# Patient Record
Sex: MALE | Race: WHITE | NOT HISPANIC OR LATINO | Employment: OTHER | URBAN - METROPOLITAN AREA
[De-identification: names, ages, dates, MRNs, and addresses within clinical notes are randomized per-mention and may not be internally consistent; named-entity substitution may affect disease eponyms.]

---

## 2017-02-24 ENCOUNTER — ALLSCRIPTS OFFICE VISIT (OUTPATIENT)
Dept: OTHER | Facility: OTHER | Age: 77
End: 2017-02-24

## 2017-02-24 DIAGNOSIS — E78.00 PURE HYPERCHOLESTEROLEMIA: ICD-10-CM

## 2017-02-24 DIAGNOSIS — I10 ESSENTIAL (PRIMARY) HYPERTENSION: ICD-10-CM

## 2017-02-24 DIAGNOSIS — E11.51 TYPE 2 DIABETES MELLITUS WITH DIABETIC PERIPHERAL ANGIOPATHY WITHOUT GANGRENE (HCC): ICD-10-CM

## 2017-03-01 ENCOUNTER — APPOINTMENT (OUTPATIENT)
Dept: LAB | Facility: CLINIC | Age: 77
End: 2017-03-01
Payer: MEDICARE

## 2017-03-01 ENCOUNTER — TRANSCRIBE ORDERS (OUTPATIENT)
Dept: LAB | Facility: CLINIC | Age: 77
End: 2017-03-01

## 2017-03-01 DIAGNOSIS — I10 ESSENTIAL (PRIMARY) HYPERTENSION: ICD-10-CM

## 2017-03-01 DIAGNOSIS — E11.51 TYPE 2 DIABETES MELLITUS WITH DIABETIC PERIPHERAL ANGIOPATHY WITHOUT GANGRENE (HCC): ICD-10-CM

## 2017-03-01 DIAGNOSIS — E78.00 PURE HYPERCHOLESTEROLEMIA: ICD-10-CM

## 2017-03-01 LAB
ALBUMIN SERPL BCP-MCNC: 4 G/DL (ref 3.5–5)
ALP SERPL-CCNC: 41 U/L (ref 46–116)
ALT SERPL W P-5'-P-CCNC: 40 U/L (ref 12–78)
ANION GAP SERPL CALCULATED.3IONS-SCNC: 8 MMOL/L (ref 4–13)
AST SERPL W P-5'-P-CCNC: 24 U/L (ref 5–45)
BASOPHILS # BLD AUTO: 0.01 THOUSANDS/ΜL (ref 0–0.1)
BASOPHILS NFR BLD AUTO: 0 % (ref 0–1)
BILIRUB SERPL-MCNC: 1.58 MG/DL (ref 0.2–1)
BUN SERPL-MCNC: 26 MG/DL (ref 5–25)
CALCIUM SERPL-MCNC: 9.3 MG/DL (ref 8.3–10.1)
CHLORIDE SERPL-SCNC: 102 MMOL/L (ref 100–108)
CHOLEST SERPL-MCNC: 144 MG/DL (ref 50–200)
CO2 SERPL-SCNC: 29 MMOL/L (ref 21–32)
CREAT SERPL-MCNC: 1.52 MG/DL (ref 0.6–1.3)
CREAT UR-MCNC: 91.6 MG/DL
EOSINOPHIL # BLD AUTO: 0.4 THOUSAND/ΜL (ref 0–0.61)
EOSINOPHIL NFR BLD AUTO: 7 % (ref 0–6)
ERYTHROCYTE [DISTWIDTH] IN BLOOD BY AUTOMATED COUNT: 13.3 % (ref 11.6–15.1)
EST. AVERAGE GLUCOSE BLD GHB EST-MCNC: 160 MG/DL
GFR SERPL CREATININE-BSD FRML MDRD: 44.6 ML/MIN/1.73SQ M
GLUCOSE SERPL-MCNC: 151 MG/DL (ref 65–140)
HBA1C MFR BLD: 7.2 % (ref 4.2–6.3)
HCT VFR BLD AUTO: 45.9 % (ref 36.5–49.3)
HDLC SERPL-MCNC: 46 MG/DL (ref 40–60)
HGB BLD-MCNC: 16.2 G/DL (ref 12–17)
LDLC SERPL CALC-MCNC: 67 MG/DL (ref 0–100)
LYMPHOCYTES # BLD AUTO: 1.72 THOUSANDS/ΜL (ref 0.6–4.47)
LYMPHOCYTES NFR BLD AUTO: 29 % (ref 14–44)
MCH RBC QN AUTO: 33.5 PG (ref 26.8–34.3)
MCHC RBC AUTO-ENTMCNC: 35.3 G/DL (ref 31.4–37.4)
MCV RBC AUTO: 95 FL (ref 82–98)
MICROALBUMIN UR-MCNC: 111 MG/L (ref 0–20)
MICROALBUMIN/CREAT 24H UR: 121 MG/G CREATININE (ref 0–30)
MONOCYTES # BLD AUTO: 0.58 THOUSAND/ΜL (ref 0.17–1.22)
MONOCYTES NFR BLD AUTO: 10 % (ref 4–12)
NEUTROPHILS # BLD AUTO: 3.27 THOUSANDS/ΜL (ref 1.85–7.62)
NEUTS SEG NFR BLD AUTO: 54 % (ref 43–75)
NRBC BLD AUTO-RTO: 0 /100 WBCS
PLATELET # BLD AUTO: 186 THOUSANDS/UL (ref 149–390)
PMV BLD AUTO: 10.8 FL (ref 8.9–12.7)
POTASSIUM SERPL-SCNC: 3.8 MMOL/L (ref 3.5–5.3)
PROT SERPL-MCNC: 7.2 G/DL (ref 6.4–8.2)
RBC # BLD AUTO: 4.84 MILLION/UL (ref 3.88–5.62)
SODIUM SERPL-SCNC: 139 MMOL/L (ref 136–145)
TRIGL SERPL-MCNC: 153 MG/DL
WBC # BLD AUTO: 5.99 THOUSAND/UL (ref 4.31–10.16)

## 2017-03-01 PROCEDURE — 85025 COMPLETE CBC W/AUTO DIFF WBC: CPT

## 2017-03-01 PROCEDURE — 82043 UR ALBUMIN QUANTITATIVE: CPT

## 2017-03-01 PROCEDURE — 36415 COLL VENOUS BLD VENIPUNCTURE: CPT

## 2017-03-01 PROCEDURE — 82570 ASSAY OF URINE CREATININE: CPT

## 2017-03-01 PROCEDURE — 80061 LIPID PANEL: CPT

## 2017-03-01 PROCEDURE — 83036 HEMOGLOBIN GLYCOSYLATED A1C: CPT

## 2017-03-01 PROCEDURE — 80053 COMPREHEN METABOLIC PANEL: CPT

## 2017-03-02 ENCOUNTER — GENERIC CONVERSION - ENCOUNTER (OUTPATIENT)
Dept: OTHER | Facility: OTHER | Age: 77
End: 2017-03-02

## 2017-03-28 ENCOUNTER — GENERIC CONVERSION - ENCOUNTER (OUTPATIENT)
Dept: OTHER | Facility: OTHER | Age: 77
End: 2017-03-28

## 2017-09-18 ENCOUNTER — ALLSCRIPTS OFFICE VISIT (OUTPATIENT)
Dept: OTHER | Facility: OTHER | Age: 77
End: 2017-09-18

## 2017-09-18 DIAGNOSIS — L81.9 DISORDER OF PIGMENTATION: ICD-10-CM

## 2017-09-18 PROCEDURE — 88305 TISSUE EXAM BY PATHOLOGIST: CPT | Performed by: INTERNAL MEDICINE

## 2017-09-19 ENCOUNTER — LAB REQUISITION (OUTPATIENT)
Dept: LAB | Facility: HOSPITAL | Age: 77
End: 2017-09-19
Payer: MEDICARE

## 2017-09-19 DIAGNOSIS — L81.9 DISORDER OF PIGMENTATION: ICD-10-CM

## 2017-09-27 ENCOUNTER — GENERIC CONVERSION - ENCOUNTER (OUTPATIENT)
Dept: OTHER | Facility: OTHER | Age: 77
End: 2017-09-27

## 2017-11-02 ENCOUNTER — GENERIC CONVERSION - ENCOUNTER (OUTPATIENT)
Dept: OTHER | Facility: OTHER | Age: 77
End: 2017-11-02

## 2018-01-10 NOTE — RESULT NOTES
Verified Results  (1) TISSUE EXAM [id number] 06:01PM Sydnee Lozano     Test Name Result Flag Reference   LAB AP CASE REPORT (Report)     Surgical Pathology Report             Case: [id number]                   Authorizing Provider: Tiffnay Johnson DO     Collected:      09/18/2017           Pathologist:      Elsa Dunaway MD      Received:      09/19/2017 0875        Specimen:  Skin, Other, Left Lateral Arm   LAB AP FINAL DIAGNOSIS (Report)     A  Skin, Left Lateral Arm, shave biopsy:  - Seborrheic keratosis, pigmented, inflamed/lichenoid and hyperkeratotic  Interpretation performed at [place] [address]  Electronically signed by Elsa Dunaway MD on 9/25/2017 at 6:01 PM   LAB AP SURGICAL ADDITIONAL INFORMATION (Report)     All controls performed with the immunohistochemical stains reported above   reacted appropriately  These tests were developed and their performance   characteristics determined by [facility] or   [address] [place] [address]  They may not be cleared or approved by the [place] S  Food and Drug Administration  The FDA has determined that such clearance   or approval is not necessary  These tests are used for clinical purposes  They should not be regarded as investigational or for research  This   laboratory has been approved by [place] [address], designated as a high-complexity   laboratory and is qualified to perform these tests  LAB AP GROSS DESCRIPTION (Report)     A  The specimen is received in formalin, labeled with the patient's name   and hospital number, and is designated skin shave left lateral arm  The   specimen consists of a tan superficial shave of skin measuring 1 x 0 8 x   0 1 cm  The skin surface exhibits a tan keratotic papule measuring 0 9 x   0 8 x 0 1 cm  The margin of resection is inked green  The skin surface is   inked red  The specimen is trisected lengthwise  Entirely submitted  One   cassette      Note: The estimated total formalin fixation time based upon information   provided by the submitting clinician and the standard processing schedule   is less than 72 hours   MAC   LAB AP CLINICAL INFORMATION      1 cm atypical pigmented lesion

## 2018-01-13 VITALS
HEART RATE: 76 BPM | BODY MASS INDEX: 28.56 KG/M2 | SYSTOLIC BLOOD PRESSURE: 134 MMHG | DIASTOLIC BLOOD PRESSURE: 82 MMHG | HEIGHT: 67 IN | RESPIRATION RATE: 16 BRPM | TEMPERATURE: 98.3 F | WEIGHT: 182 LBS

## 2018-01-13 NOTE — MISCELLANEOUS
Message   Date: 29 Apr 2016 10:43 AM EST, Recorded By: Jazmyn Galvez For: 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)   3  Benign essential hypertension (401 1) (I10)   4  BPH with urinary obstruction (600 01,599 69) (N40 1,N13 8)   5  Chronic obstructive pulmonary disease (496) (J44 9)   6  Condition influencing health status (V49 9) (Z78 9)   7  Coronary artery disease (414 00) (I25 10)   8  Depression screen (V79 0) (Z13 89)   9  Diabetes mellitus with peripheral circulatory disorder (250 70,443 81) (E11 51)   10  DMII (diabetes mellitus, type 2) (250 00) (E11 9)   11  Encounter for screening colonoscopy (V76 51) (Z12 11)   12  Eye exam, routine (V72 0) (Z01 00)   13  Fungal dermatosis (111 9) (B36 9)   14  Hydronephrosis (591) (N13 30)   15  Hypercholesterolemia (272 0) (E78 0)   16  Hypertension (401 9) (I10)   17  Need for influenza vaccination (V04 81) (Z23)   18  Need for shingles vaccine (V04 89) (Z23)   19  Need for tetanus booster (V03 7) (Z23)   20  Nocturnal polyuria (788 43) (R35 1)   21  Osteoarthritis (715 90) (M19 90)   22  Osteoporosis (733 00) (M81 0)   23  Peripheral arterial disease (443 9) (I73 9)   24  Prostatitis (601 9) (N41 9)   25  Screening for AAA (abdominal aortic aneurysm) (V81 2) (Z13 6)   26  Screening for genitourinary condition (V81 6) (Z13 89)   27  Screening for glaucoma (V80 1) (Z13 5)   28  Screening for neurological condition (V80 09) (Z13 89)   29  Sleep related leg cramps (327 52) (G47 62)   30  Special screening examination for neoplasm of prostate (V76 44) (Z12 5)   31   Urinary incontinence (788 30) (R32) 32  Urinary tract infection (599 0) (N39 0)    Current Meds   1  Atenolol 50 MG Oral Tablet; TAKE 1 TABLET DAILY; Therapy: 05FUX9442 to (Evaluate:18Jun2016)  Requested for: 61TUD6637; Last   Rx:77Eph8014 Ordered   2  Monie Low Strength 81 MG TBEC; Therapy: (Recorded:14Cpg5646) to Recorded   3  Ciprofloxacin HCl - 500 MG Oral Tablet; Take 1 tablet twice daily; Therapy: 91LTS5653 to (Thony Hem)  Requested for: 36OTS9603; Last   Rx:53Hcq4432 Ordered   4  Cyclobenzaprine HCl - 10 MG Oral Tablet; TAKE ONE OR  TWO TABLETS BY MOUTH    DAILY; Therapy: 93INT6833 to (Raymond Apple)  Requested for: 23Rmm7842; Last   Rx:23Ksb9937 Ordered   5  Finasteride 5 MG Oral Tablet; TAKE 1 TABLET DAILY; Therapy: 55LRG8692 to (Evaluate:37Big0597)  Requested for: 98ZCQ9308; Last   Rx:24Hmf9362 Ordered   6  MetFORMIN HCl - 500 MG Oral Tablet; 1 TAB PO QD; Therapy: 09LLQ7044 to (Evaluate:89Uwj7111)  Requested for: 21Mar2016; Last   Rx:21Mar2016 Ordered   7  OneTouch UltraSoft Lancets Miscellaneous; USE AS DIRECTED; Therapy: 75Mlw2617 to (05 12 73 93 30)  Requested for: 82QUZ9868; Last   Rx:88Rkb1144 Ordered   8  Simvastatin 10 MG Oral Tablet; TAKE 1 TABLET IN THE EVENING; Therapy: 30CQL8602 to (Evaluate:97Dlz1455)  Requested for: 37HXP7015; Last   Rx:84Vgu5240 Ordered   9  Tamsulosin HCl - 0 4 MG Oral Capsule; TAKE 1 CAPSULE Bedtime; Therapy: 52AYN6461 to (Last Rx:35Xfk3691)  Requested for: 49WIT2721 Ordered   10  Terazosin HCl - 2 MG Oral Capsule; TAKE 2 CAPSULES DAILY; Therapy: 56OAV2119 to (Evaluate:70Ale4153)  Requested for: 74ZOT3813; Last    Rx:67Gsb3564 Ordered   11  Zoster (Zostavax); INJECT 0 65  ML Subcutaneous; Therapy: 19Oul6503 to (Evaluate:85Acp3847); Last Rx:59Tkz3180 Ordered    Allergies    1  No Known Drug Allergies    Signatures   Electronically signed by : Sayda Luo DO;  Apr 29 2016  1:00PM EST                       (Co-author)

## 2018-01-15 VITALS
SYSTOLIC BLOOD PRESSURE: 146 MMHG | WEIGHT: 173.13 LBS | OXYGEN SATURATION: 96 % | HEART RATE: 88 BPM | DIASTOLIC BLOOD PRESSURE: 92 MMHG | TEMPERATURE: 98.5 F | HEIGHT: 67 IN | RESPIRATION RATE: 18 BRPM | BODY MASS INDEX: 27.17 KG/M2

## 2018-01-17 NOTE — RESULT NOTES
Verified Results  (1) CALCULI, RENAL [id number] 06:54PM Reza Simpson     Test Name Result Flag Reference   COLOR Brown     SIZE Comment mm     Specimen received as whole stones  STONE WEIGHT 742 7 mg     COMPOSITION Comment     Percentage (Represents the % composition)   CA OXALATE,MONOHYDR  70 %     CALCIUM PHOSPHATE 30 %     NIDUS Comment     No Nidus visualized   COMMENT-STONE2 Note:     Please do not submit specimens on Q-Tips, in tape, on filters, or inliquids such as blood, urine or formalin  This may cause unnecessarybiohazards, erroneous results and/or delay in the processing of thespecimen  PLEASE NOTE Comment     Calculi report with photograph will follow via computer, mail orcourier delivery  COMMENT-STONE3 Comment     Physician questions regarding Calculi Analysis contact [facility] at:[phone]  PHOTO Comment     Photograph will follow under separate cover    Performed at:  [address]  [id number]  : Margarita Navarro MD, Phone:  [phone]

## 2018-01-18 NOTE — RESULT NOTES
Verified Results  (1) CBC/PLT/DIFF 59OJM8951 08:14AM [facility] Order Number: [id number]     Test Name Result Flag Reference   WBC COUNT 5 99 Thousand/uL  4 31-10 16   RBC COUNT 4 84 Million/uL  3 88-5 62   HEMOGLOBIN 16 2 g/dL  12 0-17 0   HEMATOCRIT 45 9 %  36 5-49 3   MCV 95 fL  82-98   MCH 33 5 pg  26 8-34 3   MCHC 35 3 g/dL  31 4-37 4   RDW 13 3 %  11 6-15 1   MPV 10 8 fL  8 9-12 7   PLATELET COUNT 888 Thousands/uL  149-390   nRBC AUTOMATED 0 /100 WBCs     NEUTROPHILS RELATIVE PERCENT 54 %  43-75   LYMPHOCYTES RELATIVE PERCENT 29 %  14-44   MONOCYTES RELATIVE PERCENT 10 %  4-12   EOSINOPHILS RELATIVE PERCENT 7 % H 0-6   BASOPHILS RELATIVE PERCENT 0 %  0-1   NEUTROPHILS ABSOLUTE COUNT 3 27 Thousands/? ??L  1 85-7 62   LYMPHOCYTES ABSOLUTE COUNT 1 72 Thousands/? ??L  0 60-4 47   MONOCYTES ABSOLUTE COUNT 0 58 Thousand/? ??L  0 17-1 22   EOSINOPHILS ABSOLUTE COUNT 0 40 Thousand/? ??L  0 00-0 61   BASOPHILS ABSOLUTE COUNT 0 01 Thousands/? ??L  0 00-0 10   - Patient Instructions: This bloodwork is non-fasting  Please drink two glasses of water morning of bloodwork  - Patient Instructions: This bloodwork is non-fasting  Please drink two glasses of water morning of bloodwork  (1) COMPREHENSIVE METABOLIC PANEL 50IBE6111 70:46GB [facility] Order Number: [id number]     Test Name Result Flag Reference   GLUCOSE,RANDM 151 mg/dL H    If the patient is fasting, the ADA then defines impaired fasting glucose as > 100 mg/dL and diabetes as > or equal to 123 mg/dL     SODIUM 139 mmol/L  136-145   POTASSIUM 3 8 mmol/L  3 5-5 3   CHLORIDE 102 mmol/L  100-108   CARBON DIOXIDE 29 mmol/L  21-32   ANION GAP (CALC) 8 mmol/L  4-13   BLOOD UREA NITROGEN 26 mg/dL H 5-25   CREATININE 1 52 mg/dL H 0 60-1 30   Standardized to IDMS reference method   CALCIUM 9 3 mg/dL  8 3-10 1   BILI, TOTAL 1 58 mg/dL H 0 20-1 00   ALK PHOSPHATAS 41 U/L L    ALT (SGPT) 40 U/L  12-78   AST(SGOT) 24 U/L  5-45 ALBUMIN 4 0 g/dL  3 5-5 0   TOTAL PROTEIN 7 2 g/dL  6 4-8 2   eGFR Non-African American 44 6 ml/min/1 73sq m     - Patient Instructions: This is a fasting blood test  Water, black tea or black coffee only after 9:00pm the night before test Drink 2 glasses of water the morning of test   National Kidney Disease Education Program recommendations are as follows:  GFR calculation is accurate only with a steady state creatinine  Chronic Kidney disease less than 60 ml/min/1 73 sq  meters  Kidney failure less than 15 ml/min/1 73 sq  meters  (1) LIPID PANEL FASTING W DIRECT LDL REFLEX [id number] 08:14AM Bertha London Order Number: [id number]     Test Name Result Flag Reference   CHOLESTEROL 144 mg/dL     LDL CHOLESTEROL CALCULATED 67 mg/dL  0-100   - Patient Instructions: This is a fasting blood test  Water, black tea or black coffee only after 9:00pm the night before test   Drink 2 glasses of water the morning of test     - Patient Instructions: This is a fasting blood test  Water, black tea or black coffee only after 9:00pm the night before test Drink 2 glasses of water the morning of test   Triglyceride:         Normal              <150 mg/dl       Borderline High    150-199 mg/dl       High               200-499 mg/dl       Very High          >499 mg/dl  Cholesterol:         Desirable        <200 mg/dl      Borderline High  200-239 mg/dl      High             >239 mg/dl  HDL Cholesterol:        High    >59 mg/dL      Low     <41 mg/dL  LDL Cholesterol:        Optimal          <100 mg/dl        Near Optimal     100-129 mg/dl        Above Optimal          Borderline High   130-159 mg/dl          High              160-189 mg/dl          Very High        >189 mg/dl  LDL CALCULATED:    This screening LDL is a calculated result  It does not have the accuracy of the Direct Measured LDL in the monitoring of patients with hyperlipidemia and/or statin therapy     Direct Measure LDL (EQA775) must be ordered separately in these patients  TRIGLYCERIDES 153 mg/dL H <=150   Specimen collection should occur prior to N-Acetylcysteine or Metamizole administration due to the potential for falsely depressed results  HDL,DIRECT 46 mg/dL  40-60   Specimen collection should occur prior to Metamizole administration due to the potential for falsely depressed results  (1) HEMOGLOBIN A1C [id number] 08:14AM mYwindow Order Number: [id number]     Test Name Result Flag Reference   HEMOGLOBIN A1C 7 2 % H 4 2-6 3   EST  AVG   GLUCOSE 160 mg/dl       (1) MICROALBUMIN CREATININE RATIO, RANDOM URINE [id number] 08:14AM [facility] Order Number: [id number]     Test Name Result Flag Reference   MICROALBUMIN/ CREAT R 121 mg/g creatinine H 0-30   MICROALBUMIN,URINE 111 0 mg/L H 0 0-20 0   CREATININE URINE 91 6 mg/dL

## 2018-01-18 NOTE — RESULT NOTES
Verified Results  (1) TISSUE EXAM [id number] 12:00AM Mark Miranda     Test Name Result Flag Reference   LAB AP CASE REPORT (Report)     Surgical Pathology Report             Case: [id number]                   Authorizing Provider: Fahad Awan DO     Collected:      11/09/2016           Pathologist:      Kori Cueto MD      Received:      11/10/2016 1538        Specimens:  A) - Skin, Other, Skin shaving nose                                  B) - Skin, Other, Skin shaving right ear   LAB AP FINAL DIAGNOSIS (Report)     A  Skin, nose, shave biopsy:  - Ulcerated basal cell carcinoma, nodular type with metatypical features,   extending    to the peripheral border and base of biopsy  See Note  B  Skin, right ear, shave biopsy:  - Ulcerated basal cell carcinoma, nodular and adenoid types, extending to   the peripheral    border and base of biopsy  Interpretation performed at [place] [address]  Electronically signed by Kori Cueto MD on 11/14/2016 at 4:07 PM   LAB AP NOTE      In specimen A, the differential diagnosis includes a basosquamous   carcinoma  LAB AP SURGICAL ADDITIONAL INFORMATION (Report)     These tests were developed and their performance characteristics   determined by [facility] or [place]  They may not be cleared or approved by the [place]  Food and   Drug Administration  The FDA has determined that such clearance or   approval is not necessary  These tests are used for clinical purposes  They should not be regarded as investigational or for research  This   laboratory has been approved by CLIA 88, designated as a high-complexity   laboratory and is qualified to perform these tests  LAB AP GROSS DESCRIPTION (Report)     A  The specimen is received in formalin, labeled with the patient's name   and hospital number, and is designated skin shave nose   The specimen   consists of a tan superficial shave of skin measuring 0 6 x 0 4 x 0 1 cm  The skin surface exhibits a tan plaque-like lesion measuring 0 5 x 0 3 x   0 1 cm  The margin is inked blue  The skin surface is inked red  The   specimen is bisected lengthwise  Entirely submitted  One cassette  B  The specimen is received in formalin, labeled with the patient's name   and hospital number, and is designated skin shave right ear  The   specimen consists of 2 tan superficial shavings of skin, each measuring   0 6 cm in greatest dimension  The skin surfaces are partially covered by   tan plaque-like lesions measuring 0 6 cm on each shave  The margin of each   shave is inked blue  The skin surface of each shave is inked red  The   wider shave is bisected lengthwise  Entirely submitted  Two cassettes  Bisected shave in cassette 1  Note: The estimated total formalin fixation time based upon information   provided by the submitting clinician and the standard processing schedule   is 51 5 hours   MAC       Plan  Basal cell carcinoma of skin    · *1 - 20 [facility] DERMATOLOGY Physician Referral  Consult  [facility] noted in 2 areas on  biopsy  Status: Hold For - Scheduling  Requested for: 27OOZ2668  Care Summary provided  : Yes

## 2018-03-12 DIAGNOSIS — I10 HYPERTENSION, UNSPECIFIED TYPE: ICD-10-CM

## 2018-03-12 DIAGNOSIS — E78.00 HYPERCHOLESTEREMIA: Primary | ICD-10-CM

## 2018-03-12 RX ORDER — METOPROLOL SUCCINATE 50 MG/1
1 TABLET, EXTENDED RELEASE ORAL DAILY
COMMUNITY
Start: 2017-09-06 | End: 2018-03-12 | Stop reason: SDUPTHER

## 2018-03-13 DIAGNOSIS — I10 ESSENTIAL HYPERTENSION: Primary | ICD-10-CM

## 2018-03-13 DIAGNOSIS — E78.00 HYPERCHOLESTEREMIA: ICD-10-CM

## 2018-03-13 RX ORDER — SIMVASTATIN 10 MG
10 TABLET ORAL
Qty: 30 TABLET | Refills: 5 | Status: SHIPPED | OUTPATIENT
Start: 2018-03-13 | End: 2018-06-11

## 2018-03-13 RX ORDER — METOPROLOL SUCCINATE 50 MG/1
50 TABLET, EXTENDED RELEASE ORAL DAILY
Qty: 30 TABLET | Refills: 5 | Status: SHIPPED | OUTPATIENT
Start: 2018-03-13 | End: 2018-06-11

## 2018-03-13 RX ORDER — METOPROLOL SUCCINATE 50 MG/1
TABLET, EXTENDED RELEASE ORAL
Qty: 30 TABLET | Refills: 5 | Status: SHIPPED | OUTPATIENT
Start: 2018-03-13 | End: 2018-06-11

## 2018-03-13 RX ORDER — SIMVASTATIN 10 MG
TABLET ORAL
Qty: 30 TABLET | Refills: 5 | Status: SHIPPED | OUTPATIENT
Start: 2018-03-13 | End: 2018-10-18 | Stop reason: SDUPTHER

## 2018-04-14 DIAGNOSIS — E11.9 TYPE 2 DIABETES MELLITUS WITHOUT COMPLICATION, WITHOUT LONG-TERM CURRENT USE OF INSULIN (HCC): Primary | ICD-10-CM

## 2018-05-29 DIAGNOSIS — R33.9 URINARY RETENTION: Primary | ICD-10-CM

## 2018-05-29 RX ORDER — FINASTERIDE 5 MG/1
5 TABLET, FILM COATED ORAL DAILY
Qty: 90 TABLET | Refills: 3 | Status: SHIPPED | OUTPATIENT
Start: 2018-05-29 | End: 2019-05-14 | Stop reason: SDUPTHER

## 2018-06-06 ENCOUNTER — OFFICE VISIT (OUTPATIENT)
Dept: INTERNAL MEDICINE CLINIC | Facility: CLINIC | Age: 78
End: 2018-06-06
Payer: MEDICARE

## 2018-06-06 VITALS
BODY MASS INDEX: 26.04 KG/M2 | TEMPERATURE: 97.8 F | OXYGEN SATURATION: 93 % | DIASTOLIC BLOOD PRESSURE: 100 MMHG | SYSTOLIC BLOOD PRESSURE: 182 MMHG | WEIGHT: 171.8 LBS | RESPIRATION RATE: 16 BRPM | HEIGHT: 68 IN | HEART RATE: 70 BPM

## 2018-06-06 DIAGNOSIS — H60.332 ACUTE SWIMMER'S EAR OF LEFT SIDE: ICD-10-CM

## 2018-06-06 DIAGNOSIS — E11.51 DIABETES MELLITUS WITH PERIPHERAL CIRCULATORY DISORDER (HCC): Primary | ICD-10-CM

## 2018-06-06 DIAGNOSIS — I10 ESSENTIAL HYPERTENSION: ICD-10-CM

## 2018-06-06 DIAGNOSIS — H61.23 BILATERAL IMPACTED CERUMEN: ICD-10-CM

## 2018-06-06 PROCEDURE — 69209 REMOVE IMPACTED EAR WAX UNI: CPT | Performed by: INTERNAL MEDICINE

## 2018-06-06 PROCEDURE — 99213 OFFICE O/P EST LOW 20 MIN: CPT | Performed by: INTERNAL MEDICINE

## 2018-06-06 NOTE — PROGRESS NOTES
Assessment/Plan:       Diagnoses and all orders for this visit:    Diabetes mellitus with peripheral circulatory disorder ([facility] [address] )    Bilateral impacted cerumen    Essential hypertension    Other orders  -     Ear cerumen removal        No problem-specific Assessment & Plan notes found for this encounter  We will follow up on his BP at follow up    Subjective:      Patient ID: Santy Valencia is a [de-identified] y o  male  HPI    The following portions of the patient's history were reviewed and updated as appropriate:   He has a past medical history of Arthritis; Basal cell carcinoma of skin (11/15/2016); Chronic obstructive pulmonary disease ([facility] [address] ) (9/5/2012); Constipation; Coronary artery disease (5/22/2012); Depression; Hearing difficulty of both ears; High cholesterol; Hypertension; Osteoporosis (5/22/2012); Pacemaker; Poorly fitting dentures; Shortness of breath; Urinary frequency; and Wears glasses  ,   does not have any pertinent problems on file  ,   has a past surgical history that includes Cardiac pacemaker placement; Cataract extraction w/  intraocular lens implant; Kidney stone surgery; Tonsillectomy; pr transurethral elec-surg prostatectom (N/A, 5/9/2016); and CYSTOSCOPY (N/A, 5/9/2016)  ,  family history is not on file  ,   reports that he quit smoking about 38 years ago  He has quit using smokeless tobacco  He reports that he does not drink alcohol or use drugs  ,  has No Known Allergies     Current Outpatient Prescriptions   Medication Sig Dispense Refill    atenolol (TENORMIN) 50 mg tablet Take 50 mg by mouth daily   calcium carbonate (TUMS) 500 mg chewable tablet Chew 1 tablet as needed for indigestion or heartburn   cyclobenzaprine (FLEXERIL) 10 mg tablet Take 10 mg by mouth daily        finasteride (PROSCAR) 5 mg tablet Take 1 tablet (5 mg total) by mouth daily 90 tablet 3    HYDROcodone-acetaminophen (NORCO) 5-325 mg per tablet Take 1 tablet by mouth every 4 (four) hours as needed for moderate pain for up to 15 doses  Max Daily Amount: 6 tablets 15 tablet 0    Ibuprofen (ADVIL) 200 MG CAPS Take by mouth as needed   magnesium hydroxide (MILK OF MAGNESIA) 400 mg/5 mL oral suspension Take by mouth as needed for constipation   metFORMIN (GLUCOPHAGE) 500 mg tablet TAKE ONE TABLET BY MOUTH ONCE DAILY 30 tablet 5    metoprolol succinate (TOPROL-XL) 50 mg 24 hr tablet Take 1 tablet (50 mg total) by mouth daily 30 tablet 5    metoprolol succinate (TOPROL-XL) 50 mg 24 hr tablet TAKE ONE TABLET BY MOUTH ONCE DAILY 30 tablet 5    Multiple Vitamin (MULTIVITAMIN) capsule Take 1 capsule by mouth daily   simvastatin (ZOCOR) 10 mg tablet Take 1 tablet (10 mg total) by mouth daily at bedtime 30 tablet 5    simvastatin (ZOCOR) 10 mg tablet TAKE ONE TABLET BY MOUTH ONCE DAILY IN THE EVENING 30 tablet 5    tamsulosin (FLOMAX) 0 4 mg Take 0 4 mg by mouth daily with dinner   terazosin (HYTRIN) 2 mg capsule Take 2 mg by mouth daily at bedtime  No current facility-administered medications for this visit  Review of Systems   Constitutional: Negative for chills, fatigue and fever  HENT: Negative for ear pain, sinus pain and sneezing  Respiratory: Negative for chest tightness and shortness of breath  Cardiovascular: Negative for chest pain and palpitations  Gastrointestinal: Negative for abdominal pain, constipation, diarrhea, nausea and vomiting  Musculoskeletal: Negative for back pain and myalgias  Objective:  Vitals:    06/06/18 0922   BP: (!) 182/100   BP Location: Left arm   Patient Position: Sitting   Cuff Size: Large   Pulse: 70   Resp: 16   Temp: 97 8 °F (36 6 °C)   SpO2: 93%   Weight: 77 9 kg (171 lb 12 8 oz)   Height: 5' 8" (1 727 m)     Body mass index is 26 12 kg/m²       Physical Exam    Ear cerumen removal  Date/Time: 6/6/2018 9:47 AM  Performed by: Dre Cantrell by: Edward Balderas     Patient location:  Clinic  Other Assisting Provider: No    Consent: Consent obtained:  Verbal    Consent given by:  Patient    Risks discussed:  TM perforation    Alternatives discussed:  No treatment  Universal protocol:     Patient identity confirmed:  Verbally with patient  Procedure details:     Local anesthetic:  None    Location:  L ear and R ear    Procedure type: irrigation      Approach:  External    Equipment used:  Loop  Post-procedure details:     Complication:  None    Hearing quality:  Normal    Patient tolerance of procedure:   Tolerated with difficulty

## 2018-06-11 ENCOUNTER — OFFICE VISIT (OUTPATIENT)
Dept: INTERNAL MEDICINE CLINIC | Facility: CLINIC | Age: 78
End: 2018-06-11
Payer: MEDICARE

## 2018-06-11 VITALS
RESPIRATION RATE: 18 BRPM | DIASTOLIC BLOOD PRESSURE: 102 MMHG | WEIGHT: 168.4 LBS | HEIGHT: 68 IN | TEMPERATURE: 98.8 F | BODY MASS INDEX: 25.52 KG/M2 | SYSTOLIC BLOOD PRESSURE: 170 MMHG | HEART RATE: 83 BPM | OXYGEN SATURATION: 94 %

## 2018-06-11 DIAGNOSIS — I25.10 CORONARY ARTERY DISEASE INVOLVING NATIVE HEART WITHOUT ANGINA PECTORIS, UNSPECIFIED VESSEL OR LESION TYPE: Primary | ICD-10-CM

## 2018-06-11 DIAGNOSIS — I10 ESSENTIAL HYPERTENSION: ICD-10-CM

## 2018-06-11 PROCEDURE — 99214 OFFICE O/P EST MOD 30 MIN: CPT | Performed by: INTERNAL MEDICINE

## 2018-06-11 RX ORDER — ATENOLOL 50 MG/1
50 TABLET ORAL DAILY
Qty: 30 TABLET | Refills: 5 | Status: SHIPPED | OUTPATIENT
Start: 2018-06-11 | End: 2018-10-08 | Stop reason: SDUPTHER

## 2018-06-11 NOTE — PROGRESS NOTES
Assessment/Plan:       Diagnoses and all orders for this visit:    Coronary artery disease involving native heart without angina pectoris, unspecified vessel or lesion type  -     atenolol (TENORMIN) 50 mg tablet; Take 1 tablet (50 mg total) by mouth daily for 180 days  -     NM myocardial perfusion spect (rx stress and/or rest); Future    Essential hypertension  -     atenolol (TENORMIN) 50 mg tablet; Take 1 tablet (50 mg total) by mouth daily for 180 days  -     NM myocardial perfusion spect (rx stress and/or rest); Future        No problem-specific Assessment & Plan notes found for this encounter  The patient will follow up on with an NST  He has OA of the right knee and can not walk on the treadmill  Subjective:      Patient ID: Mana Izquierdo is a [de-identified] y o  male  The patient had an episode of sub sternal chest pain  He notes that it was pressure like, but was not related to effort  He notes no issues with GERD or changes in the pattern of pain with inspiration  The patient states that he has also had issues with episodic blurred vision  The patient states that he was discharged from the ER with negative cardiac markers and an equivocal EKG  He states that the chest pain had resolved  He notes no return of the pain, but does have a significant history of CAD  He is noted to have issues with HTN today  I am uncertain what he is taking, but he is not on a Beta Blocker per the patient  He notes that he was on Atenolol in the past and would like to restart this  Hypertension   This is a chronic problem  The current episode started more than 1 year ago  The problem has been rapidly worsening since onset  The problem is uncontrolled  Associated symptoms include blurred vision  Pertinent negatives include no anxiety, chest pain, headaches, malaise/fatigue, neck pain, orthopnea, palpitations, peripheral edema, PND, shortness of breath or sweats  There are no associated agents to hypertension   Risk factors for coronary artery disease include male gender and dyslipidemia  Past treatments include angiotensin blockers  The current treatment provides mild improvement  Hypertensive end-organ damage includes CAD/MI  There is no history of angina, kidney disease, CVA, heart failure or PVD  The following portions of the patient's history were reviewed and updated as appropriate:   He has a past medical history of Abnormal weight loss; Achrochordon; Arthritis; Basal cell carcinoma of skin (11/15/2016); Chronic obstructive pulmonary disease ([facility] [address] ) (9/5/2012); Condition influencing health status; Constipation; Coronary artery disease (5/22/2012); Depression; Fungal dermatosis; Hearing difficulty of both ears; High cholesterol; Hydronephrosis; Osteoporosis (5/22/2012); Pacemaker; Poorly fitting dentures; Prostatitis; Shortness of breath; Type 2 diabetes mellitus ([facility] ); Urinary frequency; Urinary incontinence; and Wears glasses  ,   does not have any pertinent problems on file  ,   has a past surgical history that includes Cardiac pacemaker placement; Cataract extraction w/  intraocular lens implant; Kidney stone surgery; Tonsillectomy; pr transurethral elec-surg prostatectom (N/A, 5/9/2016); CYSTOSCOPY (N/A, 5/9/2016); Other surgical history; and Inguinal hernia repair  ,  family history includes Stroke in his father  ,   reports that he quit smoking about 38 years ago  His smoking use included Cigars  He has quit using smokeless tobacco  He reports that he does not drink alcohol or use drugs  ,  has No Known Allergies     Current Outpatient Prescriptions   Medication Sig Dispense Refill    finasteride (PROSCAR) 5 mg tablet Take 1 tablet (5 mg total) by mouth daily 90 tablet 3    metFORMIN (GLUCOPHAGE) 500 mg tablet TAKE ONE TABLET BY MOUTH ONCE DAILY 30 tablet 5    simvastatin (ZOCOR) 10 mg tablet TAKE ONE TABLET BY MOUTH ONCE DAILY IN THE EVENING 30 tablet 5    tamsulosin (FLOMAX) 0 4 mg Take 0 4 mg by mouth daily with dinner   terazosin (HYTRIN) 2 mg capsule 2 mg TAKE 2 TABLETS AT BEDTIME       atenolol (TENORMIN) 50 mg tablet Take 1 tablet (50 mg total) by mouth daily for 180 days 30 tablet 5    calcium carbonate (TUMS) 500 mg chewable tablet Chew 1 tablet as needed for indigestion or heartburn   magnesium hydroxide (MILK OF MAGNESIA) 400 mg/5 mL oral suspension Take by mouth as needed for constipation   Multiple Vitamin (MULTIVITAMIN) capsule Take 1 capsule by mouth daily   neomycin-polymyxin-hydrocortisone (CORTISPORIN) otic solution Administer 4 drops into the left ear every 6 (six) hours for 3 days 10 mL 0     No current facility-administered medications for this visit  Review of Systems   Constitutional: Negative for chills, fatigue, fever and malaise/fatigue  HENT: Negative for ear pain, sinus pain and sore throat  Eyes: Positive for blurred vision  Respiratory: Negative for chest tightness and shortness of breath  Cardiovascular: Negative for chest pain, palpitations, orthopnea and PND  Gastrointestinal: Negative for abdominal pain, constipation, diarrhea and nausea  Genitourinary: Negative  Musculoskeletal: Negative  Negative for neck pain  Skin: Negative  Neurological: Negative  Negative for headaches  Psychiatric/Behavioral: Negative  Objective:  Vitals:    06/11/18 0958   BP: (!) 170/102   BP Location: Left arm   Patient Position: Sitting   Cuff Size: Large   Pulse: 83   Resp: 18   Temp: 98 8 °F (37 1 °C)   SpO2: 94%   Weight: 76 4 kg (168 lb 6 4 oz)   Height: 5' 8" (1 727 m)     Body mass index is 25 61 kg/m²  Physical Exam   Constitutional: He is oriented to person, place, and time  He appears well-developed and well-nourished  HENT:   Head: Normocephalic and atraumatic  Eyes: Conjunctivae are normal  Pupils are equal, round, and reactive to light  Cardiovascular: Normal rate, regular rhythm, normal heart sounds and intact distal pulses  Pulmonary/Chest: Effort normal and breath sounds normal  No respiratory distress  He has no wheezes  He exhibits no tenderness  Abdominal: Soft  Bowel sounds are normal  He exhibits no distension  There is no tenderness  There is no guarding  Musculoskeletal: Normal range of motion  Neurological: He is alert and oriented to person, place, and time  Skin: Skin is warm and dry  Psychiatric: He has a normal mood and affect

## 2018-07-10 ENCOUNTER — HOSPITAL ENCOUNTER (OUTPATIENT)
Dept: NON INVASIVE DIAGNOSTICS | Facility: CLINIC | Age: 78
Discharge: HOME | End: 2018-07-10
Payer: MEDICARE

## 2018-07-10 DIAGNOSIS — I10 ESSENTIAL HYPERTENSION: ICD-10-CM

## 2018-07-10 DIAGNOSIS — I25.10 CORONARY ARTERY DISEASE INVOLVING NATIVE HEART WITHOUT ANGINA PECTORIS, UNSPECIFIED VESSEL OR LESION TYPE: ICD-10-CM

## 2018-07-10 PROCEDURE — 93017 CV STRESS TEST TRACING ONLY: CPT

## 2018-07-10 PROCEDURE — A9502 TC99M TETROFOSMIN: HCPCS

## 2018-07-10 PROCEDURE — 78452 HT MUSCLE IMAGE SPECT MULT: CPT

## 2018-07-18 ENCOUNTER — OFFICE VISIT (OUTPATIENT)
Dept: INTERNAL MEDICINE CLINIC | Facility: CLINIC | Age: 78
End: 2018-07-18
Payer: MEDICARE

## 2018-07-18 VITALS
WEIGHT: 170.2 LBS | HEIGHT: 68 IN | OXYGEN SATURATION: 96 % | RESPIRATION RATE: 16 BRPM | BODY MASS INDEX: 25.79 KG/M2 | DIASTOLIC BLOOD PRESSURE: 84 MMHG | TEMPERATURE: 97.4 F | SYSTOLIC BLOOD PRESSURE: 150 MMHG | HEART RATE: 64 BPM

## 2018-07-18 DIAGNOSIS — N40.1 BPH WITH URINARY OBSTRUCTION: ICD-10-CM

## 2018-07-18 DIAGNOSIS — E11.51 DIABETES MELLITUS WITH PERIPHERAL CIRCULATORY DISORDER (HCC): ICD-10-CM

## 2018-07-18 DIAGNOSIS — I10 ESSENTIAL HYPERTENSION: Primary | ICD-10-CM

## 2018-07-18 DIAGNOSIS — E78.00 HYPERCHOLESTEROLEMIA: ICD-10-CM

## 2018-07-18 DIAGNOSIS — N13.8 BPH WITH URINARY OBSTRUCTION: ICD-10-CM

## 2018-07-18 PROCEDURE — 99214 OFFICE O/P EST MOD 30 MIN: CPT | Performed by: INTERNAL MEDICINE

## 2018-07-18 RX ORDER — LISINOPRIL 10 MG/1
10 TABLET ORAL DAILY
Qty: 30 TABLET | Refills: 1 | Status: SHIPPED | OUTPATIENT
Start: 2018-07-18 | End: 2018-07-26 | Stop reason: SDUPTHER

## 2018-07-18 NOTE — PROGRESS NOTES
Assessment/Plan:       Diagnoses and all orders for this visit:    Essential hypertension  -     lisinopril (ZESTRIL) 10 mg tablet; Take 1 tablet (10 mg total) by mouth daily for 60 days    Diabetes mellitus with peripheral circulatory disorder (HCC)  -     HEMOGLOBIN A1C W/ EAG ESTIMATION; Future  -     Microalbumin / creatinine urine ratio    Hypercholesterolemia  -     Lipid Panel with Direct LDL reflex; Future    BPH with urinary obstruction        No problem-specific Assessment & Plan notes found for this encounter  We reviewed the ER note and will stop the redundant Terazosin  The patient states that there are no other concerns at this time  Subjective:      Patient ID: Kavon Wu is a [de-identified] y o  male  The patient states that he has had issues with feeling off in the AM after taking his medications  The patient was in the ER at 6/13/18  We reviewed the labs and the subsequent NST (all reasonable)  The patient had noted that he felt his defibrillator went off and had reported to the ER  The patient states that they had looked at the defibrillator and it had not gone off  The patient notes no episodes since  Hypertension   This is a chronic problem  The current episode started more than 1 year ago  The problem is unchanged  The problem is resistant  Pertinent negatives include no chest pain, palpitations or shortness of breath  There are no associated agents to hypertension  Risk factors for coronary artery disease include male gender and diabetes mellitus  Past treatments include beta blockers and angiotensin blockers  The current treatment provides mild improvement  There are no compliance problems  Hypertensive end-organ damage includes CAD/MI and heart failure  Diabetes   He presents for his follow-up diabetic visit  He has type 2 diabetes mellitus  His disease course has been stable  Hypoglycemia symptoms include dizziness  There are no diabetic associated symptoms   Pertinent negatives for diabetes include no chest pain and no fatigue  There are no hypoglycemic complications  Symptoms are stable  There are no diabetic complications  Risk factors for coronary artery disease include hypertension, male sex, diabetes mellitus and dyslipidemia  Current diabetic treatment includes oral agent (monotherapy)  He is compliant with treatment all of the time  His weight is stable  There is no change in his home blood glucose trend  An ACE inhibitor/angiotensin II receptor blocker is being taken  The following portions of the patient's history were reviewed and updated as appropriate:   He has a past medical history of Abnormal weight loss; Achrochordon; Arthritis; Basal cell carcinoma of skin (11/15/2016); Chronic obstructive pulmonary disease ([facility] [address] ) (9/5/2012); Condition influencing health status; Constipation; Coronary artery disease (5/22/2012); Depression; Fungal dermatosis; Hearing difficulty of both ears; High cholesterol; Hydronephrosis; Osteoporosis (5/22/2012); Pacemaker; Poorly fitting dentures; Prostatitis; Shortness of breath; Type 2 diabetes mellitus ([facility] ); Urinary frequency; Urinary incontinence; and Wears glasses  ,   does not have any pertinent problems on file  ,   has a past surgical history that includes Cardiac pacemaker placement; Cataract extraction w/  intraocular lens implant; Kidney stone surgery; Tonsillectomy; pr transurethral elec-surg prostatectom (N/A, 5/9/2016); CYSTOSCOPY (N/A, 5/9/2016); Other surgical history; and Inguinal hernia repair  ,  family history includes Stroke in his father  ,   reports that he quit smoking about 38 years ago  His smoking use included Cigars  He has quit using smokeless tobacco  He reports that he does not drink alcohol or use drugs  ,  has No Known Allergies     Current Outpatient Prescriptions   Medication Sig Dispense Refill    atenolol (TENORMIN) 50 mg tablet Take 1 tablet (50 mg total) by mouth daily for 180 days 30 tablet 5    calcium carbonate (TUMS) 500 mg chewable tablet Chew 1 tablet as needed for indigestion or heartburn   finasteride (PROSCAR) 5 mg tablet Take 1 tablet (5 mg total) by mouth daily 90 tablet 3    magnesium hydroxide (MILK OF MAGNESIA) 400 mg/5 mL oral suspension Take by mouth as needed for constipation   metFORMIN (GLUCOPHAGE) 500 mg tablet TAKE ONE TABLET BY MOUTH ONCE DAILY 30 tablet 5    Multiple Vitamin (MULTIVITAMIN) capsule Take 1 capsule by mouth daily   simvastatin (ZOCOR) 10 mg tablet TAKE ONE TABLET BY MOUTH ONCE DAILY IN THE EVENING 30 tablet 5    tamsulosin (FLOMAX) 0 4 mg Take 0 4 mg by mouth daily with dinner   lisinopril (ZESTRIL) 10 mg tablet Take 1 tablet (10 mg total) by mouth daily for 60 days 30 tablet 1    neomycin-polymyxin-hydrocortisone (CORTISPORIN) otic solution Administer 4 drops into the left ear every 6 (six) hours for 3 days 10 mL 0     No current facility-administered medications for this visit  Review of Systems   Constitutional: Negative for chills, fatigue and fever  HENT: Negative for ear pain, sinus pain and sore throat  Respiratory: Negative for apnea, choking and shortness of breath  Cardiovascular: Negative for chest pain and palpitations  Gastrointestinal: Negative for abdominal pain, constipation, diarrhea, nausea and vomiting  Genitourinary: Negative  Musculoskeletal: Negative  Skin: Negative  Neurological: Positive for dizziness  Hematological: Negative  Psychiatric/Behavioral: Negative  Objective:  Vitals:    07/18/18 0811 07/18/18 0854   BP: (!) 176/98 150/84   BP Location: Left arm    Patient Position: Sitting    Cuff Size: Extra-Large    Pulse: 64    Resp: 16    Temp: (!) 97 4 °F (36 3 °C)    SpO2: 96%    Weight: 77 2 kg (170 lb 3 2 oz)    Height: 5' 8" (1 727 m)      Body mass index is 25 88 kg/m²  Physical Exam   Constitutional: He is oriented to person, place, and time   He appears well-developed and well-nourished  HENT:   Head: Normocephalic and atraumatic  Eyes: Conjunctivae and EOM are normal  Pupils are equal, round, and reactive to light  Neck: Normal range of motion  Neck supple  Cardiovascular: Normal rate, regular rhythm, normal heart sounds and intact distal pulses  Pulmonary/Chest: Effort normal and breath sounds normal    Abdominal: Soft  Bowel sounds are normal    Musculoskeletal: Normal range of motion  Neurological: He is alert and oriented to person, place, and time  Skin: Skin is warm and dry

## 2018-07-23 ENCOUNTER — REMOTE DEVICE CLINIC VISIT (OUTPATIENT)
Dept: CARDIOLOGY CLINIC | Facility: CLINIC | Age: 78
End: 2018-07-23
Payer: MEDICARE

## 2018-07-23 DIAGNOSIS — I49.5 SICK SINUS SYNDROME (HCC): Primary | ICD-10-CM

## 2018-07-23 DIAGNOSIS — Z95.0 PRESENCE OF PERMANENT CARDIAC PACEMAKER: ICD-10-CM

## 2018-07-23 PROCEDURE — 93280 PM DEVICE PROGR EVAL DUAL: CPT | Performed by: INTERNAL MEDICINE

## 2018-07-23 PROCEDURE — 93294 REM INTERROG EVL PM/LDLS PM: CPT | Performed by: INTERNAL MEDICINE

## 2018-07-26 DIAGNOSIS — I10 ESSENTIAL HYPERTENSION: ICD-10-CM

## 2018-07-26 DIAGNOSIS — N40.1 BPH WITH URINARY OBSTRUCTION: Primary | ICD-10-CM

## 2018-07-26 DIAGNOSIS — N13.8 BPH WITH URINARY OBSTRUCTION: Primary | ICD-10-CM

## 2018-07-26 RX ORDER — TAMSULOSIN HYDROCHLORIDE 0.4 MG/1
0.4 CAPSULE ORAL
Qty: 30 CAPSULE | Refills: 5 | Status: SHIPPED | OUTPATIENT
Start: 2018-07-26 | End: 2019-02-04 | Stop reason: SDUPTHER

## 2018-07-26 RX ORDER — LISINOPRIL 10 MG/1
10 TABLET ORAL DAILY
Qty: 60 TABLET | Refills: 5 | Status: SHIPPED | OUTPATIENT
Start: 2018-07-26 | End: 2018-09-10 | Stop reason: SDUPTHER

## 2018-07-30 DIAGNOSIS — G47.62 SLEEP RELATED LEG CRAMPS: Primary | ICD-10-CM

## 2018-07-30 RX ORDER — CYCLOBENZAPRINE HCL 10 MG
TABLET ORAL
Qty: 60 TABLET | Refills: 0 | Status: SHIPPED | OUTPATIENT
Start: 2018-07-30 | End: 2018-09-05 | Stop reason: SDUPTHER

## 2018-08-05 PROBLEM — R00.1 BRADYCARDIA: Status: ACTIVE | Noted: 2018-08-05

## 2018-08-05 PROBLEM — F41.1 ANXIETY STATE: Status: ACTIVE | Noted: 2018-08-05

## 2018-08-05 RX ORDER — ALPRAZOLAM 0.25 MG/1
0.25 TABLET ORAL 3 TIMES DAILY
COMMUNITY
End: 2018-11-07

## 2018-08-05 RX ORDER — INDAPAMIDE 2.5 MG/1
2.5 TABLET, FILM COATED ORAL EVERY MORNING
COMMUNITY
End: 2018-11-07

## 2018-08-05 RX ORDER — ASPIRIN 81 MG/1
81 TABLET ORAL DAILY
COMMUNITY
End: 2021-10-02 | Stop reason: HOSPADM

## 2018-08-14 ENCOUNTER — OFFICE VISIT (OUTPATIENT)
Dept: CARDIOLOGY CLINIC | Facility: CLINIC | Age: 78
End: 2018-08-14
Payer: MEDICARE

## 2018-08-14 VITALS
WEIGHT: 171 LBS | HEART RATE: 64 BPM | BODY MASS INDEX: 25.91 KG/M2 | DIASTOLIC BLOOD PRESSURE: 90 MMHG | SYSTOLIC BLOOD PRESSURE: 170 MMHG | HEIGHT: 68 IN

## 2018-08-14 DIAGNOSIS — I10 ESSENTIAL HYPERTENSION: ICD-10-CM

## 2018-08-14 DIAGNOSIS — Z95.0 S/P PLACEMENT OF CARDIAC PACEMAKER: ICD-10-CM

## 2018-08-14 DIAGNOSIS — R00.1 BRADYCARDIA: Primary | ICD-10-CM

## 2018-08-14 PROCEDURE — 99213 OFFICE O/P EST LOW 20 MIN: CPT | Performed by: INTERNAL MEDICINE

## 2018-08-14 NOTE — PROGRESS NOTES
HPI:  22-year-old gentleman with history of dual-chamber pacemaker for symptomatic bradycardia history of hypertension dyslipidemia  He lost his wife several months ago and is having quite a difficult time adjusting to being alone and is rather depressed  He went to the emergency room last month for very vague symptoms and no hospitalization recommended  He had a nuclear stress test on July 10, 2018 showing similar findings to that in 2016 with small zone of inferior apical ischemia  Patient denies any chest pain breathlessness dizziness heart palpitations orthopnea nocturnal dyspnea ankle swelling  Pacemaker followed in device Clinic  Current Outpatient Prescriptions:     ALPRAZolam (XANAX) 0 25 mg tablet, Take 0 25 mg by mouth 3 (three) times a day, Disp: , Rfl:     aspirin (ECOTRIN LOW STRENGTH) 81 mg EC tablet, Take 81 mg by mouth daily, Disp: , Rfl:     atenolol (TENORMIN) 50 mg tablet, Take 1 tablet (50 mg total) by mouth daily for 180 days, Disp: 30 tablet, Rfl: 5    calcium carbonate (TUMS) 500 mg chewable tablet, Chew 1 tablet as needed for indigestion or heartburn , Disp: , Rfl:     cyclobenzaprine (FLEXERIL) 10 mg tablet, TAKE 1-2 TABLETS DAILY AS NEEDED, Disp: 60 tablet, Rfl: 0    finasteride (PROSCAR) 5 mg tablet, Take 1 tablet (5 mg total) by mouth daily, Disp: 90 tablet, Rfl: 3    indapamide (LOZOL) 2 5 mg tablet, Take 2 5 mg by mouth every morning, Disp: , Rfl:     lisinopril (ZESTRIL) 10 mg tablet, Take 1 tablet (10 mg total) by mouth daily for 180 days, Disp: 60 tablet, Rfl: 5    magnesium hydroxide (MILK OF MAGNESIA) 400 mg/5 mL oral suspension, Take by mouth as needed for constipation  , Disp: , Rfl:     metFORMIN (GLUCOPHAGE) 500 mg tablet, TAKE ONE TABLET BY MOUTH ONCE DAILY, Disp: 30 tablet, Rfl: 5    Multiple Vitamin (MULTIVITAMIN) capsule, Take 1 capsule by mouth daily  , Disp: , Rfl:     simvastatin (ZOCOR) 10 mg tablet, TAKE ONE TABLET BY MOUTH ONCE DAILY IN THE EVENING, Disp: 30 tablet, Rfl: 5    tamsulosin (FLOMAX) 0 4 mg, Take 1 capsule (0 4 mg total) by mouth daily with dinner, Disp: 30 capsule, Rfl: 5    neomycin-polymyxin-hydrocortisone (CORTISPORIN) otic solution, Administer 4 drops into the left ear every 6 (six) hours for 3 days, Disp: 10 mL, Rfl: 0      PHYSICAL EXAM:  Blood pressure is 138/84 pulse is regular 70 respiratory rate 18  There is no gross neck vein distension carotid upstroke and volume are normal there is no thyromegaly there are no carotid bruits the lungs are clear without wheezing rales or rhonchi normal breath sounds no chest wall deformities no pleural friction rub cardiac exam reveals a grade 2 ejection murmur at the base of the heart is no diastolic murmur no friction rub pacemaker in the left infraclavicular area the PMI is not displaced there are no heaves lifts or thrills the abdomen is soft nontender no masses or bruits or organomegaly no abdominal guarding bowel sounds normal   Extremities no edema cyanosis or clubbing or calf negative  Homans sign is negative  IMPRESSION AND PLAN:    Essential hypertension controlled    Status post pacemaker functioning normally followed in device Clinic for symptomatic bradycardia    Dyslipidemia on statins    Suspected coronary artery disease without angina and minimally abnormal nuclear stress test   No additional cardiac testing required at this point  Follow-up in 6 months

## 2018-09-05 DIAGNOSIS — G47.62 SLEEP RELATED LEG CRAMPS: ICD-10-CM

## 2018-09-05 RX ORDER — CYCLOBENZAPRINE HCL 10 MG
TABLET ORAL
Qty: 60 TABLET | Refills: 5 | Status: SHIPPED | OUTPATIENT
Start: 2018-09-05 | End: 2019-06-19 | Stop reason: SDUPTHER

## 2018-09-10 DIAGNOSIS — I10 ESSENTIAL HYPERTENSION: ICD-10-CM

## 2018-09-10 RX ORDER — LISINOPRIL 10 MG/1
10 TABLET ORAL DAILY
Qty: 30 TABLET | Refills: 5 | Status: SHIPPED | OUTPATIENT
Start: 2018-09-10 | End: 2018-09-12 | Stop reason: SDUPTHER

## 2018-09-12 DIAGNOSIS — I10 ESSENTIAL HYPERTENSION: ICD-10-CM

## 2018-09-12 RX ORDER — LISINOPRIL 10 MG/1
10 TABLET ORAL DAILY
Qty: 30 TABLET | Refills: 5 | Status: SHIPPED | OUTPATIENT
Start: 2018-09-12 | End: 2018-11-02 | Stop reason: SDUPTHER

## 2018-09-26 ENCOUNTER — CLINICAL SUPPORT (OUTPATIENT)
Dept: INTERNAL MEDICINE CLINIC | Facility: CLINIC | Age: 78
End: 2018-09-26
Payer: MEDICARE

## 2018-09-26 DIAGNOSIS — Z23 NEED FOR VACCINATION: Primary | ICD-10-CM

## 2018-09-26 PROCEDURE — G0008 ADMIN INFLUENZA VIRUS VAC: HCPCS | Performed by: INTERNAL MEDICINE

## 2018-09-26 PROCEDURE — 90662 IIV NO PRSV INCREASED AG IM: CPT | Performed by: INTERNAL MEDICINE

## 2018-10-08 DIAGNOSIS — I25.10 CORONARY ARTERY DISEASE INVOLVING NATIVE HEART WITHOUT ANGINA PECTORIS, UNSPECIFIED VESSEL OR LESION TYPE: ICD-10-CM

## 2018-10-08 DIAGNOSIS — I10 ESSENTIAL HYPERTENSION: ICD-10-CM

## 2018-10-08 RX ORDER — ATENOLOL 50 MG/1
50 TABLET ORAL DAILY
Qty: 30 TABLET | Refills: 5 | Status: SHIPPED | OUTPATIENT
Start: 2018-10-08 | End: 2018-11-26 | Stop reason: SDUPTHER

## 2018-10-10 ENCOUNTER — OFFICE VISIT (OUTPATIENT)
Dept: INTERNAL MEDICINE CLINIC | Facility: CLINIC | Age: 78
End: 2018-10-10
Payer: MEDICARE

## 2018-10-10 VITALS
RESPIRATION RATE: 16 BRPM | WEIGHT: 170.4 LBS | SYSTOLIC BLOOD PRESSURE: 142 MMHG | HEART RATE: 61 BPM | OXYGEN SATURATION: 93 % | DIASTOLIC BLOOD PRESSURE: 92 MMHG | TEMPERATURE: 98.6 F | HEIGHT: 68 IN | BODY MASS INDEX: 25.82 KG/M2

## 2018-10-10 DIAGNOSIS — L98.9 SKIN LESION: Primary | ICD-10-CM

## 2018-10-10 DIAGNOSIS — E11.8 TYPE 2 DIABETES MELLITUS WITH COMPLICATION, WITHOUT LONG-TERM CURRENT USE OF INSULIN (HCC): ICD-10-CM

## 2018-10-10 PROCEDURE — 99213 OFFICE O/P EST LOW 20 MIN: CPT | Performed by: PHYSICIAN ASSISTANT

## 2018-10-10 NOTE — PROGRESS NOTES
Assessment/Plan:    Skin lesion  Unable to assess because pt picked off whatever lesion was there and is now just a scab  I informed pt to schedule recheck in 2 weeks to better assess and he is agreeable  Diagnoses and all orders for this visit:    Skin lesion    Type 2 diabetes mellitus with complication, without long-term current use of insulin ([facility] [address] )    Other orders  -     Cancel: POCT hemoglobin A1c          Subjective:      Patient ID: Brittany Torres is a [de-identified] y o  male  Pt presents for evaluation of a spot on his right temple that he scratched open 2 days ago  He had some burning pain the day he picked at it  He has a 2mm scabbed over spot on his right temple  No signs of infection  No pain at present  Pt also is unsure of his medicines  He brought a bottle of metoprolol in and asked for refill  I informed pt he is on atenolol now and should be taking that in place of the metoprolol  He was told to bring his meds with him to next visit so we can all have correct information regarding what he should be taking  The following portions of the patient's history were reviewed and updated as appropriate:   He  has a past medical history of Abnormal weight loss; Achrochordon; Anxiety state (8/5/2018); Arthritis; Basal cell carcinoma of skin (11/15/2016); Bradycardia, unspecified; Chronic obstructive pulmonary disease ([address] ) (9/5/2012); Condition influencing health status; Constipation; Coronary artery disease (5/22/2012); Depression; Essential (primary) hypertension; Fungal dermatosis; Hearing difficulty of both ears; High cholesterol; History of nuclear stress test (08/04/2016); Hydronephrosis; Mixed hyperlipidemia; Osteoporosis (5/22/2012); Pacemaker; Poorly fitting dentures; Prostatitis; Shortness of breath; Trifascicular block; Type 2 diabetes mellitus ([facility] [address] ); Urinary frequency; Urinary incontinence; and Wears glasses    He   Patient Active Problem List    Diagnosis Date Noted    Skin lesion 10/10/2018    Anxiety state 08/05/2018    Bradycardia 08/05/2018    Basal cell carcinoma of skin 11/15/2016    Abnormal nuclear stress test 08/04/2016    BPH with urinary obstruction 11/18/2015    Sleep related leg cramps 01/31/2013    Chronic obstructive pulmonary disease ([place] [address] ) 09/05/2012    Coronary artery disease 05/22/2012    Diabetes mellitus with peripheral circulatory disorder ([place] [address] ) 05/22/2012    Hypercholesterolemia 05/22/2012    Essential hypertension 05/22/2012    Osteoporosis 05/22/2012    Peripheral arterial disease ([place] [address] ) 05/22/2012     He  has a past surgical history that includes Cardiac pacemaker placement (11/2009); Cataract extraction w/  intraocular lens implant; Kidney stone surgery; Tonsillectomy; pr transurethral elec-surg prostatectom (N/A, 5/9/2016); CYSTOSCOPY (N/A, 5/9/2016); Other surgical history; and Inguinal hernia repair  His family history includes Coronary artery disease in his family; Stroke in his father  He  reports that he quit smoking about 38 years ago  His smoking use included Cigars  He has quit using smokeless tobacco  He reports that he does not drink alcohol or use drugs  Current Outpatient Prescriptions   Medication Sig Dispense Refill    ALPRAZolam (XANAX) 0 25 mg tablet Take 0 25 mg by mouth 3 (three) times a day      aspirin (ECOTRIN LOW STRENGTH) 81 mg EC tablet Take 81 mg by mouth daily      atenolol (TENORMIN) 50 mg tablet Take 1 tablet (50 mg total) by mouth daily for 180 days 30 tablet 5    calcium carbonate (TUMS) 500 mg chewable tablet Chew 1 tablet as needed for indigestion or heartburn        cyclobenzaprine (FLEXERIL) 10 mg tablet TAKE 1-2 TABLETS DAILY AS NEEDED 60 tablet 5    finasteride (PROSCAR) 5 mg tablet Take 1 tablet (5 mg total) by mouth daily 90 tablet 3    indapamide (LOZOL) 2 5 mg tablet Take 2 5 mg by mouth every morning      lisinopril (ZESTRIL) 10 mg tablet Take 1 tablet (10 mg total) by mouth daily 30 tablet 5    magnesium hydroxide (MILK OF MAGNESIA) 400 mg/5 mL oral suspension Take by mouth as needed for constipation   metFORMIN (GLUCOPHAGE) 500 mg tablet TAKE ONE TABLET BY MOUTH ONCE DAILY 30 tablet 5    Multiple Vitamin (MULTIVITAMIN) capsule Take 1 capsule by mouth daily   neomycin-polymyxin-hydrocortisone (CORTISPORIN) otic solution Administer 4 drops into the left ear every 6 (six) hours for 3 days 10 mL 0    simvastatin (ZOCOR) 10 mg tablet TAKE ONE TABLET BY MOUTH ONCE DAILY IN THE EVENING 30 tablet 5    tamsulosin (FLOMAX) 0 4 mg Take 1 capsule (0 4 mg total) by mouth daily with dinner 30 capsule 5     No current facility-administered medications for this visit  Current Outpatient Prescriptions on File Prior to Visit   Medication Sig    ALPRAZolam (XANAX) 0 25 mg tablet Take 0 25 mg by mouth 3 (three) times a day    aspirin (ECOTRIN LOW STRENGTH) 81 mg EC tablet Take 81 mg by mouth daily    atenolol (TENORMIN) 50 mg tablet Take 1 tablet (50 mg total) by mouth daily for 180 days    calcium carbonate (TUMS) 500 mg chewable tablet Chew 1 tablet as needed for indigestion or heartburn   cyclobenzaprine (FLEXERIL) 10 mg tablet TAKE 1-2 TABLETS DAILY AS NEEDED    finasteride (PROSCAR) 5 mg tablet Take 1 tablet (5 mg total) by mouth daily    indapamide (LOZOL) 2 5 mg tablet Take 2 5 mg by mouth every morning    lisinopril (ZESTRIL) 10 mg tablet Take 1 tablet (10 mg total) by mouth daily    magnesium hydroxide (MILK OF MAGNESIA) 400 mg/5 mL oral suspension Take by mouth as needed for constipation   metFORMIN (GLUCOPHAGE) 500 mg tablet TAKE ONE TABLET BY MOUTH ONCE DAILY    Multiple Vitamin (MULTIVITAMIN) capsule Take 1 capsule by mouth daily      neomycin-polymyxin-hydrocortisone (CORTISPORIN) otic solution Administer 4 drops into the left ear every 6 (six) hours for 3 days    simvastatin (ZOCOR) 10 mg tablet TAKE ONE TABLET BY MOUTH ONCE DAILY IN THE EVENING    tamsulosin (FLOMAX) 0 4 mg Take 1 capsule (0 4 mg total) by mouth daily with dinner     No current facility-administered medications on file prior to visit  He has No Known Allergies       Review of Systems   Constitutional: Negative for chills and fever  HENT: Negative for congestion, ear pain, hearing loss, postnasal drip, rhinorrhea, sinus pain, sinus pressure, sore throat and trouble swallowing  Eyes: Negative for pain and visual disturbance  Respiratory: Negative for cough, chest tightness, shortness of breath and wheezing  Cardiovascular: Negative  Negative for chest pain, palpitations and leg swelling  Gastrointestinal: Negative for abdominal pain, blood in stool, constipation, diarrhea, nausea and vomiting  Endocrine: Negative for cold intolerance, heat intolerance, polydipsia, polyphagia and polyuria  Genitourinary: Negative for difficulty urinating, dysuria, flank pain and urgency  Musculoskeletal: Negative for arthralgias, back pain, gait problem and myalgias  Skin: Positive for wound  Negative for rash  Allergic/Immunologic: Negative  Neurological: Negative for dizziness, weakness, light-headedness and headaches  Hematological: Negative  Psychiatric/Behavioral: Negative for behavioral problems, dysphoric mood and sleep disturbance  The patient is not nervous/anxious  Objective:      /92 (BP Location: Left arm, Patient Position: Sitting, Cuff Size: Adult)   Pulse 61   Temp 98 6 °F (37 °C) (Tympanic)   Resp 16   Ht 5' 8" (1 727 m)   Wt 77 3 kg (170 lb 6 4 oz)   SpO2 93%   BMI 25 91 kg/m²          Physical Exam   Constitutional: He is oriented to person, place, and time  He appears well-developed and well-nourished  No distress  HENT:   Head: Normocephalic and atraumatic  Right Ear: External ear normal    Left Ear: External ear normal    Nose: Nose normal    Mouth/Throat: Oropharynx is clear and moist  No oropharyngeal exudate     Eyes: Pupils are equal, round, and reactive to light  Conjunctivae and EOM are normal  Right eye exhibits no discharge  Left eye exhibits no discharge  No scleral icterus  Neck: Normal range of motion  Neck supple  No thyromegaly present  Cardiovascular: Normal rate, regular rhythm and normal heart sounds  Exam reveals no gallop and no friction rub  No murmur heard  Pulmonary/Chest: Effort normal and breath sounds normal  No respiratory distress  He has no wheezes  He has no rales  Abdominal: Soft  Bowel sounds are normal  He exhibits no distension  There is no tenderness  Musculoskeletal: Normal range of motion  He exhibits no edema, tenderness or deformity  Neurological: He is alert and oriented to person, place, and time  No cranial nerve deficit  Skin: Skin is warm and dry  He is not diaphoretic  Psychiatric: He has a normal mood and affect   His behavior is normal  Judgment and thought content normal

## 2018-10-10 NOTE — ASSESSMENT & PLAN NOTE
Unable to assess because pt picked off whatever lesion was there and is now just a scab  I informed pt to schedule recheck in 2 weeks to better assess and he is agreeable

## 2018-10-15 DIAGNOSIS — E11.9 TYPE 2 DIABETES MELLITUS WITHOUT COMPLICATION, WITHOUT LONG-TERM CURRENT USE OF INSULIN (HCC): ICD-10-CM

## 2018-10-18 DIAGNOSIS — E78.00 HYPERCHOLESTEREMIA: ICD-10-CM

## 2018-10-18 RX ORDER — SIMVASTATIN 10 MG
10 TABLET ORAL EVERY EVENING
Qty: 30 TABLET | Refills: 5 | Status: SHIPPED | OUTPATIENT
Start: 2018-10-18 | End: 2019-04-28 | Stop reason: SDUPTHER

## 2018-10-19 ENCOUNTER — OFFICE VISIT (OUTPATIENT)
Dept: INTERNAL MEDICINE CLINIC | Facility: CLINIC | Age: 78
End: 2018-10-19
Payer: MEDICARE

## 2018-10-19 VITALS
HEIGHT: 68 IN | BODY MASS INDEX: 26.07 KG/M2 | DIASTOLIC BLOOD PRESSURE: 84 MMHG | HEART RATE: 68 BPM | TEMPERATURE: 97.6 F | WEIGHT: 172 LBS | RESPIRATION RATE: 16 BRPM | OXYGEN SATURATION: 93 % | SYSTOLIC BLOOD PRESSURE: 142 MMHG

## 2018-10-19 DIAGNOSIS — E11.69 TYPE 2 DIABETES MELLITUS WITH OTHER SPECIFIED COMPLICATION, WITHOUT LONG-TERM CURRENT USE OF INSULIN (HCC): Primary | ICD-10-CM

## 2018-10-19 DIAGNOSIS — I10 ESSENTIAL HYPERTENSION: ICD-10-CM

## 2018-10-19 DIAGNOSIS — E78.00 HYPERCHOLESTEROLEMIA: ICD-10-CM

## 2018-10-19 DIAGNOSIS — E11.51 DIABETES MELLITUS WITH PERIPHERAL CIRCULATORY DISORDER (HCC): ICD-10-CM

## 2018-10-19 LAB — SL AMB POCT HEMOGLOBIN AIC: 6.2

## 2018-10-19 PROCEDURE — 36416 COLLJ CAPILLARY BLOOD SPEC: CPT | Performed by: INTERNAL MEDICINE

## 2018-10-19 PROCEDURE — 99214 OFFICE O/P EST MOD 30 MIN: CPT | Performed by: INTERNAL MEDICINE

## 2018-10-19 PROCEDURE — 83036 HEMOGLOBIN GLYCOSYLATED A1C: CPT | Performed by: INTERNAL MEDICINE

## 2018-10-19 NOTE — PROGRESS NOTES
Assessment/Plan:       Diagnoses and all orders for this visit:    Type 2 diabetes mellitus with other specified complication, without long-term current use of insulin (HCC)  -     POCT hemoglobin A1c    Essential hypertension    Diabetes mellitus with peripheral circulatory disorder (HCC)    Hypercholesterolemia        No problem-specific Assessment & Plan notes found for this encounter  The patient will check his BP at home  We will followup in 4 months and see how things go  Subjective:      Patient ID: Walter Gutierrez is a [de-identified] y o  male  Hypertension   This is a chronic problem  The current episode started more than 1 year ago  The problem has been waxing and waning since onset  The problem is uncontrolled  Pertinent negatives include no chest pain, palpitations or shortness of breath  There are no associated agents to hypertension  Risk factors for coronary artery disease include male gender, obesity and diabetes mellitus  Past treatments include ACE inhibitors and beta blockers  The current treatment provides mild improvement  There are no compliance problems  Diabetes   He presents for his follow-up diabetic visit  He has type 2 diabetes mellitus  No MedicAlert identification noted  His disease course has been stable  There are no hypoglycemic associated symptoms  Associated symptoms include foot paresthesias  Pertinent negatives for diabetes include no chest pain and no fatigue  There are no hypoglycemic complications  Current diabetic treatment includes oral agent (monotherapy)  He is compliant with treatment all of the time  His weight is stable  He participates in exercise daily  There is no change in his home blood glucose trend  An ACE inhibitor/angiotensin II receptor blocker is being taken  The following portions of the patient's history were reviewed and updated as appropriate:   He has a past medical history of Abnormal weight loss; Achrochordon;  Anxiety state (8/5/2018); Arthritis; Basal cell carcinoma of skin (11/15/2016); Bradycardia, unspecified; Chronic obstructive pulmonary disease ([facility] [address] ) (9/5/2012); Condition influencing health status; Constipation; Coronary artery disease (5/22/2012); Depression; Essential (primary) hypertension; Fungal dermatosis; Hearing difficulty of both ears; High cholesterol; History of nuclear stress test (08/04/2016); Hydronephrosis; Mixed hyperlipidemia; Osteoporosis (5/22/2012); Pacemaker; Poorly fitting dentures; Prostatitis; Shortness of breath; Trifascicular block; Urinary frequency; Urinary incontinence; and Wears glasses  ,   does not have any pertinent problems on file  ,   has a past surgical history that includes Cardiac pacemaker placement (11/2009); Cataract extraction w/  intraocular lens implant; Kidney stone surgery; Tonsillectomy; pr transurethral elec-surg prostatectom (N/A, 5/9/2016); CYSTOSCOPY (N/A, 5/9/2016); Other surgical history; and Inguinal hernia repair  ,  family history includes Coronary artery disease in his family; Stroke in his father  ,   reports that he quit smoking about 38 years ago  His smoking use included Cigars  He has quit using smokeless tobacco  He reports that he does not drink alcohol or use drugs  ,  has No Known Allergies     Current Outpatient Prescriptions   Medication Sig Dispense Refill    ALPRAZolam (XANAX) 0 25 mg tablet Take 0 25 mg by mouth 3 (three) times a day      aspirin (ECOTRIN LOW STRENGTH) 81 mg EC tablet Take 81 mg by mouth daily      atenolol (TENORMIN) 50 mg tablet Take 1 tablet (50 mg total) by mouth daily for 180 days 30 tablet 5    calcium carbonate (TUMS) 500 mg chewable tablet Chew 1 tablet as needed for indigestion or heartburn        cyclobenzaprine (FLEXERIL) 10 mg tablet TAKE 1-2 TABLETS DAILY AS NEEDED 60 tablet 5    finasteride (PROSCAR) 5 mg tablet Take 1 tablet (5 mg total) by mouth daily 90 tablet 3    indapamide (LOZOL) 2 5 mg tablet Take 2 5 mg by mouth every morning  lisinopril (ZESTRIL) 10 mg tablet Take 1 tablet (10 mg total) by mouth daily 30 tablet 5    magnesium hydroxide (MILK OF MAGNESIA) 400 mg/5 mL oral suspension Take by mouth as needed for constipation   metFORMIN (GLUCOPHAGE) 500 mg tablet Take 1 tablet (500 mg total) by mouth daily 30 tablet 5    Multiple Vitamin (MULTIVITAMIN) capsule Take 1 capsule by mouth daily   simvastatin (ZOCOR) 10 mg tablet Take 1 tablet (10 mg total) by mouth every evening 30 tablet 5    neomycin-polymyxin-hydrocortisone (CORTISPORIN) otic solution Administer 4 drops into the left ear every 6 (six) hours for 3 days 10 mL 0    tamsulosin (FLOMAX) 0 4 mg Take 1 capsule (0 4 mg total) by mouth daily with dinner 30 capsule 5     No current facility-administered medications for this visit  Review of Systems   Constitutional: Negative for chills, fatigue and fever  HENT: Negative for ear pain, facial swelling, rhinorrhea, sinus pain, sinus pressure and sneezing  Respiratory: Negative for cough, chest tightness, shortness of breath and wheezing  Cardiovascular: Negative for chest pain and palpitations  Gastrointestinal: Negative for abdominal pain, constipation, diarrhea and nausea  Genitourinary: Negative  Musculoskeletal: Negative  Skin: Negative  Neurological: Negative  Psychiatric/Behavioral: Negative  Objective:  Vitals:    10/19/18 0756 10/19/18 0825   BP: 152/88 142/84   BP Location: Left arm    Patient Position: Sitting    Cuff Size: Adult    Pulse: 68    Resp: 16    Temp: 97 6 °F (36 4 °C)    TempSrc: Tympanic    SpO2: 93%    Weight: 78 kg (172 lb)    Height: 5' 8" (1 727 m)      Body mass index is 26 15 kg/m²  Physical Exam   Constitutional: He is oriented to person, place, and time  He appears well-developed and well-nourished  HENT:   Head: Normocephalic and atraumatic  Eyes: Pupils are equal, round, and reactive to light   Conjunctivae and EOM are normal    Neck: Normal range of motion  Neck supple  Cardiovascular: Normal rate, regular rhythm and intact distal pulses  Exam reveals no gallop  Pulses are no weak pulses  No murmur heard  Pulses:       Dorsalis pedis pulses are 2+ on the right side, and 2+ on the left side  Posterior tibial pulses are 2+ on the right side, and 2+ on the left side  Pulmonary/Chest: Breath sounds normal  No respiratory distress  He has no wheezes  He has no rales  Abdominal: Soft  Bowel sounds are normal  He exhibits no distension  There is no tenderness  There is no rebound  Musculoskeletal: Normal range of motion  He exhibits no edema or tenderness  Feet:   Right Foot:   Skin Integrity: Negative for ulcer, skin breakdown, erythema, warmth, callus or dry skin  Left Foot:   Skin Integrity: Negative for ulcer, skin breakdown, erythema, warmth, callus or dry skin  Neurological: He is alert and oriented to person, place, and time  Coordination normal    Skin: Skin is warm and dry  Psychiatric: He has a normal mood and affect  Nursing note and vitals reviewed  Patient's shoes and socks removed  Right Foot/Ankle   Right Foot Inspection  Skin Exam: skin normal and skin intact no dry skin, no warmth, no callus, no erythema, no maceration, no abnormal color, no pre-ulcer, no ulcer and no callus                          Toe Exam: ROM and strength within normal limits  Sensory       Monofilament testing: intact  Vascular  Capillary refills: < 3 seconds  The right DP pulse is 2+  The right PT pulse is 2+  Left Foot/Ankle  Left Foot Inspection  Skin Exam: skin normal and skin intactno dry skin, no warmth, no erythema, no maceration, normal color, no pre-ulcer, no ulcer and no callus                         Toe Exam: ROM and strength within normal limits                   Sensory       Monofilament: intact  Vascular  Capillary refills: < 3 seconds  The left DP pulse is 2+  The left PT pulse is 2+     Assign Risk Category:  No deformity present; No loss of protective sensation;  No weak pulses       Risk: 0

## 2018-11-02 ENCOUNTER — OFFICE VISIT (OUTPATIENT)
Dept: INTERNAL MEDICINE CLINIC | Facility: CLINIC | Age: 78
End: 2018-11-02
Payer: MEDICARE

## 2018-11-02 VITALS
HEART RATE: 64 BPM | OXYGEN SATURATION: 95 % | BODY MASS INDEX: 25.88 KG/M2 | TEMPERATURE: 98.4 F | SYSTOLIC BLOOD PRESSURE: 174 MMHG | HEIGHT: 68 IN | RESPIRATION RATE: 18 BRPM | DIASTOLIC BLOOD PRESSURE: 112 MMHG | WEIGHT: 170.8 LBS

## 2018-11-02 DIAGNOSIS — I10 ESSENTIAL HYPERTENSION: ICD-10-CM

## 2018-11-02 DIAGNOSIS — K59.04 CHRONIC IDIOPATHIC CONSTIPATION: ICD-10-CM

## 2018-11-02 DIAGNOSIS — L81.9 PIGMENTED SKIN LESION OF UNCERTAIN NATURE: ICD-10-CM

## 2018-11-02 DIAGNOSIS — L98.9 SKIN LESION OF FACE: Primary | ICD-10-CM

## 2018-11-02 PROBLEM — K59.00 CONSTIPATION: Status: ACTIVE | Noted: 2018-11-02

## 2018-11-02 PROCEDURE — 99214 OFFICE O/P EST MOD 30 MIN: CPT | Performed by: INTERNAL MEDICINE

## 2018-11-02 PROCEDURE — 88305 TISSUE EXAM BY PATHOLOGIST: CPT | Performed by: PATHOLOGY

## 2018-11-02 PROCEDURE — 67810 INCAL BX EYELID SKN LID MRGN: CPT | Performed by: INTERNAL MEDICINE

## 2018-11-02 RX ORDER — POLYETHYLENE GLYCOL 3350 17 G/17G
17 POWDER, FOR SOLUTION ORAL DAILY PRN
Qty: 30 EACH | Refills: 5 | Status: SHIPPED | OUTPATIENT
Start: 2018-11-02 | End: 2019-07-01 | Stop reason: CLARIF

## 2018-11-02 RX ORDER — LISINOPRIL 20 MG/1
20 TABLET ORAL DAILY
Qty: 30 TABLET | Refills: 5 | Status: SHIPPED | OUTPATIENT
Start: 2018-11-02 | End: 2019-03-18 | Stop reason: SDUPTHER

## 2018-11-02 NOTE — PROGRESS NOTES
Assessment/Plan:       Diagnoses and all orders for this visit:    Skin lesion of face  -     Tissue Exam; Future  -     Tissue Exam    Chronic idiopathic constipation  -     polyethylene glycol (MIRALAX) 17 g packet; Take 17 g by mouth daily as needed (constipation) for up to 180 days    Essential hypertension  -     lisinopril (ZESTRIL) 20 mg tablet; Take 1 tablet (20 mg total) by mouth daily for 180 days        No problem-specific Assessment & Plan notes found for this encounter  We will follow up on the biopsy results and see what we get  The will try increasing the fiber with Metamucil and use the Miralax PRN  We will see how he tolerates the higher dose of lisiniopril  Subjective:      Patient ID: Quang Lainez is a [de-identified] y o  male  The patient is here for biopsy of the right upper lid  He notes an irritated area of skin that has been present for several months  He picks at it and it peels off then returns similar to the way it was before  The patient has issues with chronic constiption  He walks daily and is hydrating  However, he notes decreased fiber  We discussed and he is amendable to trying Metamucil  Hypertension   This is a chronic problem  The current episode started more than 1 year ago  The problem has been gradually worsening since onset  The problem is uncontrolled  Pertinent negatives include no anxiety, blurred vision, chest pain, malaise/fatigue, neck pain, orthopnea, palpitations, peripheral edema, PND, shortness of breath or sweats  There are no associated agents to hypertension  Risk factors for coronary artery disease include male gender  Past treatments include ACE inhibitors and beta blockers  The current treatment provides mild improvement  Compliance problems include diet, medication cost, medication side effects, psychosocial issues and exercise          The following portions of the patient's history were reviewed and updated as appropriate:   He has a past medical history of Abnormal weight loss; Achrochordon; Anxiety state (8/5/2018); Arthritis; Basal cell carcinoma of skin (11/15/2016); Bradycardia, unspecified; Chronic obstructive pulmonary disease ([facility] [address] ) (9/5/2012); Condition influencing health status; Constipation; Coronary artery disease (5/22/2012); Depression; Essential (primary) hypertension; Fungal dermatosis; Hearing difficulty of both ears; High cholesterol; History of nuclear stress test (08/04/2016); Hydronephrosis; Mixed hyperlipidemia; Osteoporosis (5/22/2012); Pacemaker; Poorly fitting dentures; Prostatitis; Shortness of breath; Trifascicular block; Urinary frequency; Urinary incontinence; and Wears glasses  ,   does not have any pertinent problems on file  ,   has a past surgical history that includes Cardiac pacemaker placement (11/2009); Cataract extraction w/  intraocular lens implant; Kidney stone surgery; Tonsillectomy; pr transurethral elec-surg prostatectom (N/A, 5/9/2016); CYSTOSCOPY (N/A, 5/9/2016); Other surgical history; and Inguinal hernia repair  ,  family history includes Coronary artery disease in his family; Stroke in his father  ,   reports that he quit smoking about 38 years ago  His smoking use included Cigars  He has quit using smokeless tobacco  He reports that he does not drink alcohol or use drugs  ,  has No Known Allergies     Current Outpatient Prescriptions   Medication Sig Dispense Refill    ALPRAZolam (XANAX) 0 25 mg tablet Take 0 25 mg by mouth 3 (three) times a day      aspirin (ECOTRIN LOW STRENGTH) 81 mg EC tablet Take 81 mg by mouth daily      atenolol (TENORMIN) 50 mg tablet Take 1 tablet (50 mg total) by mouth daily for 180 days 30 tablet 5    calcium carbonate (TUMS) 500 mg chewable tablet Chew 1 tablet as needed for indigestion or heartburn        cyclobenzaprine (FLEXERIL) 10 mg tablet TAKE 1-2 TABLETS DAILY AS NEEDED 60 tablet 5    finasteride (PROSCAR) 5 mg tablet Take 1 tablet (5 mg total) by mouth daily 90 tablet 3    indapamide (LOZOL) 2 5 mg tablet Take 2 5 mg by mouth every morning      lisinopril (ZESTRIL) 20 mg tablet Take 1 tablet (20 mg total) by mouth daily for 180 days 30 tablet 5    magnesium hydroxide (MILK OF MAGNESIA) 400 mg/5 mL oral suspension Take by mouth as needed for constipation   metFORMIN (GLUCOPHAGE) 500 mg tablet Take 1 tablet (500 mg total) by mouth daily 30 tablet 5    Multiple Vitamin (MULTIVITAMIN) capsule Take 1 capsule by mouth daily   simvastatin (ZOCOR) 10 mg tablet Take 1 tablet (10 mg total) by mouth every evening 30 tablet 5    tamsulosin (FLOMAX) 0 4 mg Take 1 capsule (0 4 mg total) by mouth daily with dinner 30 capsule 5    neomycin-polymyxin-hydrocortisone (CORTISPORIN) otic solution Administer 4 drops into the left ear every 6 (six) hours for 3 days 10 mL 0    polyethylene glycol (MIRALAX) 17 g packet Take 17 g by mouth daily as needed (constipation) for up to 180 days 30 each 5     No current facility-administered medications for this visit  Review of Systems   Constitutional: Negative for chills, fatigue, fever and malaise/fatigue  HENT: Negative for ear pain, postnasal drip, rhinorrhea, sinus pain and sinus pressure  Eyes: Negative for blurred vision  Respiratory: Negative for cough, choking, chest tightness and shortness of breath  Cardiovascular: Negative for chest pain, palpitations, orthopnea, leg swelling and PND  Gastrointestinal: Negative for abdominal pain, constipation, diarrhea, nausea and vomiting  Musculoskeletal: Negative for arthralgias, joint swelling and neck pain  Neurological: Negative  Psychiatric/Behavioral: Negative  Objective:  Vitals:    11/02/18 1258   BP: (!) 174/112   BP Location: Left arm   Patient Position: Sitting   Cuff Size: Large   Pulse: 64   Resp: 18   Temp: 98 4 °F (36 9 °C)   SpO2: 95%   Weight: 77 5 kg (170 lb 12 8 oz)   Height: 5' 8" (1 727 m)     Body mass index is 25 97 kg/m²       Physical Exam Constitutional: He is oriented to person, place, and time  He appears well-developed and well-nourished  HENT:   Head: Normocephalic and atraumatic  Eyes: Pupils are equal, round, and reactive to light  Conjunctivae and EOM are normal    Neck: Normal range of motion  Neck supple  Cardiovascular: Normal rate and regular rhythm  Exam reveals no gallop  No murmur heard  Pulmonary/Chest: Effort normal and breath sounds normal  No respiratory distress  He has no wheezes  Abdominal: Soft  Bowel sounds are normal  He exhibits no distension  Musculoskeletal: Normal range of motion  He exhibits no edema or tenderness  Neurological: He is alert and oriented to person, place, and time  No cranial nerve deficit  Coordination normal    Skin: Skin is warm and dry  Psychiatric: He has a normal mood and affect  Nursing note and vitals reviewed  Biopsy  Date/Time: 11/2/2018 1:59 PM  Performed by: Marcella Bautista  Authorized by: Marcella Bautista     Procedure Details - Lesion Biopsy:      Body area:  [address] location:  R eyelid    Initial size (mm):  0 4    Final defect size (mm):  0 5    Malignancy: malignancy unknown      Destruction method: shave biopsy

## 2018-11-07 ENCOUNTER — DOCUMENTATION (OUTPATIENT)
Dept: INTERNAL MEDICINE CLINIC | Facility: CLINIC | Age: 78
End: 2018-11-07

## 2018-11-07 DIAGNOSIS — C44.310 BCC (BASAL CELL CARCINOMA), FACE: Primary | ICD-10-CM

## 2018-11-19 ENCOUNTER — IN-CLINIC DEVICE VISIT (OUTPATIENT)
Dept: CARDIOLOGY CLINIC | Facility: CLINIC | Age: 78
End: 2018-11-19
Payer: MEDICARE

## 2018-11-19 DIAGNOSIS — Z45.010 ENCOUNTER FOR CHECKING AND TESTING OF CARDIAC PACEMAKER PULSE GENERATOR (BATTERY): ICD-10-CM

## 2018-11-19 DIAGNOSIS — I49.5 SICK SINUS SYNDROME (HCC): Primary | ICD-10-CM

## 2018-11-19 PROCEDURE — 93288 INTERROG EVL PM/LDLS PM IP: CPT | Performed by: INTERNAL MEDICINE

## 2018-11-19 NOTE — PROGRESS NOTES
device check pacer no observations      Current Outpatient Prescriptions:     aspirin (ECOTRIN LOW STRENGTH) 81 mg EC tablet, Take 81 mg by mouth daily, Disp: , Rfl:     atenolol (TENORMIN) 50 mg tablet, Take 1 tablet (50 mg total) by mouth daily for 180 days, Disp: 30 tablet, Rfl: 5    calcium carbonate (TUMS) 500 mg chewable tablet, Chew 1 tablet as needed for indigestion or heartburn , Disp: , Rfl:     cyclobenzaprine (FLEXERIL) 10 mg tablet, TAKE 1-2 TABLETS DAILY AS NEEDED, Disp: 60 tablet, Rfl: 5    finasteride (PROSCAR) 5 mg tablet, Take 1 tablet (5 mg total) by mouth daily, Disp: 90 tablet, Rfl: 3    lisinopril (ZESTRIL) 20 mg tablet, Take 1 tablet (20 mg total) by mouth daily for 180 days, Disp: 30 tablet, Rfl: 5    magnesium hydroxide (MILK OF MAGNESIA) 400 mg/5 mL oral suspension, Take by mouth as needed for constipation  , Disp: , Rfl:     metFORMIN (GLUCOPHAGE) 500 mg tablet, Take 1 tablet (500 mg total) by mouth daily, Disp: 30 tablet, Rfl: 5    Multiple Vitamin (MULTIVITAMIN) capsule, Take 1 capsule by mouth daily  , Disp: , Rfl:     neomycin-polymyxin-hydrocortisone (CORTISPORIN) otic solution, Administer 4 drops into the left ear every 6 (six) hours for 3 days, Disp: 10 mL, Rfl: 0    polyethylene glycol (MIRALAX) 17 g packet, Take 17 g by mouth daily as needed (constipation) for up to 180 days, Disp: 30 each, Rfl: 5    simvastatin (ZOCOR) 10 mg tablet, Take 1 tablet (10 mg total) by mouth every evening, Disp: 30 tablet, Rfl: 5    tamsulosin (FLOMAX) 0 4 mg, Take 1 capsule (0 4 mg total) by mouth daily with dinner, Disp: 30 capsule, Rfl: 5

## 2018-11-26 DIAGNOSIS — I25.10 CORONARY ARTERY DISEASE INVOLVING NATIVE HEART WITHOUT ANGINA PECTORIS, UNSPECIFIED VESSEL OR LESION TYPE: ICD-10-CM

## 2018-11-26 DIAGNOSIS — I10 ESSENTIAL HYPERTENSION: ICD-10-CM

## 2018-11-26 RX ORDER — ATENOLOL 50 MG/1
50 TABLET ORAL DAILY
Qty: 30 TABLET | Refills: 5 | Status: SHIPPED | OUTPATIENT
Start: 2018-11-26 | End: 2019-05-14 | Stop reason: SDUPTHER

## 2018-12-11 DIAGNOSIS — K59.00 CONSTIPATION, UNSPECIFIED CONSTIPATION TYPE: Primary | ICD-10-CM

## 2018-12-11 RX ORDER — POLYETHYLENE GLYCOL 3350 17 G/17G
17 POWDER, FOR SOLUTION ORAL DAILY
Qty: 250 G | Refills: 5 | Status: SHIPPED | OUTPATIENT
Start: 2018-12-11 | End: 2019-10-15

## 2019-02-04 DIAGNOSIS — N40.1 BPH WITH URINARY OBSTRUCTION: ICD-10-CM

## 2019-02-04 DIAGNOSIS — N13.8 BPH WITH URINARY OBSTRUCTION: ICD-10-CM

## 2019-02-04 RX ORDER — TAMSULOSIN HYDROCHLORIDE 0.4 MG/1
0.4 CAPSULE ORAL
Qty: 30 CAPSULE | Refills: 5 | Status: SHIPPED | OUTPATIENT
Start: 2019-02-04 | End: 2019-08-23 | Stop reason: SDUPTHER

## 2019-02-19 ENCOUNTER — REMOTE DEVICE CLINIC VISIT (OUTPATIENT)
Dept: CARDIOLOGY CLINIC | Facility: CLINIC | Age: 79
End: 2019-02-19
Payer: MEDICARE

## 2019-02-19 DIAGNOSIS — Z45.010 ENCOUNTER FOR CHECKING AND TESTING OF CARDIAC PACEMAKER PULSE GENERATOR (BATTERY): ICD-10-CM

## 2019-02-19 DIAGNOSIS — I49.5 SICK SINUS SYNDROME (HCC): Primary | ICD-10-CM

## 2019-02-19 PROCEDURE — 93294 REM INTERROG EVL PM/LDLS PM: CPT | Performed by: INTERNAL MEDICINE

## 2019-02-19 PROCEDURE — 93296 REM INTERROG EVL PM/IDS: CPT | Performed by: INTERNAL MEDICINE

## 2019-02-19 NOTE — PROGRESS NOTES
[facility] remote check pacer no alerts  Normal battery function  Current Outpatient Medications:     aspirin (ECOTRIN LOW STRENGTH) 81 mg EC tablet, Take 81 mg by mouth daily, Disp: , Rfl:     atenolol (TENORMIN) 50 mg tablet, Take 1 tablet (50 mg total) by mouth daily for 180 days, Disp: 30 tablet, Rfl: 5    calcium carbonate (TUMS) 500 mg chewable tablet, Chew 1 tablet as needed for indigestion or heartburn , Disp: , Rfl:     cyclobenzaprine (FLEXERIL) 10 mg tablet, TAKE 1-2 TABLETS DAILY AS NEEDED, Disp: 60 tablet, Rfl: 5    finasteride (PROSCAR) 5 mg tablet, Take 1 tablet (5 mg total) by mouth daily, Disp: 90 tablet, Rfl: 3    lisinopril (ZESTRIL) 20 mg tablet, Take 1 tablet (20 mg total) by mouth daily for 180 days, Disp: 30 tablet, Rfl: 5    magnesium hydroxide (MILK OF MAGNESIA) 400 mg/5 mL oral suspension, Take by mouth as needed for constipation  , Disp: , Rfl:     metFORMIN (GLUCOPHAGE) 500 mg tablet, Take 1 tablet (500 mg total) by mouth daily, Disp: 30 tablet, Rfl: 5    Multiple Vitamin (MULTIVITAMIN) capsule, Take 1 capsule by mouth daily  , Disp: , Rfl:     neomycin-polymyxin-hydrocortisone (CORTISPORIN) otic solution, Administer 4 drops into the left ear every 6 (six) hours for 3 days, Disp: 10 mL, Rfl: 0    polyethylene glycol (GLYCOLAX) powder, Take 17 g by mouth daily, Disp: 250 g, Rfl: 5    polyethylene glycol (MIRALAX) 17 g packet, Take 17 g by mouth daily as needed (constipation) for up to 180 days, Disp: 30 each, Rfl: 5    simvastatin (ZOCOR) 10 mg tablet, Take 1 tablet (10 mg total) by mouth every evening, Disp: 30 tablet, Rfl: 5    tamsulosin (FLOMAX) 0 4 mg, Take 1 capsule (0 4 mg total) by mouth daily with dinner, Disp: 30 capsule, Rfl: 5

## 2019-03-18 ENCOUNTER — OFFICE VISIT (OUTPATIENT)
Dept: INTERNAL MEDICINE CLINIC | Facility: CLINIC | Age: 79
End: 2019-03-18
Payer: MEDICARE

## 2019-03-18 VITALS
HEART RATE: 62 BPM | OXYGEN SATURATION: 96 % | BODY MASS INDEX: 25.85 KG/M2 | WEIGHT: 170.6 LBS | SYSTOLIC BLOOD PRESSURE: 140 MMHG | RESPIRATION RATE: 16 BRPM | DIASTOLIC BLOOD PRESSURE: 88 MMHG | HEIGHT: 68 IN | TEMPERATURE: 98.7 F

## 2019-03-18 DIAGNOSIS — I25.10 CORONARY ARTERY DISEASE INVOLVING NATIVE HEART WITHOUT ANGINA PECTORIS, UNSPECIFIED VESSEL OR LESION TYPE: ICD-10-CM

## 2019-03-18 DIAGNOSIS — I10 ESSENTIAL HYPERTENSION: ICD-10-CM

## 2019-03-18 DIAGNOSIS — E11.51 DIABETES MELLITUS WITH PERIPHERAL CIRCULATORY DISORDER (HCC): Primary | ICD-10-CM

## 2019-03-18 DIAGNOSIS — E78.00 HYPERCHOLESTEROLEMIA: ICD-10-CM

## 2019-03-18 LAB — SL AMB POCT HEMOGLOBIN AIC: 5.9 (ref ?–6.5)

## 2019-03-18 PROCEDURE — 83036 HEMOGLOBIN GLYCOSYLATED A1C: CPT | Performed by: INTERNAL MEDICINE

## 2019-03-18 PROCEDURE — 99214 OFFICE O/P EST MOD 30 MIN: CPT | Performed by: INTERNAL MEDICINE

## 2019-03-18 RX ORDER — LISINOPRIL 30 MG/1
30 TABLET ORAL DAILY
Qty: 30 TABLET | Refills: 5 | Status: SHIPPED | OUTPATIENT
Start: 2019-03-18 | End: 2019-03-27

## 2019-03-18 NOTE — PROGRESS NOTES
Assessment/Plan:       Diagnoses and all orders for this visit:    Diabetes mellitus with peripheral circulatory disorder (HCC)  -     glucose blood (ONE TOUCH ULTRA TEST) test strip; Test sugar once daily  -     POCT hemoglobin A1c    Essential hypertension  -     CBC and differential  -     Comprehensive metabolic panel  -     lisinopril (ZESTRIL) 30 mg tablet; Take 1 tablet (30 mg total) by mouth daily for 180 days    Hypercholesterolemia  -     Lipid Panel with Direct LDL reflex    Coronary artery disease involving native heart without angina pectoris, unspecified vessel or lesion type        No problem-specific Assessment & Plan notes found for this encounter  We will have the patient follow up on the NST ASAP  We will follow up afterward  We will see how he does on the higher dose of Lisinopril  We will stop the Metformin secondary to a very good Hgba1c  Subjective:      Patient ID: Octavia Barfield is a [de-identified] y o  male  The patient was noted to have SOB and states that this is exertional   He had a questionable NST in July of 2018  He was noted to be taking ASA and Simvastatin, but he is hypertensive  His BP did improve with recheck here  Hypertension   This is a chronic problem  The current episode started today  The problem is unchanged  The problem is resistant  Associated symptoms include shortness of breath  Pertinent negatives include no chest pain or palpitations  There are no associated agents to hypertension  Risk factors for coronary artery disease include diabetes mellitus, dyslipidemia and male gender  Past treatments include ACE inhibitors  The current treatment provides significant improvement  There are no compliance problems  There is no history of angina, kidney disease, CAD/MI, CVA, heart failure, left ventricular hypertrophy, PVD or retinopathy  Diabetes   He presents for his follow-up diabetic visit  He has type 2 diabetes mellitus   There are no hypoglycemic associated symptoms  Pertinent negatives for hypoglycemia include no pallor  There are no diabetic associated symptoms  Pertinent negatives for diabetes include no chest pain and no fatigue  There are no hypoglycemic complications  There are no diabetic complications  Pertinent negatives for diabetic complications include no CVA, PVD or retinopathy  Current diabetic treatment includes oral agent (monotherapy)  He is compliant with treatment all of the time  His weight is stable  He has not had a previous visit with a dietitian  He participates in exercise intermittently  An ACE inhibitor/angiotensin II receptor blocker is being taken  The following portions of the patient's history were reviewed and updated as appropriate:   He has a past medical history of Abnormal weight loss, Achrochordon, Anxiety state (8/5/2018), Arthritis, Basal cell carcinoma of skin (11/15/2016), Bradycardia, unspecified, Chronic obstructive pulmonary disease ([facility] [address] ) (9/5/2012), Condition influencing health status, Constipation, Coronary artery disease (5/22/2012), Depression, Essential (primary) hypertension, Fungal dermatosis, Hearing difficulty of both ears, High cholesterol, History of nuclear stress test (08/04/2016), Hydronephrosis, Mixed hyperlipidemia, Osteoporosis (5/22/2012), Pacemaker, Poorly fitting dentures, Prostatitis, Shortness of breath, Trifascicular block, Urinary frequency, Urinary incontinence, and Wears glasses  ,  does not have any pertinent problems on file  ,   has a past surgical history that includes Cardiac pacemaker placement (11/2009); Cataract extraction w/  intraocular lens implant; Kidney stone surgery; Tonsillectomy; pr transurethral elec-surg prostatectom (N/A, 5/9/2016); CYSTOSCOPY (N/A, 5/9/2016); Other surgical history; and Inguinal hernia repair  ,  family history includes Coronary artery disease in his family; Stroke in his father  ,   reports that he quit smoking about 38 years ago   His smoking use included cigars  He has quit using smokeless tobacco  He reports that he does not drink alcohol or use drugs  ,  has No Known Allergies     Current Outpatient Medications   Medication Sig Dispense Refill    aspirin (ECOTRIN LOW STRENGTH) 81 mg EC tablet Take 81 mg by mouth daily      atenolol (TENORMIN) 50 mg tablet Take 1 tablet (50 mg total) by mouth daily for 180 days 30 tablet 5    cyclobenzaprine (FLEXERIL) 10 mg tablet TAKE 1-2 TABLETS DAILY AS NEEDED 60 tablet 5    finasteride (PROSCAR) 5 mg tablet Take 1 tablet (5 mg total) by mouth daily 90 tablet 3    lisinopril (ZESTRIL) 30 mg tablet Take 1 tablet (30 mg total) by mouth daily for 180 days 30 tablet 5    magnesium hydroxide (MILK OF MAGNESIA) 400 mg/5 mL oral suspension Take by mouth as needed for constipation   Multiple Vitamin (MULTIVITAMIN) capsule Take 1 capsule by mouth daily   polyethylene glycol (MIRALAX) 17 g packet Take 17 g by mouth daily as needed (constipation) for up to 180 days 30 each 5    simvastatin (ZOCOR) 10 mg tablet Take 1 tablet (10 mg total) by mouth every evening 30 tablet 5    tamsulosin (FLOMAX) 0 4 mg Take 1 capsule (0 4 mg total) by mouth daily with dinner 30 capsule 5    calcium carbonate (TUMS) 500 mg chewable tablet Chew 1 tablet as needed for indigestion or heartburn   glucose blood (ONE TOUCH ULTRA TEST) test strip Test sugar once daily 50 each 5    neomycin-polymyxin-hydrocortisone (CORTISPORIN) otic solution Administer 4 drops into the left ear every 6 (six) hours for 3 days 10 mL 0    polyethylene glycol (GLYCOLAX) powder Take 17 g by mouth daily 250 g 5     No current facility-administered medications for this visit  Review of Systems   Constitutional: Negative for chills, fatigue and fever  HENT: Negative for postnasal drip, rhinorrhea and sore throat  Respiratory: Positive for chest tightness and shortness of breath  Negative for cough      Cardiovascular: Negative for chest pain, palpitations and leg swelling  Gastrointestinal: Negative for abdominal distention, abdominal pain, constipation, diarrhea, nausea and vomiting  Genitourinary: Negative  Musculoskeletal: Negative for arthralgias and myalgias  Skin: Negative for color change, pallor, rash and wound  Neurological: Negative  Psychiatric/Behavioral: Negative  Objective:  Vitals:    03/18/19 0928 03/18/19 0949   BP: (!) 190/110 140/88   BP Location: Left arm    Patient Position: Sitting    Cuff Size: Large    Pulse: 62    Resp: 16    Temp: 98 7 °F (37 1 °C)    SpO2: 96%    Weight: 77 4 kg (170 lb 9 6 oz)    Height: 5' 8" (1 727 m)      Body mass index is 25 94 kg/m²  Physical Exam   Constitutional: He is oriented to person, place, and time  He appears well-developed and well-nourished  HENT:   Head: Normocephalic and atraumatic  Eyes: Pupils are equal, round, and reactive to light  EOM are normal    Neck: Normal range of motion  Cardiovascular: Normal rate, regular rhythm and normal heart sounds  Exam reveals no friction rub  Pulses are weak pulses  No murmur heard  Pulses:       Dorsalis pedis pulses are 1+ on the right side, and 1+ on the left side  Posterior tibial pulses are 1+ on the right side, and 1+ on the left side  Pulmonary/Chest: Effort normal and breath sounds normal  No stridor  No respiratory distress  He has no wheezes  He has no rales  Abdominal: Soft  Bowel sounds are normal  He exhibits no distension  There is no tenderness  There is no guarding  Musculoskeletal: Normal range of motion  Feet:    Feet:   Right Foot:   Skin Integrity: Negative for ulcer, skin breakdown, erythema, warmth, callus or dry skin  Left Foot:   Skin Integrity: Negative for ulcer, skin breakdown, erythema, warmth, callus or dry skin  Neurological: He is alert and oriented to person, place, and time  Skin: Skin is warm and dry  Nursing note and vitals reviewed      Patient's shoes and socks removed  Right Foot/Ankle   Right Foot Inspection  Skin Exam: skin normal and skin intact no dry skin, no warmth, no callus, no erythema, no maceration, no abnormal color, no pre-ulcer, no ulcer and no callus                          Toe Exam: ROM and strength within normal limits  Sensory       Monofilament testing: diminished  Vascular  Capillary refills: < 3 seconds  The right DP pulse is 1+  The right PT pulse is 1+  Left Foot/Ankle  Left Foot Inspection  Skin Exam: skin normal and skin intactno dry skin, no warmth, no erythema, no maceration, normal color, no pre-ulcer, no ulcer and no callus                         Toe Exam: ROM and strength within normal limits                   Sensory       Monofilament: diminished  Vascular  Capillary refills: < 3 seconds  The left DP pulse is 1+  The left PT pulse is 1+  Assign Risk Category:  No deformity present;  No loss of protective sensation; Weak pulses       Risk: 0

## 2019-03-19 ENCOUNTER — APPOINTMENT (OUTPATIENT)
Dept: LAB | Facility: CLINIC | Age: 79
End: 2019-03-19
Payer: MEDICARE

## 2019-03-19 DIAGNOSIS — E11.51 DIABETES MELLITUS WITH PERIPHERAL CIRCULATORY DISORDER (HCC): ICD-10-CM

## 2019-03-19 DIAGNOSIS — E78.00 HYPERCHOLESTEROLEMIA: ICD-10-CM

## 2019-03-19 LAB
ALBUMIN SERPL BCP-MCNC: 4.1 G/DL (ref 3.5–5)
ALP SERPL-CCNC: 48 U/L (ref 46–116)
ALT SERPL W P-5'-P-CCNC: 24 U/L (ref 12–78)
ANION GAP SERPL CALCULATED.3IONS-SCNC: 4 MMOL/L (ref 4–13)
AST SERPL W P-5'-P-CCNC: 16 U/L (ref 5–45)
BASOPHILS # BLD AUTO: 0.03 THOUSANDS/ΜL (ref 0–0.1)
BASOPHILS NFR BLD AUTO: 1 % (ref 0–1)
BILIRUB SERPL-MCNC: 1.5 MG/DL (ref 0.2–1)
BUN SERPL-MCNC: 25 MG/DL (ref 5–25)
CALCIUM SERPL-MCNC: 9.2 MG/DL (ref 8.3–10.1)
CHLORIDE SERPL-SCNC: 101 MMOL/L (ref 100–108)
CHOLEST SERPL-MCNC: 148 MG/DL (ref 50–200)
CO2 SERPL-SCNC: 29 MMOL/L (ref 21–32)
CREAT SERPL-MCNC: 1.33 MG/DL (ref 0.6–1.3)
CREAT UR-MCNC: 62.9 MG/DL
EOSINOPHIL # BLD AUTO: 0.24 THOUSAND/ΜL (ref 0–0.61)
EOSINOPHIL NFR BLD AUTO: 4 % (ref 0–6)
ERYTHROCYTE [DISTWIDTH] IN BLOOD BY AUTOMATED COUNT: 12.1 % (ref 11.6–15.1)
EST. AVERAGE GLUCOSE BLD GHB EST-MCNC: 126 MG/DL
GFR SERPL CREATININE-BSD FRML MDRD: 50 ML/MIN/1.73SQ M
GLUCOSE P FAST SERPL-MCNC: 131 MG/DL (ref 65–99)
HBA1C MFR BLD: 6 % (ref 4.2–6.3)
HCT VFR BLD AUTO: 46.3 % (ref 36.5–49.3)
HDLC SERPL-MCNC: 46 MG/DL (ref 40–60)
HGB BLD-MCNC: 16 G/DL (ref 12–17)
IMM GRANULOCYTES # BLD AUTO: 0.01 THOUSAND/UL (ref 0–0.2)
IMM GRANULOCYTES NFR BLD AUTO: 0 % (ref 0–2)
LDLC SERPL CALC-MCNC: 76 MG/DL (ref 0–100)
LYMPHOCYTES # BLD AUTO: 1.49 THOUSANDS/ΜL (ref 0.6–4.47)
LYMPHOCYTES NFR BLD AUTO: 28 % (ref 14–44)
MCH RBC QN AUTO: 34 PG (ref 26.8–34.3)
MCHC RBC AUTO-ENTMCNC: 34.6 G/DL (ref 31.4–37.4)
MCV RBC AUTO: 99 FL (ref 82–98)
MICROALBUMIN UR-MCNC: 251 MG/L (ref 0–20)
MICROALBUMIN/CREAT 24H UR: 399 MG/G CREATININE (ref 0–30)
MONOCYTES # BLD AUTO: 0.52 THOUSAND/ΜL (ref 0.17–1.22)
MONOCYTES NFR BLD AUTO: 10 % (ref 4–12)
NEUTROPHILS # BLD AUTO: 3.11 THOUSANDS/ΜL (ref 1.85–7.62)
NEUTS SEG NFR BLD AUTO: 57 % (ref 43–75)
NRBC BLD AUTO-RTO: 0 /100 WBCS
PLATELET # BLD AUTO: 167 THOUSANDS/UL (ref 149–390)
PMV BLD AUTO: 10.2 FL (ref 8.9–12.7)
POTASSIUM SERPL-SCNC: 3.9 MMOL/L (ref 3.5–5.3)
PROT SERPL-MCNC: 6.9 G/DL (ref 6.4–8.2)
RBC # BLD AUTO: 4.7 MILLION/UL (ref 3.88–5.62)
SODIUM SERPL-SCNC: 134 MMOL/L (ref 136–145)
TRIGL SERPL-MCNC: 131 MG/DL
WBC # BLD AUTO: 5.4 THOUSAND/UL (ref 4.31–10.16)

## 2019-03-19 PROCEDURE — 80053 COMPREHEN METABOLIC PANEL: CPT | Performed by: INTERNAL MEDICINE

## 2019-03-19 PROCEDURE — 36415 COLL VENOUS BLD VENIPUNCTURE: CPT | Performed by: INTERNAL MEDICINE

## 2019-03-19 PROCEDURE — 80061 LIPID PANEL: CPT | Performed by: INTERNAL MEDICINE

## 2019-03-19 PROCEDURE — 82570 ASSAY OF URINE CREATININE: CPT | Performed by: INTERNAL MEDICINE

## 2019-03-19 PROCEDURE — 85025 COMPLETE CBC W/AUTO DIFF WBC: CPT | Performed by: INTERNAL MEDICINE

## 2019-03-19 PROCEDURE — 82043 UR ALBUMIN QUANTITATIVE: CPT | Performed by: INTERNAL MEDICINE

## 2019-03-19 PROCEDURE — 83036 HEMOGLOBIN GLYCOSYLATED A1C: CPT

## 2019-03-27 ENCOUNTER — OFFICE VISIT (OUTPATIENT)
Dept: CARDIOLOGY CLINIC | Facility: CLINIC | Age: 79
End: 2019-03-27
Payer: MEDICARE

## 2019-03-27 VITALS
HEART RATE: 60 BPM | BODY MASS INDEX: 26.15 KG/M2 | WEIGHT: 172 LBS | DIASTOLIC BLOOD PRESSURE: 100 MMHG | SYSTOLIC BLOOD PRESSURE: 180 MMHG

## 2019-03-27 DIAGNOSIS — E11.51 DIABETES MELLITUS WITH PERIPHERAL CIRCULATORY DISORDER (HCC): ICD-10-CM

## 2019-03-27 DIAGNOSIS — R00.1 BRADYCARDIA: ICD-10-CM

## 2019-03-27 DIAGNOSIS — I25.10 CORONARY ARTERY DISEASE INVOLVING NATIVE HEART WITHOUT ANGINA PECTORIS, UNSPECIFIED VESSEL OR LESION TYPE: Primary | ICD-10-CM

## 2019-03-27 DIAGNOSIS — I73.9 PERIPHERAL ARTERIAL DISEASE (HCC): ICD-10-CM

## 2019-03-27 DIAGNOSIS — E78.00 HYPERCHOLESTEROLEMIA: ICD-10-CM

## 2019-03-27 DIAGNOSIS — I10 ESSENTIAL HYPERTENSION: ICD-10-CM

## 2019-03-27 PROCEDURE — 99214 OFFICE O/P EST MOD 30 MIN: CPT | Performed by: PHYSICIAN ASSISTANT

## 2019-03-27 RX ORDER — AMLODIPINE BESYLATE 10 MG/1
10 TABLET ORAL DAILY
Qty: 30 TABLET | Refills: 5 | Status: SHIPPED | OUTPATIENT
Start: 2019-03-27 | End: 2019-09-16 | Stop reason: SDUPTHER

## 2019-03-27 RX ORDER — LISINOPRIL 40 MG/1
40 TABLET ORAL DAILY
Qty: 30 TABLET | Refills: 5 | Status: SHIPPED | OUTPATIENT
Start: 2019-03-27 | End: 2019-09-16 | Stop reason: SDUPTHER

## 2019-03-27 NOTE — ASSESSMENT & PLAN NOTE
Uncontrolled   Will increase Lisinopril to 40 mg daily   Check a Chem-7    Add Norvasc 10 mg daily    Return will be in 1 month for reassessment   In the meantime we will check an echo to assess for LVH

## 2019-03-27 NOTE — PATIENT INSTRUCTIONS
Change  Lisinopril to 40 mg daily    New: Norvasc 10 mg Daily     Lab work in 10 days      Echo before coming back   Return in one month

## 2019-03-27 NOTE — PROGRESS NOTES
[facility]   Outpatient Note  Latoya Bates  1940  [id number]  [facility] [address] [address] [phone]    Subjective: Latoya Bates is a [de-identified] y o  male    The patient is seen in the office for a routine visit in the office today regarding a history of CAD, HLD and DMII  He has been having problems with uncontrolled HTN  He has no symptoms of exertional dyspnea or chest pain  He has no palpitiatons or syncope  He has no edema orthopnea or PND          Social History  Social History     Tobacco Use   Smoking Status Former Smoker    Types: Cigars    Last attempt to quit: 1980    Years since quittin 9   Smokeless Tobacco Former User   Tobacco Comment    smokes an occ  cigar; hasn't in last 2 weeks; CIGAR (1 A DAY) AS PER [facility]   ,   Social History     Substance and Sexual Activity   Alcohol Use No    Comment: BEING A SOCIAL DRINKER AS PER [employer]   ,   Social History     Substance and Sexual Activity   Drug Use No     Family History   Problem Relation Age of Onset    Stroke Father         SYNDROME    Coronary artery disease Family         less than 61years of age       Medical and Surgical History  Past Medical History:   Diagnosis Date    Abnormal weight loss     LAST ASSESSED: 10/12/15    Achrochordon     LAST ASSESSED: 12    Anxiety state 2018    Arthritis     Basal cell carcinoma of skin 11/15/2016    Bradycardia, unspecified     dual-chamber pacemaker    Chronic obstructive pulmonary disease ([facility] [address] 2012    Condition influencing health status     LAST ASSESSED: 14    Constipation     Coronary artery disease 2012    Depression     Essential (primary) hypertension     Fungal dermatosis     LAST ASSESSED: 8/31/15    Hearing difficulty of both ears     High cholesterol     History of nuclear stress test 2016    Lexiscan MPI: EF 0 39 (39%), Moderate segmental LV systolic dysfunction  No evidence for prior myocardial infarction  Suggested inferoapical ischemia by perfusion imaging   Hydronephrosis     LAST ASSESSED: 11/18/15    Mixed hyperlipidemia     Osteoporosis 5/22/2012    Pacemaker     Poorly fitting dentures     Prostatitis     LAST ASSESSED: 12/22/14    Shortness of breath     Trifascicular block     Urinary frequency     Urinary incontinence     LAST ASSESSED: 10/12/15    Wears glasses      Past Surgical History:   Procedure Laterality Date    CARDIAC PACEMAKER PLACEMENT  11/2009    Medtronic dual chamber     CATARACT EXTRACTION W/  INTRAOCULAR LENS IMPLANT      CYSTOSCOPY N/A 5/9/2016    Procedure: CYSTOSCOPY, evacuation of bladder calculi;  Surgeon: Deshawn Patrick MD;  Location: [facility];  Service:     INGUINAL HERNIA REPAIR      UNILATERAL    KIDNEY STONE SURGERY      OTHER SURGICAL HISTORY      HERNIA REPAIR AS PER [facility] (ALREADY IN PERTINENT NEGATIVES)    DE TRANSURETHRAL ELEC-SURG PROSTATECTOM N/A 5/9/2016    Procedure: TRANSURETHRAL RESECTION OF PROSTATE (TURP);   Surgeon: Deshawn Patrick MD;  Location: [facility];  Service: Urology    TONSILLECTOMY           Current Outpatient Medications:     aspirin (ECOTRIN LOW STRENGTH) 81 mg EC tablet, Take 81 mg by mouth daily, Disp: , Rfl:     atenolol (TENORMIN) 50 mg tablet, Take 1 tablet (50 mg total) by mouth daily for 180 days, Disp: 30 tablet, Rfl: 5    calcium carbonate (TUMS) 500 mg chewable tablet, Chew 1 tablet as needed for indigestion or heartburn , Disp: , Rfl:     finasteride (PROSCAR) 5 mg tablet, Take 1 tablet (5 mg total) by mouth daily, Disp: 90 tablet, Rfl: 3    glucose blood (ONE TOUCH ULTRA TEST) test strip, Test sugar once daily, Disp: 50 each, Rfl: 5    lisinopril (ZESTRIL) 40 mg tablet, Take 1 tablet (40 mg total) by mouth daily for 30 days, Disp: 30 tablet, Rfl: 5    simvastatin (ZOCOR) 10 mg tablet, Take 1 tablet (10 mg total) by mouth every evening, Disp: 30 tablet, Rfl: 5    tamsulosin (FLOMAX) 0 4 mg, Take 1 capsule (0 4 mg total) by mouth daily with dinner, Disp: 30 capsule, Rfl: 5    amLODIPine (NORVASC) 10 mg tablet, Take 1 tablet (10 mg total) by mouth daily, Disp: 30 tablet, Rfl: 5    cyclobenzaprine (FLEXERIL) 10 mg tablet, TAKE 1-2 TABLETS DAILY AS NEEDED (Patient not taking: Reported on 3/27/2019), Disp: 60 tablet, Rfl: 5    magnesium hydroxide (MILK OF MAGNESIA) 400 mg/5 mL oral suspension, Take by mouth as needed for constipation  , Disp: , Rfl:     Multiple Vitamin (MULTIVITAMIN) capsule, Take 1 capsule by mouth daily  , Disp: , Rfl:     neomycin-polymyxin-hydrocortisone (CORTISPORIN) otic solution, Administer 4 drops into the left ear every 6 (six) hours for 3 days, Disp: 10 mL, Rfl: 0    polyethylene glycol (GLYCOLAX) powder, Take 17 g by mouth daily (Patient not taking: Reported on 3/27/2019), Disp: 250 g, Rfl: 5    polyethylene glycol (MIRALAX) 17 g packet, Take 17 g by mouth daily as needed (constipation) for up to 180 days (Patient not taking: Reported on 3/27/2019), Disp: 30 each, Rfl: 5  No Known Allergies    Review of Systems   Constitution: Negative  HENT: Negative  Eyes: Negative  Cardiovascular: Negative  Negative for chest pain, claudication, cyanosis, dyspnea on exertion, irregular heartbeat, leg swelling, near-syncope, orthopnea, palpitations, paroxysmal nocturnal dyspnea and syncope  Respiratory: Negative  Negative for cough, hemoptysis, shortness of breath, sleep disturbances due to breathing, snoring, sputum production and wheezing  Endocrine: Negative  Hematologic/Lymphatic: Negative  Skin: Negative  Musculoskeletal: Negative  Gastrointestinal: Negative  Genitourinary: Negative  Neurological: Negative  Psychiatric/Behavioral: Negative  Allergic/Immunologic: Negative          Objective:   BP (!) 180/100   Pulse 60   Wt 78 kg (172 lb)   BMI 26 15 kg/m²   Physical Exam   Constitutional: He is oriented to person, place, and time  He appears well-developed and well-nourished  HENT:   Head: Normocephalic and atraumatic  Mouth/Throat: Oropharynx is clear and moist    Eyes: Conjunctivae are normal  No scleral icterus  Neck: Normal range of motion  Neck supple  No JVD present  No thyromegaly present  Cardiovascular: Normal rate, regular rhythm and normal heart sounds  Exam reveals no gallop and no friction rub  No murmur heard  Device in place in the L   Pulmonary/Chest: No respiratory distress  He has no wheezes  He has no rales  Abdominal: Soft  Bowel sounds are normal  He exhibits no distension  There is no tenderness  Aorta not palpable   Musculoskeletal: Normal range of motion  He exhibits no edema, tenderness or deformity  Neurological: He is alert and oriented to person, place, and time  Skin: Skin is warm and dry  Psychiatric: He has a normal mood and affect  His behavior is normal    Nursing note and vitals reviewed        Lab Review:   Lab Results   Component Value Date    CHOL 142 08/24/2015     Lab Results   Component Value Date    HDL 46 03/19/2019     Lab Results   Component Value Date    LDLCALC 76 03/19/2019     Lab Results   Component Value Date    TRIG 131 03/19/2019     Results Reviewed     None        Results Reviewed     None        Results Reviewed     None          Recent Cardiovascular Testing:   Nuclear stress 7-18-19: Small zone of inferior apical ischemia    ECG Review:   None today    Assessment and Plan:     Problem List Items Addressed This Visit        Endocrine    Diabetes mellitus with peripheral circulatory disorder ([facility] [address] )     Managed by PCP              Cardiovascular and Mediastinum    Coronary artery disease - Primary     Stable without CHF manifestation or anginal symptoms           Relevant Medications    amLODIPine (NORVASC) 10 mg tablet    Other Relevant Orders    Echo complete with contrast if indicated Essential hypertension     Uncontrolled   Will increase Lisinopril to 40 mg daily   Check a Chem-7    Add Norvasc 10 mg daily    Return will be in 1 month for reassessment   In the meantime we will check an echo to assess for LVH         Relevant Medications    lisinopril (ZESTRIL) 40 mg tablet    amLODIPine (NORVASC) 10 mg tablet    Other Relevant Orders    Basic metabolic panel    Echo complete with contrast if indicated    Peripheral arterial disease (HCC)     Asymptomatic            Other    Hypercholesterolemia     On statin and tolerating           Bradycardia     Un able to tolerate increase in BB                Additional Plan:   Return in one month for reassessment  Medication adjustment as outlined above  Echo prior to next visit

## 2019-04-25 ENCOUNTER — HOSPITAL ENCOUNTER (OUTPATIENT)
Dept: NON INVASIVE DIAGNOSTICS | Facility: CLINIC | Age: 79
Discharge: HOME | End: 2019-04-25
Payer: MEDICARE

## 2019-04-25 DIAGNOSIS — I25.10 CORONARY ARTERY DISEASE INVOLVING NATIVE HEART WITHOUT ANGINA PECTORIS, UNSPECIFIED VESSEL OR LESION TYPE: ICD-10-CM

## 2019-04-25 DIAGNOSIS — I10 ESSENTIAL HYPERTENSION: ICD-10-CM

## 2019-04-25 PROCEDURE — 93306 TTE W/DOPPLER COMPLETE: CPT

## 2019-04-25 PROCEDURE — 93306 TTE W/DOPPLER COMPLETE: CPT | Performed by: INTERNAL MEDICINE

## 2019-04-28 DIAGNOSIS — E78.00 HYPERCHOLESTEREMIA: ICD-10-CM

## 2019-04-29 RX ORDER — SIMVASTATIN 10 MG
10 TABLET ORAL EVERY EVENING
Qty: 30 TABLET | Refills: 0 | Status: SHIPPED | OUTPATIENT
Start: 2019-04-29 | End: 2019-05-29 | Stop reason: SDUPTHER

## 2019-05-01 ENCOUNTER — OFFICE VISIT (OUTPATIENT)
Dept: INTERNAL MEDICINE CLINIC | Facility: CLINIC | Age: 79
End: 2019-05-01
Payer: MEDICARE

## 2019-05-01 VITALS
BODY MASS INDEX: 26.31 KG/M2 | TEMPERATURE: 98.5 F | WEIGHT: 173.6 LBS | DIASTOLIC BLOOD PRESSURE: 94 MMHG | HEIGHT: 68 IN | SYSTOLIC BLOOD PRESSURE: 156 MMHG | HEART RATE: 68 BPM | RESPIRATION RATE: 16 BRPM | OXYGEN SATURATION: 93 %

## 2019-05-01 DIAGNOSIS — E78.00 HYPERCHOLESTEROLEMIA: ICD-10-CM

## 2019-05-01 DIAGNOSIS — F41.1 GENERALIZED ANXIETY DISORDER: Primary | ICD-10-CM

## 2019-05-01 DIAGNOSIS — E11.51 DIABETES MELLITUS WITH PERIPHERAL CIRCULATORY DISORDER (HCC): ICD-10-CM

## 2019-05-01 DIAGNOSIS — I10 ESSENTIAL HYPERTENSION: ICD-10-CM

## 2019-05-01 PROCEDURE — 99214 OFFICE O/P EST MOD 30 MIN: CPT | Performed by: INTERNAL MEDICINE

## 2019-05-01 RX ORDER — ESCITALOPRAM OXALATE 5 MG/1
5 TABLET ORAL DAILY
Qty: 30 TABLET | Refills: 5 | Status: SHIPPED | OUTPATIENT
Start: 2019-05-01 | End: 2019-10-15

## 2019-05-10 ENCOUNTER — OFFICE VISIT (OUTPATIENT)
Dept: CARDIOLOGY CLINIC | Facility: CLINIC | Age: 79
End: 2019-05-10
Payer: MEDICARE

## 2019-05-10 VITALS
BODY MASS INDEX: 26.83 KG/M2 | DIASTOLIC BLOOD PRESSURE: 72 MMHG | HEIGHT: 68 IN | HEART RATE: 68 BPM | WEIGHT: 177 LBS | SYSTOLIC BLOOD PRESSURE: 130 MMHG

## 2019-05-10 DIAGNOSIS — E78.00 HYPERCHOLESTEROLEMIA: ICD-10-CM

## 2019-05-10 DIAGNOSIS — I10 ESSENTIAL HYPERTENSION: Primary | ICD-10-CM

## 2019-05-10 DIAGNOSIS — R00.1 BRADYCARDIA: ICD-10-CM

## 2019-05-10 DIAGNOSIS — I73.9 PERIPHERAL ARTERIAL DISEASE (HCC): ICD-10-CM

## 2019-05-10 DIAGNOSIS — I25.10 CORONARY ARTERY DISEASE INVOLVING NATIVE HEART WITHOUT ANGINA PECTORIS, UNSPECIFIED VESSEL OR LESION TYPE: ICD-10-CM

## 2019-05-10 PROCEDURE — 99214 OFFICE O/P EST MOD 30 MIN: CPT | Performed by: PHYSICIAN ASSISTANT

## 2019-05-14 DIAGNOSIS — I25.10 CORONARY ARTERY DISEASE INVOLVING NATIVE HEART WITHOUT ANGINA PECTORIS, UNSPECIFIED VESSEL OR LESION TYPE: ICD-10-CM

## 2019-05-14 DIAGNOSIS — R33.9 URINARY RETENTION: ICD-10-CM

## 2019-05-14 DIAGNOSIS — I10 ESSENTIAL HYPERTENSION: ICD-10-CM

## 2019-05-14 RX ORDER — ATENOLOL 50 MG/1
50 TABLET ORAL DAILY
Qty: 90 TABLET | Refills: 1 | Status: SHIPPED | OUTPATIENT
Start: 2019-05-14 | End: 2019-11-04 | Stop reason: SDUPTHER

## 2019-05-14 RX ORDER — FINASTERIDE 5 MG/1
5 TABLET, FILM COATED ORAL DAILY
Qty: 90 TABLET | Refills: 3 | Status: SHIPPED | OUTPATIENT
Start: 2019-05-14 | End: 2020-05-11 | Stop reason: SDUPTHER

## 2019-05-29 DIAGNOSIS — E78.00 HYPERCHOLESTEREMIA: ICD-10-CM

## 2019-05-29 RX ORDER — SIMVASTATIN 10 MG
10 TABLET ORAL EVERY EVENING
Qty: 90 TABLET | Refills: 1 | Status: SHIPPED | OUTPATIENT
Start: 2019-05-29 | End: 2019-12-23 | Stop reason: SDUPTHER

## 2019-06-06 ENCOUNTER — REMOTE DEVICE CLINIC VISIT (OUTPATIENT)
Dept: CARDIOLOGY CLINIC | Facility: CLINIC | Age: 79
End: 2019-06-06
Payer: MEDICARE

## 2019-06-06 DIAGNOSIS — I49.5 SICK SINUS SYNDROME (HCC): Primary | ICD-10-CM

## 2019-06-06 DIAGNOSIS — Z45.010 ENCOUNTER FOR CHECKING AND TESTING OF CARDIAC PACEMAKER PULSE GENERATOR (BATTERY): ICD-10-CM

## 2019-06-06 PROCEDURE — 93296 REM INTERROG EVL PM/IDS: CPT | Performed by: INTERNAL MEDICINE

## 2019-06-06 PROCEDURE — 93294 REM INTERROG EVL PM/LDLS PM: CPT | Performed by: INTERNAL MEDICINE

## 2019-06-19 DIAGNOSIS — G47.62 SLEEP RELATED LEG CRAMPS: ICD-10-CM

## 2019-06-19 RX ORDER — CYCLOBENZAPRINE HCL 10 MG
TABLET ORAL
Qty: 60 TABLET | Refills: 5 | Status: SHIPPED | OUTPATIENT
Start: 2019-06-19 | End: 2020-06-29

## 2019-07-01 ENCOUNTER — OFFICE VISIT (OUTPATIENT)
Dept: CARDIOLOGY CLINIC | Facility: CLINIC | Age: 79
End: 2019-07-01
Payer: MEDICARE

## 2019-07-01 VITALS
WEIGHT: 176 LBS | HEART RATE: 70 BPM | SYSTOLIC BLOOD PRESSURE: 140 MMHG | DIASTOLIC BLOOD PRESSURE: 70 MMHG | BODY MASS INDEX: 26.67 KG/M2 | HEIGHT: 68 IN

## 2019-07-01 DIAGNOSIS — I25.10 CORONARY ARTERY DISEASE INVOLVING NATIVE CORONARY ARTERY OF NATIVE HEART WITHOUT ANGINA PECTORIS: ICD-10-CM

## 2019-07-01 DIAGNOSIS — Z95.0 PACEMAKER: ICD-10-CM

## 2019-07-01 DIAGNOSIS — E78.00 HYPERCHOLESTEROLEMIA: Primary | ICD-10-CM

## 2019-07-01 DIAGNOSIS — R00.1 BRADYCARDIA: ICD-10-CM

## 2019-07-01 PROCEDURE — 99214 OFFICE O/P EST MOD 30 MIN: CPT | Performed by: INTERNAL MEDICINE

## 2019-07-01 NOTE — PROGRESS NOTES
Patient ID: Hetal Babin is a 78 y o  male  Plan:      Hypercholesterolemia  Tolerating statin tx  Coronary artery disease  No current symptoms  Pacemaker  Continue device surveillance  Bradycardia  S/p device  Follow up Plan:  1 year ecg and f/u  HPI: The patient is seen in f/u regarding sick sinus syndrome and Cad  No recent symptoms  No chest pain  No syncope  No results found for this visit on 07/01/19  Most recent or relevant cardiac/vascular testing:    Medtronic DDD: 11/6/2009  Echo 4/25/2019: Normal LV function  Mild hypokinesia of the  apical inferior wall  Myoview: 7/10/2018: Normal LV function  Small zone of inferoapical ischemia  Past Surgical History:   Procedure Laterality Date    CARDIAC PACEMAKER PLACEMENT  11/2009    Medtronic dual chamber     CATARACT EXTRACTION W/  INTRAOCULAR LENS IMPLANT      CYSTOSCOPY N/A 5/9/2016    Procedure: CYSTOSCOPY, evacuation of bladder calculi;  Surgeon: Glen Santana MD;  [facility];  Service:     INGUINAL HERNIA REPAIR      UNILATERAL    KIDNEY STONE SURGERY      OTHER SURGICAL HISTORY      HERNIA REPAIR AS PER [facility] (ALREADY IN PERTINENT NEGATIVES)    HI TRANSURETHRAL ELEC-SURG PROSTATECTOM N/A 5/9/2016    Procedure: TRANSURETHRAL RESECTION OF PROSTATE (TURP);   Surgeon: Glen Santana MD;  [facility];  Service: Urology    TONSILLECTOMY       CMP:   Lab Results   Component Value Date     06/09/2018    K 3 9 03/19/2019    K 3 9 06/09/2018     03/19/2019     06/09/2018    CO2 29 03/19/2019    CO2 28 06/09/2018    BUN 25 03/19/2019    BUN 27 (H) 06/09/2018    CREATININE 1 33 (H) 03/19/2019    CREATININE 1 35 (H) 06/09/2018    GLUCOSE 96 11/18/2015    EGFR 50 03/19/2019       Lipid Profile:   Lab Results   Component Value Date    CHOL 142 08/24/2015    TRIG 131 03/19/2019    TRIG 182 08/24/2015    HDL 46 03/19/2019    HDL 41 08/24/2015         Review of Systems   10 point ROS  was otherwise non pertinent or negative except as per HPI or as below  Gait: Slow  Objective:     /70   Pulse 70   Ht 5' 8" (1 727 m)   Wt 79 8 kg (176 lb)   BMI 26 76 kg/m²     PHYSICAL EXAM:    General:  Normal appearance in no distress  Eyes:  Anicteric  Oral mucosa:  Moist   Neck:  No JVD  Carotid upstrokes are brisk without bruits  No masses  Left subclavian pacer  Chest:  Clear to auscultation and percussion  Cardiac:  Normal PMI  Normal S1 and S2  No murmur gallop or rub  Abdomen:  Soft and nontender  No palpable organomegaly or aortic enlargement  Extremities:  No peripheral edema  Musculoskeletal:  Symmetric  Vascular:  Femoral pulses are brisk without bruits  Popliteal pulses are intact bilaterally  Pedal pulses are intact  Neuro:  Grossly symmetric  Psych:  Alert and oriented x3          Current Outpatient Medications:     amLODIPine (NORVASC) 10 mg tablet, Take 1 tablet (10 mg total) by mouth daily, Disp: 30 tablet, Rfl: 5    aspirin (ECOTRIN LOW STRENGTH) 81 mg EC tablet, Take 81 mg by mouth daily, Disp: , Rfl:     atenolol (TENORMIN) 50 mg tablet, Take 1 tablet (50 mg total) by mouth daily, Disp: 90 tablet, Rfl: 1    calcium carbonate (TUMS) 500 mg chewable tablet, Chew 1 tablet as needed for indigestion or heartburn , Disp: , Rfl:     cyclobenzaprine (FLEXERIL) 10 mg tablet, TAKE 1-2 TABLETS DAILY AS NEEDED, Disp: 60 tablet, Rfl: 5    escitalopram (LEXAPRO) 5 mg tablet, Take 1 tablet (5 mg total) by mouth daily, Disp: 30 tablet, Rfl: 5    finasteride (PROSCAR) 5 mg tablet, Take 1 tablet (5 mg total) by mouth daily, Disp: 90 tablet, Rfl: 3    glucose blood (ONE TOUCH ULTRA TEST) test strip, Test sugar once daily, Disp: 50 each, Rfl: 5    lisinopril (ZESTRIL) 40 mg tablet, Take 1 tablet (40 mg total) by mouth daily for 30 days, Disp: 30 tablet, Rfl: 5    magnesium hydroxide (MILK OF MAGNESIA) 400 mg/5 mL oral suspension, Take by mouth as needed for constipation  , Disp: , Rfl:     Multiple Vitamin (MULTIVITAMIN) capsule, Take 1 capsule by mouth daily  , Disp: , Rfl:     polyethylene glycol (GLYCOLAX) powder, Take 17 g by mouth daily, Disp: 250 g, Rfl: 5    simvastatin (ZOCOR) 10 mg tablet, Take 1 tablet (10 mg total) by mouth every evening, Disp: 90 tablet, Rfl: 1    tamsulosin (FLOMAX) 0 4 mg, Take 1 capsule (0 4 mg total) by mouth daily with dinner, Disp: 30 capsule, Rfl: 5  No Known Allergies  Past Medical History:   Diagnosis Date    Abnormal weight loss     LAST ASSESSED: 10/12/15    Achrochordon     LAST ASSESSED: 12    Anxiety state 2018    Arthritis     Basal cell carcinoma of skin 11/15/2016    Bradycardia, unspecified     dual-chamber pacemaker    Chronic obstructive pulmonary disease ([facility] [address] [facility] 2012    Condition influencing health status     LAST ASSESSED: 14    Constipation     Coronary artery disease 2012    Depression     Essential (primary) hypertension     Fungal dermatosis     LAST ASSESSED: 8/31/15    Hearing difficulty of both ears     High cholesterol     History of nuclear stress test 2016    Lexiscan MPI:  EF 0 39 (39%), Moderate segmental LV systolic dysfunction  No evidence for prior myocardial infarction  Suggested inferoapical ischemia by perfusion imaging      Hydronephrosis     LAST ASSESSED: 11/18/15    Mixed hyperlipidemia     Osteoporosis 2012    Pacemaker     Poorly fitting dentures     Prostatitis     LAST ASSESSED: 14    Shortness of breath     Trifascicular block     Urinary frequency     Urinary incontinence     LAST ASSESSED: 10/12/15    Wears glasses            Social History     Tobacco Use   Smoking Status Former Smoker    Types: Cigars    Last attempt to quit: 1980    Years since quittin 2   Smokeless Tobacco Former User   Tobacco Comment    smokes an occ  cigar; hasn't in last 2 weeks; CIGAR (1 A DAY) AS PER [facility]

## 2019-08-02 ENCOUNTER — OFFICE VISIT (OUTPATIENT)
Dept: URGENT CARE | Facility: CLINIC | Age: 79
End: 2019-08-02
Payer: MEDICARE

## 2019-08-02 ENCOUNTER — APPOINTMENT (OUTPATIENT)
Dept: RADIOLOGY | Facility: CLINIC | Age: 79
End: 2019-08-02
Payer: MEDICARE

## 2019-08-02 VITALS
OXYGEN SATURATION: 95 % | DIASTOLIC BLOOD PRESSURE: 89 MMHG | TEMPERATURE: 98 F | RESPIRATION RATE: 16 BRPM | SYSTOLIC BLOOD PRESSURE: 171 MMHG | HEART RATE: 61 BPM

## 2019-08-02 DIAGNOSIS — S99.922A FOOT INJURY, LEFT, INITIAL ENCOUNTER: ICD-10-CM

## 2019-08-02 DIAGNOSIS — S81.802A OPEN WOUND OF LEFT LOWER LEG WITH COMPLICATION, INITIAL ENCOUNTER: Primary | ICD-10-CM

## 2019-08-02 PROCEDURE — 73630 X-RAY EXAM OF FOOT: CPT

## 2019-08-02 PROCEDURE — G0463 HOSPITAL OUTPT CLINIC VISIT: HCPCS | Performed by: PHYSICIAN ASSISTANT

## 2019-08-02 PROCEDURE — 99203 OFFICE O/P NEW LOW 30 MIN: CPT | Performed by: PHYSICIAN ASSISTANT

## 2019-08-02 RX ORDER — CEPHALEXIN 500 MG/1
500 CAPSULE ORAL 3 TIMES DAILY
Qty: 21 CAPSULE | Refills: 0 | Status: SHIPPED | OUTPATIENT
Start: 2019-08-02 | End: 2019-08-09

## 2019-08-02 NOTE — PROGRESS NOTES
[facility]        NAME: Eleonora Yee is a 78 y o  male  : 1940    MRN: [id number]  DATE: 2019  TIME: 9:45 AM    Assessment and Plan   Open wound of left lower leg with complication, initial encounter [S81 [id number]]  1  Open wound of left lower leg with complication, initial encounter  cephalexin (KEFLEX) 500 mg capsule   2  Foot injury, left, initial encounter  XR foot 3+ vw left         Patient Instructions     Start Keflex as directed  Keep left leg elevated as much as possible to keep swelling down  Follow-up with wound care should the wound not start to improve with the antibiotics  Follow up with PCP in 3-5 days  Proceed to  ER if symptoms worsen  Chief Complaint     Chief Complaint   Patient presents with    Foot Pain     Pt c/o left foot pain and bruising for three days  Pt also has an open sore on his left leg  History of Present Illness       Patient presents within wound of his left lower leg, as well as, swelling and ecchymosis of his left foot  Patient states he has increased pain when his left foot swells  He sustained a lower leg wound 3 days ago when he bumped it against the tractor attached to his lawnmower  He denies any fever chills or paresthesias  Review of Systems   Review of Systems   Constitutional: Negative for chills and fever  Cardiovascular: Negative for chest pain  Gastrointestinal: Negative for nausea and vomiting  Skin: Positive for wound  Neurological: Negative for weakness and numbness  Hematological: Negative for adenopathy           Current Medications       Current Outpatient Medications:     amLODIPine (NORVASC) 10 mg tablet, Take 1 tablet (10 mg total) by mouth daily, Disp: 30 tablet, Rfl: 5    aspirin (ECOTRIN LOW STRENGTH) 81 mg EC tablet, Take 81 mg by mouth daily, Disp: , Rfl:     atenolol (TENORMIN) 50 mg tablet, Take 1 tablet (50 mg total) by mouth daily, Disp: 90 tablet, Rfl: 1    calcium carbonate (TUMS) 500 mg chewable tablet, Chew 1 tablet as needed for indigestion or heartburn , Disp: , Rfl:     cephalexin (KEFLEX) 500 mg capsule, Take 1 capsule (500 mg total) by mouth 3 (three) times a day for 7 days, Disp: 21 capsule, Rfl: 0    cyclobenzaprine (FLEXERIL) 10 mg tablet, TAKE 1-2 TABLETS DAILY AS NEEDED, Disp: 60 tablet, Rfl: 5    escitalopram (LEXAPRO) 5 mg tablet, Take 1 tablet (5 mg total) by mouth daily, Disp: 30 tablet, Rfl: 5    finasteride (PROSCAR) 5 mg tablet, Take 1 tablet (5 mg total) by mouth daily, Disp: 90 tablet, Rfl: 3    glucose blood (ONE TOUCH ULTRA TEST) test strip, Test sugar once daily, Disp: 50 each, Rfl: 5    lisinopril (ZESTRIL) 40 mg tablet, Take 1 tablet (40 mg total) by mouth daily for 30 days, Disp: 30 tablet, Rfl: 5    magnesium hydroxide (MILK OF MAGNESIA) 400 mg/5 mL oral suspension, Take by mouth as needed for constipation  , Disp: , Rfl:     Multiple Vitamin (MULTIVITAMIN) capsule, Take 1 capsule by mouth daily  , Disp: , Rfl:     polyethylene glycol (GLYCOLAX) powder, Take 17 g by mouth daily, Disp: 250 g, Rfl: 5    simvastatin (ZOCOR) 10 mg tablet, Take 1 tablet (10 mg total) by mouth every evening, Disp: 90 tablet, Rfl: 1    tamsulosin (FLOMAX) 0 4 mg, Take 1 capsule (0 4 mg total) by mouth daily with dinner, Disp: 30 capsule, Rfl: 5    Current Allergies     Allergies as of 08/02/2019    (No Known Allergies)            The following portions of the patient's history were reviewed and updated as appropriate: allergies, current medications, past family history, past medical history, past social history, past surgical history and problem list      Past Medical History:   Diagnosis Date    Abnormal weight loss     LAST ASSESSED: 10/12/15    Achrochordon     LAST ASSESSED: 7/30/12    Anxiety state 8/5/2018    Arthritis     Basal cell carcinoma of skin 11/15/2016    Bradycardia, unspecified     dual-chamber pacemaker    Chronic obstructive pulmonary disease ([facility] [address] ) 9/5/2012    Condition influencing health status     LAST ASSESSED: 4/11/14    Constipation     Coronary artery disease 5/22/2012    Depression     Essential (primary) hypertension     Fungal dermatosis     LAST ASSESSED: 8/31/15    Hearing difficulty of both ears     High cholesterol     History of nuclear stress test 08/04/2016    Lexiscan MPI:  EF 0 39 (39%), Moderate segmental LV systolic dysfunction  No evidence for prior myocardial infarction  Suggested inferoapical ischemia by perfusion imaging   Hydronephrosis     LAST ASSESSED: 11/18/15    Mixed hyperlipidemia     Osteoporosis 5/22/2012    Pacemaker     Poorly fitting dentures     Prostatitis     LAST ASSESSED: 12/22/14    Shortness of breath     Trifascicular block     Urinary frequency     Urinary incontinence     LAST ASSESSED: 10/12/15    Wears glasses        Past Surgical History:   Procedure Laterality Date    CARDIAC PACEMAKER PLACEMENT  11/2009    Medtronic dual chamber     CATARACT EXTRACTION W/  INTRAOCULAR LENS IMPLANT      CYSTOSCOPY N/A 5/9/2016    Procedure: CYSTOSCOPY, evacuation of bladder calculi;  Surgeon: Marce Boateng MD;  [facility];  Service:     INGUINAL HERNIA REPAIR      UNILATERAL    KIDNEY STONE SURGERY      OTHER SURGICAL HISTORY      HERNIA REPAIR AS PER [facility] (ALREADY IN PERTINENT NEGATIVES)    [place] TRANSURETHRAL ELEC-SURG PROSTATECTOM N/A 5/9/2016    Procedure: TRANSURETHRAL RESECTION OF PROSTATE (TURP); Surgeon: Marce Boateng MD;  [facility];  Service: Urology    TONSILLECTOMY         Family History   Problem Relation Age of Onset    Stroke Father         SYNDROME    Coronary artery disease Family         less than 61years of age         Medications have been verified  Objective   BP (!) 171/89   Pulse 61   Temp 98 °F (36 7 °C)   Resp 16   SpO2 95%        Physical Exam     Physical Exam   Constitutional: He is oriented to person, place, and time   He appears well-developed and well-nourished  HENT:   Head: Normocephalic and atraumatic  Cardiovascular: Normal rate and regular rhythm  Pulmonary/Chest: Effort normal    Musculoskeletal:   Left foot with mild swelling ecchymosis of the 2nd 3rd and 4th toes with ecchymosis around the heel  He has full range of motion of the toes ankle and foot  No open wounds on the foot  There is a 1/2 cm open wound of the left lower leg with surrounding erythema  No lymphatic streaking  Neurological: He is alert and oriented to person, place, and time  Skin: Skin is warm and dry  Psychiatric: He has a normal mood and affect  His behavior is normal    Nursing note and vitals reviewed  Left foot x-ray viewed by me and independently reviewed by me contemporaneously as no acute bony abnormality  Incidentally noted calcaneal spur, degenerative changes

## 2019-08-02 NOTE — PATIENT INSTRUCTIONS
Acute Wound Care   AMBULATORY CARE:   An acute wound  is an injury that causes a break in the skin  An acute wound can happen suddenly, last a short time, and may heal on its own  Common signs and symptoms of an acute wound:   · A cut, tear, or gash in your skin    · Bleeding, swelling, pain, or trouble moving the affected area    · Dirt or foreign objects inside the wound     · Milky, yellow, green, or brown pus in the wound     · Red, tender, or warm area around the pus    · Fever  Seek care immediately if:   · You have pus or a foul odor coming from the wound  · You have sudden trouble breathing or chest pain  · Blood soaks through your bandage  Contact your healthcare provider if:   · You have muscle, joint, or body aches, sweating, or a fever  · You have more swelling, redness, or bleeding in your wound  · Your skin is itchy, swollen, or you have a rash  · You have questions or concerns about your condition or care  Treatment for an acute wound  may include any of the following:  · Cleansing  is done with soap and water to wash away germs and decrease the risk of infection  Sterile water further cleans the wound  The cleaning is done under high pressure with a catheter tip and large syringe  A solution that kills germs may also be used  · Debridement  is done to clean and remove objects, dirt, or dead tissues from the open wound  Healthcare providers may also drain the wound to clean out pus  · Closure of the wound  is done with stitches, staples, skin adhesive, or other treatments  This may be done if the wound is wide or deep  Stitches may be needed if the wound is in an area that moves a lot, such as the hands, feet, and joints  Stitches may help to keep the wound from getting infected  They may also decrease the amount of scarring you have  Some wounds may heal better without stitches    Wound care:   · If your wound was closed with thin strips of medical tape, keep them clean and dry  The strips of medical tape will fall off on their own  Do not pull them off  · Keep the bandage clean and dry  Do not remove the bandage over your wound unless your healthcare provider says it is okay  · Wash your hands before and after you take care of your wound to prevent infection  · Clean the wound as directed  If you cannot reach the wound, have someone help you  · If you have packing, make sure all the gauze used to pack the wound is taken out and replaced as directed  Keep track of how many gauze dressings are placed inside the wound  Follow up with your healthcare provider as directed:  Write down your questions so you remember to ask them during your visits  © 2016 [employer] is for End User's use only and may not be sold, redistributed or otherwise used for commercial purposes  All illustrations and images included in CareNotes® are the copyrighted property of [employer]  or Ketan Saenz  The above information is an  only  It is not intended as medical advice for individual conditions or treatments  Talk to your doctor, nurse or pharmacist before following any medical regimen to see if it is safe and effective for you

## 2019-08-14 ENCOUNTER — APPOINTMENT (OUTPATIENT)
Dept: WOUND CARE | Facility: CLINIC | Age: 79
End: 2019-08-14
Payer: MEDICARE

## 2019-08-14 PROCEDURE — 97597 DBRDMT OPN WND 1ST 20 CM/<: CPT

## 2019-08-21 ENCOUNTER — APPOINTMENT (OUTPATIENT)
Dept: WOUND CARE | Facility: CLINIC | Age: 79
End: 2019-08-21
Payer: MEDICARE

## 2019-08-21 PROCEDURE — 99212 OFFICE O/P EST SF 10 MIN: CPT

## 2019-08-23 DIAGNOSIS — N40.1 BPH WITH URINARY OBSTRUCTION: ICD-10-CM

## 2019-08-23 DIAGNOSIS — N13.8 BPH WITH URINARY OBSTRUCTION: ICD-10-CM

## 2019-08-23 RX ORDER — TAMSULOSIN HYDROCHLORIDE 0.4 MG/1
0.4 CAPSULE ORAL
Qty: 30 CAPSULE | Refills: 5 | Status: SHIPPED | OUTPATIENT
Start: 2019-08-23 | End: 2020-03-18 | Stop reason: SDUPTHER

## 2019-08-28 ENCOUNTER — APPOINTMENT (OUTPATIENT)
Dept: WOUND CARE | Facility: CLINIC | Age: 79
End: 2019-08-28
Payer: MEDICARE

## 2019-08-28 PROCEDURE — 99212 OFFICE O/P EST SF 10 MIN: CPT

## 2019-09-06 ENCOUNTER — REMOTE DEVICE CLINIC VISIT (OUTPATIENT)
Dept: CARDIOLOGY CLINIC | Facility: CLINIC | Age: 79
End: 2019-09-06
Payer: MEDICARE

## 2019-09-06 DIAGNOSIS — I49.5 SICK SINUS SYNDROME (HCC): Primary | ICD-10-CM

## 2019-09-06 DIAGNOSIS — Z45.010 ENCOUNTER FOR CHECKING AND TESTING OF CARDIAC PACEMAKER PULSE GENERATOR (BATTERY): ICD-10-CM

## 2019-09-06 PROCEDURE — 93294 REM INTERROG EVL PM/LDLS PM: CPT | Performed by: INTERNAL MEDICINE

## 2019-09-06 PROCEDURE — 93296 REM INTERROG EVL PM/IDS: CPT | Performed by: INTERNAL MEDICINE

## 2019-09-06 NOTE — PROGRESS NOTES
[facility] remote check pacer  No events  Normal battery function  Current Outpatient Medications:     amLODIPine (NORVASC) 10 mg tablet, Take 1 tablet (10 mg total) by mouth daily, Disp: 30 tablet, Rfl: 5    aspirin (ECOTRIN LOW STRENGTH) 81 mg EC tablet, Take 81 mg by mouth daily, Disp: , Rfl:     atenolol (TENORMIN) 50 mg tablet, Take 1 tablet (50 mg total) by mouth daily, Disp: 90 tablet, Rfl: 1    calcium carbonate (TUMS) 500 mg chewable tablet, Chew 1 tablet as needed for indigestion or heartburn , Disp: , Rfl:     cyclobenzaprine (FLEXERIL) 10 mg tablet, TAKE 1-2 TABLETS DAILY AS NEEDED, Disp: 60 tablet, Rfl: 5    escitalopram (LEXAPRO) 5 mg tablet, Take 1 tablet (5 mg total) by mouth daily, Disp: 30 tablet, Rfl: 5    finasteride (PROSCAR) 5 mg tablet, Take 1 tablet (5 mg total) by mouth daily, Disp: 90 tablet, Rfl: 3    glucose blood (ONE TOUCH ULTRA TEST) test strip, Test sugar once daily, Disp: 50 each, Rfl: 5    lisinopril (ZESTRIL) 40 mg tablet, Take 1 tablet (40 mg total) by mouth daily for 30 days, Disp: 30 tablet, Rfl: 5    magnesium hydroxide (MILK OF MAGNESIA) 400 mg/5 mL oral suspension, Take by mouth as needed for constipation  , Disp: , Rfl:     Multiple Vitamin (MULTIVITAMIN) capsule, Take 1 capsule by mouth daily  , Disp: , Rfl:     polyethylene glycol (GLYCOLAX) powder, Take 17 g by mouth daily, Disp: 250 g, Rfl: 5    simvastatin (ZOCOR) 10 mg tablet, Take 1 tablet (10 mg total) by mouth every evening, Disp: 90 tablet, Rfl: 1    tamsulosin (FLOMAX) 0 4 mg, Take 1 capsule (0 4 mg total) by mouth daily with dinner, Disp: 30 capsule, Rfl: 5

## 2019-09-16 DIAGNOSIS — I10 ESSENTIAL HYPERTENSION: ICD-10-CM

## 2019-09-16 RX ORDER — LISINOPRIL 40 MG/1
40 TABLET ORAL DAILY
Qty: 30 TABLET | Refills: 5 | Status: SHIPPED | OUTPATIENT
Start: 2019-09-16 | End: 2020-03-16 | Stop reason: SDUPTHER

## 2019-09-16 RX ORDER — AMLODIPINE BESYLATE 10 MG/1
10 TABLET ORAL DAILY
Qty: 30 TABLET | Refills: 5 | Status: SHIPPED | OUTPATIENT
Start: 2019-09-16 | End: 2020-03-16 | Stop reason: SDUPTHER

## 2019-09-26 NOTE — PROGRESS NOTES
Assessment/Plan:    Problem List Items Addressed This Visit        Endocrine    Diabetes mellitus with peripheral circulatory disorder [facility])       Cardiovascular and Mediastinum    Essential hypertension       Other    Hypercholesterolemia      Other Visit Diagnoses     Generalized anxiety disorder    -  Primary    Relevant Medications    escitalopram (LEXAPRO) 5 mg tablet           Diagnoses and all orders for this visit:    Generalized anxiety disorder  -     escitalopram (LEXAPRO) 5 mg tablet; Take 1 tablet (5 mg total) by mouth daily    Essential hypertension    Hypercholesterolemia    Diabetes mellitus with peripheral circulatory disorder (HCC)        No problem-specific Assessment & Plan notes found for this encounter  Subjective:      Patient ID: Kavon Wu is a 78 y o  male  Diabetes   He presents for his follow-up diabetic visit  He has type 2 diabetes mellitus  There are no hypoglycemic associated symptoms  Pertinent negatives for hypoglycemia include no nervousness/anxiousness  There are no diabetic associated symptoms  Pertinent negatives for diabetes include no chest pain and no fatigue  There are no diabetic complications  Risk factors for coronary artery disease include male sex, hypertension, diabetes mellitus and dyslipidemia  Current diabetic treatment includes diet  He is compliant with treatment all of the time  There is no change in his home blood glucose trend  An ACE inhibitor/angiotensin II receptor blocker is being taken  Hypertension   This is a chronic problem  The current episode started more than 1 year ago  The problem is unchanged  The problem is uncontrolled  Pertinent negatives include no chest pain, palpitations or shortness of breath  There are no associated agents to hypertension  Past treatments include beta blockers, calcium channel blockers and ACE inhibitors  The current treatment provides mild improvement  There are no compliance problems          The following portions of the patient's history were reviewed and updated as appropriate:   He has a past medical history of Abnormal weight loss, Achrochordon, Anxiety state (8/5/2018), Arthritis, Basal cell carcinoma of skin (11/15/2016), Bradycardia, unspecified, Chronic obstructive pulmonary disease ([facility] [address] ) (9/5/2012), Condition influencing health status, Constipation, Coronary artery disease (5/22/2012), Depression, Essential (primary) hypertension, Fungal dermatosis, Hearing difficulty of both ears, High cholesterol, History of nuclear stress test (08/04/2016), Hydronephrosis, Mixed hyperlipidemia, Osteoporosis (5/22/2012), Pacemaker, Poorly fitting dentures, Prostatitis, Shortness of breath, Trifascicular block, Urinary frequency, Urinary incontinence, and Wears glasses  ,  does not have any pertinent problems on file  ,   has a past surgical history that includes Cardiac pacemaker placement (11/2009); Cataract extraction w/  intraocular lens implant; Kidney stone surgery; Tonsillectomy; pr transurethral elec-surg prostatectom (N/A, 5/9/2016); CYSTOSCOPY (N/A, 5/9/2016); Other surgical history; and Inguinal hernia repair  ,  family history includes Coronary artery disease in his family; Stroke in his father  ,   reports that he quit smoking about 39 years ago  His smoking use included cigars  He has quit using smokeless tobacco  He reports that he does not drink alcohol or use drugs  ,  has No Known Allergies     Current Outpatient Medications   Medication Sig Dispense Refill    aspirin (ECOTRIN LOW STRENGTH) 81 mg EC tablet Take 81 mg by mouth daily      calcium carbonate (TUMS) 500 mg chewable tablet Chew 1 tablet as needed for indigestion or heartburn   glucose blood (ONE TOUCH ULTRA TEST) test strip Test sugar once daily 50 each 5    magnesium hydroxide (MILK OF MAGNESIA) 400 mg/5 mL oral suspension Take by mouth as needed for constipation        Multiple Vitamin (MULTIVITAMIN) capsule Take 1 capsule by mouth daily       amLODIPine (NORVASC) 10 mg tablet Take 1 tablet (10 mg total) by mouth daily 30 tablet 5    atenolol (TENORMIN) 50 mg tablet Take 1 tablet (50 mg total) by mouth daily 90 tablet 1    cyclobenzaprine (FLEXERIL) 10 mg tablet TAKE 1-2 TABLETS DAILY AS NEEDED 60 tablet 5    escitalopram (LEXAPRO) 5 mg tablet Take 1 tablet (5 mg total) by mouth daily 30 tablet 5    finasteride (PROSCAR) 5 mg tablet Take 1 tablet (5 mg total) by mouth daily 90 tablet 3    lisinopril (ZESTRIL) 40 mg tablet Take 1 tablet (40 mg total) by mouth daily 30 tablet 5    polyethylene glycol (GLYCOLAX) powder Take 17 g by mouth daily 250 g 5    simvastatin (ZOCOR) 10 mg tablet Take 1 tablet (10 mg total) by mouth every evening 90 tablet 1    tamsulosin (FLOMAX) 0 4 mg Take 1 capsule (0 4 mg total) by mouth daily with dinner 30 capsule 5     No current facility-administered medications for this visit  Review of Systems   Constitutional: Negative for chills, fatigue and fever  HENT: Negative  Respiratory: Negative for cough, chest tightness and shortness of breath  Cardiovascular: Negative for chest pain and palpitations  Gastrointestinal: Negative for abdominal pain, constipation, diarrhea, nausea and vomiting  Genitourinary: Negative  Musculoskeletal: Negative for back pain and myalgias  Skin: Negative  Neurological: Negative  Psychiatric/Behavioral: Negative for dysphoric mood  The patient is not nervous/anxious  Objective:  Vitals:    05/01/19 0737 05/01/19 0824   BP: 166/94 156/94   BP Location: Left arm    Patient Position: Sitting    Cuff Size: Large    Pulse: 68    Resp: 16    Temp: 98 5 °F (36 9 °C)    SpO2: 93%    Weight: 78 7 kg (173 lb 9 6 oz)    Height: 5' 8" (1 727 m)      Body mass index is 26 4 kg/m²  Physical Exam   Constitutional: He is oriented to person, place, and time  He appears well-developed and well-nourished  HENT:   Head: Normocephalic and atraumatic  Eyes: Pupils are equal, round, and reactive to light  EOM are normal    Neck: Normal range of motion  Neck supple  Cardiovascular: Normal rate, regular rhythm, normal heart sounds and intact distal pulses  No murmur heard  Pulmonary/Chest: Effort normal and breath sounds normal  No stridor  He has no rales  Abdominal: Soft  Bowel sounds are normal  He exhibits no distension  There is no tenderness  Musculoskeletal: Normal range of motion  He exhibits no edema or deformity  Neurological: He is alert and oriented to person, place, and time  Skin: Skin is warm and dry  Psychiatric: He has a normal mood and affect  Nursing note and vitals reviewed         PHQ-9 Depression Screening    PHQ-9:    Frequency of the following problems over the past two weeks:

## 2019-10-15 ENCOUNTER — OFFICE VISIT (OUTPATIENT)
Dept: INTERNAL MEDICINE CLINIC | Facility: CLINIC | Age: 79
End: 2019-10-15
Payer: MEDICARE

## 2019-10-15 VITALS
RESPIRATION RATE: 18 BRPM | TEMPERATURE: 98.9 F | BODY MASS INDEX: 27.49 KG/M2 | HEIGHT: 68 IN | DIASTOLIC BLOOD PRESSURE: 82 MMHG | HEART RATE: 66 BPM | WEIGHT: 181.4 LBS | OXYGEN SATURATION: 94 % | SYSTOLIC BLOOD PRESSURE: 136 MMHG

## 2019-10-15 DIAGNOSIS — Z01.00 DIABETIC EYE EXAM (HCC): ICD-10-CM

## 2019-10-15 DIAGNOSIS — J43.9 PULMONARY EMPHYSEMA, UNSPECIFIED EMPHYSEMA TYPE (HCC): ICD-10-CM

## 2019-10-15 DIAGNOSIS — Z00.00 MEDICARE ANNUAL WELLNESS VISIT, SUBSEQUENT: Primary | ICD-10-CM

## 2019-10-15 DIAGNOSIS — Z23 NEED FOR INFLUENZA VACCINATION: ICD-10-CM

## 2019-10-15 DIAGNOSIS — E11.51 DIABETES MELLITUS WITH PERIPHERAL CIRCULATORY DISORDER (HCC): ICD-10-CM

## 2019-10-15 DIAGNOSIS — E78.00 HYPERCHOLESTEROLEMIA: ICD-10-CM

## 2019-10-15 DIAGNOSIS — E11.9 DIABETIC EYE EXAM (HCC): ICD-10-CM

## 2019-10-15 LAB — SL AMB POCT HEMOGLOBIN AIC: 6.2 (ref ?–6.5)

## 2019-10-15 PROCEDURE — G0439 PPPS, SUBSEQ VISIT: HCPCS | Performed by: INTERNAL MEDICINE

## 2019-10-15 PROCEDURE — 90662 IIV NO PRSV INCREASED AG IM: CPT | Performed by: INTERNAL MEDICINE

## 2019-10-15 PROCEDURE — 99214 OFFICE O/P EST MOD 30 MIN: CPT | Performed by: INTERNAL MEDICINE

## 2019-10-15 PROCEDURE — 83036 HEMOGLOBIN GLYCOSYLATED A1C: CPT | Performed by: INTERNAL MEDICINE

## 2019-10-15 PROCEDURE — G0008 ADMIN INFLUENZA VIRUS VAC: HCPCS | Performed by: INTERNAL MEDICINE

## 2019-10-15 NOTE — PATIENT INSTRUCTIONS
Medicare Preventive Visit Patient Instructions  Thank you for completing your Welcome to Medicare Visit or Medicare Annual Wellness Visit today  Your next wellness visit will be due in one year (10/15/2020)  The screening/preventive services that you may require over the next 5-10 years are detailed below  Some tests may not apply to you based off risk factors and/or age  Screening tests ordered at today's visit but not completed yet may show as past due  Also, please note that scanned in results may not display below  Preventive Screenings:  Service Recommendations Previous Testing/Comments   Colorectal Cancer Screening  · Colonoscopy    · Fecal Occult Blood Test (FOBT)/Fecal Immunochemical Test (FIT)  · Fecal DNA/Cologuard Test  · Flexible Sigmoidoscopy Age: 54-65 years old   Colonoscopy: every 10 years (May be performed more frequently if at higher risk)  OR  FOBT/FIT: every 1 year  OR  Cologuard: every 3 years  OR  Sigmoidoscopy: every 5 years  Screening may be recommended earlier than age 48 if at higher risk for colorectal cancer  Also, an individualized decision between you and your healthcare provider will decide whether screening between the ages of 74-80 would be appropriate   Colonoscopy: Not on file  FOBT/FIT: Not on file  Cologuard: Not on file  Sigmoidoscopy: Not on file         Prostate Cancer Screening Individualized decision between patient and health care provider in men between ages of 53-78   Medicare will cover every 12 months beginning on the day after your 50th birthday PSA: 1 5 ng/mL     Screening Not Indicated     Hepatitis C Screening Once for adults born between 1945 and 1965  More frequently in patients at high risk for Hepatitis C Hep C Antibody: Not on file       Diabetes Screening 1-2 times per year if you're at risk for diabetes or have pre-diabetes Fasting glucose: 131 mg/dL   A1C: 6 0 %    Screening Not Indicated  History Diabetes   Cholesterol Screening Once every 5 years if you don't have a lipid disorder  May order more often based on risk factors  Lipid panel: 03/19/2019    Screening Not Indicated  History Lipid Disorder      Other Preventive Screenings Covered by Medicare:  1  Abdominal Aortic Aneurysm (AAA) Screening: covered once if your at risk  You're considered to be at risk if you have a family history of AAA or a male between the age of 73-68 who smoking at least 100 cigarettes in your lifetime  2  Lung Cancer Screening: covers low dose CT scan once per year if you meet all of the following conditions: (1) Age 50-69; (2) No signs or symptoms of lung cancer; (3) Current smoker or have quit smoking within the last 15 years; (4) You have a tobacco smoking history of at least 30 pack years (packs per day x number of years you smoked); (5) You get a written order from a healthcare provider  3  Glaucoma Screening: covered annually if you're considered high risk: (1) You have diabetes OR (2) Family history of glaucoma OR (3)  aged 48 and older OR (3)  American aged 72 and older  3  Osteoporosis Screening: covered every 2 years if you meet one of the following conditions: (1) Have a vertebral abnormality; (2) On glucocorticoid therapy for more than 3 months; (3) Have primary hyperparathyroidism; (4) On osteoporosis medications and need to assess response to drug therapy  5  HIV Screening: covered annually if you're between the age of 12-76  Also covered annually if you are younger than 13 and older than 72 with risk factors for HIV infection  For pregnant patients, it is covered up to 3 times per pregnancy      Immunizations:  Immunization Recommendations   Influenza Vaccine Annual influenza vaccination during flu season is recommended for all persons aged >= 6 months who do not have contraindications   Pneumococcal Vaccine (Prevnar and Pneumovax)  * Prevnar = PCV13  * Pneumovax = PPSV23 Adults 25-60 years old: 1-3 doses may be recommended based on certain risk factors  Adults 72 years old: Prevnar (PCV13) vaccine recommended followed by Pneumovax (PPSV23) vaccine  If already received PPSV23 since turning 65, then PCV13 recommended at least one year after PPSV23 dose  Hepatitis B Vaccine 3 dose series if at intermediate or high risk (ex: diabetes, end stage renal disease, liver disease)   Tetanus (Td) Vaccine - COST NOT COVERED BY MEDICARE PART B Following completion of primary series, a booster dose should be given every 10 years to maintain immunity against tetanus  Td may also be given as tetanus wound prophylaxis  Tdap Vaccine - COST NOT COVERED BY MEDICARE PART B Recommended at least once for all adults  For pregnant patients, recommended with each pregnancy  Shingles Vaccine (Shingrix) - COST NOT COVERED BY MEDICARE PART B  2 shot series recommended in those aged 48 and above     Health Maintenance Due:  There are no preventive care reminders to display for this patient  Immunizations Due:      Topic Date Due    HEPATITIS B VACCINES (1 of 3 - Risk 3-dose series) 04/15/1959    DTaP,Tdap,and Td Vaccines (1 - Tdap) 04/15/1959    Pneumococcal Vaccine: 65+ Years (2 of 2 - PCV13) 07/27/2010    INFLUENZA VACCINE  07/01/2019     Advance Directives   What are advance directives? Advance directives are legal documents that state your wishes and plans for medical care  These plans are made ahead of time in case you lose your ability to make decisions for yourself  Advance directives can apply to any medical decision, such as the treatments you want, and if you want to donate organs  What are the types of advance directives? There are many types of advance directives, and each state has rules about how to use them  You may choose a combination of any of the following:  · Living will: This is a written record of the treatment you want  You can also choose which treatments you do not want, which to limit, and which to stop at a certain time   This includes surgery, medicine, IV fluid, and tube feedings  · Durable power of  for healthcare [facility]): This is a written record that states who you want to make healthcare choices for you when you are unable to make them for yourself  This person, called a proxy, is usually a family member or a friend  You may choose more than 1 proxy  · Do not resuscitate (DNR) order:  A DNR order is used in case your heart stops beating or you stop breathing  It is a request not to have certain forms of treatment, such as CPR  A DNR order may be included in other types of advance directives  · Medical directive: This covers the care that you want if you are in a coma, near death, or unable to make decisions for yourself  You can list the treatments you want for each condition  Treatment may include pain medicine, surgery, blood transfusions, dialysis, IV or tube feedings, and a ventilator (breathing machine)  · Values history: This document has questions about your views, beliefs, and how you feel and think about life  This information can help others choose the care that you would choose  Why are advance directives important? An advance directive helps you control your care  Although spoken wishes may be used, it is better to have your wishes written down  Spoken wishes can be misunderstood, or not followed  Treatments may be given even if you do not want them  An advance directive may make it easier for your family to make difficult choices about your care  Fall Prevention    Fall prevention  includes ways to make your home and other areas safer  It also includes ways you can move more carefully to prevent a fall  Health conditions that cause changes in your blood pressure, vision, or muscle strength and coordination may increase your risk for falls  Medicines may also increase your risk for falls if they make you dizzy, weak, or sleepy  Fall prevention tips:   · Stand or sit up slowly  · Use assistive devices as directed  · Wear shoes that fit well and have soles that   · Wear a personal alarm  · Stay active  · Manage your medical conditions  Home Safety Tips:  · Add items to prevent falls in the bathroom  · Keep paths clear  · Install bright lights in your home  · Keep items you use often on shelves within reach  · Paint or place reflective tape on the edges of your stairs  Weight Management   Why it is important to manage your weight:  Being overweight increases your risk of health conditions such as heart disease, high blood pressure, type 2 diabetes, and certain types of cancer  It can also increase your risk for osteoarthritis, sleep apnea, and other respiratory problems  Aim for a slow, steady weight loss  Even a small amount of weight loss can lower your risk of health problems  How to lose weight safely:  A safe and healthy way to lose weight is to eat fewer calories and get regular exercise  You can lose up about 1 pound a week by decreasing the number of calories you eat by 500 calories each day  Healthy meal plan for weight management:  A healthy meal plan includes a variety of foods, contains fewer calories, and helps you stay healthy  A healthy meal plan includes the following:  · Eat whole-grain foods more often  A healthy meal plan should contain fiber  Fiber is the part of grains, fruits, and vegetables that is not broken down by your body  Whole-grain foods are healthy and provide extra fiber in your diet  Some examples of whole-grain foods are whole-wheat breads and pastas, oatmeal, brown rice, and bulgur  · Eat a variety of vegetables every day  Include dark, leafy greens such as spinach, kale, emir greens, and mustard greens  Eat yellow and orange vegetables such as carrots, sweet potatoes, and winter squash  · Eat a variety of fruits every day  Choose fresh or canned fruit (canned in its own juice or light syrup) instead of juice   Fruit juice has very little or no fiber   · Eat low-fat dairy foods  Drink fat-free (skim) milk or 1% milk  Eat fat-free yogurt and low-fat cottage cheese  Try low-fat cheeses such as mozzarella and other reduced-fat cheeses  · Choose meat and other protein foods that are low in fat  Choose beans or other legumes such as split peas or lentils  Choose fish, skinless poultry (chicken or turkey), or lean cuts of red meat (beef or pork)  Before you cook meat or poultry, cut off any visible fat  · Use less fat and oil  Try baking foods instead of frying them  Add less fat, such as margarine, sour cream, regular salad dressing and mayonnaise to foods  Eat fewer high-fat foods  Some examples of high-fat foods include french fries, doughnuts, ice cream, and cakes  · Eat fewer sweets  Limit foods and drinks that are high in sugar  This includes candy, cookies, regular soda, and sweetened drinks  Exercise:  Exercise at least 30 minutes per day on most days of the week  Some examples of exercise include walking, biking, dancing, and swimming  You can also fit in more physical activity by taking the stairs instead of the elevator or parking farther away from stores  Ask your healthcare provider about the best exercise plan for you  © Copyright [employer] 2018 Information is for End User's use only and may not be sold, redistributed or otherwise used for commercial purposes  All illustrations and images included in CareNotes® are the copyrighted property of A D A M , Inc  or Formerly named [facility] Diallo Richards   Weight Management   AMBULATORY CARE:   Why it is important to manage your weight:  Being overweight increases your risk of health conditions such as heart disease, high blood pressure, type 2 diabetes, and certain types of cancer  It can also increase your risk for osteoarthritis, sleep apnea, and other respiratory problems  Aim for a slow, steady weight loss  Even a small amount of weight loss can lower your risk of health problems    How to lose weight safely:  A safe and healthy way to lose weight is to eat fewer calories and get regular exercise  You can lose up about 1 pound a week by decreasing the number of calories you eat by 500 calories each day  You can decrease calories by eating smaller portion sizes or by cutting out high-calorie foods  Read labels to find out how many calories are in the foods you eat  You can also burn calories with exercise such as walking, swimming, or biking  You will be more likely to keep weight off if you make these changes part of your lifestyle  Healthy meal plan for weight management:  A healthy meal plan includes a variety of foods, contains fewer calories, and helps you stay healthy  A healthy meal plan includes the following:  · Eat whole-grain foods more often  A healthy meal plan should contain fiber  Fiber is the part of grains, fruits, and vegetables that is not broken down by your body  Whole-grain foods are healthy and provide extra fiber in your diet  Some examples of whole-grain foods are whole-wheat breads and pastas, oatmeal, brown rice, and bulgur  · Eat a variety of vegetables every day  Include dark, leafy greens such as spinach, kale, emir greens, and mustard greens  Eat yellow and orange vegetables such as carrots, sweet potatoes, and winter squash  · Eat a variety of fruits every day  Choose fresh or canned fruit (canned in its own juice or light syrup) instead of juice  Fruit juice has very little or no fiber  · Eat low-fat dairy foods  Drink fat-free (skim) milk or 1% milk  Eat fat-free yogurt and low-fat cottage cheese  Try low-fat cheeses such as mozzarella and other reduced-fat cheeses  · Choose meat and other protein foods that are low in fat  Choose beans or other legumes such as split peas or lentils  Choose fish, skinless poultry (chicken or turkey), or lean cuts of red meat (beef or pork)  Before you cook meat or poultry, cut off any visible fat  · Use less fat and oil  Try baking foods instead of frying them  Add less fat, such as margarine, sour cream, regular salad dressing and mayonnaise to foods  Eat fewer high-fat foods  Some examples of high-fat foods include french fries, doughnuts, ice cream, and cakes  · Eat fewer sweets  Limit foods and drinks that are high in sugar  This includes candy, cookies, regular soda, and sweetened drinks  Ways to decrease calories:   · Eat smaller portions  ¨ Use a small plate with smaller servings  ¨ Do not eat second helpings  ¨ When you eat at a restaurant, ask for a box and place half of your meal in the box before you eat  ¨ Share an entrée with someone else  · Replace high-calorie snacks with healthy, low-calorie snacks  ¨ Choose fresh fruit, vegetables, fat-free rice cakes, or air-popped popcorn instead of potato chips, nuts, or chocolate  ¨ Choose water or calorie-free drinks instead of soda or sweetened drinks  · Eat regular meals  Skipping meals can lead to overeating later in the day  Eat a healthy snack in place of a meal if you do not have time to eat a regular meal      · Do not shop for groceries when you are hungry  You may be more likely to make unhealthy food choices  Take a grocery list of healthy foods and shop after you have eaten  Exercise:  Exercise at least 30 minutes per day on most days of the week  Some examples of exercise include walking, biking, dancing, and swimming  You can also fit in more physical activity by taking the stairs instead of the elevator or parking farther away from stores  Ask your healthcare provider about the best exercise plan for you  Other things to consider as you try to lose weight:   · Be aware of situations that may give you the urge to overeat, such as eating while watching television  Find ways to avoid these situations  For example, read a book, go for a walk, or do crafts      · Meet with a weight loss support group or friends who are also trying to lose weight  This may help you stay motivated to continue working on your weight loss goals  © 2017 [address] [employer] [address] Information is for End User's use only and may not be sold, redistributed or otherwise used for commercial purposes  All illustrations and images included in CareNotes® are the copyrighted property of A [employer] M , Inc  or Ketan Saenz  The above information is an  only  It is not intended as medical advice for individual conditions or treatments  Talk to your doctor, nurse or pharmacist before following any medical regimen to see if it is safe and effective for you

## 2019-10-15 NOTE — PROGRESS NOTES
Assessment/Plan:    Problem List Items Addressed This Visit        Endocrine    Diabetes mellitus with peripheral circulatory disorder (HCC)    Relevant Orders    POCT hemoglobin A1c (Completed)       Respiratory    Chronic obstructive pulmonary disease ([facility] [address] )       Other    Hypercholesterolemia      Other Visit Diagnoses     [facility] )    -  Primary    Need for influenza vaccination        Relevant Orders    influenza vaccine, [id number], high-dose, PF 0 5 mL (FLUZONE HIGH-DOSE)           Diagnoses and all orders for this visit:    Diabetic eye exam ([facility] [address] )  -     Cancel: Ambulatory referral to Ophthalmology    Diabetes mellitus with peripheral circulatory disorder ([facility] [address] )  -     POCT hemoglobin A1c    Need for influenza vaccination  -     influenza vaccine, [id number], high-dose, PF 0 5 mL (FLUZONE HIGH-DOSE)    Pulmonary emphysema, unspecified emphysema type ([facility] [address] )    Hypercholesterolemia        No problem-specific Assessment & Plan notes found for this encounter  Subjective:      Patient ID: Jaxson Cesar is a 78 y o  male  The patient has no issues  He states that his mood is good  He is interested in stopping the Lexapro  The patient states that he had an accident with his lawn tractor and injured his left leg  He notes pain in the anterior distal leg  He noted immediate pain after the accident  However this has resolved  The patient states that there are no other issues at this time  The patient states that there are no other issues at this time  The patient's last LDL cholesterol is noted to be good  His HgbA1c is noted to be good at 6 2%  The patient BP is mildly elevated        The following portions of the patient's history were reviewed and updated as appropriate:   He has a past medical history of Anxiety state (8/5/2018), Basal cell carcinoma of skin (11/15/2016), Chronic obstructive pulmonary disease ([address] ) (9/5/2012), Constipation, Coronary artery disease (5/22/2012), Hydronephrosis, Osteoporosis (5/22/2012), Pacemaker, and Trifascicular block  ,  does not have any pertinent problems on file  ,   has a past surgical history that includes Cardiac pacemaker placement (11/2009); Cataract extraction w/  intraocular lens implant; Kidney stone surgery; Tonsillectomy; pr transurethral elec-surg prostatectom (N/A, 5/9/2016); CYSTOSCOPY (N/A, 5/9/2016); Other surgical history; and Inguinal hernia repair  ,  family history includes Coronary artery disease in his family; Stroke in his father  ,   reports that he quit smoking about 39 years ago  His smoking use included cigars  He has quit using smokeless tobacco  He reports that he does not drink alcohol or use drugs  ,  has No Known Allergies     Current Outpatient Medications   Medication Sig Dispense Refill    amLODIPine (NORVASC) 10 mg tablet Take 1 tablet (10 mg total) by mouth daily 30 tablet 5    aspirin (ECOTRIN LOW STRENGTH) 81 mg EC tablet Take 81 mg by mouth daily      atenolol (TENORMIN) 50 mg tablet Take 1 tablet (50 mg total) by mouth daily 90 tablet 1    calcium carbonate (TUMS) 500 mg chewable tablet Chew 1 tablet as needed for indigestion or heartburn   cyclobenzaprine (FLEXERIL) 10 mg tablet TAKE 1-2 TABLETS DAILY AS NEEDED 60 tablet 5    finasteride (PROSCAR) 5 mg tablet Take 1 tablet (5 mg total) by mouth daily 90 tablet 3    glucose blood (ONE TOUCH ULTRA TEST) test strip Test sugar once daily 50 each 5    lisinopril (ZESTRIL) 40 mg tablet Take 1 tablet (40 mg total) by mouth daily 30 tablet 5    magnesium hydroxide (MILK OF MAGNESIA) 400 mg/5 mL oral suspension Take by mouth as needed for constipation   Multiple Vitamin (MULTIVITAMIN) capsule Take 1 capsule by mouth daily        simvastatin (ZOCOR) 10 mg tablet Take 1 tablet (10 mg total) by mouth every evening 90 tablet 1    tamsulosin (FLOMAX) 0 4 mg Take 1 capsule (0 4 mg total) by mouth daily with dinner 30 capsule 5     No current facility-administered medications for this visit  Review of Systems   Constitutional: Negative for chills, fatigue and fever  HENT: Negative  Negative for rhinorrhea and sinus pain  Respiratory: Negative for cough, chest tightness and shortness of breath  Cardiovascular: Negative for chest pain and palpitations  Gastrointestinal: Negative for abdominal pain, constipation, diarrhea, nausea and vomiting  Genitourinary: Negative  Musculoskeletal: Negative for back pain and myalgias  Skin: Negative  Neurological: Negative  Psychiatric/Behavioral: Negative for dysphoric mood  The patient is not nervous/anxious  Objective:  Vitals:    10/15/19 0704   BP: 136/82   BP Location: Left arm   Patient Position: Sitting   Cuff Size: Large   Pulse: 66   Resp: 18   Temp: 98 9 °F (37 2 °C)   SpO2: 94%   Weight: 82 3 kg (181 lb 6 4 oz)   Height: 5' 8" (1 727 m)     Body mass index is 27 58 kg/m²  Physical Exam   Constitutional: He is oriented to person, place, and time  He appears well-developed and well-nourished  HENT:   Head: Normocephalic and atraumatic  Eyes: Pupils are equal, round, and reactive to light  EOM are normal    Neck: Normal range of motion  Neck supple  Cardiovascular: Normal rate, regular rhythm, normal heart sounds and intact distal pulses  No murmur heard  Pulmonary/Chest: Effort normal and breath sounds normal  No stridor  He has no rales  Abdominal: Soft  Bowel sounds are normal  He exhibits no distension  There is no tenderness  Musculoskeletal: Normal range of motion  He exhibits no edema or deformity  Neurological: He is alert and oriented to person, place, and time  Skin: Skin is warm and dry  Psychiatric: He has a normal mood and affect  Nursing note and vitals reviewed  Depression Screening Follow-up Plan: Patient's depression screening was positive with a PHQ-2 score of 2  Their PHQ-9 score was    Clinically patient does not have depression  No treatment is required  PHQ-9 Depression Screening    PHQ-9:    Frequency of the following problems over the past two weeks:       Little interest or pleasure in doing things:  1 - several days  Feeling down, depressed, or hopeless:  1 - several days  PHQ-2 Score:  2         BMI Counseling: Body mass index is 27 58 kg/m²  The BMI is above normal  Nutrition recommendations include reducing portion sizes  Pharmacotherapy was ordered for patient to aid in weight loss

## 2019-10-15 NOTE — PROGRESS NOTES
Assessment and Plan:     Problem List Items Addressed This Visit        Endocrine    Diabetes mellitus with peripheral circulatory disorder ([facility] [address] )      Other Visit Diagnoses     Diabetic eye exam ([facility] [address] )    -  Primary           Preventive health issues were discussed with patient, and age appropriate screening tests were ordered as noted in patient's After Visit Summary  Personalized health advice and appropriate referrals for health education or preventive services given if needed, as noted in patient's After Visit Summary       History of Present Illness:     Patient presents for Medicare Annual Wellness visit    Patient Care Team:  Jesika Leong DO as PCP - General (Internal Medicine)  MD Kinga Maya, CRNP Rockie Lombard, MD Vicie Malay, MD Rojean Haven, DO     Problem List:     Patient Active Problem List   Diagnosis    Abnormal nuclear stress test    Basal cell carcinoma of skin    BPH with urinary obstruction    Chronic obstructive pulmonary disease ([facility] [address] )    Coronary artery disease    Diabetes mellitus with peripheral circulatory disorder ([facility] [address] )    Hypercholesterolemia    Essential hypertension    Osteoporosis    Peripheral arterial disease ([facility] [address] )    Sleep related leg cramps    Anxiety state    Bradycardia    Skin lesion    Constipation    Pacemaker      Past Medical and Surgical History:     Past Medical History:   Diagnosis Date    Abnormal weight loss     LAST ASSESSED: 10/12/15    Achrochordon     LAST ASSESSED: 7/30/12    Anxiety state 8/5/2018    Arthritis     Basal cell carcinoma of skin 11/15/2016    Bradycardia, unspecified     dual-chamber pacemaker    Chronic obstructive pulmonary disease ([facility] [address] ) 9/5/2012    Condition influencing health status     LAST ASSESSED: 4/11/14    Constipation     Coronary artery disease 5/22/2012    Depression     Essential (primary) hypertension     Fungal dermatosis     LAST ASSESSED: 8/31/15    Hearing difficulty of both ears     High cholesterol     History of nuclear stress test 08/04/2016    Lexiscan MPI:  EF 0 39 (39%), Moderate segmental LV systolic dysfunction  No evidence for prior myocardial infarction  Suggested inferoapical ischemia by perfusion imaging   Hydronephrosis     LAST ASSESSED: 11/18/15    Mixed hyperlipidemia     Osteoporosis 5/22/2012    Pacemaker     Poorly fitting dentures     Prostatitis     LAST ASSESSED: 12/22/14    Shortness of breath     Trifascicular block     Urinary frequency     Urinary incontinence     LAST ASSESSED: 10/12/15    Wears glasses      Past Surgical History:   Procedure Laterality Date    CARDIAC PACEMAKER PLACEMENT  11/2009    Medtronic dual chamber     CATARACT EXTRACTION W/  INTRAOCULAR LENS IMPLANT      CYSTOSCOPY N/A 5/9/2016    Procedure: CYSTOSCOPY, evacuation of bladder calculi;  Surgeon: Ottoniel Graves MD;  [facility];  Service:     INGUINAL HERNIA REPAIR      UNILATERAL    KIDNEY STONE SURGERY      OTHER SURGICAL HISTORY      HERNIA REPAIR AS PER ALLSCRIPTS (ALREADY IN PERTINENT NEGATIVES)    AL TRANSURETHRAL ELEC-SURG PROSTATECTOM N/A 5/9/2016    Procedure: TRANSURETHRAL RESECTION OF PROSTATE (TURP); Surgeon: Ottoniel Graves MD;  Location: [facility];  Service: Urology    TONSILLECTOMY        Family History:     Family History   Problem Relation Age of Onset    Stroke Father         SYNDROME    Coronary artery disease Family         less than 61years of age      Social History:     Social History     Socioeconomic History    Marital status:       Spouse name: None    Number of children: None    Years of education: None    Highest education level: None   Occupational History    Occupation: RETIRED   Social Needs    Financial resource strain: None    Food insecurity:     Worry: None     Inability: None    Transportation needs:     Medical: None     Non-medical: None   Tobacco Use    Smoking status: Former Smoker Types: Cigars     Last attempt to quit: 1980     Years since quittin 4    Smokeless tobacco: Former User    Tobacco comment: smokes an occ  cigar; hasn't in last 2 weeks; CIGAR (1 A DAY) AS PER ALLSCRIPTS   Substance and Sexual Activity    Alcohol use: No     Comment: BEING A SOCIAL DRINKER AS PER ALLSCRIPTS    Drug use: No    Sexual activity: None   Lifestyle    Physical activity:     Days per week: None     Minutes per session: None    Stress: None   Relationships    Social connections:     Talks on phone: None     Gets together: None     Attends Baptism service: None     Active member of club or organization: None     Attends meetings of clubs or organizations: None     Relationship status: None    Intimate partner violence:     Fear of current or ex partner: None     Emotionally abused: None     Physically abused: None     Forced sexual activity: None   Other Topics Concern    None   Social History Narrative    USES SAFETY EQUIPMENT - [employer]       Medications and Allergies:     Current Outpatient Medications   Medication Sig Dispense Refill    amLODIPine (NORVASC) 10 mg tablet Take 1 tablet (10 mg total) by mouth daily 30 tablet 5    aspirin (ECOTRIN LOW STRENGTH) 81 mg EC tablet Take 81 mg by mouth daily      atenolol (TENORMIN) 50 mg tablet Take 1 tablet (50 mg total) by mouth daily 90 tablet 1    calcium carbonate (TUMS) 500 mg chewable tablet Chew 1 tablet as needed for indigestion or heartburn        cyclobenzaprine (FLEXERIL) 10 mg tablet TAKE 1-2 TABLETS DAILY AS NEEDED 60 tablet 5    escitalopram (LEXAPRO) 5 mg tablet Take 1 tablet (5 mg total) by mouth daily 30 tablet 5    finasteride (PROSCAR) 5 mg tablet Take 1 tablet (5 mg total) by mouth daily 90 tablet 3    glucose blood (ONE TOUCH ULTRA TEST) test strip Test sugar once daily 50 each 5    lisinopril (ZESTRIL) 40 mg tablet Take 1 tablet (40 mg total) by mouth daily 30 tablet 5    magnesium hydroxide (MILK OF MAGNESIA) 400 mg/5 mL oral suspension Take by mouth as needed for constipation   Multiple Vitamin (MULTIVITAMIN) capsule Take 1 capsule by mouth daily   polyethylene glycol (GLYCOLAX) powder Take 17 g by mouth daily 250 g 5    simvastatin (ZOCOR) 10 mg tablet Take 1 tablet (10 mg total) by mouth every evening 90 tablet 1    tamsulosin (FLOMAX) 0 4 mg Take 1 capsule (0 4 mg total) by mouth daily with dinner 30 capsule 5     No current facility-administered medications for this visit  No Known Allergies   Immunizations:     Immunization History   Administered Date(s) Administered    INFLUENZA 12/22/2014, 10/24/2016    Influenza Quadrivalent Preservative Free 3 years and older IM 12/22/2014    Influenza Split High Dose Preservative Free IM 10/24/2016, 09/18/2017    Influenza TIV (IM) 10/05/2011, 01/04/2013, 12/05/2013    Influenza, high dose seasonal 0 5 mL 09/26/2018    Pneumococcal Polysaccharide PPV23 07/27/2009      Health Maintenance: There are no preventive care reminders to display for this patient  Topic Date Due    HEPATITIS B VACCINES (1 of 3 - Risk 3-dose series) 04/15/1959    DTaP,Tdap,and Td Vaccines (1 - Tdap) 04/15/1959    Pneumococcal Vaccine: 65+ Years (2 of 2 - PCV13) 07/27/2010    INFLUENZA VACCINE  07/01/2019      Medicare Health Risk Assessment:     /82 (BP Location: Left arm, Patient Position: Sitting, Cuff Size: Large)   Pulse 66   Temp 98 9 °F (37 2 °C)   Resp 18   Ht 5' 8" (1 727 m)   Wt 82 3 kg (181 lb 6 4 oz)   SpO2 94%   BMI 27 58 kg/m²      Anne Bermudez is here for his Subsequent Wellness visit  Health Risk Assessment:   Patient rates overall health as fair  Patient feels that their physical health rating is same  Eyesight was rated as slightly worse  Hearing was rated as slightly worse  Patient feels that their emotional and mental health rating is same  Pain experienced in the last 7 days has been some  Patient's pain rating has been 2/10  Patient states that he has experienced no weight loss or gain in last 6 months  Depression Screening:   PHQ-2 Score: 2      Fall Risk Screening: In the past year, patient has experienced: history of falling in past year    Number of falls: 1    Home Safety:  Patient does not have trouble with stairs inside or outside of their home  Patient has working smoke alarms and has no working carbon monoxide detector  Home safety hazards include: loose rugs on the floor  Nutrition:   Current diet is Regular  Medications:   Patient is currently taking over-the-counter supplements  OTC medications include: see medication list  Patient is able to manage medications  Activities of Daily Living (ADLs)/Instrumental Activities of Daily Living (IADLs):   Walk and transfer into and out of bed and chair?: Yes  Dress and groom yourself?: Yes    Bathe or shower yourself?: Yes    Feed yourself? Yes  Do your laundry/housekeeping?: Yes  Manage your money, pay your bills and track your expenses?: Yes  Make your own meals?: Yes    Do your own shopping?: Yes    Previous Hospitalizations:   Any hospitalizations or ED visits within the last 12 months?: No      Advance Care Planning:   Living will: Yes    Durable POA for healthcare:  Yes    Advanced directive: Yes    Advanced directive counseling given: No    Five wishes given: No    Patient declined ACP directive: No    End of Life Decisions reviewed with patient: Yes    Provider agrees with end of life decisions: Yes      Cognitive Screening:   Provider or family/friend/caregiver concerned regarding cognition?: No    PREVENTIVE SCREENINGS      Cardiovascular Screening:    General: Screening Not Indicated and History Lipid Disorder      Diabetes Screening:     General: Screening Not Indicated and History Diabetes      Colorectal Cancer Screening:     General: Screening Not Indicated      Prostate Cancer Screening:    General: Screening Not Indicated      Osteoporosis Screening:    General: Screening Not Indicated and History Osteoporosis      Abdominal Aortic Aneurysm (AAA) Screening:    Risk factors include: tobacco use        General: Screening Not Indicated      Lung Cancer Screening:     General: Screening Not Indicated      Hepatitis C Screening:    General: Screening Not Indicated      Gage Pinzon DO

## 2019-10-19 ENCOUNTER — HOSPITAL ENCOUNTER (EMERGENCY)
Facility: HOSPITAL | Age: 79
Discharge: HOME | End: 2019-10-19
Attending: EMERGENCY MEDICINE
Payer: MEDICARE

## 2019-10-19 VITALS
TEMPERATURE: 98.4 F | RESPIRATION RATE: 16 BRPM | WEIGHT: 181 LBS | OXYGEN SATURATION: 95 % | HEIGHT: 67 IN | HEART RATE: 67 BPM | SYSTOLIC BLOOD PRESSURE: 144 MMHG | DIASTOLIC BLOOD PRESSURE: 78 MMHG | BODY MASS INDEX: 28.41 KG/M2

## 2019-10-19 DIAGNOSIS — H11.32 SUBCONJUNCTIVAL HEMORRHAGE OF LEFT EYE: Primary | ICD-10-CM

## 2019-10-19 PROCEDURE — 99282 EMERGENCY DEPT VISIT SF MDM: CPT

## 2019-10-19 NOTE — ED PROVIDER NOTES
History  Chief Complaint   Patient presents with    Eye Redness     left eye is blood shot      Patient is an 78-year-old male with history of coronary disease who takes an aspirin every day and this morning he awakened with redness in the sclera of his eye  Patient no direct trauma  Patient has no problem with his vision  Patient has no other complaints except redness his eye is here for evaluation  Prior to Admission Medications   Prescriptions Last Dose Informant Patient Reported? Taking? Multiple Vitamin (MULTIVITAMIN) capsule  Self Yes No   Sig: Take 1 capsule by mouth daily  amLODIPine (NORVASC) 10 mg tablet   No No   Sig: Take 1 tablet (10 mg total) by mouth daily   aspirin (ECOTRIN LOW STRENGTH) 81 mg EC tablet   Yes Yes   Sig: Take 81 mg by mouth daily   atenolol (TENORMIN) 50 mg tablet   No Yes   Sig: Take 1 tablet (50 mg total) by mouth daily   calcium carbonate (TUMS) 500 mg chewable tablet   Yes No   Sig: Chew 1 tablet as needed for indigestion or heartburn  cyclobenzaprine (FLEXERIL) 10 mg tablet   No No   Sig: TAKE 1-2 TABLETS DAILY AS NEEDED   finasteride (PROSCAR) 5 mg tablet   No Yes   Sig: Take 1 tablet (5 mg total) by mouth daily   glucose blood (ONE TOUCH ULTRA TEST) test strip   No No   Sig: Test sugar once daily   lisinopril (ZESTRIL) 40 mg tablet   No Yes   Sig: Take 1 tablet (40 mg total) by mouth daily   magnesium hydroxide (MILK OF MAGNESIA) 400 mg/5 mL oral suspension   Yes No   Sig: Take by mouth as needed for constipation     simvastatin (ZOCOR) 10 mg tablet   No No   Sig: Take 1 tablet (10 mg total) by mouth every evening   tamsulosin (FLOMAX) 0 4 mg   No No   Sig: Take 1 capsule (0 4 mg total) by mouth daily with dinner      Facility-Administered Medications: None       Past Medical History:   Diagnosis Date    Anxiety state 8/5/2018    Basal cell carcinoma of skin 11/15/2016    Chronic obstructive pulmonary disease ([facility] [address] ) 9/5/2012    Constipation     Coronary artery disease 2012    Hydronephrosis     LAST ASSESSED: 11/18/15    Osteoporosis 2012    Pacemaker     Trifascicular block        Past Surgical History:   Procedure Laterality Date    CARDIAC PACEMAKER PLACEMENT  2009    Medtronic dual chamber     CATARACT EXTRACTION W/  INTRAOCULAR LENS IMPLANT      CYSTOSCOPY N/A 2016    Procedure: CYSTOSCOPY, evacuation of bladder calculi;  Surgeon: Rosa Bloom MD;  [facility];  Service:     INGUINAL HERNIA REPAIR      UNILATERAL    KIDNEY STONE SURGERY      OTHER SURGICAL HISTORY      HERNIA REPAIR AS PER ALLSCRIPTS (ALREADY IN PERTINENT NEGATIVES)    [place] TRANSURETHRAL ELEC-SURG PROSTATECTOM N/A 2016    Procedure: TRANSURETHRAL RESECTION OF PROSTATE (TURP); Surgeon: Rosa Bloom MD;  [facility];  Service: Urology    TONSILLECTOMY         Family History   Problem Relation Age of Onset    Stroke Father         SYNDROME    Coronary artery disease Family         less than 61years of age     I have reviewed and agree with the history as documented  Social History     Tobacco Use    Smoking status: Former Smoker     Types: Cigars     Last attempt to quit: 1980     Years since quittin 5    Smokeless tobacco: Former User    Tobacco comment: smokes an occ  cigar; hasn't in last 2 weeks; CIGAR (1 A DAY) AS PER ALLSCRIPTS   Substance Use Topics    Alcohol use: No     Comment: BEING A SOCIAL DRINKER AS PER ALLSCRIPTS    Drug use: No        Review of Systems   Constitutional: Negative for chills, fatigue, fever and unexpected weight change  HENT: Negative for congestion and nosebleeds  Eyes: Negative for visual disturbance  Respiratory: Negative for chest tightness and shortness of breath  Cardiovascular: Negative for chest pain, palpitations and leg swelling  Gastrointestinal: Negative for abdominal pain, blood in stool, diarrhea, nausea and vomiting     Endocrine: Negative for cold intolerance and heat intolerance  Genitourinary: Negative for difficulty urinating  Musculoskeletal: Negative for arthralgias, back pain, gait problem, joint swelling and myalgias  Skin: Negative for rash  Neurological: Negative for dizziness, speech difficulty, weakness and headaches  Psychiatric/Behavioral: Negative for behavioral problems, confusion, self-injury and suicidal ideas  All other systems reviewed and are negative  Physical Exam  Physical Exam   Constitutional: He is oriented to person, place, and time  He appears well-developed and well-nourished  HENT:   Head: Normocephalic and atraumatic  Nose: Nose normal    Eyes: Pupils are equal, round, and reactive to light  EOM are normal    Patient has subconjunctival hemorrhage in his left eye  Pupils equal round reactive he extra ocular muscles are intact the remainder of his eye exam is normal   Neck: Normal range of motion  Neck supple  Cardiovascular: Normal rate, regular rhythm and normal heart sounds  Exam reveals no gallop and no friction rub  No murmur heard  Pulmonary/Chest: Effort normal and breath sounds normal  No respiratory distress  He has no wheezes  He has no rales  Abdominal: Soft  He exhibits no distension  There is no tenderness  There is no rebound and no guarding  Musculoskeletal: Normal range of motion  He exhibits no edema  Neurological: He is alert and oriented to person, place, and time  Skin: Skin is warm and dry  Psychiatric: He has a normal mood and affect  His behavior is normal  Judgment and thought content normal    Nursing note and vitals reviewed        Vital Signs  ED Triage Vitals [10/19/19 0814]   Temperature Pulse Respirations Blood Pressure SpO2   98 4 °F (36 9 °C) 67 16 144/78 95 %      Temp Source Heart Rate Source Patient Position - Orthostatic VS BP Location FiO2 (%)   Temporal -- -- -- --      Pain Score       No Pain           Vitals:    10/19/19 0814   BP: 144/78   Pulse: 67         Visual Acuity      ED Medications  Medications - No data to display    Diagnostic Studies  Results Reviewed     None                 No orders to display              Procedures  Procedures       ED Course                               MDM    Disposition  Final diagnoses:   None     ED Disposition     None      Follow-up Information    None         Patient's Medications   Discharge Prescriptions    No medications on file     No discharge procedures on file      ED Provider  Electronically Signed by           Juanita Childress MD  10/19/19 2171

## 2019-11-04 DIAGNOSIS — I25.10 CORONARY ARTERY DISEASE INVOLVING NATIVE HEART WITHOUT ANGINA PECTORIS, UNSPECIFIED VESSEL OR LESION TYPE: ICD-10-CM

## 2019-11-04 DIAGNOSIS — I10 ESSENTIAL HYPERTENSION: ICD-10-CM

## 2019-11-04 RX ORDER — ATENOLOL 50 MG/1
50 TABLET ORAL DAILY
Qty: 90 TABLET | Refills: 1 | Status: SHIPPED | OUTPATIENT
Start: 2019-11-04 | End: 2020-04-29

## 2019-12-16 ENCOUNTER — IN-CLINIC DEVICE VISIT (OUTPATIENT)
Dept: CARDIOLOGY CLINIC | Facility: CLINIC | Age: 79
End: 2019-12-16
Payer: MEDICARE

## 2019-12-16 DIAGNOSIS — Z45.010 ENCOUNTER FOR CHECKING AND TESTING OF CARDIAC PACEMAKER PULSE GENERATOR (BATTERY): ICD-10-CM

## 2019-12-16 DIAGNOSIS — I49.5 SICK SINUS SYNDROME (HCC): Primary | ICD-10-CM

## 2019-12-16 PROCEDURE — 93280 PM DEVICE PROGR EVAL DUAL: CPT | Performed by: INTERNAL MEDICINE

## 2019-12-17 NOTE — PROGRESS NOTES
Device check in person pacer  No events  Normal battery function  Current Outpatient Medications:     amLODIPine (NORVASC) 10 mg tablet, Take 1 tablet (10 mg total) by mouth daily, Disp: 30 tablet, Rfl: 5    aspirin (ECOTRIN LOW STRENGTH) 81 mg EC tablet, Take 81 mg by mouth daily, Disp: , Rfl:     atenolol (TENORMIN) 50 mg tablet, Take 1 tablet (50 mg total) by mouth daily, Disp: 90 tablet, Rfl: 1    calcium carbonate (TUMS) 500 mg chewable tablet, Chew 1 tablet as needed for indigestion or heartburn , Disp: , Rfl:     cyclobenzaprine (FLEXERIL) 10 mg tablet, TAKE 1-2 TABLETS DAILY AS NEEDED, Disp: 60 tablet, Rfl: 5    finasteride (PROSCAR) 5 mg tablet, Take 1 tablet (5 mg total) by mouth daily, Disp: 90 tablet, Rfl: 3    glucose blood (ONE TOUCH ULTRA TEST) test strip, Test sugar once daily, Disp: 50 each, Rfl: 5    lisinopril (ZESTRIL) 40 mg tablet, Take 1 tablet (40 mg total) by mouth daily, Disp: 30 tablet, Rfl: 5    magnesium hydroxide (MILK OF MAGNESIA) 400 mg/5 mL oral suspension, Take by mouth as needed for constipation  , Disp: , Rfl:     Multiple Vitamin (MULTIVITAMIN) capsule, Take 1 capsule by mouth daily  , Disp: , Rfl:     simvastatin (ZOCOR) 10 mg tablet, Take 1 tablet (10 mg total) by mouth every evening, Disp: 90 tablet, Rfl: 1    tamsulosin (FLOMAX) 0 4 mg, Take 1 capsule (0 4 mg total) by mouth daily with dinner, Disp: 30 capsule, Rfl: 5

## 2019-12-23 DIAGNOSIS — E78.00 HYPERCHOLESTEREMIA: ICD-10-CM

## 2019-12-23 RX ORDER — SIMVASTATIN 10 MG
10 TABLET ORAL EVERY EVENING
Qty: 90 TABLET | Refills: 1 | Status: SHIPPED | OUTPATIENT
Start: 2019-12-23 | End: 2020-03-19 | Stop reason: SDUPTHER

## 2020-02-14 ENCOUNTER — TELEPHONE (OUTPATIENT)
Dept: ADMINISTRATIVE | Facility: OTHER | Age: 80
End: 2020-02-14

## 2020-02-14 ENCOUNTER — OFFICE VISIT (OUTPATIENT)
Dept: INTERNAL MEDICINE CLINIC | Facility: CLINIC | Age: 80
End: 2020-02-14
Payer: MEDICARE

## 2020-02-14 VITALS
TEMPERATURE: 98.1 F | WEIGHT: 182 LBS | SYSTOLIC BLOOD PRESSURE: 140 MMHG | HEART RATE: 62 BPM | OXYGEN SATURATION: 93 % | DIASTOLIC BLOOD PRESSURE: 76 MMHG | HEIGHT: 67 IN | RESPIRATION RATE: 16 BRPM | BODY MASS INDEX: 28.56 KG/M2

## 2020-02-14 DIAGNOSIS — Z01.00 DIABETIC EYE EXAM (HCC): Primary | ICD-10-CM

## 2020-02-14 DIAGNOSIS — E11.9 DIABETIC EYE EXAM (HCC): Primary | ICD-10-CM

## 2020-02-14 DIAGNOSIS — E78.00 HYPERCHOLESTEROLEMIA: ICD-10-CM

## 2020-02-14 DIAGNOSIS — J43.9 PULMONARY EMPHYSEMA, UNSPECIFIED EMPHYSEMA TYPE (HCC): ICD-10-CM

## 2020-02-14 DIAGNOSIS — I10 ESSENTIAL HYPERTENSION: ICD-10-CM

## 2020-02-14 DIAGNOSIS — E11.51 DIABETES MELLITUS WITH PERIPHERAL CIRCULATORY DISORDER (HCC): ICD-10-CM

## 2020-02-14 LAB — SL AMB POCT HEMOGLOBIN AIC: 6.4 (ref ?–6.5)

## 2020-02-14 PROCEDURE — 3078F DIAST BP <80 MM HG: CPT | Performed by: INTERNAL MEDICINE

## 2020-02-14 PROCEDURE — 3077F SYST BP >= 140 MM HG: CPT | Performed by: INTERNAL MEDICINE

## 2020-02-14 PROCEDURE — 4040F PNEUMOC VAC/ADMIN/RCVD: CPT | Performed by: INTERNAL MEDICINE

## 2020-02-14 PROCEDURE — 1036F TOBACCO NON-USER: CPT | Performed by: INTERNAL MEDICINE

## 2020-02-14 PROCEDURE — 99214 OFFICE O/P EST MOD 30 MIN: CPT | Performed by: INTERNAL MEDICINE

## 2020-02-14 PROCEDURE — 83036 HEMOGLOBIN GLYCOSYLATED A1C: CPT | Performed by: INTERNAL MEDICINE

## 2020-02-14 PROCEDURE — 1160F RVW MEDS BY RX/DR IN RCRD: CPT | Performed by: INTERNAL MEDICINE

## 2020-02-14 PROCEDURE — 3044F HG A1C LEVEL LT 7.0%: CPT | Performed by: INTERNAL MEDICINE

## 2020-02-14 NOTE — LETTER
Diabetic Foot Exam Form    Date Requested: 20  Patient: Aimee Skinner  Patient : [id number]   Referring Provider: Abigail Camarillo DO    Diabetic Foot Exam Performed with shoes and socks removed        Yes         No     Date of Diabetic Foot Exam ______________________________  Risk Score ____________________________________________    Left Foot       Visual Inspection         Monofilament Testing Sensory Exam        Pedal Pulses         Additional Comments         Right Foot      Visual Inspection         Monofilament Testing Sensory Exam       Pedal Pulses         Additional Comments         Comments __________________________________________________________    Practice Providing Exam ______________________________________________    Exam Performed By (print name) _______________________________________      Provider Signature ___________________________________________________      These reports are needed for  compliance    Please fax this completed form and a copy of the Diabetic Foot Exam report to our office located at [place] [address] as soon as possible to [phone] simon Salmon: Phone [phone]    We thank you for your assistance in treating our mutual patient

## 2020-02-14 NOTE — TELEPHONE ENCOUNTER
Upon review of the In Basket request and the patient's chart, initial outreach has been made via fax, please see Contacts section for details  A second outreach attempt will be made within 3 business days      Thank you  Yajaira Agarwal

## 2020-02-14 NOTE — PROGRESS NOTES
Assessment/Plan:    Problem List Items Addressed This Visit        Endocrine    Diabetes mellitus with peripheral circulatory disorder ([facility] )    Relevant Orders    POCT hemoglobin A1c (Completed)       Cardiovascular and Mediastinum    Essential hypertension       Other    Hypercholesterolemia      Other Visit Diagnoses     Diabetic eye exam ([facility] )    -  Primary    Relevant Orders    Ambulatory referral to Ophthalmology           Diagnoses and all orders for this visit:    Diabetic eye exam [facility])  -     Ambulatory referral to Ophthalmology; Future    Diabetes mellitus with peripheral circulatory disorder (HCC)  -     POCT hemoglobin A1c    Essential hypertension    Hypercholesterolemia        No problem-specific Assessment & Plan notes found for this encounter  The patient was seen and examined and noted to have issues with an elevated BP, but requested that we continue to follow this for now  Subjective:      Patient ID: Werner Strong is a 78 y o  male  Diabetes   He presents for his follow-up diabetic visit  He has type 2 diabetes mellitus  His disease course has been stable  Pertinent negatives for hypoglycemia include no confusion, dizziness, headaches, hunger, mood changes, nervousness/anxiousness, pallor, seizures, sleepiness, speech difficulty, sweats or tremors  Pertinent negatives for diabetes include no blurred vision, no chest pain, no fatigue, no foot paresthesias, no foot ulcerations, no polydipsia, no polyphagia, no polyuria, no visual change, no weakness and no weight loss  Symptoms are stable  There are no diabetic complications  Risk factors for coronary artery disease include hypertension, male sex, diabetes mellitus and dyslipidemia  Current diabetic treatment includes diet  He is compliant with treatment all of the time  His weight is stable  He has not had a previous visit with a dietitian  He participates in exercise weekly  He does not see a podiatrist Eye exam is not current  Hypertension   Pertinent negatives include no blurred vision, chest pain, headaches, palpitations, shortness of breath or sweats  The following portions of the patient's history were reviewed and updated as appropriate:   He has a past medical history of Anxiety state (8/5/2018), Basal cell carcinoma of skin (11/15/2016), Chronic obstructive pulmonary disease ([facility] ) (9/5/2012), Constipation, Coronary artery disease (5/22/2012), Hydronephrosis, Osteoporosis (5/22/2012), Pacemaker, and Trifascicular block  ,  does not have any pertinent problems on file  ,   has a past surgical history that includes Cardiac pacemaker placement (11/2009); Cataract extraction w/  intraocular lens implant; Kidney stone surgery; Tonsillectomy; pr transurethral elec-surg prostatectom (N/A, 5/9/2016); CYSTOSCOPY (N/A, 5/9/2016); Other surgical history; and Inguinal hernia repair  ,  family history includes Coronary artery disease in his family; Stroke in his father  ,   reports that he quit smoking about 39 years ago  His smoking use included cigars  He has quit using smokeless tobacco  He reports that he does not drink alcohol or use drugs  ,  has No Known Allergies     Current Outpatient Medications   Medication Sig Dispense Refill    amLODIPine (NORVASC) 10 mg tablet Take 1 tablet (10 mg total) by mouth daily 30 tablet 5    aspirin (ECOTRIN LOW STRENGTH) 81 mg EC tablet Take 81 mg by mouth daily      atenolol (TENORMIN) 50 mg tablet Take 1 tablet (50 mg total) by mouth daily 90 tablet 1    calcium carbonate (TUMS) 500 mg chewable tablet Chew 1 tablet as needed for indigestion or heartburn        cyclobenzaprine (FLEXERIL) 10 mg tablet TAKE 1-2 TABLETS DAILY AS NEEDED 60 tablet 5    finasteride (PROSCAR) 5 mg tablet Take 1 tablet (5 mg total) by mouth daily 90 tablet 3    glucose blood (ONE TOUCH ULTRA TEST) test strip Test sugar once daily 50 each 5    lisinopril (ZESTRIL) 40 mg tablet Take 1 tablet (40 mg total) by mouth daily 30 tablet 5    magnesium hydroxide (MILK OF MAGNESIA) 400 mg/5 mL oral suspension Take by mouth as needed for constipation   Multiple Vitamin (MULTIVITAMIN) capsule Take 1 capsule by mouth daily   simvastatin (ZOCOR) 10 mg tablet Take 1 tablet (10 mg total) by mouth every evening 90 tablet 1    tamsulosin (FLOMAX) 0 4 mg Take 1 capsule (0 4 mg total) by mouth daily with dinner 30 capsule 5     No current facility-administered medications for this visit  Review of Systems   Constitutional: Negative for chills, fatigue, fever and weight loss  HENT: Negative  Eyes: Negative for blurred vision  Respiratory: Negative for cough, chest tightness and shortness of breath  Cardiovascular: Negative for chest pain and palpitations  Gastrointestinal: Negative for abdominal pain, constipation, diarrhea, nausea and vomiting  Endocrine: Negative for polydipsia, polyphagia and polyuria  Genitourinary: Negative  Musculoskeletal: Negative for back pain and myalgias  Skin: Negative  Negative for pallor  Neurological: Negative  Negative for dizziness, tremors, seizures, speech difficulty, weakness and headaches  Psychiatric/Behavioral: Negative for confusion and dysphoric mood  The patient is not nervous/anxious  Objective:  Vitals:    02/14/20 0829   BP: 140/76   BP Location: Left arm   Patient Position: Sitting   Cuff Size: Adult   Pulse: 62   Resp: 16   Temp: 98 1 °F (36 7 °C)   TempSrc: Tympanic   SpO2: 93%   Weight: 82 6 kg (182 lb)   Height: 5' 7" (1 702 m)     Body mass index is 28 51 kg/m²  Physical Exam   Constitutional: He is oriented to person, place, and time  He appears well-developed and well-nourished  HENT:   Head: Normocephalic and atraumatic  Eyes: Pupils are equal, round, and reactive to light  EOM are normal    Neck: Normal range of motion  Neck supple  Cardiovascular: Normal rate, regular rhythm, normal heart sounds and intact distal pulses     No murmur heard  Pulmonary/Chest: Effort normal and breath sounds normal  No stridor  He has no rales  Abdominal: Soft  Bowel sounds are normal  He exhibits no distension  There is no tenderness  Musculoskeletal: Normal range of motion  He exhibits no edema or deformity  Neurological: He is alert and oriented to person, place, and time  Skin: Skin is warm and dry  Psychiatric: He has a normal mood and affect  Nursing note and vitals reviewed         PHQ-9 Depression Screening    PHQ-9:    Frequency of the following problems over the past two weeks:

## 2020-02-14 NOTE — LETTER
Diabetic Eye Exam Form    Date Requested: 20  Patient: Riri Noriega  Patient : 1940   Referring Provider: Campos Nino,     Dilated Retinal Exam, Optomap-Iris Exam, or Fundus Photography Done         Yes ([place] one above)         No     Date of Diabetic Eye Exam ______________________________  Left Eye      Exam did show retinopathy    Exam did not show retinopathy         Mild       Moderate       None       Proliferative       Severe     Right Eye     Exam did show retinopathy    Exam did not show retinopathy         Mild       Moderate       None       Proliferative       Severe     Comments __________________________________________________________    Practice Providing Exam ______________________________________________    Exam Performed By (print name) _______________________________________      Provider Signature ___________________________________________________      These reports are needed for  compliance    Please fax this completed form and a copy of the Diabetic Eye Exam report to our office located at [place] [address] as soon as possible to [phone] simon [place]: Phone [phone]    We thank you for your assistance in treating our mutual patient

## 2020-02-14 NOTE — TELEPHONE ENCOUNTER
----- Message from Tiki Wick sent at 2/14/2020  9:41 AM EST -----  Regarding: diabetic foot exam  Contact: [phone]  Pt states he had a foot exam this week at Dr Milly Lott office in [place]  Can we get the report please?   Thank you

## 2020-02-17 ENCOUNTER — TELEPHONE (OUTPATIENT)
Dept: INTERNAL MEDICINE CLINIC | Facility: CLINIC | Age: 80
End: 2020-02-17

## 2020-02-17 NOTE — TELEPHONE ENCOUNTER
----- Message from Rickey Pat sent at 2/14/2020  2:43 PM EST -----  Regarding: RE: diabetic foot exam  Contact: [phone]                Thank you for your request  Your request has been received and reviewed  This [employer] will be closed and copied into an encounter  A telephone encounter from the [address] [place] [address] for Care Gap Request has been created and can be viewed at any time  To review the status of your request within the patient chart navigate to Chart Review > Encounters tab  Messages that includes multiple requests, will require additional time to complete in entirety  We will continue to add status updates within the created encounter  If any issues/questions arise, a patient call encounter will be routed to you for assistance  If no issues arise, the completed encounter will be routed to you for notification of completion and/or review  Thank you  Rickey Pat     ----- Message -----  From: Govind Stuart  Sent: 2/14/2020   9:41 AM EST  To: Care [facility]  Subject: diabetic foot exam                               Pt states he had a foot exam this week at Dr Sienna Werner office in [facility]  Can we get the report please?   Thank you

## 2020-02-19 NOTE — TELEPHONE ENCOUNTER
As a follow-up, a second attempt has been made for outreach via fax, please see Contacts section for details  A third and final attempt will be made within 3 business days      Thank you  Emily Cullen

## 2020-02-21 NOTE — TELEPHONE ENCOUNTER
Upon review of your request/inquiry, we have found as a result of outreach that patient did not have the requested item(s) completed  Pt only had nail care done 02/12/2020    Any additional questions or concerns should be emailed to the Practice Liaisons via [email or web address] email, please do not reply via In Basket       Thank you  Odalis Ivy

## 2020-02-21 NOTE — TELEPHONE ENCOUNTER
As a final attempt, a third outreach has been made via telephone call  Please see Contacts section for details  This encounter will be closed and completed by end of day  Should we receive the requested information because of previous outreach attempts, the requested patient's chart will be updated appropriately       Thank you  Lindie Claude

## 2020-03-16 DIAGNOSIS — I10 ESSENTIAL HYPERTENSION: ICD-10-CM

## 2020-03-16 RX ORDER — LISINOPRIL 40 MG/1
40 TABLET ORAL DAILY
Qty: 30 TABLET | Refills: 5 | Status: SHIPPED | OUTPATIENT
Start: 2020-03-16 | End: 2020-09-08 | Stop reason: SDUPTHER

## 2020-03-16 RX ORDER — AMLODIPINE BESYLATE 10 MG/1
10 TABLET ORAL DAILY
Qty: 30 TABLET | Refills: 5 | Status: SHIPPED | OUTPATIENT
Start: 2020-03-16 | End: 2020-09-08 | Stop reason: SDUPTHER

## 2020-03-17 ENCOUNTER — REMOTE DEVICE CLINIC VISIT (OUTPATIENT)
Dept: CARDIOLOGY CLINIC | Facility: CLINIC | Age: 80
End: 2020-03-17
Payer: MEDICARE

## 2020-03-17 DIAGNOSIS — I49.5 SICK SINUS SYNDROME (HCC): Primary | ICD-10-CM

## 2020-03-17 DIAGNOSIS — Z45.010 ENCOUNTER FOR CHECKING AND TESTING OF CARDIAC PACEMAKER PULSE GENERATOR (BATTERY): ICD-10-CM

## 2020-03-17 PROCEDURE — 93294 REM INTERROG EVL PM/LDLS PM: CPT | Performed by: INTERNAL MEDICINE

## 2020-03-17 PROCEDURE — 93296 REM INTERROG EVL PM/IDS: CPT | Performed by: INTERNAL MEDICINE

## 2020-03-18 DIAGNOSIS — N13.8 BPH WITH URINARY OBSTRUCTION: ICD-10-CM

## 2020-03-18 DIAGNOSIS — N40.1 BPH WITH URINARY OBSTRUCTION: ICD-10-CM

## 2020-03-18 RX ORDER — TAMSULOSIN HYDROCHLORIDE 0.4 MG/1
0.4 CAPSULE ORAL
Qty: 30 CAPSULE | Refills: 5 | Status: SHIPPED | OUTPATIENT
Start: 2020-03-18 | End: 2020-09-30 | Stop reason: SDUPTHER

## 2020-03-19 DIAGNOSIS — E78.00 HYPERCHOLESTEREMIA: ICD-10-CM

## 2020-03-19 RX ORDER — SIMVASTATIN 10 MG
10 TABLET ORAL EVERY EVENING
Qty: 90 TABLET | Refills: 1 | Status: SHIPPED | OUTPATIENT
Start: 2020-03-19 | End: 2020-06-30 | Stop reason: SDUPTHER

## 2020-03-26 NOTE — PROGRESS NOTES
[facility] remote check pacer  No events  Normal battery function  Current Outpatient Medications:     amLODIPine (NORVASC) 10 mg tablet, Take 1 tablet (10 mg total) by mouth daily, Disp: 30 tablet, Rfl: 5    aspirin (ECOTRIN LOW STRENGTH) 81 mg EC tablet, Take 81 mg by mouth daily, Disp: , Rfl:     atenolol (TENORMIN) 50 mg tablet, Take 1 tablet (50 mg total) by mouth daily, Disp: 90 tablet, Rfl: 1    calcium carbonate (TUMS) 500 mg chewable tablet, Chew 1 tablet as needed for indigestion or heartburn , Disp: , Rfl:     cyclobenzaprine (FLEXERIL) 10 mg tablet, TAKE 1-2 TABLETS DAILY AS NEEDED, Disp: 60 tablet, Rfl: 5    finasteride (PROSCAR) 5 mg tablet, Take 1 tablet (5 mg total) by mouth daily, Disp: 90 tablet, Rfl: 3    glucose blood (ONE TOUCH ULTRA TEST) test strip, Test sugar once daily, Disp: 50 each, Rfl: 5    lisinopril (ZESTRIL) 40 mg tablet, Take 1 tablet (40 mg total) by mouth daily, Disp: 30 tablet, Rfl: 5    magnesium hydroxide (MILK OF MAGNESIA) 400 mg/5 mL oral suspension, Take by mouth as needed for constipation  , Disp: , Rfl:     Multiple Vitamin (MULTIVITAMIN) capsule, Take 1 capsule by mouth daily  , Disp: , Rfl:     simvastatin (ZOCOR) 10 mg tablet, Take 1 tablet (10 mg total) by mouth every evening, Disp: 90 tablet, Rfl: 1    tamsulosin (FLOMAX) 0 4 mg, Take 1 capsule (0 4 mg total) by mouth daily with dinner, Disp: 30 capsule, Rfl: 5

## 2020-04-15 ENCOUNTER — OFFICE VISIT (OUTPATIENT)
Dept: INTERNAL MEDICINE CLINIC | Facility: CLINIC | Age: 80
End: 2020-04-15
Payer: MEDICARE

## 2020-04-15 VITALS
OXYGEN SATURATION: 98 % | BODY MASS INDEX: 28.51 KG/M2 | HEIGHT: 67 IN | TEMPERATURE: 97.3 F | SYSTOLIC BLOOD PRESSURE: 138 MMHG | HEART RATE: 76 BPM | DIASTOLIC BLOOD PRESSURE: 84 MMHG

## 2020-04-15 DIAGNOSIS — E78.00 HYPERCHOLESTEROLEMIA: ICD-10-CM

## 2020-04-15 DIAGNOSIS — H10.33 ACUTE BACTERIAL CONJUNCTIVITIS OF BOTH EYES: Primary | ICD-10-CM

## 2020-04-15 DIAGNOSIS — I10 ESSENTIAL HYPERTENSION: ICD-10-CM

## 2020-04-15 DIAGNOSIS — E11.51 DIABETES MELLITUS WITH PERIPHERAL CIRCULATORY DISORDER (HCC): ICD-10-CM

## 2020-04-15 PROCEDURE — 99214 OFFICE O/P EST MOD 30 MIN: CPT | Performed by: INTERNAL MEDICINE

## 2020-04-15 PROCEDURE — 3044F HG A1C LEVEL LT 7.0%: CPT | Performed by: INTERNAL MEDICINE

## 2020-04-15 PROCEDURE — 4040F PNEUMOC VAC/ADMIN/RCVD: CPT | Performed by: INTERNAL MEDICINE

## 2020-04-15 PROCEDURE — 3079F DIAST BP 80-89 MM HG: CPT | Performed by: INTERNAL MEDICINE

## 2020-04-15 PROCEDURE — 3075F SYST BP GE 130 - 139MM HG: CPT | Performed by: INTERNAL MEDICINE

## 2020-04-15 PROCEDURE — 1160F RVW MEDS BY RX/DR IN RCRD: CPT | Performed by: INTERNAL MEDICINE

## 2020-04-15 PROCEDURE — 1036F TOBACCO NON-USER: CPT | Performed by: INTERNAL MEDICINE

## 2020-04-15 RX ORDER — SULFACETAMIDE/PREDNISOLONE 10 %-0.2 %
1 SUSPENSION, DROPS(FINAL DOSAGE FORM)(ML) OPHTHALMIC (EYE)
Qty: 10 ML | Refills: 0 | Status: SHIPPED | OUTPATIENT
Start: 2020-04-15 | End: 2020-04-17

## 2020-04-17 DIAGNOSIS — H10.33 ACUTE BACTERIAL CONJUNCTIVITIS OF BOTH EYES: Primary | ICD-10-CM

## 2020-04-17 RX ORDER — TOBRAMYCIN AND DEXAMETHASONE 3; 1 MG/ML; MG/ML
1 SUSPENSION/ DROPS OPHTHALMIC
Qty: 5 ML | Refills: 0 | Status: SHIPPED | OUTPATIENT
Start: 2020-04-17 | End: 2020-06-23

## 2020-04-29 DIAGNOSIS — I25.10 CORONARY ARTERY DISEASE INVOLVING NATIVE HEART WITHOUT ANGINA PECTORIS, UNSPECIFIED VESSEL OR LESION TYPE: ICD-10-CM

## 2020-04-29 DIAGNOSIS — I10 ESSENTIAL HYPERTENSION: ICD-10-CM

## 2020-04-29 RX ORDER — ATENOLOL 50 MG/1
TABLET ORAL
Qty: 90 TABLET | Refills: 0 | Status: SHIPPED | OUTPATIENT
Start: 2020-04-29 | End: 2020-06-30

## 2020-05-11 DIAGNOSIS — R33.9 URINARY RETENTION: ICD-10-CM

## 2020-05-11 RX ORDER — FINASTERIDE 5 MG/1
5 TABLET, FILM COATED ORAL DAILY
Qty: 90 TABLET | Refills: 1 | Status: SHIPPED | OUTPATIENT
Start: 2020-05-11 | End: 2020-11-05 | Stop reason: SDUPTHER

## 2020-05-28 ENCOUNTER — HOSPITAL ENCOUNTER (OUTPATIENT)
Facility: HOSPITAL | Age: 80
Setting detail: OBSERVATION
Discharge: HOME | End: 2020-05-29
Attending: EMERGENCY MEDICINE | Admitting: SURGERY
Payer: MEDICARE

## 2020-05-28 DIAGNOSIS — J43.9 PULMONARY EMPHYSEMA, UNSPECIFIED EMPHYSEMA TYPE (HCC): ICD-10-CM

## 2020-05-28 DIAGNOSIS — I25.10 CORONARY ARTERY DISEASE INVOLVING NATIVE CORONARY ARTERY OF NATIVE HEART WITHOUT ANGINA PECTORIS: ICD-10-CM

## 2020-05-28 DIAGNOSIS — S51.811A FOREARM LACERATION, RIGHT, INITIAL ENCOUNTER: Primary | ICD-10-CM

## 2020-05-28 DIAGNOSIS — E11.51 DIABETES MELLITUS WITH PERIPHERAL CIRCULATORY DISORDER (HCC): ICD-10-CM

## 2020-05-28 LAB
ANION GAP SERPL CALCULATED.3IONS-SCNC: 11 MMOL/L (ref 4–13)
BASOPHILS # BLD AUTO: 0 THOUSANDS/ΜL (ref 0–0.1)
BASOPHILS NFR BLD AUTO: 1 % (ref 0–2)
BUN SERPL-MCNC: 32 MG/DL (ref 7–25)
CALCIUM SERPL-MCNC: 9.7 MG/DL (ref 8.6–10.5)
CHLORIDE SERPL-SCNC: 103 MMOL/L (ref 98–107)
CO2 SERPL-SCNC: 24 MMOL/L (ref 21–31)
CREAT SERPL-MCNC: 1.92 MG/DL (ref 0.7–1.3)
EOSINOPHIL # BLD AUTO: 0.2 THOUSAND/ΜL (ref 0–0.61)
EOSINOPHIL NFR BLD AUTO: 3 % (ref 0–5)
ERYTHROCYTE [DISTWIDTH] IN BLOOD BY AUTOMATED COUNT: 13.3 % (ref 11.5–14.5)
GFR SERPL CREATININE-BSD FRML MDRD: 32 ML/MIN/1.73SQ M
GLUCOSE SERPL-MCNC: 111 MG/DL (ref 65–99)
GLUCOSE SERPL-MCNC: 154 MG/DL (ref 65–140)
GLUCOSE SERPL-MCNC: 192 MG/DL (ref 65–140)
HCT VFR BLD AUTO: 45.7 % (ref 42–47)
HGB BLD-MCNC: 15.7 G/DL (ref 14–18)
LYMPHOCYTES # BLD AUTO: 1.8 THOUSANDS/ΜL (ref 0.6–4.47)
LYMPHOCYTES NFR BLD AUTO: 28 % (ref 21–51)
MCH RBC QN AUTO: 34.1 PG (ref 26–34)
MCHC RBC AUTO-ENTMCNC: 34.4 G/DL (ref 31–37)
MCV RBC AUTO: 99 FL (ref 81–99)
MONOCYTES # BLD AUTO: 0.6 THOUSAND/ΜL (ref 0.17–1.22)
MONOCYTES NFR BLD AUTO: 9 % (ref 2–12)
NEUTROPHILS # BLD AUTO: 3.9 THOUSANDS/ΜL (ref 1.4–6.5)
NEUTS SEG NFR BLD AUTO: 59 % (ref 42–75)
PLATELET # BLD AUTO: 217 THOUSANDS/UL (ref 149–390)
PMV BLD AUTO: 8.2 FL (ref 8.6–11.7)
POTASSIUM SERPL-SCNC: 4.1 MMOL/L (ref 3.5–5.5)
RBC # BLD AUTO: 4.6 MILLION/UL (ref 4.3–5.9)
SODIUM SERPL-SCNC: 138 MMOL/L (ref 134–143)
WBC # BLD AUTO: 6.6 THOUSAND/UL (ref 4.8–10.8)

## 2020-05-28 PROCEDURE — 96374 THER/PROPH/DIAG INJ IV PUSH: CPT

## 2020-05-28 PROCEDURE — NC001 PR NO CHARGE: Performed by: SURGERY

## 2020-05-28 PROCEDURE — 80048 BASIC METABOLIC PNL TOTAL CA: CPT | Performed by: EMERGENCY MEDICINE

## 2020-05-28 PROCEDURE — 99202 OFFICE O/P NEW SF 15 MIN: CPT | Performed by: SURGERY

## 2020-05-28 PROCEDURE — 99284 EMERGENCY DEPT VISIT MOD MDM: CPT | Performed by: EMERGENCY MEDICINE

## 2020-05-28 PROCEDURE — 94760 N-INVAS EAR/PLS OXIMETRY 1: CPT

## 2020-05-28 PROCEDURE — 82948 REAGENT STRIP/BLOOD GLUCOSE: CPT

## 2020-05-28 PROCEDURE — 36415 COLL VENOUS BLD VENIPUNCTURE: CPT | Performed by: EMERGENCY MEDICINE

## 2020-05-28 PROCEDURE — 99284 EMERGENCY DEPT VISIT MOD MDM: CPT

## 2020-05-28 PROCEDURE — 85025 COMPLETE CBC W/AUTO DIFF WBC: CPT | Performed by: EMERGENCY MEDICINE

## 2020-05-28 PROCEDURE — 12007 RPR S/N/AX/GEN/TRNK >30.0 CM: CPT | Performed by: SURGERY

## 2020-05-28 RX ORDER — ASPIRIN 81 MG/1
81 TABLET ORAL DAILY
Status: DISCONTINUED | OUTPATIENT
Start: 2020-05-29 | End: 2020-05-29 | Stop reason: HOSPADM

## 2020-05-28 RX ORDER — PRAVASTATIN SODIUM 20 MG
20 TABLET ORAL
Status: DISCONTINUED | OUTPATIENT
Start: 2020-05-28 | End: 2020-05-29 | Stop reason: HOSPADM

## 2020-05-28 RX ORDER — AMLODIPINE BESYLATE 5 MG/1
10 TABLET ORAL DAILY
Status: DISCONTINUED | OUTPATIENT
Start: 2020-05-29 | End: 2020-05-29 | Stop reason: HOSPADM

## 2020-05-28 RX ORDER — ONDANSETRON 2 MG/ML
4 INJECTION INTRAMUSCULAR; INTRAVENOUS EVERY 6 HOURS PRN
Status: DISCONTINUED | OUTPATIENT
Start: 2020-05-28 | End: 2020-05-29 | Stop reason: HOSPADM

## 2020-05-28 RX ORDER — LISINOPRIL 20 MG/1
40 TABLET ORAL DAILY
Status: DISCONTINUED | OUTPATIENT
Start: 2020-05-29 | End: 2020-05-29 | Stop reason: HOSPADM

## 2020-05-28 RX ORDER — LIDOCAINE HYDROCHLORIDE AND EPINEPHRINE 10; 10 MG/ML; UG/ML
40 INJECTION, SOLUTION INFILTRATION; PERINEURAL ONCE
Status: DISCONTINUED | OUTPATIENT
Start: 2020-05-28 | End: 2020-05-29 | Stop reason: HOSPADM

## 2020-05-28 RX ORDER — SODIUM CHLORIDE, SODIUM LACTATE, POTASSIUM CHLORIDE, CALCIUM CHLORIDE 600; 310; 30; 20 MG/100ML; MG/100ML; MG/100ML; MG/100ML
125 INJECTION, SOLUTION INTRAVENOUS CONTINUOUS
Status: DISCONTINUED | OUTPATIENT
Start: 2020-05-28 | End: 2020-05-29 | Stop reason: HOSPADM

## 2020-05-28 RX ORDER — TAMSULOSIN HYDROCHLORIDE 0.4 MG/1
0.4 CAPSULE ORAL
Status: DISCONTINUED | OUTPATIENT
Start: 2020-05-28 | End: 2020-05-29 | Stop reason: HOSPADM

## 2020-05-28 RX ORDER — HEPARIN SODIUM 5000 [USP'U]/ML
5000 INJECTION, SOLUTION INTRAVENOUS; SUBCUTANEOUS EVERY 12 HOURS SCHEDULED
Status: DISCONTINUED | OUTPATIENT
Start: 2020-05-28 | End: 2020-05-29 | Stop reason: HOSPADM

## 2020-05-28 RX ORDER — ATENOLOL 25 MG/1
50 TABLET ORAL DAILY
Status: DISCONTINUED | OUTPATIENT
Start: 2020-05-29 | End: 2020-05-29 | Stop reason: HOSPADM

## 2020-05-28 RX ORDER — CEFAZOLIN SODIUM 2 G/50ML
2000 SOLUTION INTRAVENOUS ONCE
Status: COMPLETED | OUTPATIENT
Start: 2020-05-28 | End: 2020-05-28

## 2020-05-28 RX ORDER — OXYCODONE HYDROCHLORIDE AND ACETAMINOPHEN 5; 325 MG/1; MG/1
2 TABLET ORAL EVERY 4 HOURS PRN
Status: DISCONTINUED | OUTPATIENT
Start: 2020-05-28 | End: 2020-05-29 | Stop reason: HOSPADM

## 2020-05-28 RX ORDER — FINASTERIDE 5 MG/1
5 TABLET, FILM COATED ORAL DAILY
Status: DISCONTINUED | OUTPATIENT
Start: 2020-05-29 | End: 2020-05-29 | Stop reason: HOSPADM

## 2020-05-28 RX ORDER — CEFAZOLIN SODIUM 1 G/50ML
1000 SOLUTION INTRAVENOUS EVERY 12 HOURS
Status: DISCONTINUED | OUTPATIENT
Start: 2020-05-29 | End: 2020-05-29 | Stop reason: HOSPADM

## 2020-05-28 RX ADMIN — HEPARIN SODIUM 5000 UNITS: 5000 INJECTION INTRAVENOUS; SUBCUTANEOUS at 21:54

## 2020-05-28 RX ADMIN — CEFAZOLIN SODIUM 2000 MG: 2 SOLUTION INTRAVENOUS at 14:37

## 2020-05-28 RX ADMIN — TETANUS TOXOID, REDUCED DIPHTHERIA TOXOID AND ACELLULAR PERTUSSIS VACCINE, ADSORBED 0.5 ML: 5; 2.5; 8; 8; 2.5 SUSPENSION INTRAMUSCULAR at 14:35

## 2020-05-29 VITALS
HEART RATE: 65 BPM | TEMPERATURE: 98 F | RESPIRATION RATE: 18 BRPM | OXYGEN SATURATION: 93 % | SYSTOLIC BLOOD PRESSURE: 165 MMHG | DIASTOLIC BLOOD PRESSURE: 73 MMHG

## 2020-05-29 PROBLEM — E78.2 MIXED HYPERLIPIDEMIA: Status: ACTIVE | Noted: 2020-05-29

## 2020-05-29 LAB
ANION GAP SERPL CALCULATED.3IONS-SCNC: 10 MMOL/L (ref 4–13)
BASOPHILS # BLD AUTO: 0 THOUSANDS/ΜL (ref 0–0.1)
BASOPHILS NFR BLD AUTO: 0 % (ref 0–2)
BUN SERPL-MCNC: 29 MG/DL (ref 7–25)
CALCIUM SERPL-MCNC: 9.3 MG/DL (ref 8.6–10.5)
CHLORIDE SERPL-SCNC: 103 MMOL/L (ref 98–107)
CO2 SERPL-SCNC: 25 MMOL/L (ref 21–31)
CREAT SERPL-MCNC: 1.51 MG/DL (ref 0.7–1.3)
EOSINOPHIL # BLD AUTO: 0.1 THOUSAND/ΜL (ref 0–0.61)
EOSINOPHIL NFR BLD AUTO: 1 % (ref 0–5)
ERYTHROCYTE [DISTWIDTH] IN BLOOD BY AUTOMATED COUNT: 12.8 % (ref 11.5–14.5)
GFR SERPL CREATININE-BSD FRML MDRD: 42 ML/MIN/1.73SQ M
GLUCOSE SERPL-MCNC: 103 MG/DL (ref 65–99)
GLUCOSE SERPL-MCNC: 109 MG/DL (ref 65–140)
HCT VFR BLD AUTO: 43.3 % (ref 42–47)
HGB BLD-MCNC: 15.2 G/DL (ref 14–18)
LYMPHOCYTES # BLD AUTO: 1.2 THOUSANDS/ΜL (ref 0.6–4.47)
LYMPHOCYTES NFR BLD AUTO: 14 % (ref 21–51)
MAGNESIUM SERPL-MCNC: 2.2 MG/DL (ref 1.9–2.7)
MCH RBC QN AUTO: 34.6 PG (ref 26–34)
MCHC RBC AUTO-ENTMCNC: 35.1 G/DL (ref 31–37)
MCV RBC AUTO: 99 FL (ref 81–99)
MONOCYTES # BLD AUTO: 0.9 THOUSAND/ΜL (ref 0.17–1.22)
MONOCYTES NFR BLD AUTO: 11 % (ref 2–12)
NEUTROPHILS # BLD AUTO: 6.5 THOUSANDS/ΜL (ref 1.4–6.5)
NEUTS SEG NFR BLD AUTO: 75 % (ref 42–75)
PHOSPHATE SERPL-MCNC: 3.4 MG/DL (ref 3–5.5)
PLATELET # BLD AUTO: 168 THOUSANDS/UL (ref 149–390)
PMV BLD AUTO: 8.1 FL (ref 8.6–11.7)
POTASSIUM SERPL-SCNC: 3.6 MMOL/L (ref 3.5–5.5)
RBC # BLD AUTO: 4.39 MILLION/UL (ref 4.3–5.9)
SODIUM SERPL-SCNC: 138 MMOL/L (ref 134–143)
WBC # BLD AUTO: 8.7 THOUSAND/UL (ref 4.8–10.8)

## 2020-05-29 PROCEDURE — 84100 ASSAY OF PHOSPHORUS: CPT | Performed by: SURGERY

## 2020-05-29 PROCEDURE — 85025 COMPLETE CBC W/AUTO DIFF WBC: CPT | Performed by: SURGERY

## 2020-05-29 PROCEDURE — 82948 REAGENT STRIP/BLOOD GLUCOSE: CPT

## 2020-05-29 PROCEDURE — 83735 ASSAY OF MAGNESIUM: CPT | Performed by: SURGERY

## 2020-05-29 PROCEDURE — 99217 PR OBSERVATION CARE DISCHARGE MANAGEMENT: CPT | Performed by: SURGERY

## 2020-05-29 PROCEDURE — 99203 OFFICE O/P NEW LOW 30 MIN: CPT | Performed by: NURSE PRACTITIONER

## 2020-05-29 PROCEDURE — 80048 BASIC METABOLIC PNL TOTAL CA: CPT | Performed by: SURGERY

## 2020-05-29 RX ADMIN — HEPARIN SODIUM 5000 UNITS: 5000 INJECTION INTRAVENOUS; SUBCUTANEOUS at 09:01

## 2020-05-29 RX ADMIN — ATENOLOL 50 MG: 25 TABLET ORAL at 09:01

## 2020-05-29 RX ADMIN — CEFAZOLIN SODIUM 1000 MG: 1 SOLUTION INTRAVENOUS at 03:24

## 2020-05-29 RX ADMIN — ASPIRIN 81 MG: 81 TABLET, COATED ORAL at 09:00

## 2020-05-29 RX ADMIN — LISINOPRIL 40 MG: 20 TABLET ORAL at 09:00

## 2020-05-29 RX ADMIN — FINASTERIDE 5 MG: 5 TABLET, FILM COATED ORAL at 09:01

## 2020-05-29 RX ADMIN — SODIUM CHLORIDE, SODIUM LACTATE, POTASSIUM CHLORIDE, AND CALCIUM CHLORIDE 125 ML/HR: .6; .31; .03; .02 INJECTION, SOLUTION INTRAVENOUS at 07:54

## 2020-06-02 ENCOUNTER — TRANSITIONAL CARE MANAGEMENT (OUTPATIENT)
Dept: INTERNAL MEDICINE CLINIC | Facility: CLINIC | Age: 80
End: 2020-06-02

## 2020-06-05 ENCOUNTER — OFFICE VISIT (OUTPATIENT)
Dept: SURGERY | Facility: CLINIC | Age: 80
End: 2020-06-05

## 2020-06-05 VITALS — TEMPERATURE: 98.5 F

## 2020-06-05 DIAGNOSIS — S51.811A FOREARM LACERATION, RIGHT, INITIAL ENCOUNTER: Primary | ICD-10-CM

## 2020-06-05 PROCEDURE — 1160F RVW MEDS BY RX/DR IN RCRD: CPT | Performed by: PHYSICIAN ASSISTANT

## 2020-06-05 PROCEDURE — 1036F TOBACCO NON-USER: CPT | Performed by: PHYSICIAN ASSISTANT

## 2020-06-05 PROCEDURE — 3079F DIAST BP 80-89 MM HG: CPT | Performed by: PHYSICIAN ASSISTANT

## 2020-06-05 PROCEDURE — 99024 POSTOP FOLLOW-UP VISIT: CPT | Performed by: PHYSICIAN ASSISTANT

## 2020-06-05 PROCEDURE — 3074F SYST BP LT 130 MM HG: CPT | Performed by: PHYSICIAN ASSISTANT

## 2020-06-05 PROCEDURE — 3044F HG A1C LEVEL LT 7.0%: CPT | Performed by: PHYSICIAN ASSISTANT

## 2020-06-05 PROCEDURE — 4040F PNEUMOC VAC/ADMIN/RCVD: CPT | Performed by: PHYSICIAN ASSISTANT

## 2020-06-05 RX ORDER — SULFACETAMIDE SODIUM AND PREDNISOLONE ACETATE 100; 2 MG/ML; MG/ML
SUSPENSION/ DROPS OPHTHALMIC
COMMUNITY
Start: 2020-04-29 | End: 2020-08-26 | Stop reason: SDUPTHER

## 2020-06-05 NOTE — ASSESSMENT & PLAN NOTE
Patient doing well 1 week after large right forearm laceration with primary closure by Dr Leoncio Rose  On exam the incision is intact and well approximated  There is minimal scabbing and no obvious skin or flap necrosis  There is some edema at the proximal aspect with erythema likely from suture irritation  Not grossly infected  I cleansed the wound, applied Neosporin to the skin edges and suture tracts, and dressed it with adaptic gauze, plain gauze, ABD, and ACE wrap to reduce swelling  Instructed to maintain wrap over the weekend and follow-up on 06/08 for repeat examination  Will discuss with Dr Zak Torres regarding the timing of suture removal  Questions answered patient agreeable to plan

## 2020-06-05 NOTE — PROGRESS NOTES
Post-Op Note - General Surgery   Page Russell 78 y o  male MRN: [id number]  Encounter: [id number]    Assessment/Plan    Forearm laceration, right, initial encounter  Patient doing well 1 week after large right forearm laceration with primary closure by Dr Jeni Ingram  On exam the incision is intact and well approximated  There is minimal scabbing and no obvious skin or flap necrosis  There is some edema at the proximal aspect with erythema likely from suture irritation  Not grossly infected  I cleansed the wound, applied Neosporin to the skin edges and suture tracts, and dressed it with adaptic gauze, plain gauze, ABD, and ACE wrap to reduce swelling  Instructed to maintain wrap over the weekend and follow-up on 06/08 for repeat examination  Will discuss with Dr Jasen Russell regarding the timing of suture removal  Questions answered patient agreeable to plan  Diagnoses and all orders for this visit:    Forearm laceration, right, initial encounter    Other orders  -     BLEPHAMIDE 10-0 2 % ophthalmic suspension; INSTILL 1 DROP INTO EACH EYE EVERY 3 HOURS WHILE AWAKE FOR 3 DAYS        Subjective      Chief Complaint   Patient presents with    Post-op     Laceration of rt lower arm 5/28/2020     Patient is here today suture removal,po laceration of right lower arm 05-  Patient dressing was removed and the right lower arm wound is red with swelling that is warm to the touch below his  elbow  Denies fever, chills or discharge at the wound site  [address]    [facility] 29-year-old male here for follow-up 1 week after laceration and laceration repair in the ER by Dr Jeni Ingram  Reports redness and swelling distal to his elbow  Remains tender to touch  No fevers or chills, no purulent discharge from the wound  Has been cleaning the wound regularly  Review of Systems   Constitutional: Negative for chills and fever  Respiratory: Negative for cough and shortness of breath      Cardiovascular: Negative for chest pain and palpitations  Gastrointestinal: Negative for abdominal pain, nausea and vomiting  Skin: Positive for wound  All other systems reviewed and are negative  The following portions of the patient's history were reviewed and updated as appropriate: allergies, current medications, past family history, past medical history, past social history, past surgical history and problem list     Objective      Temperature 98 5 °F (36 9 °C), temperature source Tympanic  Physical Exam   Constitutional: He is oriented to person, place, and time  He appears well-developed and well-nourished  No distress  HENT:   Head: Normocephalic and atraumatic  Eyes: Pupils are equal, round, and reactive to light  Conjunctivae and EOM are normal    Neck: Normal range of motion  Pulmonary/Chest: No respiratory distress  Musculoskeletal: Normal range of motion  Neurological: He is alert and oriented to person, place, and time  Skin: Skin is warm and dry  Capillary refill takes less than 2 seconds  He is not diaphoretic  Psychiatric: He has a normal mood and affect  His behavior is normal    Nursing note and vitals reviewed        Signature:  Anne Shah PA-C  Date: 8/1/2020 Time: 2:40 PM

## 2020-06-08 ENCOUNTER — OFFICE VISIT (OUTPATIENT)
Dept: SURGERY | Facility: CLINIC | Age: 80
End: 2020-06-08
Payer: MEDICARE

## 2020-06-08 VITALS — TEMPERATURE: 98.2 F

## 2020-06-08 DIAGNOSIS — S51.811A FOREARM LACERATION, RIGHT, INITIAL ENCOUNTER: Primary | ICD-10-CM

## 2020-06-08 PROCEDURE — 3044F HG A1C LEVEL LT 7.0%: CPT | Performed by: PHYSICIAN ASSISTANT

## 2020-06-08 PROCEDURE — 3077F SYST BP >= 140 MM HG: CPT | Performed by: PHYSICIAN ASSISTANT

## 2020-06-08 PROCEDURE — 3078F DIAST BP <80 MM HG: CPT | Performed by: PHYSICIAN ASSISTANT

## 2020-06-08 PROCEDURE — 1160F RVW MEDS BY RX/DR IN RCRD: CPT | Performed by: PHYSICIAN ASSISTANT

## 2020-06-08 PROCEDURE — 4040F PNEUMOC VAC/ADMIN/RCVD: CPT | Performed by: PHYSICIAN ASSISTANT

## 2020-06-08 PROCEDURE — 99211 OFF/OP EST MAY X REQ PHY/QHP: CPT | Performed by: PHYSICIAN ASSISTANT

## 2020-06-12 ENCOUNTER — OFFICE VISIT (OUTPATIENT)
Dept: SURGERY | Facility: CLINIC | Age: 80
End: 2020-06-12

## 2020-06-12 VITALS — TEMPERATURE: 98 F

## 2020-06-12 DIAGNOSIS — S51.811A FOREARM LACERATION, RIGHT, INITIAL ENCOUNTER: Primary | ICD-10-CM

## 2020-06-12 PROCEDURE — 4040F PNEUMOC VAC/ADMIN/RCVD: CPT | Performed by: PHYSICIAN ASSISTANT

## 2020-06-12 PROCEDURE — 3077F SYST BP >= 140 MM HG: CPT | Performed by: PHYSICIAN ASSISTANT

## 2020-06-12 PROCEDURE — 99024 POSTOP FOLLOW-UP VISIT: CPT | Performed by: PHYSICIAN ASSISTANT

## 2020-06-12 PROCEDURE — 3078F DIAST BP <80 MM HG: CPT | Performed by: PHYSICIAN ASSISTANT

## 2020-06-12 PROCEDURE — 1160F RVW MEDS BY RX/DR IN RCRD: CPT | Performed by: PHYSICIAN ASSISTANT

## 2020-06-17 ENCOUNTER — REMOTE DEVICE CLINIC VISIT (OUTPATIENT)
Dept: CARDIOLOGY CLINIC | Facility: CLINIC | Age: 80
End: 2020-06-17
Payer: MEDICARE

## 2020-06-17 DIAGNOSIS — Z45.010 ENCOUNTER FOR CHECKING AND TESTING OF CARDIAC PACEMAKER PULSE GENERATOR (BATTERY): ICD-10-CM

## 2020-06-17 DIAGNOSIS — I49.5 SICK SINUS SYNDROME (HCC): Primary | ICD-10-CM

## 2020-06-17 PROCEDURE — 93296 REM INTERROG EVL PM/IDS: CPT | Performed by: INTERNAL MEDICINE

## 2020-06-17 PROCEDURE — 93294 REM INTERROG EVL PM/LDLS PM: CPT | Performed by: INTERNAL MEDICINE

## 2020-06-18 ENCOUNTER — OFFICE VISIT (OUTPATIENT)
Dept: SURGERY | Facility: CLINIC | Age: 80
End: 2020-06-18
Payer: MEDICARE

## 2020-06-18 DIAGNOSIS — S51.811A FOREARM LACERATION, RIGHT, INITIAL ENCOUNTER: Primary | ICD-10-CM

## 2020-06-18 PROCEDURE — 99211 OFF/OP EST MAY X REQ PHY/QHP: CPT | Performed by: PHYSICIAN ASSISTANT

## 2020-06-18 PROCEDURE — 3077F SYST BP >= 140 MM HG: CPT | Performed by: PHYSICIAN ASSISTANT

## 2020-06-18 PROCEDURE — 3078F DIAST BP <80 MM HG: CPT | Performed by: PHYSICIAN ASSISTANT

## 2020-06-18 PROCEDURE — 1160F RVW MEDS BY RX/DR IN RCRD: CPT | Performed by: PHYSICIAN ASSISTANT

## 2020-06-18 PROCEDURE — 4040F PNEUMOC VAC/ADMIN/RCVD: CPT | Performed by: PHYSICIAN ASSISTANT

## 2020-06-18 PROCEDURE — 3044F HG A1C LEVEL LT 7.0%: CPT | Performed by: PHYSICIAN ASSISTANT

## 2020-06-23 ENCOUNTER — OFFICE VISIT (OUTPATIENT)
Dept: INTERNAL MEDICINE CLINIC | Facility: CLINIC | Age: 80
End: 2020-06-23
Payer: MEDICARE

## 2020-06-23 VITALS
HEIGHT: 68 IN | RESPIRATION RATE: 16 BRPM | DIASTOLIC BLOOD PRESSURE: 64 MMHG | SYSTOLIC BLOOD PRESSURE: 112 MMHG | TEMPERATURE: 99 F | WEIGHT: 165.6 LBS | BODY MASS INDEX: 25.1 KG/M2 | OXYGEN SATURATION: 92 % | HEART RATE: 72 BPM

## 2020-06-23 DIAGNOSIS — R63.4 WEIGHT LOSS: ICD-10-CM

## 2020-06-23 DIAGNOSIS — E11.59 TYPE 2 DIABETES MELLITUS WITH OTHER CIRCULATORY COMPLICATION, WITHOUT LONG-TERM CURRENT USE OF INSULIN (HCC): Primary | ICD-10-CM

## 2020-06-23 DIAGNOSIS — I10 ESSENTIAL HYPERTENSION: ICD-10-CM

## 2020-06-23 DIAGNOSIS — E78.00 HYPERCHOLESTEROLEMIA: ICD-10-CM

## 2020-06-23 PROCEDURE — 3044F HG A1C LEVEL LT 7.0%: CPT | Performed by: INTERNAL MEDICINE

## 2020-06-23 PROCEDURE — 1160F RVW MEDS BY RX/DR IN RCRD: CPT | Performed by: INTERNAL MEDICINE

## 2020-06-23 PROCEDURE — 3074F SYST BP LT 130 MM HG: CPT | Performed by: INTERNAL MEDICINE

## 2020-06-23 PROCEDURE — 3008F BODY MASS INDEX DOCD: CPT | Performed by: INTERNAL MEDICINE

## 2020-06-23 PROCEDURE — 3078F DIAST BP <80 MM HG: CPT | Performed by: INTERNAL MEDICINE

## 2020-06-23 PROCEDURE — 99214 OFFICE O/P EST MOD 30 MIN: CPT | Performed by: INTERNAL MEDICINE

## 2020-06-23 PROCEDURE — 4040F PNEUMOC VAC/ADMIN/RCVD: CPT | Performed by: INTERNAL MEDICINE

## 2020-06-23 PROCEDURE — 1036F TOBACCO NON-USER: CPT | Performed by: INTERNAL MEDICINE

## 2020-06-27 DIAGNOSIS — G47.62 SLEEP RELATED LEG CRAMPS: ICD-10-CM

## 2020-06-29 RX ORDER — CYCLOBENZAPRINE HCL 10 MG
TABLET ORAL
Qty: 60 TABLET | Refills: 5 | Status: SHIPPED | OUTPATIENT
Start: 2020-06-29 | End: 2020-06-30

## 2020-06-30 DIAGNOSIS — I25.10 CORONARY ARTERY DISEASE INVOLVING NATIVE HEART WITHOUT ANGINA PECTORIS, UNSPECIFIED VESSEL OR LESION TYPE: ICD-10-CM

## 2020-06-30 DIAGNOSIS — I10 ESSENTIAL HYPERTENSION: ICD-10-CM

## 2020-06-30 DIAGNOSIS — G47.62 SLEEP RELATED LEG CRAMPS: ICD-10-CM

## 2020-06-30 DIAGNOSIS — E78.00 HYPERCHOLESTEREMIA: ICD-10-CM

## 2020-06-30 RX ORDER — SIMVASTATIN 10 MG
10 TABLET ORAL EVERY EVENING
Qty: 90 TABLET | Refills: 1 | Status: SHIPPED | OUTPATIENT
Start: 2020-06-30 | End: 2021-01-05 | Stop reason: SDUPTHER

## 2020-06-30 RX ORDER — CYCLOBENZAPRINE HCL 10 MG
TABLET ORAL
Qty: 60 TABLET | Refills: 0 | Status: SHIPPED | OUTPATIENT
Start: 2020-06-30 | End: 2020-08-26 | Stop reason: SDUPTHER

## 2020-06-30 RX ORDER — ATENOLOL 50 MG/1
TABLET ORAL
Qty: 90 TABLET | Refills: 0 | Status: SHIPPED | OUTPATIENT
Start: 2020-06-30 | End: 2020-11-05 | Stop reason: SDUPTHER

## 2020-07-08 ENCOUNTER — OFFICE VISIT (OUTPATIENT)
Dept: CARDIOLOGY CLINIC | Facility: CLINIC | Age: 80
End: 2020-07-08
Payer: MEDICARE

## 2020-07-08 VITALS
SYSTOLIC BLOOD PRESSURE: 124 MMHG | HEIGHT: 68 IN | WEIGHT: 174 LBS | BODY MASS INDEX: 26.37 KG/M2 | DIASTOLIC BLOOD PRESSURE: 80 MMHG | HEART RATE: 67 BPM

## 2020-07-08 DIAGNOSIS — I25.10 CORONARY ARTERY DISEASE INVOLVING NATIVE CORONARY ARTERY OF NATIVE HEART WITHOUT ANGINA PECTORIS: Primary | ICD-10-CM

## 2020-07-08 PROCEDURE — 3079F DIAST BP 80-89 MM HG: CPT | Performed by: PHYSICIAN ASSISTANT

## 2020-07-08 PROCEDURE — 3044F HG A1C LEVEL LT 7.0%: CPT | Performed by: PHYSICIAN ASSISTANT

## 2020-07-08 PROCEDURE — 4040F PNEUMOC VAC/ADMIN/RCVD: CPT | Performed by: PHYSICIAN ASSISTANT

## 2020-07-08 PROCEDURE — 93000 ELECTROCARDIOGRAM COMPLETE: CPT | Performed by: PHYSICIAN ASSISTANT

## 2020-07-08 PROCEDURE — 1160F RVW MEDS BY RX/DR IN RCRD: CPT | Performed by: PHYSICIAN ASSISTANT

## 2020-07-08 PROCEDURE — 3074F SYST BP LT 130 MM HG: CPT | Performed by: PHYSICIAN ASSISTANT

## 2020-07-08 PROCEDURE — 3008F BODY MASS INDEX DOCD: CPT | Performed by: PHYSICIAN ASSISTANT

## 2020-07-08 PROCEDURE — 1036F TOBACCO NON-USER: CPT | Performed by: PHYSICIAN ASSISTANT

## 2020-07-08 PROCEDURE — 99214 OFFICE O/P EST MOD 30 MIN: CPT | Performed by: PHYSICIAN ASSISTANT

## 2020-07-08 NOTE — PROGRESS NOTES
[facility]   Outpatient Note  Trinidad Almanzar  1940  [id number]  [facility], [facility] CARDIOLOGY [facility] [place] [facility] [id number] [phone]    Assessment and Plan:   Coronary artery disease involving native coronary artery of native heart without angina pectoris  Stable without CHF manifestation or anginal symptoms      Hypercholesterolemia  On statin and tolerating      Essential hypertension  Controlled on current medical regimen    Pacemaker  Paced rhythm on EKG  Interrogation normal          Additional Plan:   No Medication changes made or testing ordered today  Return visit will be in one year or earlier if there are problems  The patient is encouraged to call in the meantime if there are questions or concerns  Subjective: Trinidad Almanzar is a 78 y o  male    The patient is seen in the office for a follow up visit regarding a history of CAD, HLD, SSS with PPM and DMII  From a cardiac perspective, he is doing well without complaints of chest pain or exertional dyspnea  He has no palpitations syncope or near syncope, and denies edema orthopnea or PND  He does not complain of TIA or CVA symptoms  Coronary Artery Disease   Pertinent negatives include no chest pain, leg swelling, palpitations or shortness of breath  Hypertension   Pertinent negatives include no chest pain, orthopnea, palpitations, PND or shortness of breath  Diabetes   Pertinent negatives for diabetes include no chest pain         Social History  Social History     Tobacco Use   Smoking Status Former Smoker    Types: Cigars    Last attempt to quit: 1980    Years since quittin 2   Smokeless Tobacco Former User   Tobacco Comment    smokes an occ  cigar; hasn't in last 2 weeks; CIGAR (1 A DAY) AS PER [facility]   ,   Social History     Substance and Sexual Activity   Alcohol Use Never    Frequency: Never    Comment: BEING A SOCIAL DRINKER AS PER [employer]   ,   Social History     Substance and Sexual Activity   Drug Use No     Family History   Problem Relation Age of Onset    Stroke Father         SYNDROME    Coronary artery disease Family         less than 61years of age       Medical and Surgical History  Past Medical History:   Diagnosis Date    Anxiety state 8/5/2018    Basal cell carcinoma of skin 11/15/2016    Chronic obstructive pulmonary disease ([address] ) 9/5/2012    Constipation     Coronary artery disease 5/22/2012    Forearm laceration, right, initial encounter 5/28/2020    Hydronephrosis     LAST ASSESSED: 11/18/15    Osteoporosis 5/22/2012    Pacemaker     Trifascicular block      Past Surgical History:   Procedure Laterality Date    CARDIAC PACEMAKER PLACEMENT  11/2009    Medtronic dual chamber     CATARACT EXTRACTION W/  INTRAOCULAR LENS IMPLANT      CYSTOSCOPY N/A 5/9/2016    Procedure: CYSTOSCOPY, evacuation of bladder calculi;  Surgeon: Sania Dorantes MD;  Location: [facility];  Service:     INGUINAL HERNIA REPAIR      UNILATERAL    KIDNEY STONE SURGERY      OTHER SURGICAL HISTORY      HERNIA REPAIR AS PER [facility] (ALREADY IN PERTINENT NEGATIVES)    CO TRANSURETHRAL ELEC-SURG PROSTATECTOM N/A 5/9/2016    Procedure: TRANSURETHRAL RESECTION OF PROSTATE (TURP);   Surgeon: Sania Dorantes MD;  Location: [facility];  Service: Urology    TONSILLECTOMY           Current Outpatient Medications:     amLODIPine (NORVASC) 10 mg tablet, Take 1 tablet (10 mg total) by mouth daily, Disp: 30 tablet, Rfl: 5    aspirin (ECOTRIN LOW STRENGTH) 81 mg EC tablet, Take 81 mg by mouth daily, Disp: , Rfl:     atenolol (TENORMIN) 50 mg tablet, Take 1 tablet by mouth once daily, Disp: 90 tablet, Rfl: 0    BLEPHAMIDE 10-0 2 % ophthalmic suspension, INSTILL 1 DROP INTO EACH EYE EVERY 3 HOURS WHILE AWAKE FOR 3 DAYS, Disp: , Rfl:     calcium carbonate (TUMS) 500 mg chewable tablet, Chew 1 tablet as needed for indigestion or heartburn , Disp: , Rfl:     cyclobenzaprine (FLEXERIL) 10 mg tablet, TAKE 1 TO 2 TABLETS BY MOUTH DAILY AS NEEDED, Disp: 60 tablet, Rfl: 0    finasteride (PROSCAR) 5 mg tablet, Take 1 tablet (5 mg total) by mouth daily, Disp: 90 tablet, Rfl: 1    glucose blood (ONE TOUCH ULTRA TEST) test strip, Test sugar once daily, Disp: 50 each, Rfl: 5    lisinopril (ZESTRIL) 40 mg tablet, Take 1 tablet (40 mg total) by mouth daily, Disp: 30 tablet, Rfl: 5    magnesium hydroxide (MILK OF MAGNESIA) 400 mg/5 mL oral suspension, Take by mouth as needed for constipation  , Disp: , Rfl:     Multiple Vitamin (MULTIVITAMIN) capsule, Take 1 capsule by mouth daily  , Disp: , Rfl:     simvastatin (ZOCOR) 10 mg tablet, Take 1 tablet (10 mg total) by mouth every evening, Disp: 90 tablet, Rfl: 1    tamsulosin (FLOMAX) 0 4 mg, Take 1 capsule (0 4 mg total) by mouth daily with dinner, Disp: 30 capsule, Rfl: 5  No Known Allergies    Review of Systems   Constitution: Negative  HENT: Negative  Eyes: Negative  Cardiovascular: Negative for chest pain, claudication, cyanosis, dyspnea on exertion, irregular heartbeat, leg swelling, near-syncope, orthopnea, palpitations, paroxysmal nocturnal dyspnea and syncope  Respiratory: Negative  Negative for cough, hemoptysis, shortness of breath, sleep disturbances due to breathing, snoring, sputum production and wheezing  Endocrine: Negative  Hematologic/Lymphatic: Negative  Skin: Negative  Musculoskeletal: Negative  Gastrointestinal: Negative  Genitourinary: Negative  Neurological: Negative  Psychiatric/Behavioral: Negative  Allergic/Immunologic: Negative  Objective:   /80   Pulse 67   Ht 5' 7 5" (1 715 m)   Wt 78 9 kg (174 lb)   BMI 26 85 kg/m²   Physical Exam   Constitutional: He is oriented to person, place, and time  He appears well-developed and well-nourished  HENT:   Head: Normocephalic and atraumatic     Mouth/Throat: Oropharynx is clear and moist    Eyes: Conjunctivae are normal  No scleral icterus  Neck: Normal range of motion  Neck supple  No JVD present  No thyromegaly present  Cardiovascular: Normal rate and regular rhythm  Exam reveals no gallop and no friction rub  Murmur heard  Device in place in the L   Pulmonary/Chest: No respiratory distress  He has no wheezes  He has no rales  Abdominal: Soft  Bowel sounds are normal  He exhibits no distension  There is no tenderness  Aorta not palpable   Musculoskeletal: Normal range of motion  He exhibits no edema, tenderness or deformity  Neurological: He is alert and oriented to person, place, and time  Skin: Skin is warm and dry  Psychiatric: He has a normal mood and affect  His behavior is normal    Nursing note and vitals reviewed  Lab Review:   Lab Results   Component Value Date    CHOL 142 08/24/2015     Lab Results   Component Value Date    HDL 46 03/19/2019     Lab Results   Component Value Date    LDLCALC 76 03/19/2019     Lab Results   Component Value Date    TRIG 131 03/19/2019     Results Reviewed     None        Results Reviewed     None        Results Reviewed     None          Recent Cardiovascular Testing:   Nuclear stress 7-18-19: Small zone of inferior apical ischemia    Echo 3-66-43 Systolic function was at the lower limits of normal  Ejection fraction was estimated to be 50 %  There was mild hypokinesis of the distal inferoseptum and apical inferior wall   Mild TR    ECG Review:   A-sensed V-paced

## 2020-08-22 ENCOUNTER — HOSPITAL ENCOUNTER (EMERGENCY)
Facility: HOSPITAL | Age: 80
Discharge: HOME | End: 2020-08-22
Attending: EMERGENCY MEDICINE | Admitting: EMERGENCY MEDICINE
Payer: MEDICARE

## 2020-08-22 VITALS
WEIGHT: 174 LBS | TEMPERATURE: 97.5 F | DIASTOLIC BLOOD PRESSURE: 86 MMHG | RESPIRATION RATE: 18 BRPM | SYSTOLIC BLOOD PRESSURE: 162 MMHG | HEART RATE: 75 BPM | OXYGEN SATURATION: 100 % | BODY MASS INDEX: 26.85 KG/M2

## 2020-08-22 DIAGNOSIS — S81.812A NONINFECTED SKIN TEAR OF LEFT LOWER EXTREMITY, INITIAL ENCOUNTER: Primary | ICD-10-CM

## 2020-08-22 DIAGNOSIS — S00.411A EAR CANAL ABRASION, RIGHT, INITIAL ENCOUNTER: ICD-10-CM

## 2020-08-22 PROCEDURE — 99283 EMERGENCY DEPT VISIT LOW MDM: CPT

## 2020-08-22 PROCEDURE — 99282 EMERGENCY DEPT VISIT SF MDM: CPT | Performed by: EMERGENCY MEDICINE

## 2020-08-22 RX ORDER — TRANEXAMIC ACID 100 MG/ML
500 INJECTION, SOLUTION INTRAVENOUS ONCE
Status: COMPLETED | OUTPATIENT
Start: 2020-08-22 | End: 2020-08-22

## 2020-08-22 RX ADMIN — TRANEXAMIC ACID 500 MG: 100 INJECTION, SOLUTION INTRAVENOUS at 07:24

## 2020-08-22 NOTE — ED PROVIDER NOTES
History  No chief complaint on file  Patient is an 78-year-old male past medical history of hypertension, CAD, diabetes, presenting with complaint of intermittent right-sided ear bleeding over the last 2 weeks  Patient states he tried cleaning it with hydrogen peroxide and Q-tips with no relief  Has a history of unknown type of skin cancer on the year many years ago  Patient also complaining of left-sided shin bleeding after he sustained a skin tear after mechanical fall from standing  There was no LOC or head injury  He denies nausea, vomiting, headache, visual changes  He is on baby aspirin but no other anticoagulation  He denies lightheadedness, history of bleeding disorder, additional injury  Last tetanus in May  Injury   Location:  Left ankle  Quality:  Bleeding  Severity:  Mild  Onset quality:  Sudden  Duration:  3 days  Timing:  Intermittent  Progression:  Waxing and waning  Chronicity:  New  Context:  Skin tear  Relieved by:  None  Worsened by:  None  Ineffective treatments:  Bandage  Associated symptoms: no abdominal pain, no chest pain, no congestion, no cough, no diarrhea, no fever, no nausea, no rash, no rhinorrhea, no sore throat, no vomiting and no wheezing        Prior to Admission Medications   Prescriptions Last Dose Informant Patient Reported? Taking? BLEPHAMIDE 10-0 2 % ophthalmic suspension  Self Yes No   Sig: INSTILL 1 DROP INTO EACH EYE EVERY 3 HOURS WHILE AWAKE FOR 3 DAYS   Multiple Vitamin (MULTIVITAMIN) capsule  Self Yes No   Sig: Take 1 capsule by mouth daily  amLODIPine (NORVASC) 10 mg tablet  Self No No   Sig: Take 1 tablet (10 mg total) by mouth daily   aspirin (ECOTRIN LOW STRENGTH) 81 mg EC tablet  Self Yes No   Sig: Take 81 mg by mouth daily   atenolol (TENORMIN) 50 mg tablet   No No   Sig: Take 1 tablet by mouth once daily   calcium carbonate (TUMS) 500 mg chewable tablet  Self Yes No   Sig: Chew 1 tablet as needed for indigestion or heartburn     cyclobenzaprine (FLEXERIL) 10 mg tablet   No No   Sig: TAKE 1 TO 2 TABLETS BY MOUTH DAILY AS NEEDED   finasteride (PROSCAR) 5 mg tablet  Self No No   Sig: Take 1 tablet (5 mg total) by mouth daily   glucose blood (ONE TOUCH ULTRA TEST) test strip  Self No No   Sig: Test sugar once daily   lisinopril (ZESTRIL) 40 mg tablet  Self No No   Sig: Take 1 tablet (40 mg total) by mouth daily   magnesium hydroxide (MILK OF MAGNESIA) 400 mg/5 mL oral suspension  Self Yes No   Sig: Take by mouth as needed for constipation  simvastatin (ZOCOR) 10 mg tablet   No No   Sig: Take 1 tablet (10 mg total) by mouth every evening   tamsulosin (FLOMAX) 0 4 mg  Self No No   Sig: Take 1 capsule (0 4 mg total) by mouth daily with dinner      Facility-Administered Medications: None       Past Medical History:   Diagnosis Date    Anxiety state 8/5/2018    Basal cell carcinoma of skin 11/15/2016    Chronic obstructive pulmonary disease ([address] ) 9/5/2012    Constipation     Coronary artery disease 5/22/2012    Forearm laceration, right, initial encounter 5/28/2020    Hydronephrosis     LAST ASSESSED: 11/18/15    Osteoporosis 5/22/2012    Pacemaker     Trifascicular block        Past Surgical History:   Procedure Laterality Date    CARDIAC PACEMAKER PLACEMENT  11/2009    Medtronic dual chamber     CATARACT EXTRACTION W/  INTRAOCULAR LENS IMPLANT      CYSTOSCOPY N/A 5/9/2016    Procedure: CYSTOSCOPY, evacuation of bladder calculi;  Surgeon: Wilda Farias MD;  Location: [facility];  Service:     INGUINAL HERNIA REPAIR      UNILATERAL    KIDNEY STONE SURGERY      OTHER SURGICAL HISTORY      HERNIA REPAIR AS PER [facility] (ALREADY IN PERTINENT NEGATIVES)    UT TRANSURETHRAL ELEC-SURG PROSTATECTOM N/A 5/9/2016    Procedure: TRANSURETHRAL RESECTION OF PROSTATE (TURP);   Surgeon: Wilda Farias MD;  Location: [facility];  Service: Urology    TONSILLECTOMY         Family History   Problem Relation Age of Onset    Stroke Father         SYNDROME  Coronary artery disease Family         less than 61years of age     I have reviewed and agree with the history as documented  E-Cigarette/Vaping    E-Cigarette Use Never User      E-Cigarette/Vaping Substances    Nicotine No     THC No     CBD No     Flavoring No     Other No     Unknown No      Social History     Tobacco Use    Smoking status: Former Smoker     Types: Cigars     Last attempt to quit: 1980     Years since quittin 3    Smokeless tobacco: Former User    Tobacco comment: smokes an occ  cigar; hasn't in last 2 weeks; CIGAR (1 A DAY) AS PER [facility]   Substance Use Topics    Alcohol use: Never     Frequency: Never     Comment: BEING A SOCIAL DRINKER AS PER [facility]    Drug use: No       Review of Systems   Constitutional: Negative for chills and fever  HENT: Negative for congestion, nosebleeds, rhinorrhea and sore throat  Eyes: Negative for pain and visual disturbance  Respiratory: Negative for cough and wheezing  Cardiovascular: Negative for chest pain and leg swelling  Gastrointestinal: Negative for abdominal distention, abdominal pain, diarrhea, nausea and vomiting  Genitourinary: Negative for dysuria and frequency  Musculoskeletal: Negative for back pain and joint swelling  Skin: Negative for rash and wound  Neurological: Negative for weakness and numbness  Psychiatric/Behavioral: Negative for decreased concentration and suicidal ideas  Physical Exam  Physical Exam  Vitals signs and nursing note reviewed  Constitutional:       Appearance: He is well-developed  HENT:      Head: Normocephalic and atraumatic  Right Ear: Tympanic membrane normal       Left Ear: Tympanic membrane normal       Ears:      Comments: Abrasion on anterior surface of external canal  Eyes:      Conjunctiva/sclera: Conjunctivae normal       Pupils: Pupils are equal, round, and reactive to light     Neck:      Musculoskeletal: Normal range of motion and neck supple  Trachea: No tracheal deviation  Cardiovascular:      Rate and Rhythm: Normal rate and regular rhythm  Heart sounds: Normal heart sounds  No murmur  Pulmonary:      Effort: Pulmonary effort is normal  No respiratory distress  Breath sounds: Normal breath sounds  No wheezing or rales  Abdominal:      General: Bowel sounds are normal  There is no distension  Palpations: Abdomen is soft  Tenderness: There is no abdominal tenderness  Musculoskeletal:         General: No deformity  Skin:     General: Skin is warm and dry  Capillary Refill: Capillary refill takes less than 2 seconds  Comments: 1cm skin tear left shin, no surrounding erythema   Neurological:      Mental Status: He is alert and oriented to person, place, and time  Sensory: No sensory deficit  Psychiatric:         Judgment: Judgment normal          Vital Signs  ED Triage Vitals   Temp Pulse Resp BP SpO2   -- -- -- -- --      Temp src Heart Rate Source Patient Position - Orthostatic VS BP Location FiO2 (%)   -- -- -- -- --      Pain Score       --           There were no vitals filed for this visit  Visual Acuity      ED Medications  Medications - No data to display    Diagnostic Studies  Results Reviewed     None                 No orders to display              Procedures  Procedures         ED Course                                             MDM  Number of Diagnoses or Management Options  Ear canal abrasion, right, initial encounter: new and requires workup  Noninfected skin tear of left lower extremity, initial encounter: new and requires workup  Diagnosis management comments: Patient is an 78-year-old male presenting with right-sided ear bleeding which appears be an abrasion  He has used Q-tips which may be the cause  Also has cancer and is here  Location is in the ear with typical for packing, and I would prefer not to use chemical cautery  Will trial topical TXA   Patient informed he needs dermatology follow up as he has a history of cancer  Wound left lle skin tear, oozing blood, will bandage appropriately  No evidence of infection  Will follow up with PCP in 2-3 days for wound check       Amount and/or Complexity of Data Reviewed  Review and summarize past medical records: yes  Independent visualization of images, tracings, or specimens: yes    Risk of Complications, Morbidity, and/or Mortality  Presenting problems: low  Diagnostic procedures: minimal  Management options: minimal          Disposition  Final diagnoses:   Noninfected skin tear of left lower extremity, initial encounter   Ear canal abrasion, right, initial encounter     Time reflects when diagnosis was documented in both MDM as applicable and the Disposition within this note     Time User Action Codes Description Comment    8/22/2020  7:22 AM Reyna Colorado [T70 [id number]] Noninfected skin tear of left lower extremity, initial encounter     8/22/2020  7:23 AM Reyna Colorado [S00 [id number]] Ear canal abrasion, right, initial encounter       ED Disposition     ED Disposition Condition Date/Time Comment    Discharge Stable Sat Aug 22, 2020  7:32 AM Best Newell discharge to home/self care  Follow-up Information     Follow up With Specialties Details Why Contact Cami Perez DO Internal Medicine In 2 days For wound re-check [address] [facility]  [phone]      [facility] Derm[facility] Dermatology Schedule an appointment as soon as possible for a visit   [place] [address]  [phone]            Patient's Medications   Discharge Prescriptions    No medications on file     No discharge procedures on file      PDMP Review       Value Time User    PDMP Reviewed  Yes 5/29/2020  8:47 AM Yoselin Andino MD          ED Provider  Electronically Signed by           Nilsa Cerrato DO  08/23/20 5415

## 2020-08-26 ENCOUNTER — OFFICE VISIT (OUTPATIENT)
Dept: INTERNAL MEDICINE CLINIC | Facility: CLINIC | Age: 80
End: 2020-08-26
Payer: MEDICARE

## 2020-08-26 VITALS
RESPIRATION RATE: 18 BRPM | OXYGEN SATURATION: 91 % | TEMPERATURE: 98.9 F | BODY MASS INDEX: 26.07 KG/M2 | HEIGHT: 68 IN | HEART RATE: 73 BPM | WEIGHT: 172 LBS

## 2020-08-26 DIAGNOSIS — H10.31 ACUTE BACTERIAL CONJUNCTIVITIS OF RIGHT EYE: ICD-10-CM

## 2020-08-26 DIAGNOSIS — D49.2 ATYPICAL SQUAMOPROLIFERATIVE SKIN LESION: Primary | ICD-10-CM

## 2020-08-26 DIAGNOSIS — G47.62 SLEEP RELATED LEG CRAMPS: ICD-10-CM

## 2020-08-26 PROCEDURE — 4040F PNEUMOC VAC/ADMIN/RCVD: CPT | Performed by: INTERNAL MEDICINE

## 2020-08-26 PROCEDURE — 1036F TOBACCO NON-USER: CPT | Performed by: INTERNAL MEDICINE

## 2020-08-26 PROCEDURE — 11311 SHAVE SKIN LESION 0.6-1.0 CM: CPT | Performed by: INTERNAL MEDICINE

## 2020-08-26 PROCEDURE — 3008F BODY MASS INDEX DOCD: CPT | Performed by: INTERNAL MEDICINE

## 2020-08-26 PROCEDURE — 3044F HG A1C LEVEL LT 7.0%: CPT | Performed by: INTERNAL MEDICINE

## 2020-08-26 PROCEDURE — 3077F SYST BP >= 140 MM HG: CPT | Performed by: INTERNAL MEDICINE

## 2020-08-26 PROCEDURE — 3079F DIAST BP 80-89 MM HG: CPT | Performed by: INTERNAL MEDICINE

## 2020-08-26 PROCEDURE — 99213 OFFICE O/P EST LOW 20 MIN: CPT | Performed by: INTERNAL MEDICINE

## 2020-08-26 PROCEDURE — 1160F RVW MEDS BY RX/DR IN RCRD: CPT | Performed by: INTERNAL MEDICINE

## 2020-08-26 RX ORDER — CYCLOBENZAPRINE HCL 10 MG
10 TABLET ORAL 2 TIMES DAILY
Qty: 60 TABLET | Refills: 5 | Status: SHIPPED | OUTPATIENT
Start: 2020-08-26 | End: 2021-03-10 | Stop reason: SDUPTHER

## 2020-08-26 RX ORDER — SULFACETAMIDE SODIUM AND PREDNISOLONE ACETATE 100; 2 MG/ML; MG/ML
2 SUSPENSION/ DROPS OPHTHALMIC
Qty: 10 ML | Refills: 5 | Status: SHIPPED | OUTPATIENT
Start: 2020-08-26

## 2020-08-26 NOTE — PROGRESS NOTES
Assessment/Plan:    Problem List Items Addressed This Visit        Other    Sleep related leg cramps    Relevant Medications    cyclobenzaprine (FLEXERIL) 10 mg tablet    Blephamide 10-0 2 % ophthalmic suspension      Other Visit Diagnoses     Acute bacterial conjunctivitis of right eye    -  Primary    Relevant Medications    Blephamide 10-0 2 % ophthalmic suspension           Diagnoses and all orders for this visit:    Acute bacterial conjunctivitis of right eye  -     Blephamide 10-0 2 % ophthalmic suspension; Administer 2 drops to both eyes daily at bedtime    Sleep related leg cramps  -     cyclobenzaprine (FLEXERIL) 10 mg tablet; Take 1 tablet (10 mg total) by mouth 2 (two) times a day  -     Blephamide 10-0 2 % ophthalmic suspension; Administer 2 drops to both eyes daily at bedtime        No problem-specific Assessment & Plan notes found for this encounter  Subjective: Bleeding from ear     Patient ID: Octavia Barfield is a 78 y o  male  The patient has an excoriated area of his right ear  This is raised and bleeding  He noes that it has been present for several weeks      The following portions of the patient's history were reviewed and updated as appropriate:   He has a past medical history of Anxiety state (8/5/2018), Basal cell carcinoma of skin (11/15/2016), Chronic obstructive pulmonary disease ([facility] [place] [address] ) (9/5/2012), Constipation, Coronary artery disease (5/22/2012), Forearm laceration, right, initial encounter (5/28/2020), Hydronephrosis, Osteoporosis (5/22/2012), Pacemaker, and Trifascicular block  ,  does not have any pertinent problems on file  ,   has a past surgical history that includes Cardiac pacemaker placement (11/2009); Cataract extraction w/  intraocular lens implant; Kidney stone surgery; Tonsillectomy; pr transurethral elec-surg prostatectom (N/A, 5/9/2016); CYSTOSCOPY (N/A, 5/9/2016); Other surgical history; and Inguinal hernia repair  ,  family history includes Coronary artery disease in his family; Stroke in his father  ,   reports that he quit smoking about 40 years ago  His smoking use included cigars  He has quit using smokeless tobacco  He reports that he does not drink alcohol or use drugs  ,  has No Known Allergies     Current Outpatient Medications   Medication Sig Dispense Refill    amLODIPine (NORVASC) 10 mg tablet Take 1 tablet (10 mg total) by mouth daily 30 tablet 5    aspirin (ECOTRIN LOW STRENGTH) 81 mg EC tablet Take 81 mg by mouth daily      atenolol (TENORMIN) 50 mg tablet Take 1 tablet by mouth once daily 90 tablet 0    Blephamide 10-0 2 % ophthalmic suspension Administer 2 drops to both eyes daily at bedtime 10 mL 5    calcium carbonate (TUMS) 500 mg chewable tablet Chew 1 tablet as needed for indigestion or heartburn   cyclobenzaprine (FLEXERIL) 10 mg tablet Take 1 tablet (10 mg total) by mouth 2 (two) times a day 60 tablet 5    finasteride (PROSCAR) 5 mg tablet Take 1 tablet (5 mg total) by mouth daily 90 tablet 1    glucose blood (ONE TOUCH ULTRA TEST) test strip Test sugar once daily 50 each 5    lisinopril (ZESTRIL) 40 mg tablet Take 1 tablet (40 mg total) by mouth daily 30 tablet 5    magnesium hydroxide (MILK OF MAGNESIA) 400 mg/5 mL oral suspension Take by mouth as needed for constipation   Multiple Vitamin (MULTIVITAMIN) capsule Take 1 capsule by mouth daily   simvastatin (ZOCOR) 10 mg tablet Take 1 tablet (10 mg total) by mouth every evening 90 tablet 1    tamsulosin (FLOMAX) 0 4 mg Take 1 capsule (0 4 mg total) by mouth daily with dinner 30 capsule 5     No current facility-administered medications for this visit  Review of Systems   Skin: Positive for wound  Objective:  Vitals:    08/26/20 1540   Pulse: 73   Resp: 18   Temp: 98 9 °F (37 2 °C)   TempSrc: Temporal   SpO2: 91%   Weight: 78 kg (172 lb)   Height: 5' 7 5" (1 715 m)     Body mass index is 26 54 kg/m²  Physical Exam  Skin:     Findings: Lesion present  Shave lesion    Date/Time: 8/26/2020 4:27 PM  Performed by: Khanh Shaikh DO  Authorized by: Khanh Shaikh DO     Number of Lesions: 1  Lesion 1:     Body area: head/neck    Head/neck location: R ear    Initial size (mm): 10    Final defect size (mm): 10    Malignancy: malignancy unknown      Destruction method: shave removal        PHQ-9 Depression Screening    PHQ-9:    Frequency of the following problems over the past two weeks:

## 2020-08-27 PROCEDURE — 88305 TISSUE EXAM BY PATHOLOGIST: CPT | Performed by: PATHOLOGY

## 2020-09-08 DIAGNOSIS — I10 ESSENTIAL HYPERTENSION: ICD-10-CM

## 2020-09-08 RX ORDER — AMLODIPINE BESYLATE 10 MG/1
10 TABLET ORAL DAILY
Qty: 30 TABLET | Refills: 5 | Status: SHIPPED | OUTPATIENT
Start: 2020-09-08 | End: 2021-03-01 | Stop reason: SDUPTHER

## 2020-09-08 RX ORDER — LISINOPRIL 40 MG/1
40 TABLET ORAL DAILY
Qty: 30 TABLET | Refills: 5 | Status: SHIPPED | OUTPATIENT
Start: 2020-09-08 | End: 2021-03-01 | Stop reason: SDUPTHER

## 2020-09-08 NOTE — TELEPHONE ENCOUNTER
Pt needs lisinopril (ZESTRIL) 40 mg tablet and   amLODIPine (NORVASC) 10 mg tablet refilled, send to [facility] in [place]

## 2020-09-17 ENCOUNTER — REMOTE DEVICE CLINIC VISIT (OUTPATIENT)
Dept: CARDIOLOGY CLINIC | Facility: CLINIC | Age: 80
End: 2020-09-17
Payer: MEDICARE

## 2020-09-17 DIAGNOSIS — I49.5 SICK SINUS SYNDROME (HCC): Primary | ICD-10-CM

## 2020-09-17 DIAGNOSIS — Z45.010 ENCOUNTER FOR CHECKING AND TESTING OF CARDIAC PACEMAKER PULSE GENERATOR (BATTERY): ICD-10-CM

## 2020-09-17 PROCEDURE — 93296 REM INTERROG EVL PM/IDS: CPT | Performed by: INTERNAL MEDICINE

## 2020-09-17 PROCEDURE — 93294 REM INTERROG EVL PM/LDLS PM: CPT | Performed by: INTERNAL MEDICINE

## 2020-09-23 NOTE — PROGRESS NOTES
[facility] remote check pacer  No events  Battery less than a year  Will check more closely  Current Outpatient Medications:     amLODIPine (NORVASC) 10 mg tablet, Take 1 tablet (10 mg total) by mouth daily, Disp: 30 tablet, Rfl: 5    aspirin (ECOTRIN LOW STRENGTH) 81 mg EC tablet, Take 81 mg by mouth daily, Disp: , Rfl:     atenolol (TENORMIN) 50 mg tablet, Take 1 tablet by mouth once daily, Disp: 90 tablet, Rfl: 0    Blephamide 10-0 2 % ophthalmic suspension, Administer 2 drops to both eyes daily at bedtime, Disp: 10 mL, Rfl: 5    calcium carbonate (TUMS) 500 mg chewable tablet, Chew 1 tablet as needed for indigestion or heartburn , Disp: , Rfl:     cyclobenzaprine (FLEXERIL) 10 mg tablet, Take 1 tablet (10 mg total) by mouth 2 (two) times a day, Disp: 60 tablet, Rfl: 5    finasteride (PROSCAR) 5 mg tablet, Take 1 tablet (5 mg total) by mouth daily, Disp: 90 tablet, Rfl: 1    glucose blood (ONE TOUCH ULTRA TEST) test strip, Test sugar once daily, Disp: 50 each, Rfl: 5    lisinopril (ZESTRIL) 40 mg tablet, Take 1 tablet (40 mg total) by mouth daily, Disp: 30 tablet, Rfl: 5    magnesium hydroxide (MILK OF MAGNESIA) 400 mg/5 mL oral suspension, Take by mouth as needed for constipation  , Disp: , Rfl:     Multiple Vitamin (MULTIVITAMIN) capsule, Take 1 capsule by mouth daily  , Disp: , Rfl:     simvastatin (ZOCOR) 10 mg tablet, Take 1 tablet (10 mg total) by mouth every evening, Disp: 90 tablet, Rfl: 1    tamsulosin (FLOMAX) 0 4 mg, Take 1 capsule (0 4 mg total) by mouth daily with dinner, Disp: 30 capsule, Rfl: 5

## 2020-09-30 ENCOUNTER — TELEPHONE (OUTPATIENT)
Dept: INTERNAL MEDICINE CLINIC | Facility: CLINIC | Age: 80
End: 2020-09-30

## 2020-09-30 DIAGNOSIS — N13.8 BPH WITH URINARY OBSTRUCTION: ICD-10-CM

## 2020-09-30 DIAGNOSIS — N40.1 BPH WITH URINARY OBSTRUCTION: ICD-10-CM

## 2020-09-30 RX ORDER — TAMSULOSIN HYDROCHLORIDE 0.4 MG/1
0.4 CAPSULE ORAL
Qty: 30 CAPSULE | Refills: 5 | Status: SHIPPED | OUTPATIENT
Start: 2020-09-30 | End: 2021-04-02

## 2020-10-30 ENCOUNTER — IMMUNIZATIONS (OUTPATIENT)
Dept: INTERNAL MEDICINE CLINIC | Facility: CLINIC | Age: 80
End: 2020-10-30
Payer: MEDICARE

## 2020-10-30 DIAGNOSIS — Z23 ENCOUNTER FOR IMMUNIZATION: ICD-10-CM

## 2020-10-30 PROCEDURE — 90662 IIV NO PRSV INCREASED AG IM: CPT

## 2020-10-30 PROCEDURE — G0008 ADMIN INFLUENZA VIRUS VAC: HCPCS

## 2020-11-05 ENCOUNTER — TELEPHONE (OUTPATIENT)
Dept: INTERNAL MEDICINE CLINIC | Facility: CLINIC | Age: 80
End: 2020-11-05

## 2020-11-05 DIAGNOSIS — I10 ESSENTIAL HYPERTENSION: ICD-10-CM

## 2020-11-05 DIAGNOSIS — R33.9 URINARY RETENTION: ICD-10-CM

## 2020-11-05 DIAGNOSIS — I25.10 CORONARY ARTERY DISEASE INVOLVING NATIVE HEART WITHOUT ANGINA PECTORIS, UNSPECIFIED VESSEL OR LESION TYPE: ICD-10-CM

## 2020-11-05 RX ORDER — ATENOLOL 50 MG/1
50 TABLET ORAL DAILY
Qty: 90 TABLET | Refills: 1 | Status: SHIPPED | OUTPATIENT
Start: 2020-11-05 | End: 2021-05-04 | Stop reason: SDUPTHER

## 2020-11-05 RX ORDER — FINASTERIDE 5 MG/1
5 TABLET, FILM COATED ORAL DAILY
Qty: 90 TABLET | Refills: 1 | Status: SHIPPED | OUTPATIENT
Start: 2020-11-05 | End: 2021-05-17 | Stop reason: SDUPTHER

## 2020-12-14 ENCOUNTER — APPOINTMENT (OUTPATIENT)
Dept: LAB | Facility: CLINIC | Age: 80
End: 2020-12-14
Payer: MEDICARE

## 2020-12-14 ENCOUNTER — OFFICE VISIT (OUTPATIENT)
Dept: INTERNAL MEDICINE CLINIC | Facility: CLINIC | Age: 80
End: 2020-12-14
Payer: MEDICARE

## 2020-12-14 VITALS
BODY MASS INDEX: 26.43 KG/M2 | HEIGHT: 68 IN | RESPIRATION RATE: 14 BRPM | SYSTOLIC BLOOD PRESSURE: 148 MMHG | TEMPERATURE: 98.6 F | HEART RATE: 64 BPM | OXYGEN SATURATION: 98 % | DIASTOLIC BLOOD PRESSURE: 82 MMHG | WEIGHT: 174.4 LBS

## 2020-12-14 DIAGNOSIS — E78.00 HYPERCHOLESTEROLEMIA: ICD-10-CM

## 2020-12-14 DIAGNOSIS — E11.59 TYPE 2 DIABETES MELLITUS WITH OTHER CIRCULATORY COMPLICATION, WITHOUT LONG-TERM CURRENT USE OF INSULIN (HCC): Primary | ICD-10-CM

## 2020-12-14 DIAGNOSIS — I10 ESSENTIAL HYPERTENSION: ICD-10-CM

## 2020-12-14 DIAGNOSIS — I73.9 PERIPHERAL ARTERIAL DISEASE (HCC): ICD-10-CM

## 2020-12-14 DIAGNOSIS — N18.4 CKD (CHRONIC KIDNEY DISEASE), STAGE IV (HCC): ICD-10-CM

## 2020-12-14 LAB
ANION GAP SERPL CALCULATED.3IONS-SCNC: 5 MMOL/L (ref 4–13)
BUN SERPL-MCNC: 28 MG/DL (ref 5–25)
CALCIUM SERPL-MCNC: 9.9 MG/DL (ref 8.3–10.1)
CHLORIDE SERPL-SCNC: 105 MMOL/L (ref 100–108)
CO2 SERPL-SCNC: 30 MMOL/L (ref 21–32)
CREAT SERPL-MCNC: 1.47 MG/DL (ref 0.6–1.3)
GFR SERPL CREATININE-BSD FRML MDRD: 44 ML/MIN/1.73SQ M
GLUCOSE SERPL-MCNC: 89 MG/DL (ref 65–140)
POTASSIUM SERPL-SCNC: 4.1 MMOL/L (ref 3.5–5.3)
SL AMB POCT HEMOGLOBIN AIC: 6.4 (ref ?–6.5)
SODIUM SERPL-SCNC: 140 MMOL/L (ref 136–145)

## 2020-12-14 PROCEDURE — 36415 COLL VENOUS BLD VENIPUNCTURE: CPT

## 2020-12-14 PROCEDURE — 83036 HEMOGLOBIN GLYCOSYLATED A1C: CPT | Performed by: INTERNAL MEDICINE

## 2020-12-14 PROCEDURE — 99214 OFFICE O/P EST MOD 30 MIN: CPT | Performed by: INTERNAL MEDICINE

## 2020-12-14 PROCEDURE — 80048 BASIC METABOLIC PNL TOTAL CA: CPT

## 2020-12-14 PROCEDURE — 1124F ACP DISCUSS-NO DSCNMKR DOCD: CPT | Performed by: INTERNAL MEDICINE

## 2020-12-14 RX ORDER — BIMATOPROST 0.01 %
DROPS OPHTHALMIC (EYE)
COMMUNITY
Start: 2020-12-02 | End: 2021-09-09 | Stop reason: ALTCHOICE

## 2020-12-14 RX ORDER — BRIMONIDINE TARTRATE/TIMOLOL 0.2%-0.5%
DROPS OPHTHALMIC (EYE)
COMMUNITY
Start: 2020-10-30

## 2020-12-17 ENCOUNTER — REMOTE DEVICE CLINIC VISIT (OUTPATIENT)
Dept: CARDIOLOGY CLINIC | Facility: CLINIC | Age: 80
End: 2020-12-17
Payer: MEDICARE

## 2020-12-17 DIAGNOSIS — I49.5 SICK SINUS SYNDROME (HCC): Primary | ICD-10-CM

## 2020-12-17 DIAGNOSIS — Z45.010 ENCOUNTER FOR CHECKING AND TESTING OF CARDIAC PACEMAKER PULSE GENERATOR (BATTERY): ICD-10-CM

## 2020-12-17 PROCEDURE — 93294 REM INTERROG EVL PM/LDLS PM: CPT | Performed by: INTERNAL MEDICINE

## 2020-12-17 PROCEDURE — 93296 REM INTERROG EVL PM/IDS: CPT | Performed by: INTERNAL MEDICINE

## 2021-01-05 DIAGNOSIS — E78.00 HYPERCHOLESTEREMIA: ICD-10-CM

## 2021-01-05 RX ORDER — SIMVASTATIN 10 MG
10 TABLET ORAL EVERY EVENING
Qty: 90 TABLET | Refills: 1 | Status: SHIPPED | OUTPATIENT
Start: 2021-01-05 | End: 2021-07-19

## 2021-01-14 NOTE — PROGRESS NOTES
[facility] remote check pacer  No events  Normal battery function  Current Outpatient Medications:     amLODIPine (NORVASC) 10 mg tablet, Take 1 tablet (10 mg total) by mouth daily, Disp: 30 tablet, Rfl: 5    aspirin (ECOTRIN LOW STRENGTH) 81 mg EC tablet, Take 81 mg by mouth daily, Disp: , Rfl:     atenolol (TENORMIN) 50 mg tablet, Take 1 tablet (50 mg total) by mouth daily, Disp: 90 tablet, Rfl: 1    Blephamide 10-0 2 % ophthalmic suspension, Administer 2 drops to both eyes daily at bedtime, Disp: 10 mL, Rfl: 5    calcium carbonate (TUMS) 500 mg chewable tablet, Chew 1 tablet as needed for indigestion or heartburn , Disp: , Rfl:     Combigan 0 2-0 5 %, INSTILL 1 DROP INTO RIGHT EYE EVERY 12 HOURS, Disp: , Rfl:     cyclobenzaprine (FLEXERIL) 10 mg tablet, Take 1 tablet (10 mg total) by mouth 2 (two) times a day, Disp: 60 tablet, Rfl: 5    finasteride (PROSCAR) 5 mg tablet, Take 1 tablet (5 mg total) by mouth daily, Disp: 90 tablet, Rfl: 1    glucose blood (ONE TOUCH ULTRA TEST) test strip, Test sugar once daily, Disp: 50 each, Rfl: 5    lisinopril (ZESTRIL) 40 mg tablet, Take 1 tablet (40 mg total) by mouth daily, Disp: 30 tablet, Rfl: 5    Lumigan 0 01 % ophthalmic drops, INSTILL 1 DROP AT BEDTIME INTO RIGHT EYE, Disp: , Rfl:     magnesium hydroxide (MILK OF MAGNESIA) 400 mg/5 mL oral suspension, Take by mouth as needed for constipation  , Disp: , Rfl:     Multiple Vitamin (MULTIVITAMIN) capsule, Take 1 capsule by mouth daily  , Disp: , Rfl:     simvastatin (ZOCOR) 10 mg tablet, Take 1 tablet (10 mg total) by mouth every evening, Disp: 90 tablet, Rfl: 1    tamsulosin (FLOMAX) 0 4 mg, Take 1 capsule (0 4 mg total) by mouth daily with dinner, Disp: 30 capsule, Rfl: 5

## 2021-02-27 DIAGNOSIS — I10 ESSENTIAL HYPERTENSION: ICD-10-CM

## 2021-03-01 DIAGNOSIS — I10 ESSENTIAL HYPERTENSION: ICD-10-CM

## 2021-03-01 RX ORDER — AMLODIPINE BESYLATE 10 MG/1
TABLET ORAL
Qty: 30 TABLET | Refills: 0 | Status: SHIPPED | OUTPATIENT
Start: 2021-03-01 | End: 2021-09-28 | Stop reason: SDUPTHER

## 2021-03-01 RX ORDER — LISINOPRIL 40 MG/1
TABLET ORAL
Qty: 30 TABLET | Refills: 0 | Status: SHIPPED | OUTPATIENT
Start: 2021-03-01 | End: 2021-05-05

## 2021-03-01 RX ORDER — LISINOPRIL 40 MG/1
40 TABLET ORAL DAILY
Qty: 30 TABLET | Refills: 5 | Status: SHIPPED | OUTPATIENT
Start: 2021-03-01 | End: 2021-03-31

## 2021-03-01 RX ORDER — AMLODIPINE BESYLATE 10 MG/1
10 TABLET ORAL DAILY
Qty: 30 TABLET | Refills: 5 | Status: SHIPPED | OUTPATIENT
Start: 2021-03-01 | End: 2021-03-31

## 2021-03-01 NOTE — DISCHARGE INSTRUCTIONS
You were seen for bleeding from your right ear  I put medication on it to help stop it bleeding  We also bandaged your left leg  Follow up with your primary physician in 2 days for a wound check  Also follow up with your dermatologist regarding your ear bleeding for re-evaluation and rule out cancer reoccurence  Return if you develop signs of infection such as fever, chills, pain, pus, or any other concerns 
no

## 2021-03-10 DIAGNOSIS — G47.62 SLEEP RELATED LEG CRAMPS: ICD-10-CM

## 2021-03-10 RX ORDER — CYCLOBENZAPRINE HCL 10 MG
10 TABLET ORAL 2 TIMES DAILY
Qty: 60 TABLET | Refills: 5 | Status: SHIPPED | OUTPATIENT
Start: 2021-03-10 | End: 2021-07-08 | Stop reason: SDUPTHER

## 2021-03-17 ENCOUNTER — HOSPITAL ENCOUNTER (INPATIENT)
Facility: HOSPITAL | Age: 81
LOS: 2 days | Discharge: HOME HEALTH | DRG: 682 | End: 2021-03-19
Attending: EMERGENCY MEDICINE | Admitting: FAMILY MEDICINE
Payer: COMMERCIAL

## 2021-03-17 ENCOUNTER — APPOINTMENT (EMERGENCY)
Dept: CT IMAGING | Facility: HOSPITAL | Age: 81
DRG: 682 | End: 2021-03-17
Payer: COMMERCIAL

## 2021-03-17 ENCOUNTER — TELEPHONE (OUTPATIENT)
Dept: MRI IMAGING | Facility: HOSPITAL | Age: 81
End: 2021-03-17

## 2021-03-17 ENCOUNTER — APPOINTMENT (EMERGENCY)
Dept: RADIOLOGY | Facility: HOSPITAL | Age: 81
DRG: 682 | End: 2021-03-17
Payer: COMMERCIAL

## 2021-03-17 DIAGNOSIS — R42 DIZZINESS: ICD-10-CM

## 2021-03-17 DIAGNOSIS — Z95.0 PACEMAKER: ICD-10-CM

## 2021-03-17 DIAGNOSIS — N17.9 AKI (ACUTE KIDNEY INJURY) (HCC): ICD-10-CM

## 2021-03-17 DIAGNOSIS — G45.9 TIA (TRANSIENT ISCHEMIC ATTACK): Primary | ICD-10-CM

## 2021-03-17 DIAGNOSIS — R77.8 ELEVATED TROPONIN: ICD-10-CM

## 2021-03-17 DIAGNOSIS — N39.0 UTI (URINARY TRACT INFECTION): ICD-10-CM

## 2021-03-17 PROBLEM — R79.89 TROPONIN LEVEL ELEVATED: Status: ACTIVE | Noted: 2021-03-17

## 2021-03-17 LAB
ALBUMIN SERPL BCP-MCNC: 3.6 G/DL (ref 3.5–5.7)
ALP SERPL-CCNC: 52 U/L (ref 55–165)
ALT SERPL W P-5'-P-CCNC: 22 U/L (ref 7–52)
ANION GAP SERPL CALCULATED.3IONS-SCNC: 10 MMOL/L (ref 4–13)
APTT PPP: 34 SECONDS (ref 23–37)
AST SERPL W P-5'-P-CCNC: 19 U/L (ref 13–39)
BACTERIA UR QL AUTO: ABNORMAL /HPF
BASOPHILS # BLD AUTO: 0.1 THOUSANDS/ΜL (ref 0–0.1)
BASOPHILS NFR BLD AUTO: 1 % (ref 0–2)
BILIRUB SERPL-MCNC: 1.1 MG/DL (ref 0.2–1)
BILIRUB UR QL STRIP: NEGATIVE
BNP SERPL-MCNC: 548 PG/ML (ref 1–100)
BUN SERPL-MCNC: 49 MG/DL (ref 7–25)
CALCIUM SERPL-MCNC: 9.1 MG/DL (ref 8.6–10.5)
CHLORIDE SERPL-SCNC: 99 MMOL/L (ref 98–107)
CLARITY UR: ABNORMAL
CO2 SERPL-SCNC: 28 MMOL/L (ref 21–31)
COLOR UR: YELLOW
CREAT SERPL-MCNC: 2.01 MG/DL (ref 0.7–1.3)
EOSINOPHIL # BLD AUTO: 0 THOUSAND/ΜL (ref 0–0.61)
EOSINOPHIL NFR BLD AUTO: 0 % (ref 0–5)
ERYTHROCYTE [DISTWIDTH] IN BLOOD BY AUTOMATED COUNT: 13.2 % (ref 11.5–14.5)
FLUAV RNA RESP QL NAA+PROBE: NEGATIVE
FLUBV RNA RESP QL NAA+PROBE: NEGATIVE
GFR SERPL CREATININE-BSD FRML MDRD: 30 ML/MIN/1.73SQ M
GLUCOSE SERPL-MCNC: 158 MG/DL (ref 65–99)
GLUCOSE UR STRIP-MCNC: NEGATIVE MG/DL
HCT VFR BLD AUTO: 40.3 % (ref 42–47)
HGB BLD-MCNC: 13.5 G/DL (ref 14–18)
HGB UR QL STRIP.AUTO: ABNORMAL
INR PPP: 1.05 (ref 0.84–1.19)
KETONES UR STRIP-MCNC: NEGATIVE MG/DL
LACTATE SERPL-SCNC: 1.3 MMOL/L (ref 0.5–2)
LEUKOCYTE ESTERASE UR QL STRIP: ABNORMAL
LYMPHOCYTES # BLD AUTO: 0.9 THOUSANDS/ΜL (ref 0.6–4.47)
LYMPHOCYTES NFR BLD AUTO: 10 % (ref 21–51)
MAGNESIUM SERPL-MCNC: 2.4 MG/DL (ref 1.9–2.7)
MCH RBC QN AUTO: 33.3 PG (ref 26–34)
MCHC RBC AUTO-ENTMCNC: 33.5 G/DL (ref 31–37)
MCV RBC AUTO: 99 FL (ref 81–99)
MONOCYTES # BLD AUTO: 1.1 THOUSAND/ΜL (ref 0.17–1.22)
MONOCYTES NFR BLD AUTO: 12 % (ref 2–12)
NEUTROPHILS # BLD AUTO: 6.9 THOUSANDS/ΜL (ref 1.4–6.5)
NEUTS SEG NFR BLD AUTO: 77 % (ref 42–75)
NITRITE UR QL STRIP: NEGATIVE
NON-SQ EPI CELLS URNS QL MICRO: ABNORMAL /HPF
PH UR STRIP.AUTO: 5.5 [PH]
PLATELET # BLD AUTO: 132 THOUSANDS/UL (ref 149–390)
PLATELET # BLD AUTO: 133 THOUSANDS/UL (ref 149–390)
PMV BLD AUTO: 8.3 FL (ref 8.6–11.7)
POTASSIUM SERPL-SCNC: 3.8 MMOL/L (ref 3.5–5.5)
PROCALCITONIN SERPL-MCNC: 12.24 NG/ML
PROT SERPL-MCNC: 6.4 G/DL (ref 6.4–8.9)
PROT UR STRIP-MCNC: ABNORMAL MG/DL
PROTHROMBIN TIME: 13.6 SECONDS (ref 11.6–14.5)
RBC # BLD AUTO: 4.05 MILLION/UL (ref 4.3–5.9)
RBC #/AREA URNS AUTO: ABNORMAL /HPF
RSV RNA RESP QL NAA+PROBE: NEGATIVE
SARS-COV-2 RNA RESP QL NAA+PROBE: NEGATIVE
SODIUM SERPL-SCNC: 137 MMOL/L (ref 134–143)
SP GR UR STRIP.AUTO: 1.01 (ref 1–1.03)
TROPONIN I SERPL-MCNC: 0.04 NG/ML
TROPONIN I SERPL-MCNC: 0.04 NG/ML
TROPONIN I SERPL-MCNC: 0.05 NG/ML
TSH SERPL DL<=0.05 MIU/L-ACNC: 1.86 UIU/ML (ref 0.45–5.33)
UROBILINOGEN UR QL STRIP.AUTO: 0.2 E.U./DL
WBC # BLD AUTO: 9 THOUSAND/UL (ref 4.8–10.8)
WBC #/AREA URNS AUTO: ABNORMAL /HPF
WBC CASTS URNS QL MICRO: ABNORMAL /LPF

## 2021-03-17 PROCEDURE — 87040 BLOOD CULTURE FOR BACTERIA: CPT | Performed by: PHYSICIAN ASSISTANT

## 2021-03-17 PROCEDURE — 85025 COMPLETE CBC W/AUTO DIFF WBC: CPT | Performed by: PHYSICIAN ASSISTANT

## 2021-03-17 PROCEDURE — 36415 COLL VENOUS BLD VENIPUNCTURE: CPT | Performed by: PHYSICIAN ASSISTANT

## 2021-03-17 PROCEDURE — 81001 URINALYSIS AUTO W/SCOPE: CPT | Performed by: PHYSICIAN ASSISTANT

## 2021-03-17 PROCEDURE — 71045 X-RAY EXAM CHEST 1 VIEW: CPT

## 2021-03-17 PROCEDURE — 83735 ASSAY OF MAGNESIUM: CPT | Performed by: PHYSICIAN ASSISTANT

## 2021-03-17 PROCEDURE — 93005 ELECTROCARDIOGRAM TRACING: CPT

## 2021-03-17 PROCEDURE — 83605 ASSAY OF LACTIC ACID: CPT | Performed by: PHYSICIAN ASSISTANT

## 2021-03-17 PROCEDURE — 83880 ASSAY OF NATRIURETIC PEPTIDE: CPT | Performed by: PHYSICIAN ASSISTANT

## 2021-03-17 PROCEDURE — 99285 EMERGENCY DEPT VISIT HI MDM: CPT | Performed by: PHYSICIAN ASSISTANT

## 2021-03-17 PROCEDURE — 81003 URINALYSIS AUTO W/O SCOPE: CPT | Performed by: PHYSICIAN ASSISTANT

## 2021-03-17 PROCEDURE — 84484 ASSAY OF TROPONIN QUANT: CPT | Performed by: FAMILY MEDICINE

## 2021-03-17 PROCEDURE — 99223 1ST HOSP IP/OBS HIGH 75: CPT | Performed by: FAMILY MEDICINE

## 2021-03-17 PROCEDURE — 0241U HB NFCT DS VIR RESP RNA 4 TRGT: CPT | Performed by: PHYSICIAN ASSISTANT

## 2021-03-17 PROCEDURE — 87086 URINE CULTURE/COLONY COUNT: CPT | Performed by: PHYSICIAN ASSISTANT

## 2021-03-17 PROCEDURE — G1004 CDSM NDSC: HCPCS

## 2021-03-17 PROCEDURE — G0508 CRIT CARE TELEHEA CONSULT 60: HCPCS | Performed by: PSYCHIATRY & NEUROLOGY

## 2021-03-17 PROCEDURE — 85730 THROMBOPLASTIN TIME PARTIAL: CPT | Performed by: PHYSICIAN ASSISTANT

## 2021-03-17 PROCEDURE — 84145 PROCALCITONIN (PCT): CPT | Performed by: PHYSICIAN ASSISTANT

## 2021-03-17 PROCEDURE — 70450 CT HEAD/BRAIN W/O DYE: CPT

## 2021-03-17 PROCEDURE — 87186 SC STD MICRODIL/AGAR DIL: CPT | Performed by: PHYSICIAN ASSISTANT

## 2021-03-17 PROCEDURE — 84443 ASSAY THYROID STIM HORMONE: CPT | Performed by: PSYCHIATRY & NEUROLOGY

## 2021-03-17 PROCEDURE — 84484 ASSAY OF TROPONIN QUANT: CPT | Performed by: PHYSICIAN ASSISTANT

## 2021-03-17 PROCEDURE — 85049 AUTOMATED PLATELET COUNT: CPT | Performed by: FAMILY MEDICINE

## 2021-03-17 PROCEDURE — 87077 CULTURE AEROBIC IDENTIFY: CPT | Performed by: PHYSICIAN ASSISTANT

## 2021-03-17 PROCEDURE — 99285 EMERGENCY DEPT VISIT HI MDM: CPT

## 2021-03-17 PROCEDURE — 80053 COMPREHEN METABOLIC PANEL: CPT | Performed by: PHYSICIAN ASSISTANT

## 2021-03-17 PROCEDURE — 85610 PROTHROMBIN TIME: CPT | Performed by: PHYSICIAN ASSISTANT

## 2021-03-17 RX ORDER — SODIUM CHLORIDE 9 MG/ML
125 INJECTION, SOLUTION INTRAVENOUS CONTINUOUS
Status: DISCONTINUED | OUTPATIENT
Start: 2021-03-17 | End: 2021-03-19 | Stop reason: HOSPADM

## 2021-03-17 RX ORDER — ASPIRIN 81 MG/1
81 TABLET ORAL DAILY
Status: DISCONTINUED | OUTPATIENT
Start: 2021-03-18 | End: 2021-03-19 | Stop reason: HOSPADM

## 2021-03-17 RX ORDER — ATENOLOL 25 MG/1
50 TABLET ORAL DAILY
Status: DISCONTINUED | OUTPATIENT
Start: 2021-03-18 | End: 2021-03-19 | Stop reason: HOSPADM

## 2021-03-17 RX ORDER — CYCLOBENZAPRINE HCL 10 MG
10 TABLET ORAL 2 TIMES DAILY
Status: DISCONTINUED | OUTPATIENT
Start: 2021-03-17 | End: 2021-03-19 | Stop reason: HOSPADM

## 2021-03-17 RX ORDER — TAMSULOSIN HYDROCHLORIDE 0.4 MG/1
0.4 CAPSULE ORAL
Status: DISCONTINUED | OUTPATIENT
Start: 2021-03-17 | End: 2021-03-19 | Stop reason: HOSPADM

## 2021-03-17 RX ORDER — PRAVASTATIN SODIUM 20 MG
20 TABLET ORAL
Status: DISCONTINUED | OUTPATIENT
Start: 2021-03-17 | End: 2021-03-19 | Stop reason: HOSPADM

## 2021-03-17 RX ORDER — FINASTERIDE 5 MG/1
5 TABLET, FILM COATED ORAL DAILY
Status: DISCONTINUED | OUTPATIENT
Start: 2021-03-18 | End: 2021-03-19 | Stop reason: HOSPADM

## 2021-03-17 RX ORDER — AMLODIPINE BESYLATE 10 MG/1
10 TABLET ORAL DAILY
Status: DISCONTINUED | OUTPATIENT
Start: 2021-03-18 | End: 2021-03-19 | Stop reason: HOSPADM

## 2021-03-17 RX ORDER — HEPARIN SODIUM 5000 [USP'U]/ML
5000 INJECTION, SOLUTION INTRAVENOUS; SUBCUTANEOUS EVERY 8 HOURS SCHEDULED
Status: DISCONTINUED | OUTPATIENT
Start: 2021-03-17 | End: 2021-03-19 | Stop reason: HOSPADM

## 2021-03-17 RX ORDER — NEOMYCIN SULFATE, POLYMYXIN B SULFATE AND DEXAMETHASONE 3.5; 10000; 1 MG/ML; [USP'U]/ML; MG/ML
1 SUSPENSION/ DROPS OPHTHALMIC
Status: DISCONTINUED | OUTPATIENT
Start: 2021-03-17 | End: 2021-03-19 | Stop reason: HOSPADM

## 2021-03-17 RX ADMIN — NEOMYCIN SULFATE, POLYMYXIN B SULFATE AND DEXAMETHASONE 1 DROP: 3.5; 10000; 1 SUSPENSION OPHTHALMIC at 21:20

## 2021-03-17 RX ADMIN — HEPARIN SODIUM 5000 UNITS: 5000 INJECTION INTRAVENOUS; SUBCUTANEOUS at 21:17

## 2021-03-17 RX ADMIN — SODIUM CHLORIDE 125 ML/HR: 0.9 INJECTION, SOLUTION INTRAVENOUS at 19:56

## 2021-03-17 RX ADMIN — PRAVASTATIN SODIUM 20 MG: 20 TABLET ORAL at 20:00

## 2021-03-17 RX ADMIN — TAMSULOSIN HYDROCHLORIDE 0.4 MG: 0.4 CAPSULE ORAL at 20:00

## 2021-03-17 RX ADMIN — CYCLOBENZAPRINE HYDROCHLORIDE 10 MG: 10 TABLET, FILM COATED ORAL at 21:17

## 2021-03-17 RX ADMIN — BETAXOLOL HYDROCHLORIDE 1 DROP: 2.8 SUSPENSION/ DROPS OPHTHALMIC at 21:20

## 2021-03-17 NOTE — ASSESSMENT & PLAN NOTE
BRENDA on CKD stage 3   Creatinine 2 01 on admission  Baseline creatinine around 1 5  Likely prerenal  Will start on IV fluids  Repeat BMP a m    If no improvement will consult Nephrology

## 2021-03-17 NOTE — H&P
[address] [facility],[address] 1940, 78 y o  male MRN: [id number]  Unit/Bed#: ED 10 Encounter: [id number]  Primary Care Provider: Thomas Pham DO   Date and time admitted to hospital: 3/17/2021 10:23 AM    Dizziness  Assessment & Plan  Patient describes multiple episodes of dizziness since Sunday  They usually happen when he gets out of bed and last for about 5 minutes  Also he had witnessed slurred speech on Sunday  Will place on telemetry  Trend troponin  Orthostatic BP  CT head negative  ED provider discussed with neurology  Order MRI head and neck without contrast, MRI brain without contrast    Troponin level elevated  Assessment & Plan  Troponin 0 05  No chest pain  EKG nonischemic  Will trend troponin    BRENDA (acute kidney injury) ([facility] [address] )  Assessment & Plan  BRENDA on CKD stage 3   Creatinine 2 01 on admission  Baseline creatinine around 1 5  Likely prerenal  Will start on IV fluids  Repeat BMP a m  If no improvement will consult Nephrology    Essential hypertension  Assessment & Plan  Well controlled  Continue amlodipine and atenolol  Lisinopril held secondary to BRENDA  Continue to monitor    BPH (benign prostatic hyperplasia)  Assessment & Plan  Continue tamsulosin    VTE Prophylaxis: Heparin  / sequential compression device   Code Status:  DNR DNI  POLST: POLST form is not discussed and not completed at this time  Discussion with family:  With patient and patient's son at bedside    Anticipated Length of Stay:  Patient will be admitted on an Inpatient basis with an anticipated length of stay of  more more than 2 midnights  Justification for Hospital Stay:  Telemetry monitoring, serial labs, cardiology can    Total Time for Visit, including Counseling / Coordination of Care: 45 minutes  Greater than 50% of this total time spent on direct patient counseling and coordination of care      Chief Complaint:   Dizziness    History of Present Illness:    Shaan Vasquez is a 78 y o  male with past medical history of BPH, hypertension, CKD stage 3 who presents with dizziness and slurred speech  Patient had 3 day history of intermittent dizziness does lasting about 5 minutes and is mostly associated with getting out of bed  Patient also had witnessed slurred speech by his friend on Sunday  He denies any chest pain, shortness of breath, abdominal pain, nausea, vomiting  Patient 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 in place  Denies any fevers, chills  Review of Systems:    Review of Systems   Constitutional: Negative for chills and fever  HENT: Negative for hearing loss and sore throat  Respiratory: Negative for cough and shortness of breath  Cardiovascular: Negative for chest pain and palpitations  Gastrointestinal: Negative for abdominal pain and nausea  Neurological: Positive for dizziness and light-headedness  Negative for seizures, facial asymmetry, weakness and numbness  Hematological: Negative          Past Medical and Surgical History:     Past Medical History:   Diagnosis Date    Anxiety state 8/5/2018    Basal cell carcinoma of skin 11/15/2016    Chronic obstructive pulmonary disease ([facility] [address] ) 9/5/2012    Constipation     Coronary artery disease 5/22/2012    Forearm laceration, right, initial encounter 5/28/2020    Hydronephrosis     LAST ASSESSED: 11/18/15    Osteoporosis 5/22/2012    Pacemaker     Trifascicular block        Past Surgical History:   Procedure Laterality Date    CARDIAC PACEMAKER PLACEMENT  11/2009    Medtronic dual chamber     CATARACT EXTRACTION W/  INTRAOCULAR LENS IMPLANT      CYSTOSCOPY N/A 5/9/2016    Procedure: CYSTOSCOPY, evacuation of bladder calculi;  Surgeon: Sayda Pearl MD;  Location: [facility];  Service:     INGUINAL HERNIA REPAIR      UNILATERAL    KIDNEY STONE SURGERY      OTHER SURGICAL HISTORY      HERNIA REPAIR AS PER [employer] (ALREADY IN PERTINENT NEGATIVES)    KS TRANSURETHRAL ELEC-SURG PROSTATECTOM N/A 5/9/2016    Procedure: TRANSURETHRAL RESECTION OF PROSTATE (TURP); Surgeon: Malu High MD;  Location: [facility];  Service: Urology    TONSILLECTOMY         Meds/Allergies:    Prior to Admission medications    Medication Sig Start Date End Date Taking? Authorizing Provider   amLODIPine (NORVASC) 10 mg tablet Take 1 tablet by mouth once daily 3/1/21   Hollis Bone, DO   amLODIPine (NORVASC) 10 mg tablet Take 1 tablet (10 mg total) by mouth daily 3/1/21   Hollis Bone, DO   aspirin (ECOTRIN LOW STRENGTH) 81 mg EC tablet Take 81 mg by mouth daily    Historical Provider, MD   atenolol (TENORMIN) 50 mg tablet Take 1 tablet (50 mg total) by mouth daily 11/5/20   Hollis Bone, DO   Blephamide 10-0 2 % ophthalmic suspension Administer 2 drops to both eyes daily at bedtime 8/26/20   Hollis Bone, DO   calcium carbonate (TUMS) 500 mg chewable tablet Chew 1 tablet as needed for indigestion or heartburn  Historical Provider, MD   Combigan 0 2-0 5 % INSTILL 1 DROP INTO RIGHT EYE EVERY 12 HOURS 10/30/20   Historical Provider, MD   cyclobenzaprine (FLEXERIL) 10 mg tablet Take 1 tablet (10 mg total) by mouth 2 (two) times a day 3/10/21   Hollis Bone, DO   finasteride (PROSCAR) 5 mg tablet Take 1 tablet (5 mg total) by mouth daily 11/5/20   Hollis Bone, DO   glucose blood (ONE TOUCH ULTRA TEST) test strip Test sugar once daily 3/18/19   Hollis Bone, DO   lisinopril (ZESTRIL) 40 mg tablet Take 1 tablet by mouth once daily 3/1/21   Hollis Bone, DO   lisinopril (ZESTRIL) 40 mg tablet Take 1 tablet (40 mg total) by mouth daily 3/1/21 11/17/21  Hollis Bone, DO   Lumigan 0 01 % ophthalmic drops INSTILL 1 DROP AT BEDTIME INTO RIGHT EYE 12/2/20   Historical Provider, MD   magnesium hydroxide (MILK OF MAGNESIA) 400 mg/5 mL oral suspension Take by mouth as needed for constipation  Historical Provider, MD   Multiple Vitamin (MULTIVITAMIN) capsule Take 1 capsule by mouth daily      Historical Provider, MD   simvastatin (ZOCOR) 10 mg tablet Take 1 tablet (10 mg total) by mouth every evening 21   Joey Bermudez DO   tamsulosin [employer]) 0 4 mg Take 1 capsule (0 4 mg total) by mouth daily with dinner 20   Joey Bermudez DO     I have reviewed home medications with patient personally  Allergies: No Known Allergies    Social History:     Marital Status:    Occupation:   Patient Pre-hospital Living Situation:  Living by himself  Patient Pre-hospital Level of Mobility:  None  Patient Pre-hospital Diet Restrictions:  None  Substance Use History:   Social History     Substance and Sexual Activity   Alcohol Use Never    Frequency: Never    Comment: BEING A SOCIAL DRINKER AS PER [employer]     Social History     Tobacco Use   Smoking Status Former Smoker    Types: Cigars    Quit date: 1980    Years since quittin 9   Smokeless Tobacco Former User   Tobacco Comment    smokes an occ  cigar; hasn't in last 2 weeks; CIGAR (1 A DAY) AS PER [employer]     Social History     Substance and Sexual Activity   Drug Use No       Family History:    non-contributory    Physical Exam:     Vitals:   Blood Pressure: 124/66 (21 1430)  Pulse: 60 (21 1033)  Temperature: 98 7 °F (37 1 °C) (21 1033)  Temp Source: Temporal (21 1033)  Respirations: 16 (21 1200)  Height: 5' 7" (170 2 cm) (21 1033)  Weight - Scale: 78 9 kg (174 lb) (21 1033)  SpO2: 98 % (21 1033)    Physical Exam  Vitals signs and nursing note reviewed  Constitutional:       General: He is not in acute distress  Appearance: Normal appearance  HENT:      Head: Normocephalic  Nose: Nose normal       Mouth/Throat:      Mouth: Mucous membranes are moist    Eyes:      Conjunctiva/sclera: Conjunctivae normal    Cardiovascular:      Rate and Rhythm: Normal rate  Heart sounds: Normal heart sounds  No murmur  Pulmonary:      Effort: Pulmonary effort is normal  No respiratory distress  Breath sounds: Normal breath sounds  No wheezing  Abdominal:      General: There is no distension  Tenderness: There is no abdominal tenderness  There is no guarding  Musculoskeletal:         General: No swelling  Right lower leg: No edema  Skin:     General: Skin is warm  Neurological:      General: No focal deficit present  Mental Status: He is alert and oriented to person, place, and time  Cranial Nerves: No cranial nerve deficit  Sensory: No sensory deficit  Motor: No weakness  Coordination: Coordination normal       Gait: Gait abnormal       Comments: Patient unsteady on his feet   Psychiatric:         Mood and Affect: Mood normal              Additional Data:     Lab Results: I have personally reviewed pertinent reports  Results from last 7 days   Lab Units 03/17/21  1110   WBC Thousand/uL 9 00   HEMOGLOBIN g/dL 13 5*   HEMATOCRIT % 40 3*   PLATELETS Thousands/uL 132*   NEUTROS PCT % 77*   LYMPHS PCT % 10*   MONOS PCT % 12   EOS PCT % 0     Results from last 7 days   Lab Units 03/17/21  1110   SODIUM mmol/L 137   POTASSIUM mmol/L 3 8   CHLORIDE mmol/L 99   CO2 mmol/L 28   BUN mg/dL 49*   CREATININE mg/dL 2 01*   ANION GAP mmol/L 10   CALCIUM mg/dL 9 1   ALBUMIN g/dL 3 6   TOTAL BILIRUBIN mg/dL 1 10*   ALK PHOS U/L 52*   ALT U/L 22   AST U/L 19   GLUCOSE RANDOM mg/dL 158*     Results from last 7 days   Lab Units 03/17/21  1110   INR  1 05             Results from last 7 days   Lab Units 03/17/21  1110   LACTIC ACID mmol/L 1 3   PROCALCITONIN ng/ml 12 24*       Imaging: I have personally reviewed pertinent reports  CT head without contrast   Final Result by Davey Santiago MD (03/17 1319)      No acute intracranial abnormality  Workstation performed: [id number]         XR chest portable   Final Result by Raulito Miguel MD (03/17 1322)   No acute cardiopulmonary disease        Stable exam            Workstation performed: [id number] MRI inpatient order    (Results Pending)       EKG, Pathology, and Other Studies Reviewed on Admission:       Allscripts / Epic Records Reviewed: Yes     ** Please Note: This note has been constructed using a voice recognition system   **

## 2021-03-17 NOTE — ED PROVIDER NOTES
History  Chief Complaint   Patient presents with    Dizziness    Memory Loss       78-year-old male history of CAD with pacemaker, basal cell carcinoma, HTN, BPH s/p TURP presents accompanied by a friend complaining of generalized weakness  Patient reports that over the past 3-4 days he has felt a lightheaded sensation when rising from a seated position  He denies any syncopal episodes and reports that the dizziness subsides after a few minutes  He denies any recent falls or any headache, nausea or vomiting associated with the symptoms  His friend provides some of the history stating that he has not been acting himself and that his speech seems "slightly off" and that he is not remembering things as well as he normally does  Patient reports some chills over the past few nights but denies any fevers, cough, sore throat, rashes, lymphadenopathy  Does report that his family doctor told him he had 20 lb of weight loss that was unintentional however he states he is not eating as much recently  Prior to Admission Medications   Prescriptions Last Dose Informant Patient Reported? Taking? Blephamide 10-0 2 % ophthalmic suspension   No No   Sig: Administer 2 drops to both eyes daily at bedtime   Combigan 0 2-0 5 %   Yes No   Sig: INSTILL 1 DROP INTO RIGHT EYE EVERY 12 HOURS   Lumigan 0 01 % ophthalmic drops   Yes No   Sig: INSTILL 1 DROP AT BEDTIME INTO RIGHT EYE   Multiple Vitamin (MULTIVITAMIN) capsule  Self Yes No   Sig: Take 1 capsule by mouth daily     amLODIPine (NORVASC) 10 mg tablet   No No   Sig: Take 1 tablet by mouth once daily   amLODIPine (NORVASC) 10 mg tablet   No No   Sig: Take 1 tablet (10 mg total) by mouth daily   aspirin (ECOTRIN LOW STRENGTH) 81 mg EC tablet  Self Yes No   Sig: Take 81 mg by mouth daily   atenolol (TENORMIN) 50 mg tablet   No No   Sig: Take 1 tablet (50 mg total) by mouth daily   calcium carbonate (TUMS) 500 mg chewable tablet  Self Yes No   Sig: Chew 1 tablet as needed for indigestion or heartburn  cyclobenzaprine (FLEXERIL) 10 mg tablet   No No   Sig: Take 1 tablet (10 mg total) by mouth 2 (two) times a day   finasteride (PROSCAR) 5 mg tablet   No No   Sig: Take 1 tablet (5 mg total) by mouth daily   glucose blood (ONE TOUCH ULTRA TEST) test strip  Self No No   Sig: Test sugar once daily   lisinopril (ZESTRIL) 40 mg tablet   No No   Sig: Take 1 tablet by mouth once daily   lisinopril (ZESTRIL) 40 mg tablet   No No   Sig: Take 1 tablet (40 mg total) by mouth daily   magnesium hydroxide (MILK OF MAGNESIA) 400 mg/5 mL oral suspension  Self Yes No   Sig: Take by mouth as needed for constipation  simvastatin (ZOCOR) 10 mg tablet   No No   Sig: Take 1 tablet (10 mg total) by mouth every evening   tamsulosin (FLOMAX) 0 4 mg   No No   Sig: Take 1 capsule (0 4 mg total) by mouth daily with dinner      Facility-Administered Medications: None       Past Medical History:   Diagnosis Date    Anxiety state 8/5/2018    Basal cell carcinoma of skin 11/15/2016    Chronic obstructive pulmonary disease ([facility] [address] ) 9/5/2012    Constipation     Coronary artery disease 5/22/2012    Forearm laceration, right, initial encounter 5/28/2020    Hydronephrosis     LAST ASSESSED: 11/18/15    Osteoporosis 5/22/2012    Pacemaker     Trifascicular block        Past Surgical History:   Procedure Laterality Date    CARDIAC PACEMAKER PLACEMENT  11/2009    [employer]onic dual chamber     CATARACT EXTRACTION W/  INTRAOCULAR LENS IMPLANT      CYSTOSCOPY N/A 5/9/2016    Procedure: CYSTOSCOPY, evacuation of bladder calculi;  Surgeon: Da Argueta MD;  Location: [facility];  Service:     INGUINAL HERNIA REPAIR      UNILATERAL    KIDNEY STONE SURGERY      OTHER SURGICAL HISTORY      HERNIA REPAIR AS PER [facility] (ALREADY IN PERTINENT NEGATIVES)    LA TRANSURETHRAL ELEC-SURG PROSTATECTOM N/A 5/9/2016    Procedure: TRANSURETHRAL RESECTION OF PROSTATE (TURP);   Surgeon: Da Argueta MD;  Location: [facility];  Service: Urology    TONSILLECTOMY         Family History   Problem Relation Age of Onset    Stroke Father         SYNDROME    Coronary artery disease Family         less than 61years of age     I have reviewed and agree with the history as documented  E-Cigarette/Vaping    E-Cigarette Use Never User      E-Cigarette/Vaping Substances    Nicotine No     THC No     CBD No     Flavoring No     Other No     Unknown No      Social History     Tobacco Use    Smoking status: Former Smoker     Types: Cigars     Quit date: 1980     Years since quittin 9    Smokeless tobacco: Former User    Tobacco comment: smokes an occ  cigar; hasn't in last 2 weeks; CIGAR (1 A DAY) AS PER [facility]   Substance Use Topics    Alcohol use: Never     Frequency: Never     Comment: BEING A SOCIAL DRINKER AS PER [facility]    Drug use: No       Review of Systems   Constitutional: Positive for chills and fatigue  Negative for fever  HENT: Negative for congestion and sore throat  Eyes: Negative for pain  Respiratory: Negative for cough, chest tightness, shortness of breath and wheezing  Cardiovascular: Negative for chest pain, palpitations and leg swelling  Gastrointestinal: Negative for abdominal pain, constipation, diarrhea, nausea and vomiting  Endocrine: Negative for polyuria  Genitourinary: Negative for dysuria  Musculoskeletal: Negative for arthralgias, back pain, myalgias and neck pain  Skin: Negative for rash  Neurological: Positive for dizziness and light-headedness  Negative for syncope and headaches  All other systems reviewed and are negative  Physical Exam  Physical Exam  Vitals signs reviewed  Constitutional:       Appearance: He is well-developed  HENT:      Head: Normocephalic and atraumatic  Mouth/Throat:      Mouth: Mucous membranes are moist    Eyes:      Conjunctiva/sclera: Conjunctivae normal    Neck:      Musculoskeletal: Normal range of motion  Cardiovascular:      Rate and Rhythm: Normal rate and regular rhythm  Heart sounds: Normal heart sounds  Pulmonary:      Effort: Pulmonary effort is normal       Breath sounds: Normal breath sounds  Abdominal:      General: Bowel sounds are normal       Palpations: Abdomen is soft  Tenderness: There is no abdominal tenderness  Musculoskeletal: Normal range of motion  Skin:     General: Skin is warm and dry  Capillary Refill: Capillary refill takes less than 2 seconds  Neurological:      General: No focal deficit present  Mental Status: He is alert and oriented to person, place, and time  Comments: No dysarthria, nystagmus, dysphagia, diplopia, aphasia, vertigo, ataxia, visions loss, dysmetria  Normal finger to nose, gait, rapid alternating movement, strength and sensation in bilat upper and lower extremities  Normal upper and lower reflexes  No pronator drift  Normal heel to shin  EOMI, no visual field defects  CN 2-12 intact  GCS 15  AOx3  Normal babinski              Vital Signs  ED Triage Vitals [03/17/21 1033]   Temperature Pulse Respirations Blood Pressure SpO2   98 7 °F (37 1 °C) 60 16 134/66 98 %      Temp Source Heart Rate Source Patient Position - Orthostatic VS BP Location FiO2 (%)   Temporal Monitor Sitting Left arm --      Pain Score       --           Vitals:    03/17/21 1245 03/17/21 1300 03/17/21 1400 03/17/21 1430   BP: 148/69 144/92 126/75 124/66   Pulse:       Patient Position - Orthostatic VS: Sitting Standing Lying Lying         Visual Acuity      ED Medications  Medications - No data to display    Diagnostic Studies  Results Reviewed     Procedure Component Value Units Date/Time    Procalcitonin with AM Reflex [[id number]]  (Abnormal) Collected: 03/17/21 1110    Lab Status: Final result Specimen: Blood from Arm, Left Updated: 03/17/21 1542     Procalcitonin 12 24 ng/ml     Procalcitonin Reflex [[id number]]     Lab Status: No result Specimen: Blood Troponin I [[id number]]     Lab Status: No result Specimen: Blood     COVID19, Influenza A/B, RSV PCR, [facility] [[id number]]  (Normal) Collected: 03/17/21 1110    Lab Status: Final result Specimen: Nasopharyngeal Swab Updated: 03/17/21 1157     SARS-CoV-2 Negative     INFLUENZA A PCR Negative     INFLUENZA B PCR Negative     RSV PCR Negative    Narrative: This test has been authorized by FDA under an EUA (Emergency Use Assay) for use by authorized laboratories  Clinical caution and judgement should be used with the interpretation of these results with consideration of the clinical impression and other laboratory testing  Testing reported as "Positive" or "Negative" has been proven to be accurate according to standard laboratory validation requirements  All testing is performed with control materials showing appropriate reactivity at standard intervals  B-Type Natriuretic Peptide ([address]) [[id number]]  (Abnormal) Collected: 03/17/21 1110    Lab Status: Final result Specimen: Blood from Arm, Left Updated: 03/17/21 1142      pg/mL     Troponin I [[id number]]  (Abnormal) Collected: 03/17/21 1110    Lab Status: Final result Specimen: Blood from Arm, Left Updated: 03/17/21 1140     Troponin I 0 05 ng/mL     Lactic acid [[id number]]  (Normal) Collected: 03/17/21 1110    Lab Status: Final result Specimen: Blood from Arm, Left Updated: 03/17/21 1137     LACTIC ACID 1 3 mmol/L     Narrative:      Result may be elevated if tourniquet was used during collection      Comprehensive metabolic panel [[id number]]  (Abnormal) Collected: 03/17/21 1110    Lab Status: Final result Specimen: Blood from Arm, Left Updated: 03/17/21 1137     Sodium 137 mmol/L      Potassium 3 8 mmol/L      Chloride 99 mmol/L      CO2 28 mmol/L      ANION GAP 10 mmol/L      BUN 49 mg/dL      Creatinine 2 01 mg/dL      Glucose 158 mg/dL      Calcium 9 1 mg/dL      AST 19 U/L      ALT 22 U/L      Alkaline Phosphatase 52 U/L      Total Protein 6 4 g/dL      Albumin 3 6 g/dL      Total Bilirubin 1 10 mg/dL      eGFR 30 ml/min/1 73sq m     Narrative:      National Kidney Disease Foundation guidelines for Chronic Kidney Disease (CKD):     Stage 1 with normal or high GFR (GFR > 90 mL/min/1 73 square meters)    Stage 2 Mild CKD (GFR = 60-89 mL/min/1 73 square meters)    Stage 3A Moderate CKD (GFR = 45-59 mL/min/1 73 square meters)    Stage 3B Moderate CKD (GFR = 30-44 mL/min/1 73 square meters)    Stage 4 Severe CKD (GFR = 15-29 mL/min/1 73 square meters)    Stage 5 End Stage CKD (GFR <15 mL/min/1 73 square meters)  Note: GFR calculation is accurate only with a steady state creatinine    Magnesium [[id number]]  (Normal) Collected: 03/17/21 1110    Lab Status: Final result Specimen: Blood from Arm, Left Updated: 03/17/21 1135     Magnesium 2 4 mg/dL     Protime-INR [[id number]]  (Normal) Collected: 03/17/21 1110    Lab Status: Final result Specimen: Blood from Arm, Left Updated: 03/17/21 1133     Protime 13 6 seconds      INR 1 05    APTT [[id number]]  (Normal) Collected: 03/17/21 1110    Lab Status: Final result Specimen: Blood from Arm, Left Updated: 03/17/21 1133     PTT 34 seconds     CBC and differential [[id number]]  (Abnormal) Collected: 03/17/21 1110    Lab Status: Final result Specimen: Blood from Arm, Left Updated: 03/17/21 1124     WBC 9 00 Thousand/uL      RBC 4 05 Million/uL      Hemoglobin 13 5 g/dL      Hematocrit 40 3 %      MCV 99 fL      MCH 33 3 pg      MCHC 33 5 g/dL      RDW 13 2 %      MPV 8 3 fL      Platelets 507 Thousands/uL      Neutrophils Relative 77 %      Lymphocytes Relative 10 %      Monocytes Relative 12 %      Eosinophils Relative 0 %      Basophils Relative 1 %      Neutrophils Absolute 6 90 Thousands/µL      Lymphocytes Absolute 0 90 Thousands/µL      Monocytes Absolute 1 10 Thousand/µL      Eosinophils Absolute 0 00 Thousand/µL      Basophils Absolute 0 10 Thousands/µL     Blood culture #1 [[id number]] Collected: 03/17/21 1110    Lab Status: In process Specimen: Blood from Arm, Left Updated: 03/17/21 1115    Blood culture #2 [[id number]] Collected: 03/17/21 1110    Lab Status: In process Specimen: Blood from Arm, Right Updated: 03/17/21 1115    UA w Reflex to Microscopic w Reflex to Culture [[id number]]     Lab Status: No result Specimen: Urine                  CT head without contrast   Final Result by Hetal Villela MD (03/17 1319)      No acute intracranial abnormality  Workstation performed: [id number]         XR chest portable   Final Result by Lucia Dowell MD (03/17 1322)   No acute cardiopulmonary disease  Stable exam            Workstation performed: [id number]                    Procedures  ECG 12 Lead Documentation Only    Date/Time: 3/17/2021 3:56 PM  Performed by: Mary Arechiga PA-C  Authorized by: Mary Arechiga PA-C     ECG reviewed by me, the ED Provider: yes    Patient location:  ED  Previous ECG:     Previous ECG:  Compared to current    Similarity:  No change    Comparison to cardiac monitor: Yes    Interpretation:     Interpretation: normal    Rate:     ECG rate:  60    ECG rate assessment: normal    Rhythm:     Rhythm: paced    Ectopy:     Ectopy: none    QRS:     QRS axis:  Normal  Conduction:     Conduction: normal    ST segments:     ST segments:  Normal  T waves:     T waves: normal    Comments:      No evidence of acute cardiac ischemia  atrio-ventricular sequential paced rhythm per Dr Donato Clarity             ED Course  ED Course as of Mar 17 1559   Wed Mar 17, 2021   1036 No concordant ST-segment elevation = 1 mm in any lead  No concordant ST-segment depression = 1 mm in lead V1 - V3  No discordant ST/S Ratio = -0 25        1229 Daughter in-law Gunnar Mendez would like a call back [phone] Discussed with Dr Paxton Chavarria of neuro, recommends interogating the pacemaker to assess for a-fib   MRI brain without contrast  MRA head and neck without contrast  Stroke wouldve likely showed up by now on CT so unlikely source of symptoms, especially given metabolic abnormalities                                              MDM  Number of Diagnoses or Management Options  BRENDA (acute kidney injury) [facility]):   Elevated troponin:   TIA (transient ischemic attack):   Diagnosis management comments:  Patient presented with dizziness that appears to be orthostatic in nature as it is a lightheaded sensation when rising from a sitting position that resolves shortly after  Patient describes no dizziness at rest   He is not ataxic on exam and has a steady gait  Family and friends describe the patient's speech as being off with slight slur however no dysarthria was appreciable on exam today and they state that it has resolved  Considered TIA however after discussion with Neurology unlikely given metabolic abnormalities that are more likely responsible for the patient's symptoms  Elevated troponin noted  No evidence of acute cardiac ischemia on the EKG  Patient had no chest pain  Patient will be admitted and is agreeable to plan        Disposition  Final diagnoses:   [facility]   Elevated troponin   TIA (transient ischemic attack)     Time reflects when diagnosis was documented in both MDM as applicable and the Disposition within this note     Time User Action Codes Description Comment    3/17/2021  2:35 PM [place] Brightly Add [N17 9] BRENDA (acute kidney injury) ([facility] [address] )     3/17/2021  2:35 PM [place] Brightly Add [R77 8] Elevated troponin     3/17/2021  2:35 PM [place] Brightly Add [G45 9] TIA (transient ischemic attack)     3/17/2021  2:35 PM [place] Brightly Modify [N17 9] BRENDA (acute kidney [facility]) [facility]     3/17/2021  2:35 PM [place] Brightly Modify [G45 9] TIA (transient ischemic attack)       ED Disposition     ED Disposition Condition Date/Time Comment    Admit Stable Wed Mar 17, 2021  2:35 PM Case was discussed with Dr Sudheer Rosenthal and the patient's admission status was agreed to be Admission Status: inpatient status to the service of Dr Elena Timmons         Follow-up Information    None         Patient's Medications   Discharge Prescriptions    No medications on file     No discharge procedures on file      PDMP Review       Value Time User    PDMP Reviewed  Yes 5/29/2020  8:47 AM Mary Ann Fisher MD          ED Provider  Electronically Signed by           Katelyn Farmer PA-C  03/17/21 7941

## 2021-03-17 NOTE — PLAN OF CARE
Problem: Potential for Falls  Goal: Patient will remain free of falls  Description: INTERVENTIONS:  - Assess patient frequently for physical needs  -  Identify cognitive and physical deficits and behaviors that affect risk of falls  -  [place] fall precautions as indicated by assessment   - Educate patient/family on patient safety including physical limitations  - Instruct patient to call for assistance with activity based on assessment  - Modify environment to reduce risk of injury  - Consider OT/PT consult to assist with strengthening/mobility  Outcome: Progressing     Problem: Nutrition/Hydration-ADULT  Goal: Nutrient/Hydration intake appropriate for improving, restoring or maintaining nutritional needs  Description: Monitor and assess patient's nutrition/hydration status for malnutrition  Collaborate with interdisciplinary team and initiate plan and interventions as ordered  Monitor patient's weight and dietary intake as ordered or per policy  Utilize nutrition screening tool and intervene as necessary  Determine patient's food preferences and provide high-protein, high-caloric foods as appropriate       INTERVENTIONS:  - Monitor oral intake, urinary output, labs, and treatment plans  - Assess nutrition and hydration status and recommend course of action  - Evaluate amount of meals eaten  - Assist patient with eating if necessary   - Allow adequate time for meals  - Recommend/ encourage appropriate diets, oral nutritional supplements, and vitamin/mineral supplements  - Order, calculate, and assess calorie counts as needed  - Recommend, monitor, and adjust tube feedings and TPN/PPN based on assessed needs  - Assess need for intravenous fluids  - Provide specific nutrition/hydration education as appropriate  - Include patient/family/caregiver in decisions related to nutrition  Outcome: Progressing     Problem: NEUROSENSORY - ADULT  Goal: Achieves stable or improved neurological status  Description: INTERVENTIONS  - Monitor and report changes in neurological status  - Monitor vital signs such as temperature, blood pressure, glucose, and any other labs ordered   - Initiate measures to prevent increased intracranial pressure  - Monitor for seizure activity and implement precautions if appropriate      Outcome: Progressing     Problem: CARDIOVASCULAR - ADULT  Goal: Maintains optimal cardiac output and hemodynamic stability  Description: INTERVENTIONS:  - Monitor I/O, vital signs and rhythm  - Monitor for S/S and trends of decreased cardiac output  - Administer and titrate ordered vasoactive medications to optimize hemodynamic stability  - Assess quality of pulses, skin color and temperature  - Assess for signs of decreased coronary artery perfusion  - Instruct patient to report change in severity of symptoms  Outcome: Progressing  Goal: Absence of cardiac dysrhythmias or at baseline rhythm  Description: INTERVENTIONS:  - Continuous cardiac monitoring, vital signs, obtain 12 lead EKG if ordered  - Administer antiarrhythmic and heart rate control medications as ordered  - Monitor electrolytes and administer replacement therapy as ordered  Outcome: Progressing     Problem: RESPIRATORY - ADULT  Goal: Achieves optimal ventilation and oxygenation  Description: INTERVENTIONS:  - Assess for changes in respiratory status  - Assess for changes in mentation and behavior  - Position to facilitate oxygenation and minimize respiratory effort  - Oxygen administered by appropriate delivery if ordered  - Initiate smoking cessation education as indicated  - Encourage broncho-pulmonary hygiene including cough, deep breathe, Incentive Spirometry  - Assess the need for suctioning and aspirate as needed  - Assess and instruct to report SOB or any respiratory difficulty  - Respiratory Therapy support as indicated  Outcome: Progressing     Problem: GASTROINTESTINAL - ADULT  Goal: Minimal or absence of nausea and/or vomiting  Description: INTERVENTIONS:  - Administer IV fluids if ordered to ensure adequate hydration  - Maintain NPO status until nausea and vomiting are resolved  - Nasogastric tube if ordered  - Administer ordered antiemetic medications as needed  - Provide nonpharmacologic comfort measures as appropriate  - Advance diet as tolerated, if ordered  - Consider nutrition services referral to assist patient with adequate nutrition and appropriate food choices  Outcome: Progressing  Goal: Maintains or returns to baseline bowel function  Description: INTERVENTIONS:  - Assess bowel function  - Encourage oral fluids to ensure adequate hydration  - Administer IV fluids if ordered to ensure adequate hydration  - Administer ordered medications as needed  - Encourage mobilization and activity  - Consider nutritional services referral to assist patient with adequate nutrition and appropriate food choices  Outcome: Progressing  Goal: Maintains adequate nutritional intake  Description: INTERVENTIONS:  - Monitor percentage of each meal consumed  - Identify factors contributing to decreased intake, treat as appropriate  - Assist with meals as needed  - Monitor I&O, weight, and lab values if indicated  - Obtain nutrition services referral as needed  Outcome: Progressing     Problem: GENITOURINARY - ADULT  Goal: Maintains or returns to baseline urinary function  Description: INTERVENTIONS:  - Assess urinary function  - Encourage oral fluids to ensure adequate hydration if ordered  - Administer IV fluids as ordered to ensure adequate hydration  - Administer ordered medications as needed  - Offer frequent toileting  - Follow urinary retention protocol if ordered  Outcome: Progressing  Goal: Absence of urinary retention  Description: INTERVENTIONS:  - Assess patients ability to void and empty bladder  - Monitor I/O  - Bladder scan as needed  - Discuss with physician/AP medications to alleviate retention as needed  - Discuss catheterization for long term situations as appropriate  Outcome: Progressing     Problem: METABOLIC, FLUID AND ELECTROLYTES - ADULT  Goal: Electrolytes maintained within normal limits  Description: INTERVENTIONS:  - Monitor labs and assess patient for signs and symptoms of electrolyte imbalances  - Administer electrolyte replacement as ordered  - Monitor response to electrolyte replacements, including repeat lab results as appropriate  - Instruct patient on fluid and nutrition as appropriate  Outcome: Progressing  Goal: Fluid balance maintained  Description: INTERVENTIONS:  - Monitor labs   - Monitor I/O and WT  - Instruct patient on fluid and nutrition as appropriate  - Assess for signs & symptoms of volume excess or deficit  Outcome: Progressing  Goal: Glucose maintained within target range  Description: INTERVENTIONS:  - Monitor Blood Glucose as ordered  - Assess for signs and symptoms of hyperglycemia and hypoglycemia  - Administer ordered medications to maintain glucose within target range  - Assess nutritional intake and initiate nutrition service referral as needed  Outcome: Progressing     Problem: SKIN/TISSUE INTEGRITY - ADULT  Goal: Skin integrity remains intact  Description: INTERVENTIONS  - Identify patients at risk for skin breakdown  - Assess and monitor skin integrity  - Assess and monitor nutrition and hydration status  - Monitor labs (i e  albumin)  - Assess for incontinence   - Turn and reposition patient  - Assist with mobility/ambulation  - Relieve pressure over bony prominences  - Avoid friction and shearing  - Provide appropriate hygiene as needed including keeping skin clean and dry  - Evaluate need for skin moisturizer/barrier cream  - Collaborate with interdisciplinary team (i e  Nutrition, Rehabilitation, etc )   - Patient/family teaching  Outcome: Progressing  Goal: Oral mucous membranes remain intact  Description: INTERVENTIONS  - Assess oral mucosa and hygiene practices  - Implement preventative oral hygiene regimen  - Implement oral medicated treatments as ordered  - Initiate Nutrition services referral as needed  Outcome: Progressing     Problem: HEMATOLOGIC - ADULT  Goal: Maintains hematologic stability  Description: INTERVENTIONS  - Assess for signs and symptoms of bleeding or hemorrhage  - Monitor labs  - Administer supportive blood products/factors as ordered and appropriate  Outcome: Progressing     Problem: MUSCULOSKELETAL - ADULT  Goal: Maintain or return mobility to safest level of function  Description: INTERVENTIONS:  - Assess patient's ability to carry out ADLs; assess patient's baseline for ADL function and identify physical deficits which impact ability to perform ADLs (bathing, care of mouth/teeth, toileting, grooming, dressing, etc )  - Assess/evaluate cause of self-care deficits   - Assess range of motion  - Assess patient's mobility  - Assess patient's need for assistive devices and provide as appropriate  - Encourage maximum independence but intervene and supervise when necessary  - Involve family in performance of ADLs  - Assess for home care needs following discharge   - Consider OT consult to assist with ADL evaluation and planning for discharge  - Provide patient education as appropriate  Outcome: Progressing  Goal: Maintain proper alignment of affected body part  Description: INTERVENTIONS:  - Support, maintain and protect limb and body alignment  - Provide patient/ family with appropriate education  Outcome: Progressing     Problem: PAIN - ADULT  Goal: Verbalizes/displays adequate comfort level or baseline comfort level  Description: Interventions:  - Encourage patient to monitor pain and request assistance  - Assess pain using appropriate pain scale  - Administer analgesics based on type and severity of pain and evaluate response  - Implement non-pharmacological measures as appropriate and evaluate response  - Consider cultural and social influences on pain and pain management  - Notify physician/advanced practitioner if interventions unsuccessful or patient reports new pain  Outcome: Progressing     Problem: INFECTION - ADULT  Goal: Absence or prevention of progression during hospitalization  Description: INTERVENTIONS:  - Assess and monitor for signs and symptoms of infection  - Monitor lab/diagnostic results  - Monitor all insertion sites, i e  indwelling lines, tubes, and drains  - Monitor endotracheal if appropriate and nasal secretions for changes in amount and color  - [place] appropriate cooling/warming therapies per order  - Administer medications as ordered  - Instruct and encourage patient and family to use good hand hygiene technique  - Identify and instruct in appropriate isolation precautions for identified infection/condition  Outcome: Progressing  Goal: Absence of fever/infection during neutropenic period  Description: INTERVENTIONS:  - Monitor WBC    Outcome: Progressing     Problem: SAFETY ADULT  Goal: Patient will remain free of falls  Description: INTERVENTIONS:  - Assess patient frequently for physical needs  -  Identify cognitive and physical deficits and behaviors that affect risk of falls    -  [place] fall precautions as indicated by assessment   - Educate patient/family on patient safety including physical limitations  - Instruct patient to call for assistance with activity based on assessment  - Modify environment to reduce risk of injury  - Consider OT/PT consult to assist with strengthening/mobility  Outcome: Progressing  Goal: Maintain or return to baseline ADL function  Description: INTERVENTIONS:  -  Assess patient's ability to carry out ADLs; assess patient's baseline for ADL function and identify physical deficits which impact ability to perform ADLs (bathing, care of mouth/teeth, toileting, grooming, dressing, etc )  - Assess/evaluate cause of self-care deficits   - Assess range of motion  - Assess patient's mobility; develop plan if impaired  - Assess patient's need for assistive devices and provide as appropriate  - Encourage maximum independence but intervene and supervise when necessary  - Involve family in performance of ADLs  - Assess for home care needs following discharge   - Consider OT consult to assist with ADL evaluation and planning for discharge  - Provide patient education as appropriate  Outcome: Progressing  Goal: Maintain or return mobility status to optimal level  Description: INTERVENTIONS:  - Assess patient's baseline mobility status (ambulation, transfers, stairs, etc )    - Identify cognitive and physical deficits and behaviors that affect mobility  - Identify mobility aids required to assist with transfers and/or ambulation (gait belt, sit-to-stand, lift, walker, cane, etc )  - [place] fall precautions as indicated by assessment  - Record patient progress and toleration of activity level on Mobility SBAR; progress patient to next Phase/Stage  - Instruct patient to call for assistance with activity based on assessment  - Consider rehabilitation consult to assist with strengthening/weightbearing, etc   Outcome: Progressing     Problem: DISCHARGE PLANNING  Goal: Discharge to home or other facility with appropriate resources  Description: INTERVENTIONS:  - Identify barriers to discharge w/patient and caregiver  - Arrange for needed discharge resources and transportation as appropriate  - Identify discharge learning needs (meds, wound care, etc )  - Arrange for interpretive services to assist at discharge as needed  - Refer to Case Management Department for coordinating discharge planning if the patient needs post-hospital services based on physician/advanced practitioner order or complex needs related to functional status, cognitive ability, or social support system  Outcome: Progressing     Problem: Knowledge Deficit  Goal: Patient/family/caregiver demonstrates understanding of disease process, treatment plan, medications, and discharge instructions  Description: Complete learning assessment and assess knowledge base    Interventions:  - Provide teaching at level of understanding  - Provide teaching via preferred learning methods  Outcome: Progressing

## 2021-03-17 NOTE — ASSESSMENT & PLAN NOTE
Patient describes multiple episodes of dizziness since Sunday  They usually happen when he gets out of bed and last for about 5 minutes  Also he had witnessed slurred speech on Sunday  Will place on telemetry  Trend troponin  Orthostatic BP  Patient has a pacemaker  Will consult Cardiology for possible pacemaker interrogation  CT head negative  ED provider discussed with neurology    Order MRI head and neck without contrast, MRI brain without contrast  PT/OT evaluation

## 2021-03-17 NOTE — PLAN OF CARE
Problem: Potential for Falls  Goal: Patient will remain free of falls  Description: INTERVENTIONS:  - Assess patient frequently for physical needs  -  Identify cognitive and physical deficits and behaviors that affect risk of falls  -  [place] fall precautions as indicated by assessment   - Educate patient/family on patient safety including physical limitations  - Instruct patient to call for assistance with activity based on assessment  - Modify environment to reduce risk of injury  - Consider OT/PT consult to assist with strengthening/mobility  Outcome: Progressing     Problem: Nutrition/Hydration-ADULT  Goal: Nutrient/Hydration intake appropriate for improving, restoring or maintaining nutritional needs  Description: Monitor and assess patient's nutrition/hydration status for malnutrition  Collaborate with interdisciplinary team and initiate plan and interventions as ordered  Monitor patient's weight and dietary intake as ordered or per policy  Utilize nutrition screening tool and intervene as necessary  Determine patient's food preferences and provide high-protein, high-caloric foods as appropriate       INTERVENTIONS:  - Monitor oral intake, urinary output, labs, and treatment plans  - Assess nutrition and hydration status and recommend course of action  - Evaluate amount of meals eaten  - Assist patient with eating if necessary   - Allow adequate time for meals  - Recommend/ encourage appropriate diets, oral nutritional supplements, and vitamin/mineral supplements  - Order, calculate, and assess calorie counts as needed  - Recommend, monitor, and adjust tube feedings and TPN/PPN based on assessed needs  - Assess need for intravenous fluids  - Provide specific nutrition/hydration education as appropriate  - Include patient/family/caregiver in decisions related to nutrition  Outcome: Progressing     Problem: NEUROSENSORY - ADULT  Goal: Achieves stable or improved neurological status  Description: INTERVENTIONS  - Monitor and report changes in neurological status  - Monitor vital signs such as temperature, blood pressure, glucose, and any other labs ordered   - Initiate measures to prevent increased intracranial pressure  - Monitor for seizure activity and implement precautions if appropriate      Outcome: Progressing     Problem: CARDIOVASCULAR - ADULT  Goal: Maintains optimal cardiac output and hemodynamic stability  Description: INTERVENTIONS:  - Monitor I/O, vital signs and rhythm  - Monitor for S/S and trends of decreased cardiac output  - Administer and titrate ordered vasoactive medications to optimize hemodynamic stability  - Assess quality of pulses, skin color and temperature  - Assess for signs of decreased coronary artery perfusion  - Instruct patient to report change in severity of symptoms  Outcome: Progressing  Goal: Absence of cardiac dysrhythmias or at baseline rhythm  Description: INTERVENTIONS:  - Continuous cardiac monitoring, vital signs, obtain 12 lead EKG if ordered  - Administer antiarrhythmic and heart rate control medications as ordered  - Monitor electrolytes and administer replacement therapy as ordered  Outcome: Progressing     Problem: RESPIRATORY - ADULT  Goal: Achieves optimal ventilation and oxygenation  Description: INTERVENTIONS:  - Assess for changes in respiratory status  - Assess for changes in mentation and behavior  - Position to facilitate oxygenation and minimize respiratory effort  - Oxygen administered by appropriate delivery if ordered  - Initiate smoking cessation education as indicated  - Encourage broncho-pulmonary hygiene including cough, deep breathe, Incentive Spirometry  - Assess the need for suctioning and aspirate as needed  - Assess and instruct to report SOB or any respiratory difficulty  - Respiratory Therapy support as indicated  Outcome: Progressing     Problem: GASTROINTESTINAL - ADULT  Goal: Minimal or absence of nausea and/or vomiting  Description: INTERVENTIONS:  - Administer IV fluids if ordered to ensure adequate hydration  - Maintain NPO status until nausea and vomiting are resolved  - Nasogastric tube if ordered  - Administer ordered antiemetic medications as needed  - Provide nonpharmacologic comfort measures as appropriate  - Advance diet as tolerated, if ordered  - Consider nutrition services referral to assist patient with adequate nutrition and appropriate food choices  Outcome: Progressing  Goal: Maintains or returns to baseline bowel function  Description: INTERVENTIONS:  - Assess bowel function  - Encourage oral fluids to ensure adequate hydration  - Administer IV fluids if ordered to ensure adequate hydration  - Administer ordered medications as needed  - Encourage mobilization and activity  - Consider nutritional services referral to assist patient with adequate nutrition and appropriate food choices  Outcome: Progressing  Goal: Maintains adequate nutritional intake  Description: INTERVENTIONS:  - Monitor percentage of each meal consumed  - Identify factors contributing to decreased intake, treat as appropriate  - Assist with meals as needed  - Monitor I&O, weight, and lab values if indicated  - Obtain nutrition services referral as needed  Outcome: Progressing     Problem: GENITOURINARY - ADULT  Goal: Maintains or returns to baseline urinary function  Description: INTERVENTIONS:  - Assess urinary function  - Encourage oral fluids to ensure adequate hydration if ordered  - Administer IV fluids as ordered to ensure adequate hydration  - Administer ordered medications as needed  - Offer frequent toileting  - Follow urinary retention protocol if ordered  Outcome: Progressing  Goal: Absence of urinary retention  Description: INTERVENTIONS:  - Assess patients ability to void and empty bladder  - Monitor I/O  - Bladder scan as needed  - Discuss with physician/AP medications to alleviate retention as needed  - Discuss catheterization for long term situations as appropriate  Outcome: Progressing     Problem: METABOLIC, FLUID AND ELECTROLYTES - ADULT  Goal: Electrolytes maintained within normal limits  Description: INTERVENTIONS:  - Monitor labs and assess patient for signs and symptoms of electrolyte imbalances  - Administer electrolyte replacement as ordered  - Monitor response to electrolyte replacements, including repeat lab results as appropriate  - Instruct patient on fluid and nutrition as appropriate  Outcome: Progressing  Goal: Fluid balance maintained  Description: INTERVENTIONS:  - Monitor labs   - Monitor I/O and WT  - Instruct patient on fluid and nutrition as appropriate  - Assess for signs & symptoms of volume excess or deficit  Outcome: Progressing  Goal: Glucose maintained within target range  Description: INTERVENTIONS:  - Monitor Blood Glucose as ordered  - Assess for signs and symptoms of hyperglycemia and hypoglycemia  - Administer ordered medications to maintain glucose within target range  - Assess nutritional intake and initiate nutrition service referral as needed  Outcome: Progressing     Problem: SKIN/TISSUE INTEGRITY - ADULT  Goal: Skin integrity remains intact  Description: INTERVENTIONS  - Identify patients at risk for skin breakdown  - Assess and monitor skin integrity  - Assess and monitor nutrition and hydration status  - Monitor labs (i e  albumin)  - Assess for incontinence   - Turn and reposition patient  - Assist with mobility/ambulation  - Relieve pressure over bony prominences  - Avoid friction and shearing  - Provide appropriate hygiene as needed including keeping skin clean and dry  - Evaluate need for skin moisturizer/barrier cream  - Collaborate with interdisciplinary team (i e  Nutrition, Rehabilitation, etc )   - Patient/family teaching  Outcome: Progressing  Goal: Oral mucous membranes remain intact  Description: INTERVENTIONS  - Assess oral mucosa and hygiene practices  - Implement preventative oral hygiene regimen  - Implement oral medicated treatments as ordered  - Initiate Nutrition services referral as needed  Outcome: Progressing     Problem: HEMATOLOGIC - ADULT  Goal: Maintains hematologic stability  Description: INTERVENTIONS  - Assess for signs and symptoms of bleeding or hemorrhage  - Monitor labs  - Administer supportive blood products/factors as ordered and appropriate  Outcome: Progressing     Problem: MUSCULOSKELETAL - ADULT  Goal: Maintain or return mobility to safest level of function  Description: INTERVENTIONS:  - Assess patient's ability to carry out ADLs; assess patient's baseline for ADL function and identify physical deficits which impact ability to perform ADLs (bathing, care of mouth/teeth, toileting, grooming, dressing, etc )  - Assess/evaluate cause of self-care deficits   - Assess range of motion  - Assess patient's mobility  - Assess patient's need for assistive devices and provide as appropriate  - Encourage maximum independence but intervene and supervise when necessary  - Involve family in performance of ADLs  - Assess for home care needs following discharge   - Consider OT consult to assist with ADL evaluation and planning for discharge  - Provide patient education as appropriate  Outcome: Progressing  Goal: Maintain proper alignment of affected body part  Description: INTERVENTIONS:  - Support, maintain and protect limb and body alignment  - Provide patient/ family with appropriate education  Outcome: Progressing     Problem: PAIN - ADULT  Goal: Verbalizes/displays adequate comfort level or baseline comfort level  Description: Interventions:  - Encourage patient to monitor pain and request assistance  - Assess pain using appropriate pain scale  - Administer analgesics based on type and severity of pain and evaluate response  - Implement non-pharmacological measures as appropriate and evaluate response  - Consider cultural and social influences on pain and pain management  - Notify physician/advanced practitioner if interventions unsuccessful or patient reports new pain  Outcome: Progressing     Problem: INFECTION - ADULT  Goal: Absence or prevention of progression during hospitalization  Description: INTERVENTIONS:  - Assess and monitor for signs and symptoms of infection  - Monitor lab/diagnostic results  - Monitor all insertion sites, i e  indwelling lines, tubes, and drains  - Monitor endotracheal if appropriate and nasal secretions for changes in amount and color  - [place] appropriate cooling/warming therapies per order  - Administer medications as ordered  - Instruct and encourage patient and family to use good hand hygiene technique  - Identify and instruct in appropriate isolation precautions for identified infection/condition  Outcome: Progressing  Goal: Absence of fever/infection during neutropenic period  Description: INTERVENTIONS:  - Monitor WBC    Outcome: Progressing     Problem: SAFETY ADULT  Goal: Patient will remain free of falls  Description: INTERVENTIONS:  - Assess patient frequently for physical needs  -  Identify cognitive and physical deficits and behaviors that affect risk of falls    -  [place] fall precautions as indicated by assessment   - Educate patient/family on patient safety including physical limitations  - Instruct patient to call for assistance with activity based on assessment  - Modify environment to reduce risk of injury  - Consider OT/PT consult to assist with strengthening/mobility  Outcome: Progressing  Goal: Maintain or return to baseline ADL function  Description: INTERVENTIONS:  -  Assess patient's ability to carry out ADLs; assess patient's baseline for ADL function and identify physical deficits which impact ability to perform ADLs (bathing, care of mouth/teeth, toileting, grooming, dressing, etc )  - Assess/evaluate cause of self-care deficits   - Assess range of motion  - Assess patient's mobility; develop plan if impaired  - Assess patient's need for assistive devices and provide as appropriate  - Encourage maximum independence but intervene and supervise when necessary  - Involve family in performance of ADLs  - Assess for home care needs following discharge   - Consider OT consult to assist with ADL evaluation and planning for discharge  - Provide patient education as appropriate  Outcome: Progressing  Goal: Maintain or return mobility status to optimal level  Description: INTERVENTIONS:  - Assess patient's baseline mobility status (ambulation, transfers, stairs, etc )    - Identify cognitive and physical deficits and behaviors that affect mobility  - Identify mobility aids required to assist with transfers and/or ambulation (gait belt, sit-to-stand, lift, walker, cane, etc )  - [place] fall precautions as indicated by assessment  - Record patient progress and toleration of activity level on Mobility SBAR; progress patient to next Phase/Stage  - Instruct patient to call for assistance with activity based on assessment  - Consider rehabilitation consult to assist with strengthening/weightbearing, etc   Outcome: Progressing     Problem: DISCHARGE PLANNING  Goal: Discharge to home or other facility with appropriate resources  Description: INTERVENTIONS:  - Identify barriers to discharge w/patient and caregiver  - Arrange for needed discharge resources and transportation as appropriate  - Identify discharge learning needs (meds, wound care, etc )  - Arrange for interpretive services to assist at discharge as needed  - Refer to Case Management Department for coordinating discharge planning if the patient needs post-hospital services based on physician/advanced practitioner order or complex needs related to functional status, cognitive ability, or social support system  Outcome: Progressing     Problem: Knowledge Deficit  Goal: Patient/family/caregiver demonstrates understanding of disease process, treatment plan, medications, and discharge instructions  Description: Complete learning assessment and assess knowledge base    Interventions:  - Provide teaching at level of understanding  - Provide teaching via preferred learning methods  Outcome: Progressing

## 2021-03-17 NOTE — TELEMEDICINE
TeleConsultation - Neurology   Alex Johnson 78 y o  male MRN: [id number]  Unit/Bed#: ED 10 Encounter: [id number]      REQUIRED DOCUMENTATION:     1  This service was provided via Telemedicine  2  Provider located at [facility]  3  TeleMed provider:  Dr Charmaine Crowley  4  Identify all parties in room with patient during tele consult: The patient  5  After connecting through TRELYSideo, patient was identified by name and date of birth and assistant checked wristband  Patient was then informed that this was a Telemedicine visit and that the exam was being conducted confidentially over secure lines  My office door was closed  No one else was in the room  Patient acknowledged consent and understanding of privacy and security of the Telemedicine visit, and gave us permission to have the assistant stay in the room in order to assist with the history and to conduct the exam   I informed the patient that I have reviewed their record in Epic and presented the opportunity for them to ask any questions regarding the visit today  The patient agreed to participate  Assessment/Plan   78year old male with past medical hx as listed below, the patient has a hx of CAD and pacemaker, who presented to [facility] ED with dizziness, when stands up, speech disturbance, confusion and memory loss, this was reported to be occurring for 4 days  NIH is reported to be zero, neurological examination as below, CT of head was completed which showed no acute intracranial pathology  He was note to have ROBIN, his procalcitonin was elevated, and had a slight troponin elevation  Currently he does not appear encephalopathic  Unlikely an underlying dementia process  At this point appears to have been toxic/metabolic encephalopathy from acute robin  He states he was getting more dizzy this past week than his usual baseline dizziness when standing   sbp in 130s-140s here in standing/laying positions however orthostats not checked at one time and he's been receiving fluids  He says he feels a lot better  1  I am not seeing any clinical indication for mri brain imaging or of cerebrovasculature  2  Recommend testing b12, tsh added to current labs    3  He can follow with outpt neurology team in the outpt setting if family remains concerned  History of Present Illness     Reason for Consult / Principal Problem:  Confusion    HPI: Boby Rangel is a 78 y o   male with past medical hx as listed below, the patient has a hx of CAD and pacemaker, who presents with dizziness when he stands up, speech is off, confusion and memory loss  It is reported that these symptoms have been present for 4 days  CT of the head was completed, which showed no acute intracranial abnormality  Chest x ray showed no acute cardiopulmonary disease  The patient was noted to have an elevated BUN and CR, he is being treated for BRENDA  The patient's procalcitonin was elevated, he had troponin of 0 5H  The patient's BNP was 548H  UA is pending  Neurology was asked to see secondary to above correlation of symptoms  Vital signs was 60, respiration 16, pulse 60, blood pressure 134/66 mmhg  Consult to neurology  Consult performed by: Gianfranco Israel MD  Consult ordered by:  Kya Palomares PA-C           Review of Systems   Per 12 point review, nocturia, dizziness when standing up, dry mouth, rt sided hearing loss, near blindness in the right eye, rest negative    Historical Information   Past Medical History:   Diagnosis Date    Anxiety state 8/5/2018    Basal cell carcinoma of skin 11/15/2016    Chronic obstructive pulmonary disease ([facility] [address] ) 9/5/2012    Constipation     Coronary artery disease 5/22/2012    Forearm laceration, right, initial encounter 5/28/2020    Hydronephrosis     LAST ASSESSED: 11/18/15    Osteoporosis 5/22/2012    Pacemaker     Trifascicular block      Past Surgical History:   Procedure Laterality Date    CARDIAC PACEMAKER PLACEMENT  11/2009    Medtronic dual chamber     CATARACT EXTRACTION W/  INTRAOCULAR LENS IMPLANT      CYSTOSCOPY N/A 2016    Procedure: CYSTOSCOPY, evacuation of bladder calculi;  Surgeon: Liz Panchal MD;  [facility];  Service:     INGUINAL HERNIA REPAIR      UNILATERAL    KIDNEY STONE SURGERY      OTHER SURGICAL HISTORY      HERNIA REPAIR AS PER ALLSCRIPTS (ALREADY IN PERTINENT NEGATIVES)    [place] TRANSURETHRAL ELEC-SURG PROSTATECTOM N/A 2016    Procedure: TRANSURETHRAL RESECTION OF PROSTATE (TURP); Surgeon: Liz Panchal MD;  [facility];  Service: Urology    TONSILLECTOMY       Social History   Social History     Substance and Sexual Activity   Alcohol Use Never    Frequency: Never    Comment: BEING A SOCIAL DRINKER AS PER ALLSCRIPTS     Social History     Substance and Sexual Activity   Drug Use No     E-Cigarette/Vaping    E-Cigarette Use Never User      E-Cigarette/Vaping Substances    Nicotine No     THC No     CBD No     Flavoring No     Other No     Unknown No      Social History     Tobacco Use   Smoking Status Former Smoker    Types: Cigars    Quit date: 1980    Years since quittin 9   Smokeless Tobacco Former User   Tobacco Comment    smokes an occ  cigar; hasn't in last 2 weeks; CIGAR (1 A DAY) AS PER ALLSCRIPTS     Family History:   Family History   Problem Relation Age of Onset    Stroke Father         SYNDROME    Coronary artery disease Family         less than 61years of age     Review of previous medical records was completed, no prior neurological records with record review  Meds/Allergies   all current active meds have been reviewed, current meds:   No current facility-administered medications for this encounter  and PTA meds:   Prior to Admission Medications   Prescriptions Last Dose Informant Patient Reported? Taking?    Blephamide 10-0 2 % ophthalmic suspension   No No   Sig: Administer 2 drops to both eyes daily at bedtime   Combigan 0 2-0 5 %   Yes No Sig: INSTILL 1 DROP INTO RIGHT EYE EVERY 12 HOURS   Lumigan 0 01 % ophthalmic drops   Yes No   Sig: INSTILL 1 DROP AT BEDTIME INTO RIGHT EYE   Multiple Vitamin (MULTIVITAMIN) capsule  Self Yes No   Sig: Take 1 capsule by mouth daily  amLODIPine (NORVASC) 10 mg tablet   No No   Sig: Take 1 tablet by mouth once daily   amLODIPine (NORVASC) 10 mg tablet   No No   Sig: Take 1 tablet (10 mg total) by mouth daily   aspirin (ECOTRIN LOW STRENGTH) 81 mg EC tablet  Self Yes No   Sig: Take 81 mg by mouth daily   atenolol (TENORMIN) 50 mg tablet   No No   Sig: Take 1 tablet (50 mg total) by mouth daily   calcium carbonate (TUMS) 500 mg chewable tablet  Self Yes No   Sig: Chew 1 tablet as needed for indigestion or heartburn  cyclobenzaprine (FLEXERIL) 10 mg tablet   No No   Sig: Take 1 tablet (10 mg total) by mouth 2 (two) times a day   finasteride (PROSCAR) 5 mg tablet   No No   Sig: Take 1 tablet (5 mg total) by mouth daily   glucose blood (ONE TOUCH ULTRA TEST) test strip  Self No No   Sig: Test sugar once daily   lisinopril (ZESTRIL) 40 mg tablet   No No   Sig: Take 1 tablet by mouth once daily   lisinopril (ZESTRIL) 40 mg tablet   No No   Sig: Take 1 tablet (40 mg total) by mouth daily   magnesium hydroxide (MILK OF MAGNESIA) 400 mg/5 mL oral suspension  Self Yes No   Sig: Take by mouth as needed for constipation  simvastatin (ZOCOR) 10 mg tablet   No No   Sig: Take 1 tablet (10 mg total) by mouth every evening   tamsulosin (FLOMAX) 0 4 mg   No No   Sig: Take 1 capsule (0 4 mg total) by mouth daily with dinner      Facility-Administered Medications: None     No Known Allergies    Objective   Vitals:Blood pressure 124/66, pulse 60, temperature 98 7 °F (37 1 °C), temperature source Temporal, resp  rate 16, height 5' 7" (1 702 m), weight 78 9 kg (174 lb), SpO2 98 %  ,Body mass index is 27 25 kg/m²    No intake or output data in the 24 hours ending 03/17/21 1547     Physical Exam   General: no acute distress  Extremities: no visible deformities    Neurologic Exam  MS: Alert and orientedX3  Able to spell the word "women" forwards/backwards  He choose this word for testing  3/3 immediate and delayed recall  Able to take multistep command taking rt thumb to left ear to nose  +  Currently   7 quarters in $1 75  Gave his address  States he's been living by himself for the last 5 years  4 sons live in the area and daughter lives [facility]  He was attentive  States he drinks 5-6 bottles of water per day when prompted  He states he drives locally and that his friend has been concerned about him for the past week and urged him to come  CN 2-12 intact except impaired visual fields rt eye  Decreased hearing rt ear to finger rub  Motor: 5 power ue/le bilat with assistance from nurse performed under my visual supervision      Sensory: light touch intact throughout ue/le bilat    Coordination: finger to nose, heel to shin no dysmetria or ataxia      Lab Results:     Recent Results (from the past 24 hour(s))   CBC and differential    Collection Time: 21 11:10 AM   Result Value Ref Range    WBC 9 00 4 80 - 10 80 Thousand/uL    RBC 4 05 (L) 4 30 - 5 90 Million/uL    Hemoglobin 13 5 (L) 14 0 - 18 0 g/dL    Hematocrit 40 3 (L) 42 0 - 47 0 %    MCV 99 81 - 99 fL    MCH 33 3 26 0 - 34 0 pg    MCHC 33 5 31 0 - 37 0 g/dL    RDW 13 2 11 5 - 14 5 %    MPV 8 3 (L) 8 6 - 11 7 fL    Platelets 570 (L) 050 - 390 Thousands/uL    Neutrophils Relative 77 (H) 42 - 75 %    Lymphocytes Relative 10 (L) 21 - 51 %    Monocytes Relative 12 2 - 12 %    Eosinophils Relative 0 0 - 5 %    Basophils Relative 1 0 - 2 %    Neutrophils Absolute 6 90 (H) 1 40 - 6 50 Thousands/µL    Lymphocytes Absolute 0 90 0 60 - 4 47 Thousands/µL    Monocytes Absolute 1 10 0 17 - 1 22 Thousand/µL    Eosinophils Absolute 0 00 0 00 - 0 61 Thousand/µL    Basophils Absolute 0 10 0 00 - 0 10 Thousands/µL   Comprehensive metabolic panel Collection Time: 03/17/21 11:10 AM   Result Value Ref Range    Sodium 137 134 - 143 mmol/L    Potassium 3 8 3 5 - 5 5 mmol/L    Chloride 99 98 - 107 mmol/L    CO2 28 21 - 31 mmol/L    ANION GAP 10 4 - 13 mmol/L    BUN 49 (H) 7 - 25 mg/dL    Creatinine 2 01 (H) 0 70 - 1 30 mg/dL    Glucose 158 (H) 65 - 99 mg/dL    Calcium 9 1 8 6 - 10 5 mg/dL    AST 19 13 - 39 U/L    ALT 22 7 - 52 U/L    Alkaline Phosphatase 52 (L) 55 - 165 U/L    Total Protein 6 4 6 4 - 8 9 g/dL    Albumin 3 6 3 5 - 5 7 g/dL    Total Bilirubin 1 10 (H) 0 20 - 1 00 mg/dL    eGFR 30 ml/min/1 73sq m   Lactic acid    Collection Time: 03/17/21 11:10 AM   Result Value Ref Range    LACTIC ACID 1 3 0 5 - 2 0 mmol/L   Procalcitonin with AM Reflex    Collection Time: 03/17/21 11:10 AM   Result Value Ref Range    Procalcitonin 12 24 (H) <=0 25 ng/ml   Protime-INR    Collection Time: 03/17/21 11:10 AM   Result Value Ref Range    Protime 13 6 11 6 - 14 5 seconds    INR 1 05 0 84 - 1 19   APTT    Collection Time: 03/17/21 11:10 AM   Result Value Ref Range    PTT 34 23 - 37 seconds   COVID19, Influenza A/B, RSV PCR, [facility]    Collection Time: 03/17/21 11:10 AM    Specimen: Nasopharyngeal Swab   Result Value Ref Range    SARS-CoV-2 Negative Negative    INFLUENZA A PCR Negative Negative    INFLUENZA B PCR Negative Negative    RSV PCR Negative Negative   Troponin I    Collection Time: 03/17/21 11:10 AM   Result Value Ref Range    Troponin I 0 05 (H) <=0 03 ng/mL   Magnesium    Collection Time: 03/17/21 11:10 AM   Result Value Ref Range    Magnesium 2 4 1 9 - 2 7 mg/dL   B-Type Natriuretic Peptide ([facility] ONLY)    Collection Time: 03/17/21 11:10 AM   Result Value Ref Range     (H) 1 - 100 pg/mL     Per radiology interpretation -    "  Procedure: Xr Chest Portable    Result Date: 3/17/2021  Narrative: CHEST INDICATION:   dizzy   COMPARISON:  4/27/2016 EXAM PERFORMED/VIEWS:  XR CHEST PORTABLE Single view FINDINGS: Dual-lead left chest wall cardiac pacer Cardiomediastinal silhouette appears unremarkable  The lungs are clear  No pneumothorax or pleural effusion  Osseous structures appear within normal limits for patient age  Eventration right hemidiaphragm     Impression: No acute cardiopulmonary disease  Stable exam Workstation performed: [id number]     Procedure: Ct Head Without Contrast    Result Date: 3/17/2021  Narrative: CT BRAIN - WITHOUT CONTRAST INDICATION:   Dizziness, non-specific dizziness  COMPARISON:  CT brain 6/9/2018 TECHNIQUE:  CT examination of the brain was performed  In addition to axial images, sagittal and coronal 2D reformatted images were created and submitted for interpretation  Radiation dose length product (DLP) for this visit:  872 8 mGy-cm   This examination, like all CT scans performed in the [facility], was performed utilizing techniques to minimize radiation dose exposure, including the use of iterative reconstruction and automated exposure control  IMAGE QUALITY:  Diagnostic  FINDINGS: PARENCHYMA: Decreased attenuation is noted in periventricular and subcortical white matter demonstrating an appearance that is statistically most likely to represent moderate microangiopathic change; this appearance is similar when compared to most recent prior examination  No CT signs of acute infarction  No intracranial mass, mass effect or midline shift  No acute parenchymal hemorrhage  VENTRICLES AND EXTRA-AXIAL SPACES:  Ventricles and extra-axial CSF spaces are prominent commensurate with the degree of volume loss  No hydrocephalus  No acute extra-axial hemorrhage  VISUALIZED ORBITS AND PARANASAL SINUSES:  Unremarkable  CALVARIUM AND EXTRACRANIAL SOFT TISSUES:  Normal      Impression: No acute intracranial abnormality  Workstation performed: [id number]   "  Imaging Studies: I have personally reviewed pertinent reports  EKG, Pathology, and Other Studies: I have personally reviewed pertinent reports        Code Status: No Order    Counseling / Coordination of [facility] JIGNA acted as a scribe for attending during this tele medicine consultation

## 2021-03-17 NOTE — ASSESSMENT & PLAN NOTE
Well controlled  Continue amlodipine and atenolol    Lisinopril held secondary to BRENDA  Continue to monitor

## 2021-03-18 PROBLEM — N39.0 UTI (URINARY TRACT INFECTION): Status: ACTIVE | Noted: 2021-03-18

## 2021-03-18 LAB
ALBUMIN SERPL BCP-MCNC: 4 G/DL (ref 3.5–5.7)
ALP SERPL-CCNC: 69 U/L (ref 55–165)
ALT SERPL W P-5'-P-CCNC: 31 U/L (ref 7–52)
ANION GAP SERPL CALCULATED.3IONS-SCNC: 13 MMOL/L (ref 4–13)
AST SERPL W P-5'-P-CCNC: 28 U/L (ref 13–39)
ATRIAL RATE: 60 BPM
ATRIAL RATE: 60 BPM
ATRIAL RATE: 64 BPM
BILIRUB SERPL-MCNC: 1.1 MG/DL (ref 0.2–1)
BUN SERPL-MCNC: 46 MG/DL (ref 7–25)
CALCIUM SERPL-MCNC: 9.6 MG/DL (ref 8.6–10.5)
CHLORIDE SERPL-SCNC: 99 MMOL/L (ref 98–107)
CO2 SERPL-SCNC: 27 MMOL/L (ref 21–31)
CREAT SERPL-MCNC: 1.81 MG/DL (ref 0.7–1.3)
GFR SERPL CREATININE-BSD FRML MDRD: 34 ML/MIN/1.73SQ M
GLUCOSE SERPL-MCNC: 154 MG/DL (ref 65–99)
P AXIS: 13 DEGREES
P AXIS: 31 DEGREES
POTASSIUM SERPL-SCNC: 3.4 MMOL/L (ref 3.5–5.5)
PR INTERVAL: 184 MS
PROCALCITONIN SERPL-MCNC: 10.65 NG/ML
PROT SERPL-MCNC: 7.5 G/DL (ref 6.4–8.9)
QRS AXIS: -31 DEGREES
QRS AXIS: -38 DEGREES
QRS AXIS: -46 DEGREES
QRSD INTERVAL: 186 MS
QRSD INTERVAL: 204 MS
QRSD INTERVAL: 208 MS
QT INTERVAL: 490 MS
QT INTERVAL: 490 MS
QT INTERVAL: 494 MS
QTC INTERVAL: 490 MS
QTC INTERVAL: 494 MS
QTC INTERVAL: 505 MS
SODIUM SERPL-SCNC: 139 MMOL/L (ref 134–143)
T WAVE AXIS: 104 DEGREES
T WAVE AXIS: 112 DEGREES
T WAVE AXIS: 122 DEGREES
TROPONIN I SERPL-MCNC: 0.07 NG/ML
VENTRICULAR RATE: 60 BPM
VENTRICULAR RATE: 60 BPM
VENTRICULAR RATE: 64 BPM
VIT B12 SERPL-MCNC: 451 PG/ML (ref 100–900)

## 2021-03-18 PROCEDURE — 97166 OT EVAL MOD COMPLEX 45 MIN: CPT

## 2021-03-18 PROCEDURE — 84484 ASSAY OF TROPONIN QUANT: CPT | Performed by: FAMILY MEDICINE

## 2021-03-18 PROCEDURE — 93010 ELECTROCARDIOGRAM REPORT: CPT | Performed by: INTERNAL MEDICINE

## 2021-03-18 PROCEDURE — 82607 VITAMIN B-12: CPT | Performed by: PSYCHIATRY & NEUROLOGY

## 2021-03-18 PROCEDURE — 99222 1ST HOSP IP/OBS MODERATE 55: CPT | Performed by: INTERNAL MEDICINE

## 2021-03-18 PROCEDURE — 97163 PT EVAL HIGH COMPLEX 45 MIN: CPT

## 2021-03-18 PROCEDURE — 99233 SBSQ HOSP IP/OBS HIGH 50: CPT | Performed by: FAMILY MEDICINE

## 2021-03-18 PROCEDURE — 80053 COMPREHEN METABOLIC PANEL: CPT | Performed by: FAMILY MEDICINE

## 2021-03-18 RX ORDER — POTASSIUM CHLORIDE 20 MEQ/1
40 TABLET, EXTENDED RELEASE ORAL ONCE
Status: COMPLETED | OUTPATIENT
Start: 2021-03-18 | End: 2021-03-18

## 2021-03-18 RX ORDER — CEFTRIAXONE 1 G/50ML
1000 INJECTION, SOLUTION INTRAVENOUS EVERY 24 HOURS
Status: DISCONTINUED | OUTPATIENT
Start: 2021-03-18 | End: 2021-03-19 | Stop reason: HOSPADM

## 2021-03-18 RX ADMIN — BETAXOLOL HYDROCHLORIDE 1 DROP: 2.8 SUSPENSION/ DROPS OPHTHALMIC at 22:31

## 2021-03-18 RX ADMIN — CEFTRIAXONE 1000 MG: 1 INJECTION, SOLUTION INTRAVENOUS at 09:17

## 2021-03-18 RX ADMIN — CYCLOBENZAPRINE HYDROCHLORIDE 10 MG: 10 TABLET, FILM COATED ORAL at 21:30

## 2021-03-18 RX ADMIN — NEOMYCIN SULFATE, POLYMYXIN B SULFATE AND DEXAMETHASONE 1 DROP: 3.5; 10000; 1 SUSPENSION OPHTHALMIC at 22:31

## 2021-03-18 RX ADMIN — FINASTERIDE 5 MG: 5 TABLET, FILM COATED ORAL at 09:09

## 2021-03-18 RX ADMIN — SODIUM CHLORIDE 125 ML/HR: 0.9 INJECTION, SOLUTION INTRAVENOUS at 22:46

## 2021-03-18 RX ADMIN — SODIUM CHLORIDE 125 ML/HR: 0.9 INJECTION, SOLUTION INTRAVENOUS at 12:12

## 2021-03-18 RX ADMIN — ATENOLOL 50 MG: 25 TABLET ORAL at 09:10

## 2021-03-18 RX ADMIN — HEPARIN SODIUM 5000 UNITS: 5000 INJECTION INTRAVENOUS; SUBCUTANEOUS at 05:06

## 2021-03-18 RX ADMIN — ASPIRIN 81 MG: 81 TABLET, COATED ORAL at 09:09

## 2021-03-18 RX ADMIN — AMLODIPINE BESYLATE 10 MG: 10 TABLET ORAL at 09:09

## 2021-03-18 RX ADMIN — POTASSIUM CHLORIDE 40 MEQ: 1500 TABLET, EXTENDED RELEASE ORAL at 09:17

## 2021-03-18 RX ADMIN — PRAVASTATIN SODIUM 20 MG: 20 TABLET ORAL at 15:55

## 2021-03-18 RX ADMIN — HEPARIN SODIUM 5000 UNITS: 5000 INJECTION INTRAVENOUS; SUBCUTANEOUS at 22:26

## 2021-03-18 RX ADMIN — HEPARIN SODIUM 5000 UNITS: 5000 INJECTION INTRAVENOUS; SUBCUTANEOUS at 15:05

## 2021-03-18 RX ADMIN — SODIUM CHLORIDE 125 ML/HR: 0.9 INJECTION, SOLUTION INTRAVENOUS at 03:26

## 2021-03-18 RX ADMIN — CYCLOBENZAPRINE HYDROCHLORIDE 10 MG: 10 TABLET, FILM COATED ORAL at 09:10

## 2021-03-18 RX ADMIN — TAMSULOSIN HYDROCHLORIDE 0.4 MG: 0.4 CAPSULE ORAL at 15:55

## 2021-03-18 NOTE — PHYSICAL THERAPY NOTE
Physical Therapy Evaluation     Patient's Name: Marian Davis    Admitting Diagnosis  TIA (transient ischemic attack) [G45 9]  Dizziness [R42]  Vertigo [R42]  Pacemaker [Z95 0]  Elevated troponin [R77 8]  BRENDA (acute kidney injury) ([facility] [address] ) [N17 9]    Problem List  Patient Active Problem List   Diagnosis    Abnormal nuclear stress test    Basal cell carcinoma of skin    BPH (benign prostatic hyperplasia)    Coronary artery disease involving native coronary artery of native heart without angina pectoris    Type 2 diabetes mellitus with circulatory disorder, without long-term current use of insulin ([facility] [address] )    Hypercholesterolemia    Essential hypertension    Osteoporosis    Peripheral arterial disease ([facility] [address] )    Sleep related leg cramps    Anxiety state    Bradycardia    Skin lesion    Constipation    Pacemaker    Mixed hyperlipidemia    BRENDA (acute kidney injury) ([facility] [address] )    Troponin level elevated    Dizziness       Past Medical History  Past Medical History:   Diagnosis Date    Anxiety state 8/5/2018    Basal cell carcinoma of skin 11/15/2016    Chronic obstructive pulmonary disease ([facility] [address] ) 9/5/2012    Constipation     Coronary artery disease 5/22/2012    Forearm laceration, right, initial encounter 5/28/2020    Hydronephrosis     LAST ASSESSED: 11/18/15    Osteoporosis 5/22/2012    Pacemaker     Trifascicular block        Past Surgical History  Past Surgical History:   Procedure Laterality Date    CARDIAC PACEMAKER PLACEMENT  11/2009    Medtronic dual chamber     CATARACT EXTRACTION W/  INTRAOCULAR LENS IMPLANT      CYSTOSCOPY N/A 5/9/2016    Procedure: CYSTOSCOPY, evacuation of bladder calculi;  Surgeon: Rockie Lombard, MD;  [facility];  Service:     INGUINAL HERNIA REPAIR      UNILATERAL    KIDNEY STONE SURGERY      OTHER SURGICAL HISTORY      HERNIA REPAIR AS PER ALLSCRIPTS (ALREADY IN PERTINENT NEGATIVES)    [place] TRANSURETHRAL ELEC-SURG PROSTATECTOM N/A 5/9/2016    Procedure: TRANSURETHRAL RESECTION OF PROSTATE (TURP); Surgeon: Malu High MD;  [facility];  Service: Urology    TONSILLECTOMY          03/18/21 0819   PT Last Visit   PT Visit Date 03/18/21   Note Type   Note type Evaluation   Pain Assessment   Pain Assessment Tool Pain Assessment not indicated - pt denies pain   Pain Score No Pain   Home Living   Type of [address] One level;Performs ADLs on one level; Able to live on main level with bedroom/bathroom;Stairs to enter without rails  (1 ALIDA)   Bathroom Shower/Tub Tub/shower unit   [employer] Raised   Bathroom Equipment Grab bars in [place] [address]   (denies prior AD usage)   Prior Function   Level of [place] Independent with ADLs and functional mobility   Lives With [address] [facility] in the last 6 months 0   Vocational Retired   Restrictions/Precautions   [facility] Bearing Precautions Per Order No   Other Precautions Fall Risk;Telemetry; Chair Alarm; Bed Alarm   General   Family/Caregiver Present No   Cognition   Overall Cognitive Status WFL   Arousal/Participation Alert   Orientation Level Oriented X4   Memory Decreased recall of precautions   Following Commands Follows one step commands with increased time or repetition   Comments Pt  agreeable to PT assessment  RUE Assessment   RUE Assessment WFL   LUE Assessment   LUE Assessment WFL   RLE Assessment   RLE Assessment X  (3+/5 gross musculature)   LLE Assessment   LLE Assessment X  (3+/5 gross musculature)   Coordination   Movements are Fluid and Coordinated 0   Bed Mobility   Additional Comments DNT as pt  was sitting on bedside upon arrival/ OOB on chair upon conclusion  Transfers   Sit to Stand   (CGA)   Additional items Assist x 1;Bedrails; Increased time required;Verbal cues   Stand to Sit   (CGA)   Additional items Assist x 1; Armrests; Impulsive;Verbal cues   Stand pivot   (CGA)   Additional items Assist x 1; Impulsive;Verbal cues   Ambulation/Elevation   Gait pattern Improper Weight shift;Narrow JANA; Forward Flexion; Inconsistent lupe   Gait Assistance   (CGA)   Additional items Assist x 1;Verbal cues; Tactile cues   Assistive Device Rolling walker   Distance 30 feet   Stair Management Assistance Not tested   Balance   Static Sitting Good   Dynamic Sitting Fair +   Static Standing Fair   Dynamic Standing Fair -   Ambulatory Fair -   Endurance Deficit   Endurance Deficit Yes   Activity Tolerance   Activity Tolerance Patient limited by fatigue   Medical Staff Made Aware yes, Malia EDGAR & Juancho Prince OT   Nurse Made Aware Yes,nursing staff was informed of assessment outcome  Assessment   Prognosis Good   Problem List Decreased strength;Decreased endurance; Impaired balance;Decreased mobility; Decreased coordination;Decreased safety awareness; Impaired judgement   Assessment Pt is 78 y o  male seen for PT evaluation s/p admit to [address] [place] [address] on 3/17/2021 w/ Dizziness  PT consulted to assess pt's functional mobility and d/c needs  Order placed for PT eval and tx, w/ up and OOB as tolerated order  Comorbidities affecting pt's physical performance at time of assessment include: weakness,dizziness, BRENDA, HTN, elevated troponins  PTA, pt was independent w/ all functional mobility w/ o AD usage  Personal factors affecting pt at time of IE include: stairs to enter home, inability to navigate community distances, inability to navigate level surfaces w/o external assistance, unable to perform dynamic tasks in community, impulsivity, limited insight into impairments, inability to perform IADLs and inability to perform ADLs   Please find objective findings from PT assessment regarding body systems outlined above with impairments and limitations including weakness, impaired balance, decreased endurance, impaired coordination, gait deviations, decreased activity tolerance, decreased functional mobility tolerance, decreased safety awareness, impaired judgement and fall risk  The following objective measures performed on IE also reveal limitations: AM-PAC 6-Clicks: 78/59  Pt's clinical presentation is currently unstable/unpredictable seen in pt's presentation of abnormal lab values, impulsivity, sudden symptomatic onset PTA, ongoing telemetry monitoring  Pt to benefit from continued PT tx to address deficits as defined above and maximize level of functional independent mobility and consistency  From PT/mobility standpoint, recommendation at time of d/c would be Home PT pending progress in order to facilitate return to PLOF  Goals   Patient Goals to go home today   LTG Expiration Date 03/28/21   Long Term Goal #1 Patient will complete transfers modified I  to decrease risk of falls, facilitate upright standing posture  BLE strength to greater than/equal to 4/5 gross musculature to increase ability to safely transfer, control descent to chair  Patient will exhibit increase dynamic standing balance to Good > 3 minutes without LOB distant supervision to improve endurance  Patient will exhibit increase dynamic ambulatory balance to Good >100 feet w/ least restrictive AD supervision of 1 to improve ability to mobilize to toilet, chair and decrease risk for additional medical complications  Patient will exhibit good self monitoring and ability to follow 2 step commands to increase complexity of tasks and resume ADL's without LOB  PT Treatment Day 0   Plan   Treatment/Interventions Functional transfer training;LE strengthening/ROM; Therapeutic exercise; Endurance training;Patient/family training;Equipment eval/education; Bed mobility;Gait training;Spoke to nursing;OT   PT Frequency Other (Comment)  (3-5x/wk)   Recommendation   PT Discharge Recommendation Home with skilled therapy   Equipment Recommended [address] [facility] Recommended Wheeled walker   Change/add to [address]?  No   PT - OK to Discharge No   Additional Comments Upon conclusion, pt  was resting OOB on chair w/chair alarm engaged and all needs within reach  Additional Comments [facility] raw score of 19, standardized score of 42 48  As per the data, this score is correlated most closely with a HHPT recommendation  However,the therapist's discharge disposition recommendation may vary as numerous social factors and objective findings contribute to the suggested destination     AM-PAC Basic Mobility Inpatient   Turning in Bed Without Bedrails 4   Lying on Back to Sitting on Edge of Flat Bed 3   Moving Bed to Chair 3   Standing Up From Chair 3   Walk in Room 3   Climb 3-5 Stairs 3   Basic Mobility Inpatient Raw Score 19   Basic Mobility Standardized Score 42 48       Beryl Canela, PT

## 2021-03-18 NOTE — ASSESSMENT & PLAN NOTE
Blood pressure with some readings higher than desired range  Will monitor for now on current regimen   Norvasc, atenolol    Home lisinopril on hold due to BRENDA/CKD

## 2021-03-18 NOTE — ASSESSMENT & PLAN NOTE
No significant rise;   Continue to trend and monitor symptoms  May be related to BRENDA/CKD, possible infection (source unknown at this time) procal is elevated  Will arrange OP stress imaging

## 2021-03-18 NOTE — ASSESSMENT & PLAN NOTE
Troponin 0 05> 0 04> 0 07  No chest pain  EKG nonischemic  Cardiology consult appreciated    Patient will need outpatient stress test

## 2021-03-18 NOTE — NURSING NOTE
RESTING IN BED RESP EASY  NO C/O DIZZINESS  TELE MAINTAINED PACED RYTHYMN   CALL PALA[place] IS NEAR AND 3 RAILS UP

## 2021-03-18 NOTE — PLAN OF CARE
Problem: OCCUPATIONAL THERAPY ADULT  Goal: Performs self-care activities at highest level of function for planned discharge setting  See evaluation for individualized goals  Description: Treatment Interventions: ADL retraining, Functional transfer training, UE strengthening/ROM, Endurance training, Compensatory technique education, Patient/family training(safety awareness )          See flowsheet documentation for full assessment, interventions and recommendations  Note: Limitation: Decreased ADL status, Decreased UE ROM, Decreased UE strength, Decreased cognition, Decreased Safe judgement during ADL, Decreased endurance, Decreased self-care trans, Decreased high-level ADLs  Prognosis: Good  Assessment: Patient is a 78 y o  male seen for OT evaluation at [address] [place] [address] on 3/17/2021  s/p Dizziness  Comorbidities impacting functional performance include: BPH, HTN, BRENDA, and elevated troponin levels  Patient presents with active orders for OT eval and treat and up and OOB as tolerated   Performed at least 2 patient identifiers during session including name and wristband  Patient reports prior level of function as independent  with ADL/IADLs, ambulatory without AD , and lives alone in a 2 story house  with 1 ALIDA  Patient is retired  and does  drive  Upon initial evaluation, patient requires supervision for UB/LB ADLs, and CGA for transfers and functional mobility with RW  Based on functional eval, patient presents A&Ox4 with intact  attention, but impaired safety awareness and memory  Occupational performance is affected by the following deficits: endurance , muscular strength , balance , memory  and awareness   Patient would benefit from OT services to maximize independence in self-care and transfers  Personal factors impacting performance include: decreased caregiver status , ALIDA home  and FOS to second floor   Occupational areas to address include:bathing/showering, toileting, dressing , bed mobility , functional mobility, meal preparation, medication management , safety awareness  and home management   Recommending return to previous environment with skilled therapy upon D/C        OT Discharge Recommendation: Home with skilled therapy  OT - OK to Discharge: Yes(Once medically clear)     GIA Wilson

## 2021-03-18 NOTE — ASSESSMENT & PLAN NOTE
Patient describes multiple episodes of dizziness since Sunday  They usually happen when he gets out of bed and last for about 5 minutes  Also he had witnessed slurred speech on Sunday  Telemetry unremarkable  Pending orthostatic BP  CT head negative  Neurology consult appreciated:  No indication of MRI, pending TSH and vitamin B12, can follow-up with neurology family still concerned  Cardiology consult appreciated; no arrhythmia on telemetry, not cardiac cause of dizziness  PT/OT:  Home with home care    His dizziness significantly improved with IV fluids and antibiotics    Likely to dehydration and UTI

## 2021-03-18 NOTE — OCCUPATIONAL THERAPY NOTE
Occupational Therapy Evaluation      Je Brown    3/18/2021    Patient Active Problem List   Diagnosis    Abnormal nuclear stress test    Basal cell carcinoma of skin    BPH (benign prostatic hyperplasia)    Coronary artery disease involving native coronary artery of native heart without angina pectoris    Type 2 diabetes mellitus with circulatory disorder, without long-term current use of insulin (HCC)    Hypercholesterolemia    Essential hypertension    Osteoporosis    Peripheral arterial disease ([facility] [facility]    Sleep related leg cramps    Anxiety state    Bradycardia    Skin lesion    Constipation    Pacemaker    Mixed hyperlipidemia    BRENDA (acute kidney injury) ([facility] [facility] )    Troponin level elevated    Dizziness       Past Medical History:   Diagnosis Date    Anxiety state 8/5/2018    Basal cell carcinoma of skin 11/15/2016    Chronic obstructive pulmonary disease ([facility] [facility] ) 9/5/2012    Constipation     Coronary artery disease 5/22/2012    Forearm laceration, right, initial encounter 5/28/2020    Hydronephrosis     LAST ASSESSED: 11/18/15    Osteoporosis 5/22/2012    Pacemaker     Trifascicular block        Past Surgical History:   Procedure Laterality Date    CARDIAC PACEMAKER PLACEMENT  11/2009    [employer]tronic dual chamber     CATARACT EXTRACTION W/  INTRAOCULAR LENS IMPLANT      CYSTOSCOPY N/A 5/9/2016    Procedure: CYSTOSCOPY, evacuation of bladder calculi;  Surgeon: Delta Oden MD;  [facility]: [facility];  Service:     INGUINAL HERNIA REPAIR      UNILATERAL    KIDNEY STONE SURGERY      OTHER SURGICAL HISTORY      HERNIA REPAIR AS PER ALLSCRIPTS (ALREADY IN PERTINENT NEGATIVES)    AR TRANSURETHRAL ELEC-SURG PROSTATECTOM N/A 5/9/2016    Procedure: TRANSURETHRAL RESECTION OF PROSTATE (TURP);   Surgeon: Delta Oden MD;  [facility];  Service: Urology    TONSILLECTOMY          03/18/21 7898   OT Last Visit   OT Visit Date 03/18/21   Note Type   Note type Evaluation Restrictions/Precautions   Weight Bearing Precautions Per Order No   Other Precautions Telemetry; Bed Alarm; Fall Risk; Chair Alarm; Cognitive  (Pacemaker )   Pain Assessment   Pain Assessment Tool Pain Assessment not indicated - pt denies pain   Pain Score No Pain   Home Living   Type of Home House   Home Layout Performs ADLs on one level; Able to live on main level with bedroom/bathroom;Stairs to enter without rails; Two level  (1 ALIDA without HR, FFSU)   Bathroom Shower/Tub Tub/Shower   Bathroom Toilet Raised   Bathroom Equipment Grab bars around toilet   [address]   (no use of AD at baseline )   Prior Function   Level of Bartholomew Independent with ADLs and functional mobility   Lives With Alone   ADL Assistance Independent   IADLs Independent   Falls in the last 6 months 0   Vocational Retired   Comments (+) driving    Lifestyle   Autonomy Pt lives alone in 2 story house with 1 ALIDA and first floor set up  Pt reports PLOF as (I) c ADL/IADLs, retired, and does drive; no use of AD at baseline  Reciprocal Relationships 5 adult children    Service to Others retired, used to work in a [address]  Modified independent   [address] [place] [address]   [address] [address]  [address] [place] [address]  5  [address]  [address]  [place]  [address]  5  [phone]  [phone]  Supervision/Setup   Additional Comments Pt limited by decreased strength, balance, and safety awareness     Bed Mobility   Supine to Sit   (DNT: pt seated at EOB upon arrival )   Sit to Supine   (DNT: pt seated OOB in chair at end of session )   Transfers   Sit to Stand   (CGA )   Additional items Assist x 1;Bedrails; Increased time required;Verbal cues; Impulsive   Stand to Sit   (CGA )   Additional items Assist x 1; Armrests; Verbal cues; Impulsive   Additional Comments Pt denied lightheaded/dizziness c positional changes    Functional Mobility   Functional Mobility   (CGA )   Additional Comments Pt ambulated bed>door>chair with no overt LOB, but general instability noted  No s/s of exertion, but decreased safety awareness noted   Additional items Rolling walker   Balance   Static Sitting Good   Dynamic Sitting Fair +   Static Standing Fair   Dynamic Standing Fair -   Activity Tolerance   Activity Tolerance Patient limited by fatigue   Medical Staff Made Aware PT Meg Teran present during session    Nurse Made Aware RN made aware of outcomes    RUE Assessment   RUE Assessment WFL  (Full AROM, MMT 4/5 )   LUE Assessment   LUE Assessment WFL  (Full AROM, MMT 4/5 )   Hand Function   Gross Motor Coordination Functional   Fine Motor Coordination Functional   Sensation   Light Touch No apparent deficits  (pt demonstrates equal B sensation upon evaluation  )   Vision-Basic Assessment   Current Vision Wears glasses only for reading   Vision - Complex Assessment   Acuity Able to read employee name badge without difficulty  (Pt unable to read clock but reports consistent c baseline)   Cognition   Overall Cognitive Status [facility]   Arousal/Participation Alert; Responsive; Cooperative   Attention Attends with cues to redirect   Orientation Level Oriented X4   Memory Decreased recall of precautions;Decreased short term memory  (Pt able to recall 0/3 words per Mini Cog )   Following Commands Follows one step commands with increased time or repetition   Comments Mini-Cog assessment performed today  Version 1 was given  Patient able to recall 0/3 words  Patient able to draw an abnormal clock with the correct time  Total score of test was 1/5 indicating  further screening of cognition is recommended  (Pt agreeable to OT session )   Cognition Assessment Tools Other (Comment)  (Mini Cog )   Score 1   Assessment   Limitation Decreased ADL status; Decreased UE ROM; Decreased UE strength;Decreased cognition;Decreased Safe judgement during ADL;Decreased endurance;Decreased self-care trans;Decreased high-level ADLs   Prognosis Good   Assessment Patient is a 78 y o  male seen for OT evaluation at [address] [place] [address] on 3/17/2021  s/p Dizziness  Comorbidities impacting functional performance include: BPH, HTN, BRENDA, and elevated troponin levels  Patient presents with active orders for OT eval and treat and up and OOB as tolerated   Performed at least 2 patient identifiers during session including name and wristband  Patient reports prior level of function as independent  with ADL/IADLs, ambulatory without AD , and lives alone in a 2 story house  with 1 ALIDA  Patient is retired  and does  drive  Upon initial evaluation, patient requires supervision for UB/LB ADLs, and CGA for transfers and functional mobility with RW  Based on functional eval, patient presents A&Ox4 with intact  attention, but impaired safety awareness and memory  Occupational performance is affected by the following deficits: endurance , muscular strength , balance , memory  and awareness   Patient would benefit from OT services to maximize independence in self-care and transfers  Personal factors impacting performance include: decreased caregiver status , ALIDA home  and FOS to second floor  Occupational areas to address include:bathing/showering, toileting, dressing , bed mobility , functional mobility, meal preparation, medication management , safety awareness  and home management   Recommending return to previous environment with skilled therapy upon D/C  Goals   Patient Goals to go home today    Plan   Treatment Interventions ADL retraining;Functional transfer training;UE strengthening/ROM; Endurance training; Compensatory technique education;Patient/family training  (safety awareness )   Goal Expiration Date 03/28/21   OT Treatment Day 0   OT Frequency 3-5x/wk   Recommendation   OT Discharge Recommendation Home with skilled therapy   OT - OK to Discharge Yes  (Once medically clear)   Additional Comments  Pt seated OOB in chair at end of session; call button in reach and chair alarm activated    Additional Comments 2 The patient's raw score on the AM-PAC Daily Activity inpatient short form is 20, standardized score is 42 03, greater than 39 4  Patients at this level are likely to benefit from discharge to home  Please refer to the recommendation of the Occupational Therapist for safe discharge planning  AM-PAC Daily Activity Inpatient   Lower Body Dressing 3   Bathing 3   Toileting 3   Upper Body Dressing 3   Grooming 4   Eating 4   Daily Activity Raw Score 20   Daily Activity Standardized Score (Calc for Raw Score >=11) 42 03   AM-PAC Applied Cognition Inpatient   Following a Speech/Presentation 4   Understanding Ordinary Conversation 4   Taking Medications 3   Remembering Where Things Are Placed or Put Away 3   Remembering List of 4-5 Errands 3   Taking Care of Complicated Tasks 3   Applied Cognition Raw Score 20   Applied Cognition Standardized Score 41 76   Barthel Index   Feeding 10   Bathing 0   Grooming Score 5   Dressing Score 5   Bladder Score 10   Bowels Score 10   Toilet Use Score 5   Transfers (Bed/Chair) Score 10   Mobility (Level Surface) Score 0   Stairs Score 0  (DNT)   Barthel Index Score 55     Pt will complete UB ADLs Mod independent  with use of AE as needed for increased ADL independence within 10 days  Pt will complete LB ADLs Mod independent   with use of AE as needed for increased ADL independence within 10 days  Pt will complete toileting Mod independent   with use of DME for increased ADL independence within 10 days  Pt will demonstrate proper body mechanics to complete transfers and functional mobility with Mod independent  and use of AD for increased safety and functional mobility within 10 days       Pt will demonstrate standing tolerance of 6 min with Mod independent  and use of AD for increased endurance and activity tolerance within 10 days  Pt will demonstrate OOB sitting tolerance of 2-4 hr/day for increased activity tolerance and engagement within 10 days  Pt will participate in 10 min of BUE therapeutic exercise to increase strength, ROM, and endurance during ADL/IADLs within 10 days  Pt will receive education on use of compensatory memory strategies for increased safety and independent with IADL tasks  Pt will participate in ongoing cognitive assessments to assist with safe D/C planning and recommendations       Pt will demonstrate proper body mechanics and fall prevention strategies during 100% of tx sessions for increased safety awareness during ADL/IADLs      GIA Singh

## 2021-03-18 NOTE — PROGRESS NOTES
[facility]  Progress Note Lisandra Edouard 1940, 78 y o  male MRN: [id number]  Unit/Bed#: -02 Encounter: [id number]  Primary Care Provider: Bunny Wade DO   Date and time admitted to hospital: 3/17/2021 10:23 AM    * Dizziness  Assessment & Plan  Patient describes multiple episodes of dizziness since Sunday  They usually happen when he gets out of bed and last for about 5 minutes  Also he had witnessed slurred speech on Sunday  Telemetry unremarkable  Pending orthostatic BP  CT head negative  Neurology consult appreciated:  No indication of MRI, pending TSH and vitamin B12, can follow-up with neurology family still concerned  Cardiology consult appreciated; no arrhythmia on telemetry, not cardiac cause of dizziness  PT/OT:  Home with home care    His dizziness significantly improved with IV fluids and antibiotics  Likely to dehydration and UTI    UTI (urinary tract infection)  Assessment & Plan  Started on ceftriaxone IV  Pending urine culture    Troponin level elevated  Assessment & Plan  Troponin 0 05> 0 04> 0 07  No chest pain  EKG nonischemic  Cardiology consult appreciated  Patient will need outpatient stress test    BRENDA (acute kidney injury) ([facility] [address] )  Assessment & Plan  BRENDA on CKD stage 3   Creatinine 2 01 on admission  Baseline creatinine around 1 5  Improving with IV fluids  Irepeat BMP a m  Essential hypertension  Assessment & Plan  Well controlled  Continue amlodipine and atenolol  Lisinopril held secondary to BRENDA  Continue to monitor    BPH (benign prostatic hyperplasia)  Assessment & Plan  Continue tamsulosin      VTE Pharmacologic Prophylaxis:   Pharmacologic: Heparin  Mechanical VTE Prophylaxis in Place: Yes    Patient Centered Rounds: I have performed bedside rounds with nursing staff today      Discussions with Specialists or Other Care Team Provider:  Cardiology    Education and Discussions with Family / Patient:  With patient and patient's family present at bedside    Time Spent for Care: 45 minutes  More than 50% of total time spent on counseling and coordination of care as described above  Current Length of Stay: 1 day(s)    Current Patient Status: Inpatient   Certification Statement: The patient will continue to require additional inpatient hospital stay due to IV antibiotics, pending urine culture    Discharge Plan:  Anticipate discharge on 2021    Code Status: Level 1 - Full Code      Subjective:   Patient seen examined bedside  No acute events overnight  He states he feels still dizzy and would like to stay overnight  Objective:     Vitals:   Temp (24hrs), Av 9 °F (36 6 °C), Min:96 8 °F (36 °C), Max:98 8 °F (37 1 °C)    Temp:  [96 8 °F (36 °C)-98 8 °F (37 1 °C)] 96 8 °F (36 °C)  HR:  [60-75] 63  Resp:  [16-22] 22  BP: (120-159)/(66-92) 146/80  SpO2:  [90 %-93 %] 91 %  Body mass index is 26 9 kg/m²  Input and Output Summary (last 24 hours): Intake/Output Summary (Last 24 hours) at 3/18/2021 1214  Last data filed at 3/18/2021 1212  Gross per 24 hour   Intake 4103 33 ml   Output 1400 ml   Net 2703 33 ml       Physical Exam:     Physical Exam  Vitals signs and nursing note reviewed  Constitutional:       General: He is not in acute distress  Appearance: Normal appearance  HENT:      Head: Normocephalic  Nose: Nose normal       Mouth/Throat:      Mouth: Mucous membranes are moist    Eyes:      Conjunctiva/sclera: Conjunctivae normal    Cardiovascular:      Rate and Rhythm: Normal rate  Heart sounds: Normal heart sounds  No murmur  Pulmonary:      Effort: Pulmonary effort is normal  No respiratory distress  Breath sounds: Normal breath sounds  No wheezing  Abdominal:      General: There is no distension  Tenderness: There is no abdominal tenderness  There is no guarding  Musculoskeletal:         General: No swelling  Right lower leg: No edema  Skin:     General: Skin is warm  Neurological:      General: No focal deficit present  Mental Status: He is alert and oriented to person, place, and time  Psychiatric:         Mood and Affect: Mood normal            Additional Data:     Labs:    Results from last 7 days   Lab Units 03/17/21  2147 03/17/21  1110   WBC Thousand/uL  --  9 00   HEMOGLOBIN g/dL  --  13 5*   HEMATOCRIT %  --  40 3*   PLATELETS Thousands/uL 133* 132*   NEUTROS PCT %  --  77*   LYMPHS PCT %  --  10*   MONOS PCT %  --  12   EOS PCT %  --  0     Results from last 7 days   Lab Units 03/18/21  0443   SODIUM mmol/L 139   POTASSIUM mmol/L 3 4*   CHLORIDE mmol/L 99   CO2 mmol/L 27   BUN mg/dL 46*   CREATININE mg/dL 1 81*   ANION GAP mmol/L 13   CALCIUM mg/dL 9 6   ALBUMIN g/dL 4 0   TOTAL BILIRUBIN mg/dL 1 10*   ALK PHOS U/L 69   ALT U/L 31   AST U/L 28   GLUCOSE RANDOM mg/dL 154*     Results from last 7 days   Lab Units 03/17/21  1110   INR  1 05             Results from last 7 days   Lab Units 03/17/21  1647 03/17/21  1110   LACTIC ACID mmol/L  --  1 3   PROCALCITONIN ng/ml 10 65* 12 24*           * I Have Reviewed All Lab Data Listed Above  * Additional Pertinent Lab Tests Reviewed: Andrea 64 Admission Reviewed    Imaging:    Imaging Reports Reviewed Today Include:  None  Imaging Personally Reviewed by Myself Includes:  None    Recent Cultures (last 7 days):     Results from last 7 days   Lab Units 03/17/21  1110   BLOOD CULTURE  Received in Microbiology Lab  Culture in Progress  Received in Microbiology Lab  Culture in Progress         Last 24 Hours Medication List:   Current Facility-Administered Medications   Medication Dose Route Frequency Provider Last Rate    amLODIPine  10 mg Oral Daily Benson Braga MD      aspirin  81 mg Oral Daily Deysi Martini MD      atenolol  50 mg Oral Daily Deysi Martini MD      betaxolol  1 drop Right Eye HS Deysi Martini MD      cefTRIAXone  1,000 mg Intravenous Q24H Benson PEÑA Lenard Ku MD Stopped (03/18/21 3132)    cyclobenzaprine  10 mg Oral BID Richie Randhawa MD      finasteride  5 mg Oral Daily Benson Mcdowell MD      heparin (porcine)  5,000 Units Subcutaneous Q8H Mahendra Vieyra MD      neomycin-polymyxin-dexamethasone  1 drop Both Eyes HS Richie Randhawa MD      pravastatin  20 mg Oral Daily With Efrain Gama MD      sodium chloride  125 mL/hr Intravenous Continuous Richie Randhawa  mL/hr (03/18/21 1212)    tamsulosin  0 4 mg Oral Daily With Efrain Gama MD          Today, Patient Was Seen By: Asia Su MD    ** Please Note: Dictation voice to text software may have been used in the creation of this document   **

## 2021-03-18 NOTE — NURSING NOTE
1901, received from ed  To bed  Contact guard assist to bed  R/a status resp easy 02 sat 93%  Denies pain  Neuro status within normal limits  Call bell given tele applied  No c/o voiced

## 2021-03-18 NOTE — CONSULTS
[facility] 1940, 78 y o  male MRN: [id number]  Unit/Bed#: -02 Encounter: [id number]  Primary Care Provider: Jesika Leong DO   Date and time admitted to hospital: 3/17/2021 10:23 AM    Inpatient consult to Cardiology  Consult performed by: CARROLL Wright  Consult ordered by: Rajiv Conteh MD        Troponin level elevated  Assessment & Plan  No significant rise;   Continue to trend and monitor symptoms  May be related to BRENDA/CKD, possible infection (source unknown at this time) procal is elevated  Will arrange OP stress imaging     BRENDA (acute kidney injury) [facility])  Assessment & Plan  Improving with fluids    Essential hypertension  Assessment & Plan  Blood pressure with some readings higher than desired range  Will monitor for now on current regimen   Norvasc, atenolol  Home lisinopril on hold due to BRENDA/CKD    * Dizziness  Assessment & Plan  Improved this morning  This may be related to orthostasis given that it occurs with position changes  No concerning arrhythmias noted on tele      Other summary comments:   Troponin level noted to be slightly elevated in the setting of acute on chronic kidney disease and suspected infection  There are no signs or symptoms consistent with acute coronary syndrome  Continue to trend troponin  We will arrange for outpatient stress testing  Outpatient Cardiologist: Dr Michael Bran    HPI: Marian Davis is a 78y o  year old male seen in consultation for elevated troponin  Annie Garcia is a pleasant 78-year-old male who has been experiencing episodic dizziness since Sunday  This mostly occurs with position changes  It is difficult to discern if this is more of a spinning or a lightheaded sensation  He states that he is feeling better now  He was also reportedly witnessed to have had episodes of slurred speech      He underwent a video visit with Neurology who felt his symptoms were not consistent with acute stroke  CT of the head was negative  They felt this was due to a toxic metabolic encephalopathy due to acute kidney injury on top of chronic kidney disease  This morning Evelin Perry is seen out of bed in the chair  He denies any chest pain, pressure, tightness, burning, palpitations  He is active at home and lives by himself  He is able to do house work including mowing the grass in the summer and grocery shopping without experiencing symptoms of chest pain or exertional dyspnea  He denies any orthopnea, lower extremity edema, or syncope  Cardiac history is significant for a Medtronic dual-chamber permanent pacemaker for symptomatic bradycardia placed in November 2009  He had an abnormal stress test in 2016 and underwent cardiac catheterization at [facility] showing a 30% mid LAD lesion  He has had subsequent stress testing since that time which remains unchanged  EKG:  3/17/2021 A-sensed, V-paced     MOST  RECENT CARDIAC IMAGING:   Echo 4/25/2019   EF 50 %  There was mild hypokinesis of the distal inferoseptum and apical inferior wall(s)  Mild MR, AI, TR    Cardiac catheterization 08/2016 30% mid LAD lesion    Nuclear stress test 07/2018 small area of inferoapical ischemia, no change since prior in 2016    Review of Systems: a 10 point review of systems was conducted and is negative except for as mentioned in the HPI or as below          Historical Information   Past Medical History:   Diagnosis Date    Anxiety state 8/5/2018    Basal cell carcinoma of skin 11/15/2016    Chronic obstructive pulmonary disease ([facility] [address] ) 9/5/2012    Constipation     Coronary artery disease 5/22/2012    Forearm laceration, right, initial encounter 5/28/2020    Hydronephrosis     LAST ASSESSED: 11/18/15    Osteoporosis 5/22/2012    Pacemaker     Trifascicular block      Past Surgical History:   Procedure Laterality Date    CARDIAC PACEMAKER PLACEMENT  11/2009    Medtronic dual chamber     CATARACT EXTRACTION W/  INTRAOCULAR LENS IMPLANT      CYSTOSCOPY N/A 2016    Procedure: CYSTOSCOPY, evacuation of bladder calculi;  Surgeon: Krish Cole MD;  [facility];  Service:     INGUINAL HERNIA REPAIR      UNILATERAL    KIDNEY STONE SURGERY      OTHER SURGICAL HISTORY      HERNIA REPAIR AS PER [facility] (ALREADY IN PERTINENT NEGATIVES)    WI TRANSURETHRAL ELEC-SURG PROSTATECTOM N/A 2016    Procedure: TRANSURETHRAL RESECTION OF PROSTATE (TURP);   Surgeon: Krish Cole MD;  [facility];  Service: Urology    TONSILLECTOMY       Social History     Substance and Sexual Activity   Alcohol Use Never    Frequency: Never    Comment: BEING A SOCIAL DRINKER AS PER [employer]     Social History     Substance and Sexual Activity   Drug Use No     Social History     Tobacco Use   Smoking Status Former Smoker    Types: Cigars    Quit date: 1980    Years since quittin 9   Smokeless Tobacco Former User   Tobacco Comment    smokes an occ  cigar; hasn't in last 2 weeks; CIGAR (1 A DAY) AS PER [facility]       Family History:   No longer pertinent given patient age    Meds/Allergies   all current active meds have been reviewed  Medications Prior to Admission   Medication    amLODIPine (NORVASC) 10 mg tablet    amLODIPine (NORVASC) 10 mg tablet    aspirin (ECOTRIN LOW STRENGTH) 81 mg EC tablet    atenolol (TENORMIN) 50 mg tablet    Blephamide 10-0 2 % ophthalmic suspension    calcium carbonate (TUMS) 500 mg chewable tablet    Combigan 0 2-0 5 %    cyclobenzaprine (FLEXERIL) 10 mg tablet    finasteride (PROSCAR) 5 mg tablet    glucose blood (ONE TOUCH ULTRA TEST) test strip    lisinopril (ZESTRIL) 40 mg tablet    lisinopril (ZESTRIL) 40 mg tablet    Lumigan 0 01 % ophthalmic drops    magnesium hydroxide (MILK OF MAGNESIA) 400 mg/5 mL oral suspension    Multiple Vitamin (MULTIVITAMIN) capsule    simvastatin (ZOCOR) 10 mg tablet    tamsulosin (FLOMAX) 0 4 mg       No Known Allergies    Objective   Vitals: Blood pressure 149/82, pulse 67, temperature 98 8 °F (37 1 °C), temperature source Temporal, resp  rate 21, height 5' 7" (1 702 m), weight 77 9 kg (171 lb 11 8 oz), SpO2 91 % , Body mass index is 26 9 kg/m² ,   Orthostatic Blood Pressures      Most Recent Value   Blood Pressure  149/82 filed at 03/18/2021 0910   Patient Position - Orthostatic VS  Lying filed at 03/18/2021 0582          Systolic (11BJI), VNH:664 , Min:123 , USH:702     Diastolic (53GMR), GEY:27, Min:66, Max:92    Physical Exam:    General:  Normal appearance in no distress  Eyes:  Anicteric  Oral mucosa:  Moist   Neck:  No JVD  Carotid upstrokes are brisk without bruits  No masses  Chest:  Clear to auscultation and percussion, L subclavian device  Cardiac:  No palpable PMI  Normal S1 and S2  No murmur gallop or rub  Abdomen:  Soft and nontender  No palpable organomegaly or aortic enlargement  Extremities:  No peripheral edema  Musculoskeletal:  Symmetric  Vascular:  Pedal pulses are intact  Neuro:  Grossly symmetric  Psych:  Alert and oriented x3      Lab Results:     Troponins:   Results from last 7 days   Lab Units 03/18/21  0443 03/17/21  2304 03/17/21  1647   TROPONIN I ng/mL 0 07* 0 04* 0 04*     BNP:   Results from last 6 Months   Lab Units 03/17/21  1110   BNP pg/mL 548*       CBC :   Results from last 7 days   Lab Units 03/17/21  2147 03/17/21  1110   WBC Thousand/uL  --  9 00   HEMOGLOBIN g/dL  --  13 5*   HEMATOCRIT %  --  40 3*   MCV fL  --  99   PLATELETS Thousands/uL 133* 132*     TSH:     CMP:   Results from last 7 days   Lab Units 03/18/21  0443 03/17/21  1110   POTASSIUM mmol/L 3 4* 3 8   CHLORIDE mmol/L 99 99   CO2 mmol/L 27 28   BUN mg/dL 46* 49*   CREATININE mg/dL 1 81* 2 01*   AST U/L 28 19   ALT U/L 31 22   EGFR ml/min/1 73sq m 34 30     Lipid Profile:     Coags:   Results from last 7 days   Lab Units 03/17/21  1110   INR  1 05

## 2021-03-18 NOTE — ASSESSMENT & PLAN NOTE
BRENDA on CKD stage 3   Creatinine 2 01 on admission  Baseline creatinine around 1 5  Improving with IV fluids  Irepeat BMP a m

## 2021-03-18 NOTE — PLAN OF CARE
Problem: PHYSICAL THERAPY ADULT  Goal: Performs mobility at highest level of function for planned discharge setting  See evaluation for individualized goals  Description: Treatment/Interventions: Functional transfer training, LE strengthening/ROM, Therapeutic exercise, Endurance training, Patient/family training, Equipment eval/education, Bed mobility, Gait training, Spoke to nursing, OT  Equipment Recommended: Shanda Garcia Walker       See flowsheet documentation for full assessment, interventions and recommendations  Note: Prognosis: Good  Problem List: Decreased strength, Decreased endurance, Impaired balance, Decreased mobility, Decreased coordination, Decreased safety awareness, Impaired judgement  Assessment: Pt is 78 y o  male seen for PT evaluation s/p admit to [address] [place] [address] on 3/17/2021 w/ Dizziness  PT consulted to assess pt's functional mobility and d/c needs  Order placed for PT eval and tx, w/ up and OOB as tolerated order  Comorbidities affecting pt's physical performance at time of assessment include: weakness,dizziness, BRENDA, HTN, elevated troponins  PTA, pt was independent w/ all functional mobility w/ o AD usage  Personal factors affecting pt at time of IE include: stairs to enter home, inability to navigate community distances, inability to navigate level surfaces w/o external assistance, unable to perform dynamic tasks in community, impulsivity, limited insight into impairments, inability to perform IADLs and inability to perform ADLs  Please find objective findings from PT assessment regarding body systems outlined above with impairments and limitations including weakness, impaired balance, decreased endurance, impaired coordination, gait deviations, decreased activity tolerance, decreased functional mobility tolerance, decreased safety awareness, impaired judgement and fall risk  The following objective measures performed on IE also reveal limitations: AM-PAC 6-Clicks: 75/08   Pt's clinical presentation is currently unstable/unpredictable seen in pt's presentation of abnormal lab values, impulsivity, sudden symptomatic onset PTA, ongoing telemetry monitoring  Pt to benefit from continued PT tx to address deficits as defined above and maximize level of functional independent mobility and consistency  From PT/mobility standpoint, recommendation at time of d/c would be Home PT pending progress in order to facilitate return to PLOF  PT Discharge Recommendation: Home with skilled therapy     PT - OK to Discharge: No    See flowsheet documentation for full assessment

## 2021-03-18 NOTE — ASSESSMENT & PLAN NOTE
Improved this morning  This may be related to orthostasis given that it occurs with position changes  No concerning arrhythmias noted on tele

## 2021-03-19 VITALS
OXYGEN SATURATION: 91 % | DIASTOLIC BLOOD PRESSURE: 74 MMHG | WEIGHT: 179.23 LBS | HEART RATE: 75 BPM | BODY MASS INDEX: 28.13 KG/M2 | RESPIRATION RATE: 20 BRPM | TEMPERATURE: 97.7 F | HEIGHT: 67 IN | SYSTOLIC BLOOD PRESSURE: 142 MMHG

## 2021-03-19 LAB
ANION GAP SERPL CALCULATED.3IONS-SCNC: 12 MMOL/L (ref 4–13)
BACTERIA UR CULT: ABNORMAL
BACTERIA UR CULT: ABNORMAL
BUN SERPL-MCNC: 37 MG/DL (ref 7–25)
CALCIUM SERPL-MCNC: 9 MG/DL (ref 8.6–10.5)
CHLORIDE SERPL-SCNC: 104 MMOL/L (ref 98–107)
CO2 SERPL-SCNC: 23 MMOL/L (ref 21–31)
CREAT SERPL-MCNC: 1.59 MG/DL (ref 0.7–1.3)
GFR SERPL CREATININE-BSD FRML MDRD: 40 ML/MIN/1.73SQ M
GLUCOSE SERPL-MCNC: 156 MG/DL (ref 65–99)
POTASSIUM SERPL-SCNC: 3.6 MMOL/L (ref 3.5–5.5)
SODIUM SERPL-SCNC: 139 MMOL/L (ref 134–143)

## 2021-03-19 PROCEDURE — 99239 HOSP IP/OBS DSCHRG MGMT >30: CPT | Performed by: FAMILY MEDICINE

## 2021-03-19 PROCEDURE — 80048 BASIC METABOLIC PNL TOTAL CA: CPT | Performed by: FAMILY MEDICINE

## 2021-03-19 RX ORDER — CEFPODOXIME PROXETIL 100 MG/1
100 TABLET, FILM COATED ORAL 2 TIMES DAILY
Qty: 16 TABLET | Refills: 0 | Status: SHIPPED | OUTPATIENT
Start: 2021-03-19 | End: 2021-03-27

## 2021-03-19 RX ADMIN — SODIUM CHLORIDE 125 ML/HR: 0.9 INJECTION, SOLUTION INTRAVENOUS at 09:18

## 2021-03-19 RX ADMIN — AMLODIPINE BESYLATE 10 MG: 10 TABLET ORAL at 09:23

## 2021-03-19 RX ADMIN — CYCLOBENZAPRINE HYDROCHLORIDE 10 MG: 10 TABLET, FILM COATED ORAL at 09:23

## 2021-03-19 RX ADMIN — FINASTERIDE 5 MG: 5 TABLET, FILM COATED ORAL at 09:23

## 2021-03-19 RX ADMIN — ASPIRIN 81 MG: 81 TABLET, COATED ORAL at 09:23

## 2021-03-19 RX ADMIN — CEFTRIAXONE 1000 MG: 1 INJECTION, SOLUTION INTRAVENOUS at 09:23

## 2021-03-19 RX ADMIN — ATENOLOL 50 MG: 25 TABLET ORAL at 09:23

## 2021-03-19 RX ADMIN — HEPARIN SODIUM 5000 UNITS: 5000 INJECTION INTRAVENOUS; SUBCUTANEOUS at 05:20

## 2021-03-19 NOTE — ASSESSMENT & PLAN NOTE
BRENDA on CKD stage 3  Creatinine 2 01 on admission  Baseline creatinine around 1 5  Resolved with IV fluids

## 2021-03-19 NOTE — DISCHARGE INSTR - AVS FIRST PAGE
Dear Megan Acevedo,     It was our pleasure to care for you here at [facility]  It is our hope that we were always able to exceed the expected standards for your care during your stay  You were hospitalized due to dizziness, urinary tract infection  You were cared for on the medical floor by Karl Rutherford MD with the [facility] who covers for your primary care physician (PCP), Tiffany Rodriguez DO, while you were hospitalized  If you have any questions or concerns related to this hospitalization, you may contact us at [phone]  For follow up as well as any medication refills, we recommend that you follow up with your primary care physician  A registered nurse will reach out to you by phone within a few days after your discharge to answer any additional questions that you may have after going home  However, at this time we provide for you here, the most important instructions / recommendations at discharge:     · Notable Medication Adjustments -   · Continue cefpodoxime for total of 8 days  · Testing Required after Discharge -   · Cardiac stress test  · Important follow up information -   · Follow-up with cardiology  · Other Instructions -   · Follow-up with neurology  · Follow-up with PCP  · Please review this entire after visit summary as additional general instructions including medication list, appointments, activity, diet, any pertinent wound care, and other additional recommendations from your care team that may be provided for you        Sincerely,     Karl Rutherford MD

## 2021-03-19 NOTE — UTILIZATION REVIEW
Initial Clinical Review    Admission: Date/Time/Statement:   Admission Orders (From admission, onward)     Ordered        03/17/21 1451  Inpatient Admission  Once                   Orders Placed This Encounter   Procedures    Inpatient Admission     Standing Status:   Standing     Number of Occurrences:   1     Order Specific Question:   Level of Care     Answer:   Med Surg [16]     Order Specific Question:   Bed request comments     Answer:   tele     Order Specific Question:   Estimated length of stay     Answer:   More than 2 Midnights     Order Specific Question:   Certification     Answer:   I certify that inpatient services are medically necessary for this patient for a duration of greater than two midnights  See H&P and MD Progress Notes for additional information about the patient's course of treatment  ED Arrival Information     Expected Arrival Acuity Means of Arrival Escorted By Service Admission Type    - 3/17/2021 10:18 Emergent Walk-In Friend General Medicine Emergency    Arrival Complaint    Vertigo        Chief Complaint   Patient presents with    Dizziness    Memory Loss     Assessment/Plan:   78 yom to ER from home c/o generalized weakness, intermittent  lightheaded/dizziness; friend states speech slightly off & memory issues, 20# unintentional weight loss  Hx CAD with pacemaker, basal cell carcinoma, HTN, BPH s/p TURP  Presents as above, unsteady gait  Admission work-up showing brenda, elevated tbili, procalcitonin & BNP, +troponin, +u/a  Admitted to inpatient status for dizziness with elevated troponin & BRENDA  Started on IVABT & IVF, neuro & cardio consulted  Per neuro:  Currently he does not appear encephalopathic  Unlikely an underlying dementia process  At this point appears to have been toxic/metabolic encephalopathy from acute brenda  He states he was getting more dizzy this past week than his usual baseline dizziness when standing   sbp in 130s-140s here in standing/laying positions however orthostats not checked at one time and he's been receiving fluids  He says he feels a lot better  1  I am not seeing any clinical indication for mri brain imaging or of cerebrovasculature  2  Recommend testing b12, tsh added to current labs    3/18:  C/o persistent dizziness at rest, tele paced rhythm  Sundowners confusion in evening, reoriented, bed alarm placed  IVABT in progress for +u/a, pending urine culture results  IVF maintained at this time for BRENDA, labs improving on fluids    Per cardio:  Dizziness  · Likely related to infection / mild dehydration  · Improved with IV fluids, and ambulated on the floor without symptoms  Plan  Non-MI troponin elevation  · No ischemic signs or symptoms   · ECG ventricular paced rhythm, and nondiagnostic   · Troponins essentially flat and minimally elevated   · Likely secondary to BRENDA + UTI    ED Triage Vitals   Temperature Pulse Respirations Blood Pressure SpO2   03/17/21 1033 03/17/21 1033 03/17/21 1033 03/17/21 1033 03/17/21 1033   98 7 °F (37 1 °C) 60 16 134/66 98 %      Temp Source Heart Rate Source Patient Position - Orthostatic VS BP Location FiO2 (%)   03/17/21 1033 03/17/21 1033 03/17/21 1033 03/17/21 1033 --   Temporal Monitor Sitting Left arm       Pain Score       03/17/21 1900       No Pain          Wt Readings from Last 1 Encounters:   03/19/21 81 3 kg (179 lb 3 7 oz)     Additional Vital Signs:   03/18/21 1102  98 °F (36 7 °C)  66  16  129/71  --  90 %  None (Room air)  Lying   03/18/21 1101  --  70  17  120/70  --  91 %  None (Room air)  Sitting   03/18/21 1100  --  73  17  129/76  --  91 %  None (Room air)  Standing   03/18/21 0910  --  67  --  149/82  --  --  --  --   03/18/21 0909  --  --  --  149/82  --  --  --  --   03/18/21 0700  98 8 °F (37 1 °C)  67  21  149/82  --  91 %  None (Room air)  Lying   03/18/21 0300  98 5 °F (36 9 °C)  75  18  159/75  108  90 %  None (Room air)  Lying   03/17/21 2300  97 5 °F (36 4 °C)  63  20  145/71  102  90 % None (Room air)  Lying   03/17/21 1906  --  --  --  140/76  --  --  --  Standing - Orthostatic VS   03/17/21 1903  --  60  22  137/72  --  --  --  Sitting - Orthostatic VS   03/17/21 1901  97 5 °F (36 4 °C)  62  22  144/69  --  93 %  None (Room air)  Lying - Orthostatic VS   03/17/21 1715  --  60  22  --  --  93 %  None (Room air)  --     Pertinent Labs/Diagnostic Test Results:   Results from last 7 days   Lab Units 03/17/21  1110   SARS-COV-2  Negative     Results from last 7 days   Lab Units 03/17/21  2147 03/17/21  1110   WBC Thousand/uL  --  9 00   HEMOGLOBIN g/dL  --  13 5*   HEMATOCRIT %  --  40 3*   PLATELETS Thousands/uL 133* 132*   NEUTROS ABS Thousands/µL  --  6 90*     Results from last 7 days   Lab Units 03/19/21 0445 03/18/21 0443 03/17/21  1110   SODIUM mmol/L 139 139 137   POTASSIUM mmol/L 3 6 3 4* 3 8   CHLORIDE mmol/L 104 99 99   CO2 mmol/L 23 27 28   ANION GAP mmol/L 12 13 10   BUN mg/dL 37* 46* 49*   CREATININE mg/dL 1 59* 1 81* 2 01*   EGFR ml/min/1 73sq m 40 34 30   CALCIUM mg/dL 9 0 9 6 9 1   MAGNESIUM mg/dL  --   --  2 4     Results from last 7 days   Lab Units 03/18/21 0443 03/17/21  1110   AST U/L 28 19   ALT U/L 31 22   ALK PHOS U/L 69 52*   TOTAL PROTEIN g/dL 7 5 6 4   ALBUMIN g/dL 4 0 3 6   TOTAL BILIRUBIN mg/dL 1 10* 1 10*     Results from last 7 days   Lab Units 03/19/21 0445 03/18/21  0443 03/17/21  1110   GLUCOSE RANDOM mg/dL 156* 154* 158*     Results from last 7 days   Lab Units 03/18/21  0443 03/17/21  2304 03/17/21  1647 03/17/21  1110   TROPONIN I ng/mL 0 07* 0 04* 0 04* 0 05*     Results from last 7 days   Lab Units 03/17/21  1110   PROTIME seconds 13 6   INR  1 05   PTT seconds 34     Results from last 7 days   Lab Units 03/17/21  1647   TSH 3RD GENERATON uIU/mL 1 860     Results from last 7 days   Lab Units 03/17/21  1647 03/17/21  1110   PROCALCITONIN ng/ml 10 65* 12 24*     Results from last 7 days   Lab Units 03/17/21  1110   LACTIC ACID mmol/L 1 3     Results from last 7 days   Lab Units 03/17/21  1110   BNP pg/mL 548*     Results from last 7 days   Lab Units 03/17/21  1651   CLARITY UA  Slightly Cloudy*   COLOR UA  Yellow   SPEC GRAV UA  1 015   PH UA  5 5   GLUCOSE UA mg/dl Negative   KETONES UA mg/dl Negative   BLOOD UA  2+*   PROTEIN UA mg/dl 1+*   NITRITE UA  Negative   BILIRUBIN UA  Negative   UROBILINOGEN UA E U /dl 0 2   LEUKOCYTES UA  2+*   WBC UA /hpf 20-30*   RBC UA /hpf 1-2   BACTERIA UA /hpf Moderate*   EPITHELIAL CELLS WET PREP /hpf Occasional     Results from last 7 days   Lab Units 03/17/21  1110   INFLUENZA A PCR  Negative   INFLUENZA B PCR  Negative   RSV PCR  Negative     Results from last 7 days   Lab Units 03/17/21  1651 03/17/21  1110   BLOOD CULTURE   --  No Growth at 24 hrs  No Growth at 24 hrs  URINE CULTURE  >100,000 cfu/ml Escherichia coli*  --      3/17  Cxr=  No acute cardiopulmonary disease  Ct head=  No acute intracranial abnormality    Ekg=  AV dual-paced rhythm    Past Medical History:   Diagnosis Date    Anxiety state 8/5/2018    Basal cell carcinoma of skin 11/15/2016    [facility]2012    Constipation     Coronary artery disease 5/22/2012    Forearm laceration, right, initial encounter 5/28/2020    Hydronephrosis     LAST ASSESSED: 11/18/15    Osteoporosis 5/22/2012    Pacemaker     Trifascicular block      Present on Admission:   Essential hypertension    Admitting Diagnosis: TIA (transient ischemic attack) [G45 9]  Dizziness [R42]  Vertigo [R42]  Pacemaker [Z95 0]  Elevated troponin [R77 8]  BRENDA (acute kidney injury) ([facility] ) [N17 9]  Age/Sex: 78 y o  male  Admission Orders:  Orthostatic VS  Measure PVR  Telemetry  Consult cardio  Consult neuro  Pt/ot eval & tx  Scd/foot pumps    Scheduled Medications:  amLODIPine, 10 mg, Oral, Daily  aspirin, 81 mg, Oral, Daily  atenolol, 50 mg, Oral, Daily  betaxolol, 1 drop, Right Eye, HS  cefTRIAXone, 1,000 mg, Intravenous, Q24H  cyclobenzaprine, 10 mg, Oral, BID  finasteride, 5 mg, Oral, Daily  heparin (porcine), 5,000 Units, Subcutaneous, Q8H [address]  neomycin-polymyxin-dexamethasone, 1 drop, Both Eyes, HS  pravastatin, 20 mg, Oral, Daily With Dinner  tamsulosin, 0 4 mg, Oral, Daily With Dinner    Continuous IV Infusions:  sodium chloride, 125 mL/hr, Intravenous, Continuous    Network Utilization Review Department  ATTENTION: Please call with any questions or concerns to [phone] and carefully listen to the prompts so that you are directed to the right person  All voicemails are confidential   Samuel Pronneal all requests for admission clinical reviews, approved or denied determinations and any other requests to dedicated fax number below belonging to the campus where the patient is receiving treatment   List of dedicated fax numbers for the Facilities:  [address] DENIALS (Administrative/Medical Necessity) [phone]   [address] (Maternity/NICU/Pediatrics) [phone]   [address] Adrianne [address] (  Kelly Escudero [facility] [address]) [address] [place] [address] [address] [place] [address] [phone]

## 2021-03-19 NOTE — NURSING NOTE
Awake and alert  Denies c/o dizziness at rest  Tele maintained paced rythym  Lungs clear  R/a status maintained no edema adeq pedals b/l  Call bell given  No c/o voiced scds also maintained ble

## 2021-03-19 NOTE — NURSING NOTE
Resting in bed with bed alarm on  scds removed for a while per pt request  3 rails up call bell near  He questions again, why he cant go home

## 2021-03-19 NOTE — PLAN OF CARE
Problem: Potential for Falls  Goal: Patient will remain free of falls  Description: INTERVENTIONS:  - Assess patient frequently for physical needs  -  Identify cognitive and physical deficits and behaviors that affect risk of falls  -  [place] fall precautions as indicated by assessment   - Educate patient/family on patient safety including physical limitations  - Instruct patient to call for assistance with activity based on assessment  - Modify environment to reduce risk of injury  - Consider OT/PT consult to assist with strengthening/mobility  Outcome: Progressing     Problem: Nutrition/Hydration-ADULT  Goal: Nutrient/Hydration intake appropriate for improving, restoring or maintaining nutritional needs  Description: Monitor and assess patient's nutrition/hydration status for malnutrition  Collaborate with interdisciplinary team and initiate plan and interventions as ordered  Monitor patient's weight and dietary intake as ordered or per policy  Utilize nutrition screening tool and intervene as necessary  Determine patient's food preferences and provide high-protein, high-caloric foods as appropriate       INTERVENTIONS:  - Monitor oral intake, urinary output, labs, and treatment plans  - Assess nutrition and hydration status and recommend course of action  - Evaluate amount of meals eaten  - Assist patient with eating if necessary   - Allow adequate time for meals  - Recommend/ encourage appropriate diets, oral nutritional supplements, and vitamin/mineral supplements  - Order, calculate, and assess calorie counts as needed  - Recommend, monitor, and adjust tube feedings and TPN/PPN based on assessed needs  - Assess need for intravenous fluids  - Provide specific nutrition/hydration education as appropriate  - Include patient/family/caregiver in decisions related to nutrition  Outcome: Progressing     Problem: NEUROSENSORY - ADULT  Goal: Achieves stable or improved neurological status  Description: INTERVENTIONS  - Monitor and report changes in neurological status  - Monitor vital signs such as temperature, blood pressure, glucose, and any other labs ordered   - Initiate measures to prevent increased intracranial pressure  - Monitor for seizure activity and implement precautions if appropriate      Outcome: Progressing     Problem: CARDIOVASCULAR - ADULT  Goal: Maintains optimal cardiac output and hemodynamic stability  Description: INTERVENTIONS:  - Monitor I/O, vital signs and rhythm  - Monitor for S/S and trends of decreased cardiac output  - Administer and titrate ordered vasoactive medications to optimize hemodynamic stability  - Assess quality of pulses, skin color and temperature  - Assess for signs of decreased coronary artery perfusion  - Instruct patient to report change in severity of symptoms  Outcome: Progressing  Goal: Absence of cardiac dysrhythmias or at baseline rhythm  Description: INTERVENTIONS:  - Continuous cardiac monitoring, vital signs, obtain 12 lead EKG if ordered  - Administer antiarrhythmic and heart rate control medications as ordered  - Monitor electrolytes and administer replacement therapy as ordered  Outcome: Progressing     Problem: RESPIRATORY - ADULT  Goal: Achieves optimal ventilation and oxygenation  Description: INTERVENTIONS:  - Assess for changes in respiratory status  - Assess for changes in mentation and behavior  - Position to facilitate oxygenation and minimize respiratory effort  - Oxygen administered by appropriate delivery if ordered  - Initiate smoking cessation education as indicated  - Encourage broncho-pulmonary hygiene including cough, deep breathe, Incentive Spirometry  - Assess the need for suctioning and aspirate as needed  - Assess and instruct to report SOB or any respiratory difficulty  - Respiratory Therapy support as indicated  Outcome: Progressing     Problem: GASTROINTESTINAL - ADULT  Goal: Minimal or absence of nausea and/or vomiting  Description: INTERVENTIONS:  - Administer IV fluids if ordered to ensure adequate hydration  - Maintain NPO status until nausea and vomiting are resolved  - Nasogastric tube if ordered  - Administer ordered antiemetic medications as needed  - Provide nonpharmacologic comfort measures as appropriate  - Advance diet as tolerated, if ordered  - Consider nutrition services referral to assist patient with adequate nutrition and appropriate food choices  Outcome: Progressing  Goal: Maintains or returns to baseline bowel function  Description: INTERVENTIONS:  - Assess bowel function  - Encourage oral fluids to ensure adequate hydration  - Administer IV fluids if ordered to ensure adequate hydration  - Administer ordered medications as needed  - Encourage mobilization and activity  - Consider nutritional services referral to assist patient with adequate nutrition and appropriate food choices  Outcome: Progressing  Goal: Maintains adequate nutritional intake  Description: INTERVENTIONS:  - Monitor percentage of each meal consumed  - Identify factors contributing to decreased intake, treat as appropriate  - Assist with meals as needed  - Monitor I&O, weight, and lab values if indicated  - Obtain nutrition services referral as needed  Outcome: Progressing     Problem: GENITOURINARY - ADULT  Goal: Maintains or returns to baseline urinary function  Description: INTERVENTIONS:  - Assess urinary function  - Encourage oral fluids to ensure adequate hydration if ordered  - Administer IV fluids as ordered to ensure adequate hydration  - Administer ordered medications as needed  - Offer frequent toileting  - Follow urinary retention protocol if ordered  Outcome: Progressing  Goal: Absence of urinary retention  Description: INTERVENTIONS:  - Assess patients ability to void and empty bladder  - Monitor I/O  - Bladder scan as needed  - Discuss with physician/AP medications to alleviate retention as needed  - Discuss catheterization for long term situations as appropriate  Outcome: Progressing     Problem: METABOLIC, FLUID AND ELECTROLYTES - ADULT  Goal: Electrolytes maintained within normal limits  Description: INTERVENTIONS:  - Monitor labs and assess patient for signs and symptoms of electrolyte imbalances  - Administer electrolyte replacement as ordered  - Monitor response to electrolyte replacements, including repeat lab results as appropriate  - Instruct patient on fluid and nutrition as appropriate  Outcome: Progressing  Goal: Fluid balance maintained  Description: INTERVENTIONS:  - Monitor labs   - Monitor I/O and WT  - Instruct patient on fluid and nutrition as appropriate  - Assess for signs & symptoms of volume excess or deficit  Outcome: Progressing  Goal: Glucose maintained within target range  Description: INTERVENTIONS:  - Monitor Blood Glucose as ordered  - Assess for signs and symptoms of hyperglycemia and hypoglycemia  - Administer ordered medications to maintain glucose within target range  - Assess nutritional intake and initiate nutrition service referral as needed  Outcome: Progressing     Problem: SKIN/TISSUE INTEGRITY - ADULT  Goal: Skin integrity remains intact  Description: INTERVENTIONS  - Identify patients at risk for skin breakdown  - Assess and monitor skin integrity  - Assess and monitor nutrition and hydration status  - Monitor labs (i e  albumin)  - Assess for incontinence   - Turn and reposition patient  - Assist with mobility/ambulation  - Relieve pressure over bony prominences  - Avoid friction and shearing  - Provide appropriate hygiene as needed including keeping skin clean and dry  - Evaluate need for skin moisturizer/barrier cream  - Collaborate with interdisciplinary team (i e  Nutrition, Rehabilitation, etc )   - Patient/family teaching  Outcome: Progressing  Goal: Oral mucous membranes remain intact  Description: INTERVENTIONS  - Assess oral mucosa and hygiene practices  - Implement preventative oral hygiene regimen  - Implement oral medicated treatments as ordered  - Initiate Nutrition services referral as needed  Outcome: Progressing     Problem: HEMATOLOGIC - ADULT  Goal: Maintains hematologic stability  Description: INTERVENTIONS  - Assess for signs and symptoms of bleeding or hemorrhage  - Monitor labs  - Administer supportive blood products/factors as ordered and appropriate  Outcome: Progressing     Problem: MUSCULOSKELETAL - ADULT  Goal: Maintain or return mobility to safest level of function  Description: INTERVENTIONS:  - Assess patient's ability to carry out ADLs; assess patient's baseline for ADL function and identify physical deficits which impact ability to perform ADLs (bathing, care of mouth/teeth, toileting, grooming, dressing, etc )  - Assess/evaluate cause of self-care deficits   - Assess range of motion  - Assess patient's mobility  - Assess patient's need for assistive devices and provide as appropriate  - Encourage maximum independence but intervene and supervise when necessary  - Involve family in performance of ADLs  - Assess for home care needs following discharge   - Consider OT consult to assist with ADL evaluation and planning for discharge  - Provide patient education as appropriate  Outcome: Progressing  Goal: Maintain proper alignment of affected body part  Description: INTERVENTIONS:  - Support, maintain and protect limb and body alignment  - Provide patient/ family with appropriate education  Outcome: Progressing     Problem: PAIN - ADULT  Goal: Verbalizes/displays adequate comfort level or baseline comfort level  Description: Interventions:  - Encourage patient to monitor pain and request assistance  - Assess pain using appropriate pain scale  - Administer analgesics based on type and severity of pain and evaluate response  - Implement non-pharmacological measures as appropriate and evaluate response  - Consider cultural and social influences on pain and pain management  - Notify physician/advanced practitioner if interventions unsuccessful or patient reports new pain  Outcome: Progressing     Problem: INFECTION - ADULT  Goal: Absence or prevention of progression during hospitalization  Description: INTERVENTIONS:  - Assess and monitor for signs and symptoms of infection  - Monitor lab/diagnostic results  - Monitor all insertion sites, i e  indwelling lines, tubes, and drains  - Monitor endotracheal if appropriate and nasal secretions for changes in amount and color  - [place] appropriate cooling/warming therapies per order  - Administer medications as ordered  - Instruct and encourage patient and family to use good hand hygiene technique  - Identify and instruct in appropriate isolation precautions for identified infection/condition  Outcome: Progressing  Goal: Absence of fever/infection during neutropenic period  Description: INTERVENTIONS:  - Monitor WBC    Outcome: Progressing     Problem: SAFETY ADULT  Goal: Patient will remain free of falls  Description: INTERVENTIONS:  - Assess patient frequently for physical needs  -  Identify cognitive and physical deficits and behaviors that affect risk of falls    -  [place] fall precautions as indicated by assessment   - Educate patient/family on patient safety including physical limitations  - Instruct patient to call for assistance with activity based on assessment  - Modify environment to reduce risk of injury  - Consider OT/PT consult to assist with strengthening/mobility  Outcome: Progressing  Goal: Maintain or return to baseline ADL function  Description: INTERVENTIONS:  -  Assess patient's ability to carry out ADLs; assess patient's baseline for ADL function and identify physical deficits which impact ability to perform ADLs (bathing, care of mouth/teeth, toileting, grooming, dressing, etc )  - Assess/evaluate cause of self-care deficits   - Assess range of motion  - Assess patient's mobility; develop plan if impaired  - Assess patient's need for assistive devices and provide as appropriate  - Encourage maximum independence but intervene and supervise when necessary  - Involve family in performance of ADLs  - Assess for home care needs following discharge   - Consider OT consult to assist with ADL evaluation and planning for discharge  - Provide patient education as appropriate  Outcome: Progressing  Goal: Maintain or return mobility status to optimal level  Description: INTERVENTIONS:  - Assess patient's baseline mobility status (ambulation, transfers, stairs, etc )    - Identify cognitive and physical deficits and behaviors that affect mobility  - Identify mobility aids required to assist with transfers and/or ambulation (gait belt, sit-to-stand, lift, walker, cane, etc )  - [place] fall precautions as indicated by assessment  - Record patient progress and toleration of activity level on Mobility SBAR; progress patient to next Phase/Stage  - Instruct patient to call for assistance with activity based on assessment  - Consider rehabilitation consult to assist with strengthening/weightbearing, etc   Outcome: Progressing     Problem: DISCHARGE PLANNING  Goal: Discharge to home or other facility with appropriate resources  Description: INTERVENTIONS:  - Identify barriers to discharge w/patient and caregiver  - Arrange for needed discharge resources and transportation as appropriate  - Identify discharge learning needs (meds, wound care, etc )  - Arrange for interpretive services to assist at discharge as needed  - Refer to Case Management Department for coordinating discharge planning if the patient needs post-hospital services based on physician/advanced practitioner order or complex needs related to functional status, cognitive ability, or social support system  Outcome: Progressing     Problem: Knowledge Deficit  Goal: Patient/family/caregiver demonstrates understanding of disease process, treatment plan, medications, and discharge instructions  Description: Complete learning assessment and assess knowledge base    Interventions:  - Provide teaching at level of understanding  - Provide teaching via preferred learning methods  Outcome: Progressing

## 2021-03-19 NOTE — PLAN OF CARE
Problem: Potential for Falls  Goal: Patient will remain free of falls  Description: INTERVENTIONS:  - Assess patient frequently for physical needs  -  Identify cognitive and physical deficits and behaviors that affect risk of falls  -  [place] fall precautions as indicated by assessment   - Educate patient/family on patient safety including physical limitations  - Instruct patient to call for assistance with activity based on assessment  - Modify environment to reduce risk of injury  - Consider OT/PT consult to assist with strengthening/mobility  Outcome: Progressing     Problem: Nutrition/Hydration-ADULT  Goal: Nutrient/Hydration intake appropriate for improving, restoring or maintaining nutritional needs  Description: Monitor and assess patient's nutrition/hydration status for malnutrition  Collaborate with interdisciplinary team and initiate plan and interventions as ordered  Monitor patient's weight and dietary intake as ordered or per policy  Utilize nutrition screening tool and intervene as necessary  Determine patient's food preferences and provide high-protein, high-caloric foods as appropriate       INTERVENTIONS:  - Monitor oral intake, urinary output, labs, and treatment plans  - Assess nutrition and hydration status and recommend course of action  - Evaluate amount of meals eaten  - Assist patient with eating if necessary   - Allow adequate time for meals  - Recommend/ encourage appropriate diets, oral nutritional supplements, and vitamin/mineral supplements  - Order, calculate, and assess calorie counts as needed  - Recommend, monitor, and adjust tube feedings and TPN/PPN based on assessed needs  - Assess need for intravenous fluids  - Provide specific nutrition/hydration education as appropriate  - Include patient/family/caregiver in decisions related to nutrition  Outcome: Progressing     Problem: NEUROSENSORY - ADULT  Goal: Achieves stable or improved neurological status  Description: INTERVENTIONS  - Monitor and report changes in neurological status  - Monitor vital signs such as temperature, blood pressure, glucose, and any other labs ordered   - Initiate measures to prevent increased intracranial pressure  - Monitor for seizure activity and implement precautions if appropriate      Outcome: Progressing     Problem: CARDIOVASCULAR - ADULT  Goal: Maintains optimal cardiac output and hemodynamic stability  Description: INTERVENTIONS:  - Monitor I/O, vital signs and rhythm  - Monitor for S/S and trends of decreased cardiac output  - Administer and titrate ordered vasoactive medications to optimize hemodynamic stability  - Assess quality of pulses, skin color and temperature  - Assess for signs of decreased coronary artery perfusion  - Instruct patient to report change in severity of symptoms  Outcome: Progressing  Goal: Absence of cardiac dysrhythmias or at baseline rhythm  Description: INTERVENTIONS:  - Continuous cardiac monitoring, vital signs, obtain 12 lead EKG if ordered  - Administer antiarrhythmic and heart rate control medications as ordered  - Monitor electrolytes and administer replacement therapy as ordered  Outcome: Progressing     Problem: RESPIRATORY - ADULT  Goal: Achieves optimal ventilation and oxygenation  Description: INTERVENTIONS:  - Assess for changes in respiratory status  - Assess for changes in mentation and behavior  - Position to facilitate oxygenation and minimize respiratory effort  - Oxygen administered by appropriate delivery if ordered  - Initiate smoking cessation education as indicated  - Encourage broncho-pulmonary hygiene including cough, deep breathe, Incentive Spirometry  - Assess the need for suctioning and aspirate as needed  - Assess and instruct to report SOB or any respiratory difficulty  - Respiratory Therapy support as indicated  Outcome: Progressing     Problem: GASTROINTESTINAL - ADULT  Goal: Minimal or absence of nausea and/or vomiting  Description: INTERVENTIONS:  - Administer IV fluids if ordered to ensure adequate hydration  - Maintain NPO status until nausea and vomiting are resolved  - Nasogastric tube if ordered  - Administer ordered antiemetic medications as needed  - Provide nonpharmacologic comfort measures as appropriate  - Advance diet as tolerated, if ordered  - Consider nutrition services referral to assist patient with adequate nutrition and appropriate food choices  Outcome: Progressing  Goal: Maintains or returns to baseline bowel function  Description: INTERVENTIONS:  - Assess bowel function  - Encourage oral fluids to ensure adequate hydration  - Administer IV fluids if ordered to ensure adequate hydration  - Administer ordered medications as needed  - Encourage mobilization and activity  - Consider nutritional services referral to assist patient with adequate nutrition and appropriate food choices  Outcome: Progressing  Goal: Maintains adequate nutritional intake  Description: INTERVENTIONS:  - Monitor percentage of each meal consumed  - Identify factors contributing to decreased intake, treat as appropriate  - Assist with meals as needed  - Monitor I&O, weight, and lab values if indicated  - Obtain nutrition services referral as needed  Outcome: Progressing     Problem: GENITOURINARY - ADULT  Goal: Maintains or returns to baseline urinary function  Description: INTERVENTIONS:  - Assess urinary function  - Encourage oral fluids to ensure adequate hydration if ordered  - Administer IV fluids as ordered to ensure adequate hydration  - Administer ordered medications as needed  - Offer frequent toileting  - Follow urinary retention protocol if ordered  Outcome: Progressing  Goal: Absence of urinary retention  Description: INTERVENTIONS:  - Assess patients ability to void and empty bladder  - Monitor I/O  - Bladder scan as needed  - Discuss with physician/AP medications to alleviate retention as needed  - Discuss catheterization for long term situations as appropriate  Outcome: Progressing     Problem: METABOLIC, FLUID AND ELECTROLYTES - ADULT  Goal: Electrolytes maintained within normal limits  Description: INTERVENTIONS:  - Monitor labs and assess patient for signs and symptoms of electrolyte imbalances  - Administer electrolyte replacement as ordered  - Monitor response to electrolyte replacements, including repeat lab results as appropriate  - Instruct patient on fluid and nutrition as appropriate  Outcome: Progressing  Goal: Fluid balance maintained  Description: INTERVENTIONS:  - Monitor labs   - Monitor I/O and WT  - Instruct patient on fluid and nutrition as appropriate  - Assess for signs & symptoms of volume excess or deficit  Outcome: Progressing  Goal: Glucose maintained within target range  Description: INTERVENTIONS:  - Monitor Blood Glucose as ordered  - Assess for signs and symptoms of hyperglycemia and hypoglycemia  - Administer ordered medications to maintain glucose within target range  - Assess nutritional intake and initiate nutrition service referral as needed  Outcome: Progressing     Problem: SKIN/TISSUE INTEGRITY - ADULT  Goal: Skin integrity remains intact  Description: INTERVENTIONS  - Identify patients at risk for skin breakdown  - Assess and monitor skin integrity  - Assess and monitor nutrition and hydration status  - Monitor labs (i e  albumin)  - Assess for incontinence   - Turn and reposition patient  - Assist with mobility/ambulation  - Relieve pressure over bony prominences  - Avoid friction and shearing  - Provide appropriate hygiene as needed including keeping skin clean and dry  - Evaluate need for skin moisturizer/barrier cream  - Collaborate with interdisciplinary team (i e  Nutrition, Rehabilitation, etc )   - Patient/family teaching  Outcome: Progressing  Goal: Oral mucous membranes remain intact  Description: INTERVENTIONS  - Assess oral mucosa and hygiene practices  - Implement preventative oral hygiene regimen  - Implement oral medicated treatments as ordered  - Initiate Nutrition services referral as needed  Outcome: Progressing     Problem: HEMATOLOGIC - ADULT  Goal: Maintains hematologic stability  Description: INTERVENTIONS  - Assess for signs and symptoms of bleeding or hemorrhage  - Monitor labs  - Administer supportive blood products/factors as ordered and appropriate  Outcome: Progressing     Problem: MUSCULOSKELETAL - ADULT  Goal: Maintain or return mobility to safest level of function  Description: INTERVENTIONS:  - Assess patient's ability to carry out ADLs; assess patient's baseline for ADL function and identify physical deficits which impact ability to perform ADLs (bathing, care of mouth/teeth, toileting, grooming, dressing, etc )  - Assess/evaluate cause of self-care deficits   - Assess range of motion  - Assess patient's mobility  - Assess patient's need for assistive devices and provide as appropriate  - Encourage maximum independence but intervene and supervise when necessary  - Involve family in performance of ADLs  - Assess for home care needs following discharge   - Consider OT consult to assist with ADL evaluation and planning for discharge  - Provide patient education as appropriate  Outcome: Progressing  Goal: Maintain proper alignment of affected body part  Description: INTERVENTIONS:  - Support, maintain and protect limb and body alignment  - Provide patient/ family with appropriate education  Outcome: Progressing     Problem: PAIN - ADULT  Goal: Verbalizes/displays adequate comfort level or baseline comfort level  Description: Interventions:  - Encourage patient to monitor pain and request assistance  - Assess pain using appropriate pain scale  - Administer analgesics based on type and severity of pain and evaluate response  - Implement non-pharmacological measures as appropriate and evaluate response  - Consider cultural and social influences on pain and pain management  - Notify physician/advanced practitioner if interventions unsuccessful or patient reports new pain  Outcome: Progressing     Problem: INFECTION - ADULT  Goal: Absence or prevention of progression during hospitalization  Description: INTERVENTIONS:  - Assess and monitor for signs and symptoms of infection  - Monitor lab/diagnostic results  - Monitor all insertion sites, i e  indwelling lines, tubes, and drains  - Monitor endotracheal if appropriate and nasal secretions for changes in amount and color  - [place] appropriate cooling/warming therapies per order  - Administer medications as ordered  - Instruct and encourage patient and family to use good hand hygiene technique  - Identify and instruct in appropriate isolation precautions for identified infection/condition  Outcome: Progressing  Goal: Absence of fever/infection during neutropenic period  Description: INTERVENTIONS:  - Monitor WBC    Outcome: Progressing     Problem: SAFETY ADULT  Goal: Patient will remain free of falls  Description: INTERVENTIONS:  - Assess patient frequently for physical needs  -  Identify cognitive and physical deficits and behaviors that affect risk of falls    -  [place] fall precautions as indicated by assessment   - Educate patient/family on patient safety including physical limitations  - Instruct patient to call for assistance with activity based on assessment  - Modify environment to reduce risk of injury  - Consider OT/PT consult to assist with strengthening/mobility  Outcome: Progressing  Goal: Maintain or return to baseline ADL function  Description: INTERVENTIONS:  -  Assess patient's ability to carry out ADLs; assess patient's baseline for ADL function and identify physical deficits which impact ability to perform ADLs (bathing, care of mouth/teeth, toileting, grooming, dressing, etc )  - Assess/evaluate cause of self-care deficits   - Assess range of motion  - Assess patient's mobility; develop plan if impaired  - Assess patient's need for assistive devices and provide as appropriate  - Encourage maximum independence but intervene and supervise when necessary  - Involve family in performance of ADLs  - Assess for home care needs following discharge   - Consider OT consult to assist with ADL evaluation and planning for discharge  - Provide patient education as appropriate  Outcome: Progressing  Goal: Maintain or return mobility status to optimal level  Description: INTERVENTIONS:  - Assess patient's baseline mobility status (ambulation, transfers, stairs, etc )    - Identify cognitive and physical deficits and behaviors that affect mobility  - Identify mobility aids required to assist with transfers and/or ambulation (gait belt, sit-to-stand, lift, walker, cane, etc )  - [place] fall precautions as indicated by assessment  - Record patient progress and toleration of activity level on Mobility SBAR; progress patient to next Phase/Stage  - Instruct patient to call for assistance with activity based on assessment  - Consider rehabilitation consult to assist with strengthening/weightbearing, etc   Outcome: Progressing     Problem: DISCHARGE PLANNING  Goal: Discharge to home or other facility with appropriate resources  Description: INTERVENTIONS:  - Identify barriers to discharge w/patient and caregiver  - Arrange for needed discharge resources and transportation as appropriate  - Identify discharge learning needs (meds, wound care, etc )  - Arrange for interpretive services to assist at discharge as needed  - Refer to Case Management Department for coordinating discharge planning if the patient needs post-hospital services based on physician/advanced practitioner order or complex needs related to functional status, cognitive ability, or social support system  Outcome: Progressing     Problem: Knowledge Deficit  Goal: Patient/family/caregiver demonstrates understanding of disease process, treatment plan, medications, and discharge instructions  Description: Complete learning assessment and assess knowledge base    Interventions:  - Provide teaching at level of understanding  - Provide teaching via preferred learning methods  Outcome: Progressing

## 2021-03-19 NOTE — DISCHARGE SUMMARY
[facility]  Discharge- Viviana Brown 1940, 78 y o  male MRN: [id number]  Unit/Bed#: -02 Encounter: [id number]  Primary Care Provider: Hazel Guadarrama DO   Date and time admitted to hospital: 3/17/2021 10:23 AM    * Dizziness  Assessment & Plan  Patient describes multiple episodes of dizziness since Sunday  They usually happen when he gets out of bed and last for about 5 minutes  Also he had witnessed slurred speech on Sunday  Telemetry unremarkable  CT head negative  Neurology consult appreciated:  No indication of MRI, pending TSH and vitamin B12, can follow-up with neurology family still concerned  Cardiology consult appreciated; no arrhythmia on telemetry, not cardiac cause of dizziness  PT/OT:  Home with home care  Resolved    His dizziness significantly improved with IV fluids and antibiotics  Likely to dehydration and UTI    UTI (urinary tract infection)  Assessment & Plan   on ceftriaxone IV  Urine culture: More than 100 K coli  Will switch to cefpodoxime total of 10 days    Troponin level elevated  Assessment & Plan  Troponin 0 05> 0 04> 0 07  No chest pain  EKG nonischemic  Cardiology consult appreciated  Patient will need outpatient stress test    BRENDA (acute kidney injury) ([facility] [address] )  Assessment & Plan  BRNEDA on CKD stage 3  Creatinine 2 01 on admission  Baseline creatinine around 1 5  Resolved with IV fluids    Essential hypertension  Assessment & Plan  Well controlled  Continue amlodipine and atenolol    Will restart lisinopril  Continue to monitor    BPH (benign prostatic hyperplasia)  Assessment & Plan  Continue tamsulosin      Discharging Physician / Practitioner: Marta Chery MD  PCP: Hazel Guadarrama DO  Admission Date:   Admission Orders (From admission, onward)     Ordered        03/17/21 1451  Inpatient Admission  Once                   Discharge Date: 03/19/21    Resolved Problems  Date Reviewed: 3/18/2021    None          Consultations During Hospital Stay:  · Neurology, Cardiology    Procedures Performed:   · None    Significant Findings / Test Results:   · As above    Incidental Findings:   · None     Test Results Pending at Discharge (will require follow up):   · Urine culture sensitivities     Outpatient Tests Requested:  · None    Complications:  None    Reason for Admission:  Dizziness    Hospital Course:   CT scan of the head negative  Neurology was consulted  No need for MRI unlikely neurological cause of dizziness  Cardiology was consulted  Telemetry unremarkable  Unlikely cardiac cause of dizziness  Patient had elevated troponin  Without chest pain  He will need outpatient stress test  Patient was found to have UTI and BRENDA  He was treated with IV antibiotics and IV fluids     His dizziness resolved  He will be switched to cefpodoxime for total of 10 days     Was evaluated by Physical therapy and home with home care was advised  Please see above list of diagnoses and related plan for additional information  Condition at Discharge: good     Discharge Day Visit / Exam:     Subjective:  Patient seen examined bedside  No acute events overnight  Denies any chest pain, shortness of breath, dizziness, headache  Vitals: Blood Pressure: 142/74 (03/19/21 1051)  Pulse: 75 (03/19/21 1051)  Temperature: 97 7 °F (36 5 °C) (03/19/21 1051)  Temp Source: Temporal (03/19/21 1051)  Respirations: 20 (03/19/21 1051)  Height: 5' 7" (170 2 cm) (03/17/21 1033)  Weight - Scale: 81 3 kg (179 lb 3 7 oz) (03/19/21 0543)  SpO2: 91 % (03/19/21 1051)  Exam:   Physical Exam  Vitals signs and nursing note reviewed  Constitutional:       General: He is not in acute distress  Appearance: Normal appearance  HENT:      Head: Normocephalic  Nose: Nose normal       Mouth/Throat:      Mouth: Mucous membranes are moist    Eyes:      Conjunctiva/sclera: Conjunctivae normal    Cardiovascular:      Rate and Rhythm: Normal rate        Heart sounds: Normal heart sounds  No murmur  Pulmonary:      Effort: Pulmonary effort is normal  No respiratory distress  Breath sounds: Normal breath sounds  No wheezing  Abdominal:      General: There is no distension  Tenderness: There is no abdominal tenderness  There is no guarding  Musculoskeletal:         General: No swelling  Right lower leg: No edema  Skin:     General: Skin is warm  Neurological:      General: No focal deficit present  Mental Status: He is alert and oriented to person, place, and time  Psychiatric:         Mood and Affect: Mood normal          Discussion with Family:  With patient's son wife    Discharge instructions/Information to patient and family:   See after visit summary for information provided to patient and family  Provisions for Follow-Up Care:  See after visit summary for information related to follow-up care and any pertinent home health orders  Disposition:     Home with VNA Services (Reminder: Complete face to face encounter)    For Discharges to [facility] SNF:   · Not Applicable to this Patient - Not Applicable to this Patient    Planned Readmission:  No     Discharge Statement:  I spent 35 minutes discharging the patient  This time was spent on the day of discharge  I had direct contact with the patient on the day of discharge  Greater than 50% of the total time was spent examining patient, answering all patient questions, arranging and discussing plan of care with patient as well as directly providing post-discharge instructions  Additional time then spent on discharge activities  Discharge Medications:  See after visit summary for reconciled discharge medications provided to patient and family        ** Please Note: This note has been constructed using a voice recognition system **

## 2021-03-19 NOTE — CASE MANAGEMENT
Cm met with the pt on 3/18/2021 and mad him aware of cm role, pt lives alone in a 2 story with a basement and he does not use, 1 step outside and 12-14 steps each level insdie, pt drives and is independent, RX plan [facility] [place], DME: none, HHC non, pt denies smoking and alcohol A post acute care recommendation was made by your care team for [facility] AT [facility]  Discussed [place] of Choice with patient  List of agencies given to patient via in person  patient aware the list is custom filtered for them by zip code location and that [facility] post acute providers are designated  Pt's choice was [facility], referral was sent and he was accepted , cm also made him aware a walker was recommended, he declined, pt's son came to transport the pt home, I reviewed d/c plan with him and I made him aware that therapy recommended a walker, he stated they have walkers at home, pt and son are in agreement with the d/c  And d/c plan home with [facility], CM reviewed d/c planning process including the following: identifying help at home, patient preference for d/c planning needs, availability of treatment team to discuss questions or concerns patient and/or family may have regarding understanding medications and recognizing signs and symptoms once discharged  CM also encouraged patient to follow up with all recommended appointments after discharge  Patient advised of importance for patient and family to participate in managing patients medical well being

## 2021-03-19 NOTE — ASSESSMENT & PLAN NOTE
Patient describes multiple episodes of dizziness since Sunday  They usually happen when he gets out of bed and last for about 5 minutes  Also he had witnessed slurred speech on Sunday  Telemetry unremarkable  CT head negative  Neurology consult appreciated:  No indication of MRI, pending TSH and vitamin B12, can follow-up with neurology family still concerned  Cardiology consult appreciated; no arrhythmia on telemetry, not cardiac cause of dizziness  PT/OT:  Home with home care  Resolved    His dizziness significantly improved with IV fluids and antibiotics    Likely to dehydration and UTI

## 2021-03-19 NOTE — ASSESSMENT & PLAN NOTE
on ceftriaxone IV  Urine culture:   More than 100 K coli  Will switch to cefpodoxime total of 10 days

## 2021-03-22 ENCOUNTER — REMOTE DEVICE CLINIC VISIT (OUTPATIENT)
Dept: CARDIOLOGY CLINIC | Facility: CLINIC | Age: 81
End: 2021-03-22
Payer: COMMERCIAL

## 2021-03-22 DIAGNOSIS — Z45.010 ENCOUNTER FOR CHECKING AND TESTING OF CARDIAC PACEMAKER PULSE GENERATOR (BATTERY): ICD-10-CM

## 2021-03-22 DIAGNOSIS — I49.5 SICK SINUS SYNDROME (HCC): Primary | ICD-10-CM

## 2021-03-22 LAB
BACTERIA BLD CULT: NORMAL
BACTERIA BLD CULT: NORMAL

## 2021-03-22 PROCEDURE — 93294 REM INTERROG EVL PM/LDLS PM: CPT | Performed by: INTERNAL MEDICINE

## 2021-03-22 PROCEDURE — 93296 REM INTERROG EVL PM/IDS: CPT | Performed by: INTERNAL MEDICINE

## 2021-03-25 ENCOUNTER — RA CDI HCC (OUTPATIENT)
Dept: OTHER | Facility: HOSPITAL | Age: 81
End: 2021-03-25

## 2021-03-25 NOTE — PROGRESS NOTES
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

## 2021-03-31 ENCOUNTER — OFFICE VISIT (OUTPATIENT)
Dept: INTERNAL MEDICINE CLINIC | Facility: CLINIC | Age: 81
End: 2021-03-31
Payer: COMMERCIAL

## 2021-03-31 VITALS
DIASTOLIC BLOOD PRESSURE: 76 MMHG | TEMPERATURE: 99.7 F | BODY MASS INDEX: 27.31 KG/M2 | OXYGEN SATURATION: 95 % | WEIGHT: 174 LBS | HEIGHT: 67 IN | HEART RATE: 82 BPM | RESPIRATION RATE: 14 BRPM | SYSTOLIC BLOOD PRESSURE: 114 MMHG

## 2021-03-31 DIAGNOSIS — E11.59 TYPE 2 DIABETES MELLITUS WITH OTHER CIRCULATORY COMPLICATION, WITHOUT LONG-TERM CURRENT USE OF INSULIN (HCC): Primary | ICD-10-CM

## 2021-03-31 DIAGNOSIS — Z01.00 DIABETIC EYE EXAM (HCC): ICD-10-CM

## 2021-03-31 DIAGNOSIS — D69.6 PLATELETS DECREASED (HCC): ICD-10-CM

## 2021-03-31 DIAGNOSIS — E11.9 DIABETIC EYE EXAM (HCC): ICD-10-CM

## 2021-03-31 DIAGNOSIS — I73.9 PERIPHERAL ARTERIAL DISEASE (HCC): ICD-10-CM

## 2021-03-31 DIAGNOSIS — N18.31 STAGE 3A CHRONIC KIDNEY DISEASE (HCC): ICD-10-CM

## 2021-03-31 DIAGNOSIS — H81.10 BENIGN PAROXYSMAL POSITIONAL VERTIGO, UNSPECIFIED LATERALITY: ICD-10-CM

## 2021-03-31 LAB — SL AMB POCT HEMOGLOBIN AIC: 6.7 (ref ?–6.5)

## 2021-03-31 PROCEDURE — 83036 HEMOGLOBIN GLYCOSYLATED A1C: CPT | Performed by: INTERNAL MEDICINE

## 2021-03-31 PROCEDURE — 99214 OFFICE O/P EST MOD 30 MIN: CPT | Performed by: INTERNAL MEDICINE

## 2021-03-31 RX ORDER — MECLIZINE HYDROCHLORIDE 25 MG/1
25 TABLET ORAL 3 TIMES DAILY PRN
Qty: 30 TABLET | Refills: 0 | Status: SHIPPED | OUTPATIENT
Start: 2021-03-31 | End: 2021-05-17

## 2021-03-31 NOTE — PROGRESS NOTES
Transition of Care  Follow-up After Hospitalization    Werner Tae 78 y o  male   Date:  3/31/2021    TCM Call (since 2/28/2021)     None      TCM Call (since 2/28/2021)     None        Hospital records were reviewed  Medications upon discharge reviewed/updated  Discharge Disposition:    Follow up visits with other specialists:       Assessment and Plan:    Narinder Bello was seen today for follow-up  Diagnoses and all orders for this visit:    Type 2 diabetes mellitus with other circulatory complication, without long-term current use of insulin (HCC)  -     POCT hemoglobin A1c    Diabetic eye exam ([facility] [address] )  -     Ambulatory referral to Ophthalmology    Benign paroxysmal positional vertigo, unspecified laterality  -     meclizine (ANTIVERT) 25 mg tablet; Take 1 tablet (25 mg total) by mouth 3 (three) times a day as needed for dizziness for up to 10 days    Stage 3a chronic kidney disease  -     Basic metabolic panel; Future    Peripheral arterial disease (HCC)    Platelets decreased ([facility])            HPI:  The patient was admitted to the hospital after a period of dizziness  The patient was noted to have CKD and mildly elevated Trop I  The patient states that he was having dizziness when lying down  He noted resolution of symptoms of the most severe symptoms with some mile vertigo with lying back  He notes no chest pain or SOB  His CT was reviewed and negative  The patient states that he has no other issues at this time  ROS: Review of Systems   Constitutional: Negative for chills, fatigue and fever  HENT: Negative  Respiratory: Negative for cough, chest tightness and shortness of breath  Cardiovascular: Negative for chest pain and palpitations  Gastrointestinal: Negative for abdominal pain, constipation, diarrhea, nausea and vomiting  Genitourinary: Negative  Musculoskeletal: Negative for back pain and myalgias  Skin: Negative  Neurological: Negative      Psychiatric/Behavioral: Negative for dysphoric mood  The patient is not nervous/anxious  Past Medical History:   Diagnosis Date    Anxiety state 2018    Basal cell carcinoma of skin 11/15/2016    Chronic obstructive pulmonary disease ([address] ) 2012    Constipation     Coronary artery disease 2012    Forearm laceration, right, initial encounter 2020    Hydronephrosis     LAST ASSESSED: 11/18/15    Osteoporosis 2012    Pacemaker     Trifascicular block        Past Surgical History:   Procedure Laterality Date    CARDIAC PACEMAKER PLACEMENT  2009    Medtronic dual chamber     CATARACT EXTRACTION W/  INTRAOCULAR LENS IMPLANT      CYSTOSCOPY N/A 2016    Procedure: CYSTOSCOPY, evacuation of bladder calculi;  Surgeon: Mj Crook MD;  Location: [facility];  Service:     INGUINAL HERNIA REPAIR      UNILATERAL    KIDNEY STONE SURGERY      OTHER SURGICAL HISTORY      HERNIA REPAIR AS PER [facility] (ALREADY IN PERTINENT NEGATIVES)    MN TRANSURETHRAL ELEC-SURG PROSTATECTOM N/A 2016    Procedure: TRANSURETHRAL RESECTION OF PROSTATE (TURP); Surgeon: Mj Crook MD;  Location: [facility];  Service: Urology    TONSILLECTOMY         Social History     Socioeconomic History    Marital status:       Spouse name: None    Number of children: None    Years of education: None    Highest education level: None   Occupational History    Occupation: RETIRED   Social Needs    Financial resource strain: None    Food insecurity     Worry: None     Inability: None    Transportation needs     Medical: None     Non-medical: None   Tobacco Use    Smoking status: Former Smoker     Types: Cigars     Quit date: 1980     Years since quittin 9    Smokeless tobacco: Former User    Tobacco comment: smokes an occ  cigar; hasn't in last 2 weeks; CIGAR (1 A DAY) AS PER [facility]   Substance and Sexual Activity    Alcohol use: Never     Frequency: Never     Comment: BEING A SOCIAL DRINKER AS PER [employer]    Drug use: No    Sexual activity: None   Lifestyle    Physical activity     Days per week: None     Minutes per session: None    Stress: None   Relationships    Social connections     Talks on phone: None     Gets together: None     Attends Orthodox service: None     Active member of club or organization: None     Attends meetings of clubs or organizations: None     Relationship status: None    Intimate partner violence     Fear of current or ex partner: None     Emotionally abused: None     Physically abused: None     Forced sexual activity: None   Other Topics Concern    None   Social History Narrative    USES [employer]IPMENT - [employer]       Family History   Problem Relation Age of Onset    Stroke Father         SYNDROME    Coronary artery disease Family         less than 61years of age       No Known Allergies      Current Outpatient Medications:     aspirin (ECOTRIN LOW STRENGTH) 81 mg EC tablet, Take 81 mg by mouth daily, Disp: , Rfl:     atenolol (TENORMIN) 50 mg tablet, Take 1 tablet (50 mg total) by mouth daily, Disp: 90 tablet, Rfl: 1    Blephamide 10-0 2 % ophthalmic suspension, Administer 2 drops to both eyes daily at bedtime, Disp: 10 mL, Rfl: 5    calcium carbonate (TUMS) 500 mg chewable tablet, Chew 1 tablet as needed for indigestion or heartburn , Disp: , Rfl:     Combigan 0 2-0 5 %, INSTILL 1 DROP INTO RIGHT EYE EVERY 12 HOURS, Disp: , Rfl:     cyclobenzaprine (FLEXERIL) 10 mg tablet, Take 1 tablet (10 mg total) by mouth 2 (two) times a day, Disp: 60 tablet, Rfl: 5    finasteride (PROSCAR) 5 mg tablet, Take 1 tablet (5 mg total) by mouth daily, Disp: 90 tablet, Rfl: 1    glucose blood (ONE TOUCH ULTRA TEST) test strip, Test sugar once daily, Disp: 50 each, Rfl: 5    Lumigan 0 01 % ophthalmic drops, INSTILL 1 DROP AT BEDTIME INTO RIGHT EYE, Disp: , Rfl:     magnesium hydroxide (MILK OF MAGNESIA) 400 mg/5 mL oral suspension, Take by mouth as needed for constipation  , Disp: , Rfl:     Multiple Vitamin (MULTIVITAMIN) capsule, Take 1 capsule by mouth daily  , Disp: , Rfl:     simvastatin (ZOCOR) 10 mg tablet, Take 1 tablet (10 mg total) by mouth every evening, Disp: 90 tablet, Rfl: 1    tamsulosin (FLOMAX) 0 4 mg, Take 1 capsule (0 4 mg total) by mouth daily with dinner, Disp: 30 capsule, Rfl: 5    amLODIPine (NORVASC) 10 mg tablet, Take 1 tablet by mouth once daily (Patient not taking: Reported on 3/31/2021), Disp: 30 tablet, Rfl: 0    lisinopril (ZESTRIL) 40 mg tablet, Take 1 tablet by mouth once daily (Patient not taking: Reported on 3/31/2021), Disp: 30 tablet, Rfl: 0    meclizine (ANTIVERT) 25 mg tablet, Take 1 tablet (25 mg total) by mouth 3 (three) times a day as needed for dizziness for up to 10 days, Disp: 30 tablet, Rfl: 0      Physical Exam:  /76 (BP Location: Right arm, Patient Position: Sitting, Cuff Size: Large)   Pulse 82   Temp 99 7 °F (37 6 °C)   Resp 14   Ht 5' 7" (1 702 m)   Wt 78 9 kg (174 lb)   SpO2 95%   BMI 27 25 kg/m²     Physical Exam  Vitals signs and nursing note reviewed  Constitutional:       Appearance: He is well-developed  HENT:      Head: Normocephalic and atraumatic  Eyes:      Pupils: Pupils are equal, round, and reactive to light  Neck:      Musculoskeletal: Normal range of motion and neck supple  Cardiovascular:      Rate and Rhythm: Normal rate and regular rhythm  Heart sounds: Normal heart sounds  No murmur  Pulmonary:      Effort: Pulmonary effort is normal       Breath sounds: Normal breath sounds  No stridor  No rales  Abdominal:      General: Bowel sounds are normal  There is no distension  Palpations: Abdomen is soft  Tenderness: There is no abdominal tenderness  Musculoskeletal: Normal range of motion  General: No deformity  Skin:     General: Skin is warm and dry  Neurological:      Mental Status: He is alert and oriented to person, place, and time  Labs:  Lab Results   Component Value Date    WBC 9 00 03/17/2021    HGB 13 5 (L) 03/17/2021    HCT 40 3 (L) 03/17/2021    MCV 99 03/17/2021     (L) 03/17/2021     Lab Results   Component Value Date     06/09/2018    K 3 6 03/19/2021     03/19/2021    CO2 23 03/19/2021    ANIONGAP 12 9 06/09/2018    BUN 37 (H) 03/19/2021    CREATININE 1 59 (H) 03/19/2021    GLUCOSE 96 11/18/2015    GLUF 131 (H) 03/19/2019    CALCIUM 9 0 03/19/2021    AST 28 03/18/2021    ALT 31 03/18/2021    ALKPHOS 69 03/18/2021    PROT 6 2 (L) 06/09/2018    BILITOT 1 3 (H) 06/09/2018    EGFR 40 03/19/2021

## 2021-04-01 ENCOUNTER — APPOINTMENT (OUTPATIENT)
Dept: LAB | Facility: CLINIC | Age: 81
End: 2021-04-01
Payer: COMMERCIAL

## 2021-04-01 DIAGNOSIS — N18.31 STAGE 3A CHRONIC KIDNEY DISEASE (HCC): ICD-10-CM

## 2021-04-01 DIAGNOSIS — I10 ESSENTIAL HYPERTENSION: ICD-10-CM

## 2021-04-01 DIAGNOSIS — R63.4 WEIGHT LOSS: ICD-10-CM

## 2021-04-01 DIAGNOSIS — E78.00 HYPERCHOLESTEROLEMIA: ICD-10-CM

## 2021-04-01 LAB
ANION GAP SERPL CALCULATED.3IONS-SCNC: 3 MMOL/L (ref 4–13)
BUN SERPL-MCNC: 35 MG/DL (ref 5–25)
CALCIUM SERPL-MCNC: 10.1 MG/DL (ref 8.3–10.1)
CHLORIDE SERPL-SCNC: 106 MMOL/L (ref 100–108)
CO2 SERPL-SCNC: 29 MMOL/L (ref 21–32)
CREAT SERPL-MCNC: 1.89 MG/DL (ref 0.6–1.3)
GFR SERPL CREATININE-BSD FRML MDRD: 32 ML/MIN/1.73SQ M
GLUCOSE P FAST SERPL-MCNC: 142 MG/DL (ref 65–99)
POTASSIUM SERPL-SCNC: 4.8 MMOL/L (ref 3.5–5.3)
SODIUM SERPL-SCNC: 138 MMOL/L (ref 136–145)

## 2021-04-01 PROCEDURE — 80048 BASIC METABOLIC PNL TOTAL CA: CPT

## 2021-04-01 PROCEDURE — 36415 COLL VENOUS BLD VENIPUNCTURE: CPT

## 2021-04-02 DIAGNOSIS — N13.8 BPH WITH URINARY OBSTRUCTION: ICD-10-CM

## 2021-04-02 DIAGNOSIS — N40.1 BPH WITH URINARY OBSTRUCTION: ICD-10-CM

## 2021-04-02 RX ORDER — TAMSULOSIN HYDROCHLORIDE 0.4 MG/1
CAPSULE ORAL
Qty: 30 CAPSULE | Refills: 0 | Status: SHIPPED | OUTPATIENT
Start: 2021-04-02 | End: 2021-05-05 | Stop reason: SDUPTHER

## 2021-04-08 DIAGNOSIS — I10 ESSENTIAL HYPERTENSION: ICD-10-CM

## 2021-04-08 NOTE — PROGRESS NOTES
[facility] remote check pacer  No events  Normal battery function  Current Outpatient Medications:     amLODIPine (NORVASC) 10 mg tablet, Take 1 tablet by mouth once daily (Patient not taking: Reported on 3/31/2021), Disp: 30 tablet, Rfl: 0    aspirin (ECOTRIN LOW STRENGTH) 81 mg EC tablet, Take 81 mg by mouth daily, Disp: , Rfl:     atenolol (TENORMIN) 50 mg tablet, Take 1 tablet (50 mg total) by mouth daily, Disp: 90 tablet, Rfl: 1    Blephamide 10-0 2 % ophthalmic suspension, Administer 2 drops to both eyes daily at bedtime, Disp: 10 mL, Rfl: 5    calcium carbonate (TUMS) 500 mg chewable tablet, Chew 1 tablet as needed for indigestion or heartburn , Disp: , Rfl:     Combigan 0 2-0 5 %, INSTILL 1 DROP INTO RIGHT EYE EVERY 12 HOURS, Disp: , Rfl:     cyclobenzaprine (FLEXERIL) 10 mg tablet, Take 1 tablet (10 mg total) by mouth 2 (two) times a day, Disp: 60 tablet, Rfl: 5    finasteride (PROSCAR) 5 mg tablet, Take 1 tablet (5 mg total) by mouth daily, Disp: 90 tablet, Rfl: 1    glucose blood (ONE TOUCH ULTRA TEST) test strip, Test sugar once daily, Disp: 50 each, Rfl: 5    lisinopril (ZESTRIL) 40 mg tablet, Take 1 tablet by mouth once daily (Patient not taking: Reported on 3/31/2021), Disp: 30 tablet, Rfl: 0    Lumigan 0 01 % ophthalmic drops, INSTILL 1 DROP AT BEDTIME INTO RIGHT EYE, Disp: , Rfl:     magnesium hydroxide (MILK OF MAGNESIA) 400 mg/5 mL oral suspension, Take by mouth as needed for constipation  , Disp: , Rfl:     meclizine (ANTIVERT) 25 mg tablet, Take 1 tablet (25 mg total) by mouth 3 (three) times a day as needed for dizziness for up to 10 days, Disp: 30 tablet, Rfl: 0    Multiple Vitamin (MULTIVITAMIN) capsule, Take 1 capsule by mouth daily  , Disp: , Rfl:     simvastatin (ZOCOR) 10 mg tablet, Take 1 tablet (10 mg total) by mouth every evening, Disp: 90 tablet, Rfl: 1    tamsulosin (FLOMAX) 0 4 mg, TAKE 1 CAPSULE BY MOUTH ONCE DAILY WITH  DINNER, Disp: 30 capsule, Rfl: 0

## 2021-04-17 DIAGNOSIS — I10 ESSENTIAL HYPERTENSION: ICD-10-CM

## 2021-04-17 RX ORDER — LISINOPRIL 20 MG/1
TABLET ORAL
Qty: 30 TABLET | Refills: 5 | Status: SHIPPED | OUTPATIENT
Start: 2021-04-17 | End: 2021-08-30 | Stop reason: SDUPTHER

## 2021-04-29 ENCOUNTER — RA CDI HCC (OUTPATIENT)
Dept: OTHER | Facility: HOSPITAL | Age: 81
End: 2021-04-29

## 2021-04-29 NOTE — PROGRESS NOTES
[facility]  coding opportunities     Both dx not used      Chart reviewed, (number of) suggestions sent to provider: 2     Problem listed updated  Provider Accepted, (number of) suggestions accepted: 2               Patients insurance company: [address]  (Medicare [employer]vant[employer] and [employer])     Visit status: Patient arrived for their scheduled appointment        [facility]  coding opportunities             Chart reviewed, (number of) suggestions sent to provider: 2     Problem listed updated   Provider Accepted, (number of) suggestions accepted: 2        Patients insurance company: [address]  (Medicare [employer] and [employer])           [facility]  coding opportunities             Chart reviewed, (number of) suggestions sent to provider: 2   DX: E11 22 Type 2 diabetes mellitus with diabetic chronic kidney disease  DX: E11 51 Type 2 diabetes mellitus with diabetic peripheral angiopathy without gangrene          Patients insurance company: [address]  (Medicare [employer]vantage and [employer])

## 2021-05-04 DIAGNOSIS — I25.10 CORONARY ARTERY DISEASE INVOLVING NATIVE HEART WITHOUT ANGINA PECTORIS, UNSPECIFIED VESSEL OR LESION TYPE: ICD-10-CM

## 2021-05-04 DIAGNOSIS — I10 ESSENTIAL HYPERTENSION: ICD-10-CM

## 2021-05-04 PROBLEM — E11.51 TYPE 2 DIABETES MELLITUS WITH DIABETIC PERIPHERAL ANGIOPATHY WITHOUT GANGRENE (HCC): Status: ACTIVE | Noted: 2021-05-04

## 2021-05-04 PROBLEM — E11.22 TYPE 2 DIABETES MELLITUS WITH CHRONIC KIDNEY DISEASE (HCC): Status: ACTIVE | Noted: 2021-05-04

## 2021-05-04 RX ORDER — ATENOLOL 50 MG/1
50 TABLET ORAL DAILY
Qty: 90 TABLET | Refills: 1 | Status: SHIPPED | OUTPATIENT
Start: 2021-05-04 | End: 2021-05-05 | Stop reason: SDUPTHER

## 2021-05-05 ENCOUNTER — OFFICE VISIT (OUTPATIENT)
Dept: INTERNAL MEDICINE CLINIC | Facility: CLINIC | Age: 81
End: 2021-05-05
Payer: COMMERCIAL

## 2021-05-05 VITALS
HEART RATE: 62 BPM | TEMPERATURE: 99.1 F | OXYGEN SATURATION: 93 % | DIASTOLIC BLOOD PRESSURE: 78 MMHG | HEIGHT: 67 IN | BODY MASS INDEX: 27.94 KG/M2 | RESPIRATION RATE: 14 BRPM | SYSTOLIC BLOOD PRESSURE: 164 MMHG | WEIGHT: 178 LBS

## 2021-05-05 DIAGNOSIS — I10 ESSENTIAL HYPERTENSION: Primary | ICD-10-CM

## 2021-05-05 DIAGNOSIS — E11.59 TYPE 2 DIABETES MELLITUS WITH OTHER CIRCULATORY COMPLICATION, WITHOUT LONG-TERM CURRENT USE OF INSULIN (HCC): ICD-10-CM

## 2021-05-05 DIAGNOSIS — I25.10 CORONARY ARTERY DISEASE INVOLVING NATIVE HEART WITHOUT ANGINA PECTORIS, UNSPECIFIED VESSEL OR LESION TYPE: ICD-10-CM

## 2021-05-05 DIAGNOSIS — N13.8 BPH WITH URINARY OBSTRUCTION: ICD-10-CM

## 2021-05-05 DIAGNOSIS — N40.1 BPH WITH URINARY OBSTRUCTION: ICD-10-CM

## 2021-05-05 PROCEDURE — 99214 OFFICE O/P EST MOD 30 MIN: CPT | Performed by: INTERNAL MEDICINE

## 2021-05-05 PROCEDURE — 3077F SYST BP >= 140 MM HG: CPT | Performed by: INTERNAL MEDICINE

## 2021-05-05 PROCEDURE — 3078F DIAST BP <80 MM HG: CPT | Performed by: INTERNAL MEDICINE

## 2021-05-05 PROCEDURE — 1160F RVW MEDS BY RX/DR IN RCRD: CPT | Performed by: INTERNAL MEDICINE

## 2021-05-05 PROCEDURE — 1036F TOBACCO NON-USER: CPT | Performed by: INTERNAL MEDICINE

## 2021-05-05 RX ORDER — TAMSULOSIN HYDROCHLORIDE 0.4 MG/1
0.4 CAPSULE ORAL
Qty: 30 CAPSULE | Refills: 5 | Status: SHIPPED | OUTPATIENT
Start: 2021-05-05 | End: 2021-11-15 | Stop reason: SDUPTHER

## 2021-05-05 RX ORDER — CLONIDINE HYDROCHLORIDE 0.1 MG/1
0.1 TABLET ORAL DAILY
Qty: 90 TABLET | Refills: 1 | Status: SHIPPED | OUTPATIENT
Start: 2021-05-05 | End: 2021-11-10 | Stop reason: SDUPTHER

## 2021-05-05 RX ORDER — ATENOLOL 50 MG/1
50 TABLET ORAL DAILY
Qty: 90 TABLET | Refills: 1 | Status: SHIPPED | OUTPATIENT
Start: 2021-05-05 | End: 2021-10-25 | Stop reason: SDUPTHER

## 2021-05-05 NOTE — PROGRESS NOTES
Assessment/Plan:  BPH symptoms and continue the Flomax  The patient BP is noted to improve with recheck, but is still elevated  Clonidine 0 1 mg Daily started  DM well controlled with an a1c of 6 7%    Problem List Items Addressed This Visit        Cardiovascular and Mediastinum    Essential hypertension - Primary    Relevant Medications    cloNIDine (CATAPRES) 0 1 mg tablet    atenolol (TENORMIN) 50 mg tablet      Other Visit Diagnoses     BPH with urinary obstruction        Relevant Medications    tamsulosin (FLOMAX) 0 4 mg    Coronary artery disease involving native heart without angina pectoris, unspecified vessel or lesion type        Relevant Medications    atenolol (TENORMIN) 50 mg tablet           Diagnoses and all orders for this visit:    Essential hypertension  -     cloNIDine (CATAPRES) 0 1 mg tablet; Take 1 tablet (0 1 mg total) by mouth daily for 180 doses  -     atenolol (TENORMIN) 50 mg tablet; Take 1 tablet (50 mg total) by mouth daily    BPH with urinary obstruction  -     tamsulosin (FLOMAX) 0 4 mg; Take 1 capsule (0 4 mg total) by mouth daily with dinner    Coronary artery disease involving native heart without angina pectoris, unspecified vessel or lesion type  -     atenolol (TENORMIN) 50 mg tablet; Take 1 tablet (50 mg total) by mouth daily        No problem-specific Assessment & Plan notes found for this encounter  BMI Counseling: Body mass index is 27 88 kg/m²  The BMI is above normal  Exercise recommendations include exercising 3-5 times per week  No pharmacotherapy was ordered  Subjective: mild dizziness and we will follow up on his BP  Patient ID: Christine Henry is a 78 y o  male  The patient's BP is noted to be good and there are no issues at this time with the current medications  The patient states that he is having some mild dizziness  We will work on his BP and see if that helps    The patient notes nocturia and I noted decreased fluid in the late PM and micturition prior to bed might help  The patient's A1c is noted to be good  He has no issues with continued diet changes  No chest pain noted  We encouraged regular exercise  The following portions of the patient's history were reviewed and updated as appropriate:   He has a past medical history of Anxiety state (8/5/2018), Basal cell carcinoma of skin (11/15/2016), Chronic obstructive pulmonary disease ([address] ) (9/5/2012), Constipation, Coronary artery disease (5/22/2012), Forearm laceration, right, initial encounter (5/28/2020), Hydronephrosis, Osteoporosis (5/22/2012), Pacemaker, and Trifascicular block  ,  does not have any pertinent problems on file  ,   has a past surgical history that includes Cardiac pacemaker placement (11/2009); Cataract extraction w/  intraocular lens implant; Kidney stone surgery; Tonsillectomy; pr transurethral elec-surg prostatectom (N/A, 5/9/2016); CYSTOSCOPY (N/A, 5/9/2016); Other surgical history; and Inguinal hernia repair  ,  family history includes Coronary artery disease in his family; Stroke in his father  ,   reports that he quit smoking about 41 years ago  His smoking use included cigars  He has quit using smokeless tobacco  He reports that he does not drink alcohol or use drugs  ,  has No Known Allergies     Current Outpatient Medications   Medication Sig Dispense Refill    aspirin (ECOTRIN LOW STRENGTH) 81 mg EC tablet Take 81 mg by mouth daily      atenolol (TENORMIN) 50 mg tablet Take 1 tablet (50 mg total) by mouth daily 90 tablet 1    Blephamide 10-0 2 % ophthalmic suspension Administer 2 drops to both eyes daily at bedtime 10 mL 5    calcium carbonate (TUMS) 500 mg chewable tablet Chew 1 tablet as needed for indigestion or heartburn        Combigan 0 2-0 5 % INSTILL 1 DROP INTO RIGHT EYE EVERY 12 HOURS      cyclobenzaprine (FLEXERIL) 10 mg tablet Take 1 tablet (10 mg total) by mouth 2 (two) times a day 60 tablet 5    finasteride (PROSCAR) 5 mg tablet Take 1 tablet (5 mg total) by mouth daily 90 tablet 1    glucose blood (ONE TOUCH ULTRA TEST) test strip Test sugar once daily 50 each 5    lisinopril (ZESTRIL) 20 mg tablet Take 1 tablet by mouth once daily 30 tablet 5    Lumigan 0 01 % ophthalmic drops INSTILL 1 DROP AT BEDTIME INTO RIGHT EYE      magnesium hydroxide (MILK OF MAGNESIA) 400 mg/5 mL oral suspension Take by mouth as needed for constipation   Multiple Vitamin (MULTIVITAMIN) capsule Take 1 capsule by mouth daily   simvastatin (ZOCOR) 10 mg tablet Take 1 tablet (10 mg total) by mouth every evening 90 tablet 1    tamsulosin (FLOMAX) 0 4 mg Take 1 capsule (0 4 mg total) by mouth daily with dinner 30 capsule 5    amLODIPine (NORVASC) 10 mg tablet Take 1 tablet by mouth once daily (Patient not taking: Reported on 3/31/2021) 30 tablet 0    cloNIDine (CATAPRES) 0 1 mg tablet Take 1 tablet (0 1 mg total) by mouth daily for 180 doses 90 tablet 1    meclizine (ANTIVERT) 25 mg tablet Take 1 tablet (25 mg total) by mouth 3 (three) times a day as needed for dizziness for up to 10 days 30 tablet 0     No current facility-administered medications for this visit  Review of Systems   Constitutional: Negative for chills, fatigue and fever  HENT: Negative  Respiratory: Negative for cough, chest tightness and shortness of breath  Cardiovascular: Negative for chest pain and palpitations  Gastrointestinal: Negative for abdominal pain, constipation, diarrhea, nausea and vomiting  Genitourinary: Negative  Musculoskeletal: Negative for back pain and myalgias  Skin: Negative  Neurological: Negative  Psychiatric/Behavioral: Negative for dysphoric mood  The patient is not nervous/anxious            Objective:  Vitals:    05/05/21 1251   BP: 164/78   BP Location: Right arm   Patient Position: Sitting   Cuff Size: Standard   Pulse: 62   Resp: 14   Temp: 99 1 °F (37 3 °C)   SpO2: 93%   Weight: 80 7 kg (178 lb)   Height: 5' 7" (1 702 m) Body mass index is 27 88 kg/m²  Physical Exam  Vitals signs and nursing note reviewed  Constitutional:       Appearance: He is well-developed  HENT:      Head: Normocephalic and atraumatic  Eyes:      Pupils: Pupils are equal, round, and reactive to light  Neck:      Musculoskeletal: Normal range of motion and neck supple  Cardiovascular:      Rate and Rhythm: Normal rate and regular rhythm  Heart sounds: Normal heart sounds  No murmur  Pulmonary:      Effort: Pulmonary effort is normal       Breath sounds: Normal breath sounds  No stridor  No rales  Abdominal:      General: Bowel sounds are normal  There is no distension  Palpations: Abdomen is soft  Tenderness: There is no abdominal tenderness  Musculoskeletal: Normal range of motion  General: No deformity  Skin:     General: Skin is warm and dry  Neurological:      Mental Status: He is alert and oriented to person, place, and time            PHQ-9 Depression Screening    PHQ-9:   Frequency of the following problems over the past two weeks:

## 2021-05-05 NOTE — PROGRESS NOTES
Diabetic Foot Exam    Patient's shoes and socks removed  Right Foot/Ankle   Right Foot Inspection  Skin Exam: skin normal and skin intact no dry skin, no warmth, no callus, no erythema, no maceration, no abnormal color, no pre-ulcer, no ulcer and no callus                        Amputation: amputation right foot   Toe Exam: ROM and strength within normal limits no right toe deformity  Sensory       Monofilament testing: intact  Vascular  Capillary refills: < 3 seconds  The right DP pulse is 2+  The right PT pulse is 2+  Left Foot/Ankle  Left Foot Inspection  Skin Exam: skin normal and skin intactno dry skin, no warmth, no erythema, no maceration, normal color, no pre-ulcer, no ulcer and no callus                         Toe Exam: ROM and strength within normal limitsno left toe deformity                   Sensory       Monofilament: intact  Vascular  Capillary refills: < 3 seconds  The left DP pulse is 2+  The left PT pulse is 2+  Assign Risk Category:  No deformity present; No loss of protective sensation;  No weak pulses       Risk: 0

## 2021-05-17 DIAGNOSIS — H81.10 BENIGN PAROXYSMAL POSITIONAL VERTIGO, UNSPECIFIED LATERALITY: ICD-10-CM

## 2021-05-17 DIAGNOSIS — R33.9 URINARY RETENTION: ICD-10-CM

## 2021-05-17 RX ORDER — FINASTERIDE 5 MG/1
5 TABLET, FILM COATED ORAL DAILY
Qty: 90 TABLET | Refills: 1 | Status: SHIPPED | OUTPATIENT
Start: 2021-05-17 | End: 2021-11-08 | Stop reason: SDUPTHER

## 2021-05-17 RX ORDER — MECLIZINE HYDROCHLORIDE 25 MG/1
TABLET ORAL
Qty: 30 TABLET | Refills: 0 | Status: SHIPPED | OUTPATIENT
Start: 2021-05-17

## 2021-06-22 ENCOUNTER — REMOTE DEVICE CLINIC VISIT (OUTPATIENT)
Dept: CARDIOLOGY CLINIC | Facility: CLINIC | Age: 81
End: 2021-06-22
Payer: COMMERCIAL

## 2021-06-22 DIAGNOSIS — Z45.010 ENCOUNTER FOR CHECKING AND TESTING OF CARDIAC PACEMAKER PULSE GENERATOR (BATTERY): ICD-10-CM

## 2021-06-22 DIAGNOSIS — I49.5 SICK SINUS SYNDROME (HCC): Primary | ICD-10-CM

## 2021-06-22 PROCEDURE — 93296 REM INTERROG EVL PM/IDS: CPT | Performed by: INTERNAL MEDICINE

## 2021-06-22 PROCEDURE — 93294 REM INTERROG EVL PM/LDLS PM: CPT | Performed by: INTERNAL MEDICINE

## 2021-06-26 ENCOUNTER — HOSPITAL ENCOUNTER (EMERGENCY)
Facility: HOSPITAL | Age: 81
Discharge: HOME | End: 2021-06-26
Attending: EMERGENCY MEDICINE
Payer: COMMERCIAL

## 2021-06-26 ENCOUNTER — APPOINTMENT (EMERGENCY)
Dept: CT IMAGING | Facility: HOSPITAL | Age: 81
End: 2021-06-26
Payer: COMMERCIAL

## 2021-06-26 VITALS
HEART RATE: 68 BPM | BODY MASS INDEX: 26.68 KG/M2 | HEIGHT: 67 IN | RESPIRATION RATE: 18 BRPM | SYSTOLIC BLOOD PRESSURE: 191 MMHG | WEIGHT: 170 LBS | OXYGEN SATURATION: 98 % | TEMPERATURE: 97.4 F | DIASTOLIC BLOOD PRESSURE: 114 MMHG

## 2021-06-26 DIAGNOSIS — I10 HTN (HYPERTENSION): ICD-10-CM

## 2021-06-26 DIAGNOSIS — R94.39 ABNORMAL NUCLEAR STRESS TEST: ICD-10-CM

## 2021-06-26 DIAGNOSIS — R22.1 NECK MASS: Primary | ICD-10-CM

## 2021-06-26 LAB
ALBUMIN SERPL BCP-MCNC: 4.6 G/DL (ref 3.5–5.7)
ALP SERPL-CCNC: 45 U/L (ref 55–165)
ALT SERPL W P-5'-P-CCNC: 14 U/L (ref 7–52)
ANION GAP SERPL CALCULATED.3IONS-SCNC: 8 MMOL/L (ref 4–13)
AST SERPL W P-5'-P-CCNC: 14 U/L (ref 13–39)
BASOPHILS # BLD AUTO: 0 THOUSANDS/ΜL (ref 0–0.1)
BASOPHILS NFR BLD AUTO: 0 % (ref 0–2)
BILIRUB SERPL-MCNC: 1.5 MG/DL (ref 0.2–1)
BUN SERPL-MCNC: 29 MG/DL (ref 7–25)
CALCIUM SERPL-MCNC: 10.1 MG/DL (ref 8.6–10.5)
CHLORIDE SERPL-SCNC: 102 MMOL/L (ref 98–107)
CO2 SERPL-SCNC: 30 MMOL/L (ref 21–31)
CREAT SERPL-MCNC: 1.5 MG/DL (ref 0.7–1.3)
EOSINOPHIL # BLD AUTO: 0.3 THOUSAND/ΜL (ref 0–0.61)
EOSINOPHIL NFR BLD AUTO: 3 % (ref 0–5)
ERYTHROCYTE [DISTWIDTH] IN BLOOD BY AUTOMATED COUNT: 13.1 % (ref 11.5–14.5)
GFR SERPL CREATININE-BSD FRML MDRD: 42 ML/MIN/1.73SQ M
GLUCOSE SERPL-MCNC: 200 MG/DL (ref 65–99)
HCT VFR BLD AUTO: 46.8 % (ref 42–47)
HGB BLD-MCNC: 16.1 G/DL (ref 14–18)
LYMPHOCYTES # BLD AUTO: 0.9 THOUSANDS/ΜL (ref 0.6–4.47)
LYMPHOCYTES NFR BLD AUTO: 11 % (ref 21–51)
MCH RBC QN AUTO: 33.4 PG (ref 26–34)
MCHC RBC AUTO-ENTMCNC: 34.3 G/DL (ref 31–37)
MCV RBC AUTO: 97 FL (ref 81–99)
MONOCYTES # BLD AUTO: 0.6 THOUSAND/ΜL (ref 0.17–1.22)
MONOCYTES NFR BLD AUTO: 7 % (ref 2–12)
NEUTROPHILS # BLD AUTO: 6.9 THOUSANDS/ΜL (ref 1.4–6.5)
NEUTS SEG NFR BLD AUTO: 79 % (ref 42–75)
PLATELET # BLD AUTO: 210 THOUSANDS/UL (ref 149–390)
PMV BLD AUTO: 7.8 FL (ref 8.6–11.7)
POTASSIUM SERPL-SCNC: 4.3 MMOL/L (ref 3.5–5.5)
PROT SERPL-MCNC: 7.5 G/DL (ref 6.4–8.9)
RBC # BLD AUTO: 4.81 MILLION/UL (ref 4.3–5.9)
SODIUM SERPL-SCNC: 140 MMOL/L (ref 134–143)
WBC # BLD AUTO: 8.8 THOUSAND/UL (ref 4.8–10.8)

## 2021-06-26 PROCEDURE — 85025 COMPLETE CBC W/AUTO DIFF WBC: CPT | Performed by: EMERGENCY MEDICINE

## 2021-06-26 PROCEDURE — 36415 COLL VENOUS BLD VENIPUNCTURE: CPT | Performed by: EMERGENCY MEDICINE

## 2021-06-26 PROCEDURE — 80053 COMPREHEN METABOLIC PANEL: CPT | Performed by: EMERGENCY MEDICINE

## 2021-06-26 PROCEDURE — 70491 CT SOFT TISSUE NECK W/DYE: CPT

## 2021-06-26 PROCEDURE — G1004 CDSM NDSC: HCPCS

## 2021-06-26 PROCEDURE — 99283 EMERGENCY DEPT VISIT LOW MDM: CPT

## 2021-06-26 PROCEDURE — 99285 EMERGENCY DEPT VISIT HI MDM: CPT | Performed by: EMERGENCY MEDICINE

## 2021-06-26 RX ORDER — AMLODIPINE BESYLATE 5 MG/1
10 TABLET ORAL ONCE
Status: COMPLETED | OUTPATIENT
Start: 2021-06-26 | End: 2021-06-26

## 2021-06-26 RX ORDER — LISINOPRIL 20 MG/1
20 TABLET ORAL ONCE
Status: COMPLETED | OUTPATIENT
Start: 2021-06-26 | End: 2021-06-26

## 2021-06-26 RX ORDER — ATENOLOL 25 MG/1
50 TABLET ORAL ONCE
Status: COMPLETED | OUTPATIENT
Start: 2021-06-26 | End: 2021-06-26

## 2021-06-26 RX ORDER — CLONIDINE HYDROCHLORIDE 0.1 MG/1
0.2 TABLET ORAL ONCE
Status: COMPLETED | OUTPATIENT
Start: 2021-06-26 | End: 2021-06-26

## 2021-06-26 RX ADMIN — LISINOPRIL 20 MG: 20 TABLET ORAL at 11:22

## 2021-06-26 RX ADMIN — ATENOLOL 50 MG: 25 TABLET ORAL at 11:21

## 2021-06-26 RX ADMIN — CLONIDINE HYDROCHLORIDE 0.2 MG: 0.1 TABLET ORAL at 10:57

## 2021-06-26 RX ADMIN — IOHEXOL 85 ML: 350 INJECTION, SOLUTION INTRAVENOUS at 09:18

## 2021-06-26 RX ADMIN — AMLODIPINE BESYLATE 10 MG: 5 TABLET ORAL at 11:21

## 2021-06-26 NOTE — DISCHARGE INSTRUCTIONS
TODAY WHEN YOU GET HOME, DO NOT TAKE YOUR NORVASC, TENORMIN OR LISINOPRIL MEDICATIONS, YOU WERE GIVEN THIS MEDICATION TODAY IN THE EMERGENCY DEPARTMENT BECAUSE YOUR BLOOD PRESSURE WAS ELEVATED

## 2021-06-26 NOTE — ED PROVIDER NOTES
History  Chief Complaint   Patient presents with    Ear Swelling     HPI    This is an 47-year-old male presents the emergency department with a chief complaint of right ear and neck swelling  Patient reports approximately 1 week ago he had swelling this area and then it has spontaneously resolved  No history of trauma  Patient reports when he opens and closes the jaw he has pain behind his ear and at the angle of his mandible  Patient does have a history of having skin cancer at the right earlobe that removed a portion of posterior edge of the helix  Prior to Admission Medications   Prescriptions Last Dose Informant Patient Reported? Taking? Blephamide 10-0 2 % ophthalmic suspension   No No   Sig: Administer 2 drops to both eyes daily at bedtime   Combigan 0 2-0 5 %   Yes No   Sig: INSTILL 1 DROP INTO RIGHT EYE EVERY 12 HOURS   Lumigan 0 01 % ophthalmic drops   Yes No   Sig: INSTILL 1 DROP AT BEDTIME INTO RIGHT EYE   Multiple Vitamin (MULTIVITAMIN) capsule  Self Yes No   Sig: Take 1 capsule by mouth daily  amLODIPine (NORVASC) 10 mg tablet   No No   Sig: Take 1 tablet by mouth once daily   Patient not taking: Reported on 3/31/2021   aspirin (ECOTRIN LOW STRENGTH) 81 mg EC tablet  Self Yes No   Sig: Take 81 mg by mouth daily   atenolol (TENORMIN) 50 mg tablet   No No   Sig: Take 1 tablet (50 mg total) by mouth daily   calcium carbonate (TUMS) 500 mg chewable tablet  Self Yes No   Sig: Chew 1 tablet as needed for indigestion or heartburn     cloNIDine (CATAPRES) 0 1 mg tablet   No No   Sig: Take 1 tablet (0 1 mg total) by mouth daily for 180 doses   cyclobenzaprine (FLEXERIL) 10 mg tablet   No No   Sig: Take 1 tablet (10 mg total) by mouth 2 (two) times a day   finasteride (PROSCAR) 5 mg tablet   No No   Sig: Take 1 tablet (5 mg total) by mouth daily   glucose blood (ONE TOUCH ULTRA TEST) test strip  Self No No   Sig: Test sugar once daily   lisinopril (ZESTRIL) 20 mg tablet   No No   Sig: Take 1 tablet by mouth once daily   magnesium hydroxide (MILK OF MAGNESIA) 400 mg/5 mL oral suspension  Self Yes No   Sig: Take by mouth as needed for constipation  meclizine (ANTIVERT) 25 mg tablet   No No   Sig: TAKE 1 TABLET BY MOUTH THREE TIMES DAILY AS NEEDED FOR DIZZINESS FOR  UP  TO  10  DAYS   simvastatin (ZOCOR) 10 mg tablet   No No   Sig: Take 1 tablet (10 mg total) by mouth every evening   tamsulosin (FLOMAX) 0 4 mg   No No   Sig: Take 1 capsule (0 4 mg total) by mouth daily with dinner      Facility-Administered Medications: None       Past Medical History:   Diagnosis Date    Anxiety state 8/5/2018    Basal cell carcinoma of skin 11/15/2016    Chronic obstructive pulmonary disease ([facility] [address] ) 9/5/2012    Constipation     Coronary artery disease 5/22/2012    Forearm laceration, right, initial encounter 5/28/2020    Hydronephrosis     LAST ASSESSED: 11/18/15    Osteoporosis 5/22/2012    Pacemaker     Trifascicular block        Past Surgical History:   Procedure Laterality Date    CARDIAC PACEMAKER PLACEMENT  11/2009    Medtronic dual chamber     CATARACT EXTRACTION W/  INTRAOCULAR LENS IMPLANT      CYSTOSCOPY N/A 5/9/2016    Procedure: CYSTOSCOPY, evacuation of bladder calculi;  Surgeon: Chen Garcia MD;  Location: [facility];  Service:     INGUINAL HERNIA REPAIR      UNILATERAL    KIDNEY STONE SURGERY      OTHER SURGICAL HISTORY      HERNIA REPAIR AS PER [facility] (ALREADY IN PERTINENT NEGATIVES)    [place] TRANSURETHRAL ELEC-SURG PROSTATECTOM N/A 5/9/2016    Procedure: TRANSURETHRAL RESECTION OF PROSTATE (TURP); Surgeon: Chen Garcia MD;  Location: [facility];  Service: Urology    TONSILLECTOMY         Family History   Problem Relation Age of Onset    Stroke Father         SYNDROME    Coronary artery disease Family         less than 61years of age     I have reviewed and agree with the history as documented      E-Cigarette/Vaping    E-Cigarette Use Never User      E-Cigarette/Vaping Substances  Nicotine No     THC No     CBD No     Flavoring No     Other No     Unknown No      Social History     Tobacco Use    Smoking status: Former Smoker     Types: Cigars     Quit date: 1980     Years since quittin 1    Smokeless tobacco: Former User    Tobacco comment: smokes an occ  cigar; hasn't in last 2 weeks; CIGAR (1 A DAY) AS PER [facility]   Vaping Use    Vaping Use: Never used   Substance Use Topics    Alcohol use: Never     Comment: BEING A SOCIAL DRINKER AS PER [facility]    Drug use: No       Review of Systems   Constitutional: Negative  HENT: Positive for facial swelling  Negative for congestion, dental problem, ear pain, mouth sores, postnasal drip, sinus pressure, sore throat, tinnitus and trouble swallowing  Pain behind R ear  Eyes: Negative  Respiratory: Negative  Cardiovascular: Negative  Gastrointestinal: Negative  Endocrine: Negative  Genitourinary: Negative  Musculoskeletal: Negative  Allergic/Immunologic: Negative  Neurological: Negative  Hematological: Negative  Psychiatric/Behavioral: Negative  Physical Exam  Physical Exam  Vitals and nursing note reviewed  Constitutional:       Appearance: Normal appearance  He is normal weight  HENT:      Head: Normocephalic and atraumatic  Comments: See inserted photo into chart  Right Ear: External ear normal       Left Ear: External ear normal       Nose: Nose normal       Mouth/Throat:      Mouth: Mucous membranes are moist       Pharynx: Oropharynx is clear  Eyes:      Extraocular Movements: Extraocular movements intact  Conjunctiva/sclera: Conjunctivae normal       Pupils: Pupils are equal, round, and reactive to light  Neck:      Vascular: No carotid bruit  Cardiovascular:      Rate and Rhythm: Normal rate  Pulses: Normal pulses  Heart sounds: Normal heart sounds     Pulmonary:      Effort: Pulmonary effort is normal       Breath sounds: Normal breath sounds  Abdominal:      General: Abdomen is flat  Bowel sounds are normal    Musculoskeletal:         General: Normal range of motion  Cervical back: Normal range of motion  No rigidity or tenderness  Lymphadenopathy:      Cervical: No cervical adenopathy  Skin:     General: Skin is warm  Capillary Refill: Capillary refill takes less than 2 seconds  Neurological:      General: No focal deficit present  Mental Status: He is alert     Psychiatric:         Mood and Affect: Mood normal                  Vital Signs  ED Triage Vitals [06/26/21 0742]   Temperature Pulse Respirations Blood Pressure SpO2   (!) 97 4 °F (36 3 °C) 73 18 (!) 221/109 98 %      Temp Source Heart Rate Source Patient Position - Orthostatic VS BP Location FiO2 (%)   Temporal Monitor Sitting Left arm --      Pain Score       --           Vitals:    06/26/21 1057 06/26/21 1115 06/26/21 1130 06/26/21 1200   BP: (!) 225/124 (!) 202/107 (!) 217/115 (!) 191/114   Pulse:  65 67 68   Patient Position - Orthostatic VS:             Visual Acuity      ED Medications  Medications   iohexol (OMNIPAQUE) 350 MG/ML injection (SINGLE-DOSE) 85 mL (85 mL Intravenous Given 6/26/21 0918)   cloNIDine (CATAPRES) tablet 0 2 mg (0 2 mg Oral Given 6/26/21 1057)   amLODIPine (NORVASC) tablet 10 mg (10 mg Oral Given 6/26/21 1121)   atenolol (TENORMIN) tablet 50 mg (50 mg Oral Given 6/26/21 1121)   lisinopril (ZESTRIL) tablet 20 mg (20 mg Oral Given 6/26/21 1122)       Diagnostic Studies  Results Reviewed     Procedure Component Value Units Date/Time    Comprehensive metabolic panel [[id number]]  (Abnormal) Collected: 06/26/21 0837    Lab Status: Final result Specimen: Blood from Arm, Left Updated: 06/26/21 0859     Sodium 140 mmol/L      Potassium 4 3 mmol/L      Chloride 102 mmol/L      CO2 30 mmol/L      ANION GAP 8 mmol/L      BUN 29 mg/dL      Creatinine 1 50 mg/dL      Glucose 200 mg/dL      Calcium 10 1 mg/dL      AST 14 U/L      ALT 14 U/L Alkaline Phosphatase 45 U/L      Total Protein 7 5 g/dL      Albumin 4 6 g/dL      Total Bilirubin 1 50 mg/dL      eGFR 42 ml/min/1 73sq m     Narrative:      National Kidney Disease Foundation guidelines for Chronic Kidney Disease (CKD):     Stage 1 with normal or high GFR (GFR > 90 mL/min/1 73 square meters)    Stage 2 Mild CKD (GFR = 60-89 mL/min/1 73 square meters)    Stage 3A Moderate CKD (GFR = 45-59 mL/min/1 73 square meters)    Stage 3B Moderate CKD (GFR = 30-44 mL/min/1 73 square meters)    Stage 4 Severe CKD (GFR = 15-29 mL/min/1 73 square meters)    Stage 5 End Stage CKD (GFR <15 mL/min/1 73 square meters)  Note: GFR calculation is accurate only with a steady state creatinine    CBC and differential [[id number]]  (Abnormal) Collected: 06/26/21 0837    Lab Status: Final result Specimen: Blood from Arm, Left Updated: 06/26/21 0852     WBC 8 80 Thousand/uL      RBC 4 81 Million/uL      Hemoglobin 16 1 g/dL      Hematocrit 46 8 %      MCV 97 fL      MCH 33 4 pg      MCHC 34 3 g/dL      RDW 13 1 %      MPV 7 8 fL      Platelets 365 Thousands/uL      Neutrophils Relative 79 %      Lymphocytes Relative 11 %      Monocytes Relative 7 %      Eosinophils Relative 3 %      Basophils Relative 0 %      Neutrophils Absolute 6 90 Thousands/µL      Lymphocytes Absolute 0 90 Thousands/µL      Monocytes Absolute 0 60 Thousand/µL      Eosinophils Absolute 0 30 Thousand/µL      Basophils Absolute 0 00 Thousands/µL                  CT soft tissue neck with contrast   Final Result by Timur 6, DO (06/26 1461)      Solid mass measuring 4 2 x 4 1 x 3 5 cm corresponding to the palpable abnormality with differential considerations of pathologically enlarged lymph node versus exophytic parotid mass  Biopsy is recommended for further evaluation  No pathologic    adenopathy is identified elsewhere  No gross evidence for mucosal disease  The study was marked in [facility] for immediate notification  Workstation performed: [id number]                    Procedures  CriticalCare Time  Performed by: Silvana Tsai DO  Authorized by: Naresh Toribio III, DO     Critical care provider statement:     Critical care time (minutes):  35    Critical care start time:  6/26/2021 12:25 PM    Critical care end time:  6/26/2021 1:00 PM  Comments:      Patient's blood pressure was 419 systolic after giving clonidine and patient's a m  Blood pressure medications patient's blood pressure came down to 173 systolic  ED Course  ED Course as of Jun 26 1530   Sat Jun 26, 2021   0830 History and physical completed  Orders placed  , painless mass the right inferior portion of the earlobe along the lymphatic chain the angle of the mandible, will proceed with CT imaging of neck r/o mass vs  Tumor vs  Fluid contained structure  [address] [facility]  [id number] ENT tiger texted  1048 Case d/w Dr Allie Marrufo, from ENT, will see the patient in the office this coming Monday, reviewed CT results with the patient  1225 Patient's blood pressure is down to 654 systolic, patient's baseline labs are unremarkable with exception of a creatinine 1 50, patient's baseline creatinine is 1 8 range, patient has had elevated glucoses in the past, patient has morning a m  Medications specifically Norvasc, lisinopril and atenolol were administered prior to discharge  Reviewed multiple times with the patient that he will need to follow-up with his primary care doctor most importantly ENT for this neck mass  SBIRT 22yo+      Most Recent Value   SBIRT (24 yo +)   In order to provide better care to our patients, we are screening all of our patients for alcohol and drug use  Would it be okay to ask you these screening questions? Yes Filed at: 06/26/2021 1112   Initial Alcohol Screen: US AUDIT-C    1   How often do you have a drink containing alcohol?  0 Filed at: 06/26/2021 1112   2  How many drinks containing alcohol do you have on a typical day you are drinking? 0 Filed at: 06/26/2021 1112   3a  Male UNDER 65: How often do you have five or more drinks on one occasion? 0 Filed at: 06/26/2021 1112   3b  FEMALE Any Age, or MALE 65+: How often do you have 4 or more drinks on one occassion? 0 Filed at: 06/26/2021 1112   Audit-C Score  0 Filed at: 06/26/2021 1112   CHRISTIN: How many times in the past year have you    Used an illegal drug or used a prescription medication for non-medical reasons? Never Filed at: 06/26/2021 1112                    MDM  Number of Diagnoses or Management Options  Abnormal nuclear stress test  HTN (hypertension)  Neck mass  Diagnosis management comments: 49-year-old male presents the emergency department with right-sided neck swelling with no airway involvement, patient is a poor historian unclear the exact clinical temporal profile in terms of the timeline regarding presentation of the neck mass, CT was ordered, found to have a mass without involvement of the airway, case discussed with ENT, patient be seen semi emergently within the next 48 hours for further evaluation and biopsy  Patient has a history of renal insufficiency, creatinine is improved from baseline, patient's blood pressure was elevated while in the emergency department and patient was given clonidine and his morning AM medications  Portions of the record may have been created with voice recognition software  Occasional wrong word or "sound a like" substitutions may have occurred due to the inherent limitations of voice recognition software  Read the chart carefully and recognize, using context, where substitutions have occurred      Counseling: I had a detailed discussion with the patient and/or guardian regarding: the historical points, exam findings, and any diagnostic results supporting the discharge diagnosis, lab results, radiology results, discharge instructions reviewed with patient and/or family/caregiver and understanding was verbalized  Instructions given to return to the emergency department if symptoms worsen or persist, or if there are any questions or concerns that arise at home         Amount and/or Complexity of Data Reviewed  Clinical lab tests: ordered and reviewed  Tests in the radiology section of CPT®: ordered and reviewed  Tests in the medicine section of CPT®: ordered and reviewed        Disposition  Final diagnoses:   Neck mass   HTN (hypertension)   Abnormal nuclear stress test     Time reflects when diagnosis was documented in both MDM as applicable and the Disposition within this note     Time User Action Codes Description Comment    6/26/2021 10:49 AM Kinnie Divine Add [R22 1] Neck mass     6/26/2021 10:49 AM Kinnie Divine Add [I10] HTN (hypertension)     6/26/2021 10:52 AM Kinnie Divine Add [R94 39] Abnormal nuclear stress test       ED Disposition     ED Disposition Condition Date/Time Comment    Discharge Stable Sat Jun 26, 2021 12:26 PM Marylen Gentile discharge to home/self care              Follow-up Information     Follow up With Specialties Details Why [address] [facility], DO Internal Medicine   [address], DO Otolaryngology Call in 2 days  [address]  [address] [place] [id number] [phone]            Discharge Medication List as of 6/26/2021 12:27 PM      CONTINUE these medications which have NOT CHANGED    Details   amLODIPine (NORVASC) 10 mg tablet Take 1 tablet by mouth once daily, Normal      aspirin (ECOTRIN LOW STRENGTH) 81 mg EC tablet Take 81 mg by mouth daily, Historical Med      atenolol (TENORMIN) 50 mg tablet Take 1 tablet (50 mg total) by mouth daily, Starting Wed 5/5/2021, Normal      Blephamide 10-0 2 % ophthalmic suspension Administer 2 drops to both eyes daily at bedtime, Starting Wed 8/26/2020, Normal      calcium carbonate (TUMS) 500 mg chewable tablet Chew 1 tablet as needed for indigestion or heartburn , Historical Med      cloNIDine (CATAPRES) 0 1 mg tablet Take 1 tablet (0 1 mg total) by mouth daily for 180 doses, Starting Wed 5/5/2021, Until Mon 11/1/2021, Normal      Combigan 0 2-0 5 % INSTILL 1 DROP INTO RIGHT EYE EVERY 12 HOURS, Historical Med      cyclobenzaprine (FLEXERIL) 10 mg tablet Take 1 tablet (10 mg total) by mouth 2 (two) times a day, Starting Wed 3/10/2021, Normal      finasteride (PROSCAR) 5 mg tablet Take 1 tablet (5 mg total) by mouth daily, Starting Mon 5/17/2021, Normal      glucose blood (ONE TOUCH ULTRA TEST) test strip Test sugar once daily, Normal      lisinopril (ZESTRIL) 20 mg tablet Take 1 tablet by mouth once daily, Normal      Lumigan 0 01 % ophthalmic drops INSTILL 1 DROP AT BEDTIME INTO RIGHT EYE, Historical Med      magnesium hydroxide (MILK OF MAGNESIA) 400 mg/5 mL oral suspension Take by mouth as needed for constipation  , Historical Med      meclizine (ANTIVERT) 25 mg tablet TAKE 1 TABLET BY MOUTH THREE TIMES DAILY AS NEEDED FOR DIZZINESS FOR  UP  TO  10  DAYS, Normal      Multiple Vitamin (MULTIVITAMIN) capsule Take 1 capsule by mouth daily  , Until Discontinued, Historical Med      simvastatin (ZOCOR) 10 mg tablet Take 1 tablet (10 mg total) by mouth every evening, Starting Tue 1/5/2021, Normal      tamsulosin (FLOMAX) 0 4 mg Take 1 capsule (0 4 mg total) by mouth daily with dinner, Starting Wed 5/5/2021, Normal               PDMP Review       Value Time User    PDMP Reviewed  Yes 5/29/2020  8:47 AM Chandler Dunbar MD          ED Provider  Electronically Signed by           Madelaine Tidwell III,   06/26/21 3268

## 2021-06-29 ENCOUNTER — OFFICE VISIT (OUTPATIENT)
Dept: INTERNAL MEDICINE CLINIC | Facility: CLINIC | Age: 81
End: 2021-06-29
Payer: COMMERCIAL

## 2021-06-29 VITALS
SYSTOLIC BLOOD PRESSURE: 146 MMHG | DIASTOLIC BLOOD PRESSURE: 80 MMHG | HEIGHT: 67 IN | TEMPERATURE: 97.8 F | HEART RATE: 60 BPM | WEIGHT: 168 LBS | OXYGEN SATURATION: 94 % | RESPIRATION RATE: 18 BRPM | BODY MASS INDEX: 26.37 KG/M2

## 2021-06-29 DIAGNOSIS — L02.91 ABSCESS: Primary | ICD-10-CM

## 2021-06-29 PROCEDURE — 1160F RVW MEDS BY RX/DR IN RCRD: CPT | Performed by: INTERNAL MEDICINE

## 2021-06-29 PROCEDURE — 99213 OFFICE O/P EST LOW 20 MIN: CPT | Performed by: INTERNAL MEDICINE

## 2021-06-29 PROCEDURE — 1036F TOBACCO NON-USER: CPT | Performed by: INTERNAL MEDICINE

## 2021-06-29 RX ORDER — AMOXICILLIN AND CLAVULANATE POTASSIUM 875; 125 MG/1; MG/1
1 TABLET, FILM COATED ORAL EVERY 12 HOURS SCHEDULED
Qty: 14 TABLET | Refills: 0 | Status: SHIPPED | OUTPATIENT
Start: 2021-06-29 | End: 2021-07-06

## 2021-06-29 NOTE — PROGRESS NOTES
Assessment/Plan:    Problem List Items Addressed This Visit     None           There are no diagnoses linked to this encounter  No problem-specific Assessment & Plan notes found for this encounter  Subjective: fixed mass of the right mandible  Patient ID: Olga Lidia Sahu is a 78 y o  male  The patient states that he has a right sided neck mass that started approximately 2 weeks ago and is variable in size  He states that it is non-painful  He notes no drainage from it  It was noted that there was some excoriation in the superior part of the mass by the right ear  He has had no fevers or chills and states no concerns with mastication  The following portions of the patient's history were reviewed and updated as appropriate:   He has a past medical history of Anxiety state (8/5/2018), Basal cell carcinoma of skin (11/15/2016), Chronic obstructive pulmonary disease ([facility] [address] ) (9/5/2012), Constipation, Coronary artery disease (5/22/2012), Forearm laceration, right, initial encounter (5/28/2020), Hydronephrosis, Osteoporosis (5/22/2012), Pacemaker, and Trifascicular block  ,  does not have any pertinent problems on file  ,   has a past surgical history that includes Cardiac pacemaker placement (11/2009); Cataract extraction w/  intraocular lens implant; Kidney stone surgery; Tonsillectomy; pr transurethral elec-surg prostatectom (N/A, 5/9/2016); CYSTOSCOPY (N/A, 5/9/2016); Other surgical history; and Inguinal hernia repair  ,  family history includes Coronary artery disease in his family; Stroke in his father  ,   reports that he quit smoking about 41 years ago  His smoking use included cigars  He has quit using smokeless tobacco  He reports that he does not drink alcohol and does not use drugs  ,  has No Known Allergies     Current Outpatient Medications   Medication Sig Dispense Refill    amLODIPine (NORVASC) 10 mg tablet Take 1 tablet by mouth once daily 30 tablet 0    aspirin (ECOTRIN LOW STRENGTH) 81 mg EC tablet Take 81 mg by mouth daily      atenolol (TENORMIN) 50 mg tablet Take 1 tablet (50 mg total) by mouth daily 90 tablet 1    Blephamide 10-0 2 % ophthalmic suspension Administer 2 drops to both eyes daily at bedtime 10 mL 5    calcium carbonate (TUMS) 500 mg chewable tablet Chew 1 tablet as needed for indigestion or heartburn   cloNIDine (CATAPRES) 0 1 mg tablet Take 1 tablet (0 1 mg total) by mouth daily for 180 doses 90 tablet 1    Combigan 0 2-0 5 % INSTILL 1 DROP INTO RIGHT EYE EVERY 12 HOURS      cyclobenzaprine (FLEXERIL) 10 mg tablet Take 1 tablet (10 mg total) by mouth 2 (two) times a day 60 tablet 5    finasteride (PROSCAR) 5 mg tablet Take 1 tablet (5 mg total) by mouth daily 90 tablet 1    glucose blood (ONE TOUCH ULTRA TEST) test strip Test sugar once daily 50 each 5    lisinopril (ZESTRIL) 20 mg tablet Take 1 tablet by mouth once daily 30 tablet 5    Lumigan 0 01 % ophthalmic drops INSTILL 1 DROP AT BEDTIME INTO RIGHT EYE      magnesium hydroxide (MILK OF MAGNESIA) 400 mg/5 mL oral suspension Take by mouth as needed for constipation   meclizine (ANTIVERT) 25 mg tablet TAKE 1 TABLET BY MOUTH THREE TIMES DAILY AS NEEDED FOR DIZZINESS FOR  UP  TO  10  DAYS 30 tablet 0    Multiple Vitamin (MULTIVITAMIN) capsule Take 1 capsule by mouth daily   simvastatin (ZOCOR) 10 mg tablet Take 1 tablet (10 mg total) by mouth every evening 90 tablet 1    tamsulosin (FLOMAX) 0 4 mg Take 1 capsule (0 4 mg total) by mouth daily with dinner 30 capsule 5     No current facility-administered medications for this visit  Review of Systems   Constitutional: Negative for chills, fatigue and fever  HENT: Negative  Respiratory: Negative for cough, chest tightness and shortness of breath  Cardiovascular: Negative for chest pain and palpitations  Gastrointestinal: Negative for abdominal pain, constipation, diarrhea, nausea and vomiting  Genitourinary: Negative  Musculoskeletal: Negative for back pain and myalgias  Skin: Negative  Neurological: Negative  Psychiatric/Behavioral: Negative for dysphoric mood  The patient is not nervous/anxious  Objective:  Vitals:    06/29/21 1336   BP: 146/80   Pulse: 60   Resp: 18   Temp: 97 8 °F (36 6 °C)   TempSrc: Tympanic   SpO2: 94%   Weight: 76 2 kg (168 lb)   Height: 5' 7" (1 702 m)     Body mass index is 26 31 kg/m²  Physical Exam  Vitals and nursing note reviewed  Constitutional:       Appearance: He is well-developed  HENT:      Head: Normocephalic and atraumatic  Eyes:      Pupils: Pupils are equal, round, and reactive to light  Cardiovascular:      Rate and Rhythm: Normal rate and regular rhythm  Heart sounds: Normal heart sounds  No murmur heard  Pulmonary:      Effort: Pulmonary effort is normal       Breath sounds: Normal breath sounds  No stridor  No rales  Abdominal:      General: Bowel sounds are normal  There is no distension  Palpations: Abdomen is soft  Tenderness: There is no abdominal tenderness  Musculoskeletal:         General: No deformity  Normal range of motion  Cervical back: Normal range of motion and neck supple  Skin:     General: Skin is warm and dry  Neurological:      Mental Status: He is alert and oriented to person, place, and time            PHQ-9 Depression Screening    PHQ-9:   Frequency of the following problems over the past two weeks:

## 2021-06-30 PROCEDURE — 88172 CYTP DX EVAL FNA 1ST EA SITE: CPT | Performed by: PATHOLOGY

## 2021-06-30 PROCEDURE — 88173 CYTOPATH EVAL FNA REPORT: CPT | Performed by: PATHOLOGY

## 2021-07-08 DIAGNOSIS — G47.62 SLEEP RELATED LEG CRAMPS: ICD-10-CM

## 2021-07-08 RX ORDER — CYCLOBENZAPRINE HCL 10 MG
10 TABLET ORAL 2 TIMES DAILY
Qty: 60 TABLET | Refills: 5 | Status: SHIPPED | OUTPATIENT
Start: 2021-07-08 | End: 2021-10-25 | Stop reason: SDUPTHER

## 2021-07-13 DIAGNOSIS — I25.10 CORONARY ARTERY DISEASE INVOLVING NATIVE CORONARY ARTERY OF NATIVE HEART WITHOUT ANGINA PECTORIS: Primary | ICD-10-CM

## 2021-07-13 DIAGNOSIS — I49.5 SICK SINUS SYNDROME (HCC): ICD-10-CM

## 2021-07-15 ENCOUNTER — HOSPITAL ENCOUNTER (EMERGENCY)
Facility: HOSPITAL | Age: 81
Discharge: HOME | End: 2021-07-15
Attending: EMERGENCY MEDICINE
Payer: COMMERCIAL

## 2021-07-15 VITALS
OXYGEN SATURATION: 94 % | HEART RATE: 70 BPM | RESPIRATION RATE: 17 BRPM | TEMPERATURE: 99.8 F | DIASTOLIC BLOOD PRESSURE: 104 MMHG | SYSTOLIC BLOOD PRESSURE: 195 MMHG

## 2021-07-15 DIAGNOSIS — T14.8XXA HEMATOMA: Primary | ICD-10-CM

## 2021-07-15 PROCEDURE — 99282 EMERGENCY DEPT VISIT SF MDM: CPT

## 2021-07-15 PROCEDURE — 99284 EMERGENCY DEPT VISIT MOD MDM: CPT | Performed by: EMERGENCY MEDICINE

## 2021-07-16 NOTE — ED PROVIDER NOTES
History  Chief Complaint   Patient presents with    Rash     Patient presents with purple stripe on leg that appeared 3 days ago  No pain  This is an 78-year-old man who presents with a purple stripe on his left leg that appeared 3 days ago  He has not enlarged or worsened  It is not painful or pruritic  He reports he otherwise feels extremely well  Does not recall any trauma to the area  Has not applied any lotions or creams  Prior to Admission Medications   Prescriptions Last Dose Informant Patient Reported? Taking? Blephamide 10-0 2 % ophthalmic suspension  Self No No   Sig: Administer 2 drops to both eyes daily at bedtime   Combigan 0 2-0 5 %  Self Yes No   Sig: INSTILL 1 DROP INTO RIGHT EYE EVERY 12 HOURS   Lumigan 0 01 % ophthalmic drops  Self Yes No   Sig: INSTILL 1 DROP AT BEDTIME INTO RIGHT EYE   Multiple Vitamin (MULTIVITAMIN) capsule  Self Yes No   Sig: Take 1 capsule by mouth daily  amLODIPine (NORVASC) 10 mg tablet  Self No No   Sig: Take 1 tablet by mouth once daily   aspirin (ECOTRIN LOW STRENGTH) 81 mg EC tablet  Self Yes No   Sig: Take 81 mg by mouth daily   atenolol (TENORMIN) 50 mg tablet  Self No No   Sig: Take 1 tablet (50 mg total) by mouth daily   calcium carbonate (TUMS) 500 mg chewable tablet  Self Yes No   Sig: Chew 1 tablet as needed for indigestion or heartburn     cloNIDine (CATAPRES) 0 1 mg tablet  Self No No   Sig: Take 1 tablet (0 1 mg total) by mouth daily for 180 doses   cyclobenzaprine (FLEXERIL) 10 mg tablet   No No   Sig: Take 1 tablet (10 mg total) by mouth 2 (two) times a day   finasteride (PROSCAR) 5 mg tablet  Self No No   Sig: Take 1 tablet (5 mg total) by mouth daily   glucose blood (ONE TOUCH ULTRA TEST) test strip  Self No No   Sig: Test sugar once daily   lisinopril (ZESTRIL) 20 mg tablet  Self No No   Sig: Take 1 tablet by mouth once daily   magnesium hydroxide (MILK OF MAGNESIA) 400 mg/5 mL oral suspension  Self Yes No   Sig: Take by mouth as needed for constipation  meclizine (ANTIVERT) 25 mg tablet  Self No No   Sig: TAKE 1 TABLET BY MOUTH THREE TIMES DAILY AS NEEDED FOR DIZZINESS FOR  UP  TO  10  DAYS   simvastatin (ZOCOR) 10 mg tablet  Self No No   Sig: Take 1 tablet (10 mg total) by mouth every evening   tamsulosin (FLOMAX) 0 4 mg  Self No No   Sig: Take 1 capsule (0 4 mg total) by mouth daily with dinner      Facility-Administered Medications: None       Past Medical History:   Diagnosis Date    Anxiety state 8/5/2018    Basal cell carcinoma of skin 11/15/2016    Chronic obstructive pulmonary disease ([address] ) 9/5/2012    Constipation     Coronary artery disease 5/22/2012    Forearm laceration, right, initial encounter 5/28/2020    Hydronephrosis     LAST ASSESSED: 11/18/15    Osteoporosis 5/22/2012    Pacemaker     Trifascicular block        Past Surgical History:   Procedure Laterality Date    CARDIAC PACEMAKER PLACEMENT  11/2009    Medtronic dual chamber     CATARACT EXTRACTION W/  INTRAOCULAR LENS IMPLANT      CYSTOSCOPY N/A 5/9/2016    Procedure: CYSTOSCOPY, evacuation of bladder calculi;  Surgeon: Glen Santana MD;  [facility];  Service:     INGUINAL HERNIA REPAIR      UNILATERAL    KIDNEY STONE SURGERY      OTHER SURGICAL HISTORY      HERNIA REPAIR AS PER ALLSCRIPTS (ALREADY IN PERTINENT NEGATIVES)    [place] TRANSURETHRAL ELEC-SURG PROSTATECTOM N/A 5/9/2016    Procedure: TRANSURETHRAL RESECTION OF PROSTATE (TURP); Surgeon: Glen Santana MD;  Location[facility];  Service: Urology    TONSILLECTOMY         Family History   Problem Relation Age of Onset    Stroke Father         SYNDROME    Coronary artery disease Family         less than 61years of age     I have reviewed and agree with the history as documented      E-Cigarette/Vaping    E-Cigarette Use Never User      E-Cigarette/Vaping Substances    Nicotine No     THC No     CBD No     Flavoring No     Other No     Unknown No      Social History Tobacco Use    Smoking status: Former Smoker     Types: Cigars     Quit date: 1980     Years since quittin 2    Smokeless tobacco: Former User    Tobacco comment: smokes an occ  cigar; hasn't in last 2 weeks; CIGAR (1 A DAY) AS PER ALLSCRIPTS   Vaping Use    Vaping Use: Never used   Substance Use Topics    Alcohol use: Yes     Alcohol/week: 2 0 standard drinks     Types: 2 Cans of beer per week     Comment: once and a while    Drug use: No       Review of Systems   Constitutional: Negative for chills and fever  Eyes: Negative for visual disturbance  Respiratory: Negative for shortness of breath  Cardiovascular: Negative for chest pain  Gastrointestinal: Negative for abdominal distention and abdominal pain  Endocrine: Negative for polyuria  Genitourinary: Negative for decreased urine volume and dysuria  Neurological: Negative for dizziness, syncope and weakness  Physical Exam  Physical Exam  Constitutional:       General: He is not in acute distress  Appearance: He is well-developed  He is not ill-appearing, toxic-appearing or diaphoretic  HENT:      Head: Normocephalic and atraumatic  Eyes:      Conjunctiva/sclera: Conjunctivae normal       Pupils: Pupils are equal, round, and reactive to light  Cardiovascular:      Rate and Rhythm: Normal rate and regular rhythm  Heart sounds: Normal heart sounds  Pulmonary:      Effort: Pulmonary effort is normal  No respiratory distress  Breath sounds: Normal breath sounds  Abdominal:      General: Bowel sounds are normal       Palpations: Abdomen is soft  Musculoskeletal:         General: Normal range of motion  Cervical back: Normal range of motion and neck supple  Skin:     General: Skin is warm and dry  Comments: Approximately 6 cm by 1 cm purple stripe which appears excoriated in the middle  Appears to be a small hematoma     Neurological:      Mental Status: He is alert and oriented to person, place, and time  Psychiatric:         Behavior: Behavior normal          Thought Content: Thought content normal          Judgment: Judgment normal          Vital Signs  ED Triage Vitals   Temperature Pulse Respirations Blood Pressure SpO2   07/15/21 1532 07/15/21 1527 07/15/21 1527 07/15/21 1527 07/15/21 1527   99 8 °F (37 7 °C) 70 17 (!) 195/104 94 %      Temp Source Heart Rate Source Patient Position - Orthostatic VS BP Location FiO2 (%)   07/15/21 1532 07/15/21 1527 07/15/21 1527 07/15/21 1527 --   Tympanic Monitor Sitting Left arm       Pain Score       --                  Vitals:    07/15/21 1527   BP: (!) 195/104   Pulse: 70   Patient Position - Orthostatic VS: Sitting         Visual Acuity      ED Medications  Medications - No data to display    Diagnostic Studies  Results Reviewed     None                 No orders to display              Procedures  Procedures         ED Course                                           MDM  Number of Diagnoses or Management Options  Hematoma: new and requires workup  Diagnosis management comments: This is an 22-year-old man with small hematoma  Patient appears clinically well  Discussed warning signs and symptoms with the patient as well as when to return to the emergency department versus follow up with [facility]  Patient states understanding and agreement with the plan  This note was completed using dictation software  Disposition  Final diagnoses:   Hematoma     Time reflects when diagnosis was documented in both MDM as applicable and the Disposition within this note     Time User Action Codes Description Comment    7/15/2021  3:34 PM Trae Trujillo  8XXA] Hematoma       ED Disposition     ED Disposition Condition Date/Time Comment    Discharge Stable Thu Jul 15, 2021  3:34 PM Camacho Castellon discharge to home/self care              Follow-up Information     Follow up With Specialties Details Why [address], DO Internal Medicine In 1 day  [address]  [phone]            Discharge Medication List as of 7/15/2021  3:35 PM      CONTINUE these medications which have NOT CHANGED    Details   amLODIPine (NORVASC) 10 mg tablet Take 1 tablet by mouth once daily, Normal      aspirin (ECOTRIN LOW STRENGTH) 81 mg EC tablet Take 81 mg by mouth daily, Historical Med      atenolol (TENORMIN) 50 mg tablet Take 1 tablet (50 mg total) by mouth daily, Starting Wed 5/5/2021, Normal      Blephamide 10-0 2 % ophthalmic suspension Administer 2 drops to both eyes daily at bedtime, Starting Wed 8/26/2020, Normal      calcium carbonate (TUMS) 500 mg chewable tablet Chew 1 tablet as needed for indigestion or heartburn , Historical Med      cloNIDine (CATAPRES) 0 1 mg tablet Take 1 tablet (0 1 mg total) by mouth daily for 180 doses, Starting Wed 5/5/2021, Until Mon 11/1/2021, Normal      Combigan 0 2-0 5 % INSTILL 1 DROP INTO RIGHT EYE EVERY 12 HOURS, Historical Med      cyclobenzaprine (FLEXERIL) 10 mg tablet Take 1 tablet (10 mg total) by mouth 2 (two) times a day, Starting Thu 7/8/2021, Normal      finasteride (PROSCAR) 5 mg tablet Take 1 tablet (5 mg total) by mouth daily, Starting Mon 5/17/2021, Normal      glucose blood (ONE TOUCH ULTRA TEST) test strip Test sugar once daily, Normal      lisinopril (ZESTRIL) 20 mg tablet Take 1 tablet by mouth once daily, Normal      Lumigan 0 01 % ophthalmic drops INSTILL 1 DROP AT BEDTIME INTO RIGHT EYE, Historical Med      magnesium hydroxide (MILK OF MAGNESIA) 400 mg/5 mL oral suspension Take by mouth as needed for constipation  , Historical Med      meclizine (ANTIVERT) 25 mg tablet TAKE 1 TABLET BY MOUTH THREE TIMES DAILY AS NEEDED FOR DIZZINESS FOR  UP  TO  10  DAYS, Normal      Multiple Vitamin (MULTIVITAMIN) capsule Take 1 capsule by mouth daily  , Until Discontinued, Historical Med      simvastatin (ZOCOR) 10 mg tablet Take 1 tablet (10 mg total) by mouth every evening, Starting Tue 1/5/2021, Normal      tamsulosin (FLOMAX) 0 4 mg Take 1 capsule (0 4 mg total) by mouth daily with dinner, Starting Wed 5/5/2021, Normal           No discharge procedures on file      PDMP Review       Value Time User    PDMP Reviewed  Yes 5/29/2020  8:47 AM Dasia Rutherford MD          ED Provider  Electronically Signed by           Kun Heard MD  07/16/21 7414

## 2021-07-22 ENCOUNTER — HOSPITAL ENCOUNTER (OUTPATIENT)
Dept: RADIOLOGY | Age: 81
Discharge: HOME | End: 2021-07-22
Payer: COMMERCIAL

## 2021-07-22 DIAGNOSIS — R22.1 NECK MASS: ICD-10-CM

## 2021-07-22 LAB — GLUCOSE SERPL-MCNC: 111 MG/DL (ref 65–140)

## 2021-07-22 PROCEDURE — A9552 F18 FDG: HCPCS

## 2021-07-22 PROCEDURE — 82948 REAGENT STRIP/BLOOD GLUCOSE: CPT

## 2021-07-22 PROCEDURE — 78815 PET IMAGE W/CT SKULL-THIGH: CPT

## 2021-07-22 PROCEDURE — G1004 CDSM NDSC: HCPCS

## 2021-07-27 ENCOUNTER — TELEPHONE (OUTPATIENT)
Dept: OTHER | Facility: HOSPITAL | Age: 81
End: 2021-07-27

## 2021-07-29 NOTE — PROGRESS NOTES
[facility] remote check pacer  No events  Normal battery function  Current Outpatient Medications:     amLODIPine (NORVASC) 10 mg tablet, Take 1 tablet by mouth once daily, Disp: 30 tablet, Rfl: 0    aspirin (ECOTRIN LOW STRENGTH) 81 mg EC tablet, Take 81 mg by mouth daily, Disp: , Rfl:     atenolol (TENORMIN) 50 mg tablet, Take 1 tablet (50 mg total) by mouth daily, Disp: 90 tablet, Rfl: 1    Blephamide 10-0 2 % ophthalmic suspension, Administer 2 drops to both eyes daily at bedtime, Disp: 10 mL, Rfl: 5    calcium carbonate (TUMS) 500 mg chewable tablet, Chew 1 tablet as needed for indigestion or heartburn , Disp: , Rfl:     cloNIDine (CATAPRES) 0 1 mg tablet, Take 1 tablet (0 1 mg total) by mouth daily for 180 doses, Disp: 90 tablet, Rfl: 1    Combigan 0 2-0 5 %, INSTILL 1 DROP INTO RIGHT EYE EVERY 12 HOURS, Disp: , Rfl:     cyclobenzaprine (FLEXERIL) 10 mg tablet, Take 1 tablet (10 mg total) by mouth 2 (two) times a day, Disp: 60 tablet, Rfl: 5    finasteride (PROSCAR) 5 mg tablet, Take 1 tablet (5 mg total) by mouth daily, Disp: 90 tablet, Rfl: 1    glucose blood (ONE TOUCH ULTRA TEST) test strip, Test sugar once daily, Disp: 50 each, Rfl: 5    lisinopril (ZESTRIL) 20 mg tablet, Take 1 tablet by mouth once daily, Disp: 30 tablet, Rfl: 5    Lumigan 0 01 % ophthalmic drops, INSTILL 1 DROP AT BEDTIME INTO RIGHT EYE, Disp: , Rfl:     magnesium hydroxide (MILK OF MAGNESIA) 400 mg/5 mL oral suspension, Take by mouth as needed for constipation  , Disp: , Rfl:     meclizine (ANTIVERT) 25 mg tablet, TAKE 1 TABLET BY MOUTH THREE TIMES DAILY AS NEEDED FOR DIZZINESS FOR  UP  TO  10  DAYS, Disp: 30 tablet, Rfl: 0    Multiple Vitamin (MULTIVITAMIN) capsule, Take 1 capsule by mouth daily  , Disp: , Rfl:     simvastatin (ZOCOR) 10 mg tablet, TAKE 1 TABLET BY MOUTH ONCE DAILY IN THE EVENING, Disp: 90 tablet, Rfl: 1    tamsulosin (FLOMAX) 0 4 mg, Take 1 capsule (0 4 mg total) by mouth daily with dinner, Disp: 30 capsule, Rfl: 5

## 2021-08-26 NOTE — H&P (VIEW-ONLY)
Assessment/Plan:    Based upon the history given and current physical findings, I have recommended that the patient undergo total right parotidectomy with NIMS (nerve integrity monitoring system)  All risks, benefits, alternatives, and complications of the surgery have been reviewed in detail  The risks of the surgery include but are not limited to:  bleeding, infection, need for further surgery, scar formation, sialocele formation (may require repeat aspirations to drain), need to drain fluid collection (saliva or blood) under skin after surgery, permanent numbness of the earlobe and incision site, facial sweating (small patch of sweat on cheek may form while eating), facial nerve injury or facial paralysis (inability to move entire side of face, inability to close eye and need to tape eye shut at night, inability to whistle or drink through a straw)  The patient / parent understands and accepts all risks of the surgery  Pre-operative labs have been ordered  Medical clearance is needed  The patient / parent has been instructed to fill the medications in preparation for the surgery  Post operative instructions have been reviewed and a copy has been given to the patient / parent  A post operative visit has been scheduled  If any questions should arise prior to or after the surgery, the patient has been instructed to call my office to discuss the concerns  Based upon the history given and current physical findings, I have recommended that the patient undergo right modified radical neck dissection  All risks, benefits, alternatives, and complications of the surgery have been reviewed in detail    The risks of the surgery include but are not limited to:  bleeding, infection, breakdown of skin flap, need for further surgery, scar formation, sialocele formation, chyle leak, need to drain fluid collection under skin after surgery, numbness of the earlobe and incision site, numbness of distal site,  facial nerve injury or facial paralysis (weakness of corner of the mouth), recurrence of tumor, inability to completely eradicate tumor burden, injury to the 11th cranial nerve (inability to lift arm above shoulder, winged scapula, lower shoulder than opposite side)  The patient understands and accepts all risks of the surgery  Pre-operative labs have been ordered  Medical clearance is needed  Post operative medication has been given and the patient has been instructed to fill the medication in preparation for the surgery  Post operative instructions have been reviewed  and a copy of those instructions was given to the patient  A post operative visit has been scheduled  If any questions should arise prior to or after the surgery, the patient has been instructed to call my office to discuss the concerns  He will have a drain placed at the time of surgery and an overnight stay will be required  Encounter Diagnosis     ICD-10-CM    1  Malignant neoplasm of parotid gland ([facility] [address] )  C07 oxyCODONE-acetaminophen (PERCOCET) 5-325 mg per tablet              Patient ID: Nesha Morales is a 78 y o  male  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  I had previously recommended (with the presence of his daugther-in-law that he undergo a total parotidectomy with facial nerve monitoring and preservation  We had also discussed the possible role of a modified radical neck dissection on the right side to prevent future spread to lymph nodes  I had explained to him that without treatment the tumor will likely continue to grow and cause a number of issues  Those issues include but are not limited to: Increasing size, extravasation of tumor through the skin resulting in foul smell and bleeding issues, right facial nerve paralysis, inability to close the right eye, spread to lymph nodes in the neck and other areas of the body, bleeding, airway issues, and death    At this point he understood and accepted all the risks associated with not treating this tumor  I had asked him to sign some paperwork that he is well informed of this decision  He was also told that if he changed his mind in the future I would be more than happy to schedule the surgical procedure for him  He understool that if he waited any significant amount of time I would need to repeat some of his studies prior to the surgery  He is now back today in the office to discuss this once again - this time in the presence of his son Bc Bunch)  His son had some concerns about the surgery if it was to be completed  He is currently scheduled to have the procedure in the next few weeks - scheduled by his son  The following portions of the patient's history were reviewed and updated as appropriate: allergies, current medications, past family history, past medical history, past social history, past surgical history and problem list       Past Medical History:   Diagnosis Date    Anxiety state 8/5/2018    Basal cell carcinoma of skin 11/15/2016    Chronic obstructive pulmonary disease ([facility]) 9/5/2012    Constipation     Coronary artery disease 5/22/2012    Forearm laceration, right, initial encounter 5/28/2020    Hydronephrosis     LAST ASSESSED: 11/18/15    Osteoporosis 5/22/2012    Pacemaker     Trifascicular block        Past Surgical History:   Procedure Laterality Date    CARDIAC PACEMAKER PLACEMENT  11/2009    Medtronic dual chamber     CATARACT EXTRACTION W/  INTRAOCULAR LENS IMPLANT      CYSTOSCOPY N/A 5/9/2016    Procedure: CYSTOSCOPY, evacuation of bladder calculi;  Surgeon: Rustam Arriaga MD;  [facility];  Service:     INGUINAL HERNIA REPAIR      UNILATERAL    KIDNEY STONE SURGERY      OTHER SURGICAL HISTORY      HERNIA REPAIR AS PER ALLSCRIPTS (ALREADY IN PERTINENT NEGATIVES)    [place] TRANSURETHRAL ELEC-SURG PROSTATECTOM N/A 5/9/2016    Procedure: TRANSURETHRAL RESECTION OF PROSTATE (TURP);   Surgeon: Rustam Arriaga MD;  [facility];  Service: Urology    TONSILLECTOMY         Social History     Tobacco Use    Smoking status: Former Smoker     Types: Cigars     Quit date: 1980     Years since quittin 3    Smokeless tobacco: Former User    Tobacco comment: smokes an occ  cigar; hasn't in last 2 weeks; CIGAR (1 A DAY) AS PER ALLSCRIPTS   Vaping Use    Vaping Use: Never used   Substance Use Topics    Alcohol use: Yes     Alcohol/week: 2 0 standard drinks     Types: 2 Cans of beer per week     Comment: once and a while    Drug use: No       Current Outpatient Medications on File Prior to Visit   Medication Sig Dispense Refill    amLODIPine (NORVASC) 10 mg tablet Take 1 tablet by mouth once daily 30 tablet 0    aspirin (ECOTRIN LOW STRENGTH) 81 mg EC tablet Take 81 mg by mouth daily      atenolol (TENORMIN) 50 mg tablet Take 1 tablet (50 mg total) by mouth daily 90 tablet 1    Blephamide 10-0 2 % ophthalmic suspension Administer 2 drops to both eyes daily at bedtime 10 mL 5    calcium carbonate (TUMS) 500 mg chewable tablet Chew 1 tablet as needed for indigestion or heartburn   cloNIDine (CATAPRES) 0 1 mg tablet Take 1 tablet (0 1 mg total) by mouth daily for 180 doses 90 tablet 1    Combigan 0 2-0 5 % INSTILL 1 DROP INTO RIGHT EYE EVERY 12 HOURS      cyclobenzaprine (FLEXERIL) 10 mg tablet Take 1 tablet (10 mg total) by mouth 2 (two) times a day 60 tablet 5    finasteride (PROSCAR) 5 mg tablet Take 1 tablet (5 mg total) by mouth daily 90 tablet 1    glucose blood (ONE TOUCH ULTRA TEST) test strip Test sugar once daily 50 each 5    lisinopril (ZESTRIL) 20 mg tablet Take 1 tablet by mouth once daily 30 tablet 5    Lumigan 0 01 % ophthalmic drops INSTILL 1 DROP AT BEDTIME INTO RIGHT EYE      magnesium hydroxide (MILK OF MAGNESIA) 400 mg/5 mL oral suspension Take by mouth as needed for constipation        meclizine (ANTIVERT) 25 mg tablet TAKE 1 TABLET BY MOUTH THREE TIMES DAILY AS NEEDED FOR DIZZINESS FOR  UP  TO  10  DAYS 30 tablet 0    Multiple Vitamin (MULTIVITAMIN) capsule Take 1 capsule by mouth daily   simvastatin (ZOCOR) 10 mg tablet TAKE 1 TABLET BY MOUTH ONCE DAILY IN THE EVENING 90 tablet 1    tamsulosin (FLOMAX) 0 4 mg Take 1 capsule (0 4 mg total) by mouth daily with dinner 30 capsule 5     No current facility-administered medications on file prior to visit  No Known Allergies        Review of Systems   Constitutional: Negative for activity change, appetite change, fatigue and unexpected weight change  HENT: Negative for congestion, ear discharge, ear pain, facial swelling, hearing loss, nosebleeds, postnasal drip, rhinorrhea, sinus pressure, sinus pain, sneezing, sore throat, tinnitus, trouble swallowing and voice change  Eyes: Negative for photophobia, pain, discharge, itching and visual disturbance  Respiratory: Negative for cough, chest tightness and shortness of breath  Musculoskeletal: Negative for gait problem, neck pain and neck stiffness  Skin: Negative for rash and wound  Allergic/Immunologic: Negative for environmental allergies  Neurological: Negative for dizziness, facial asymmetry and speech difficulty  Hematological: Positive for adenopathy  Psychiatric/Behavioral: Negative for sleep disturbance  The patient is not nervous/anxious  /70   Pulse 60   Ht 5' 7" (1 702 m)   Wt 77 1 kg (170 lb)   BMI 26 63 kg/m²       PHYSICAL  EXAMINATION    CONSTITUTION:    Appears appropriate for age  No evidence of any acute distress  Communicates normally  Voice quality is clear  Alert and oriented  HEAD/FACE:    Atraumatic, normocephalic on inspection  No scars present  Salivary gland: The submandibular glands are normal in texture and size without any asymmetry  The left parotid gland normal - the right side with a very large firm mass in the parotid tail which measures 5 5 x 5 0 cm     Facial nerve function is symmetric and normal     EYES:    Extraocular muscles intact in both eyes, normal gaze bilaterally and no evidence of nystagmus  Pupils equal, round, and accommodate to light bilaterally  EARS:    External ears normal     External canals are impacted  Tympanic membranes intact with normal mobility, no effusion, no retraction, no perforation  Post auricular area is normal    NOSE:    External nose without deformity  Internal mucosa pink and moist     Septum midline  Inferior nasal turbinates normal in color and size bilaterally    ORAL CAVITY:    Lips normal and healthy in appearance  Full upper dentures - lowers with a few remaining teeth in poor condition  Gums healthy, pink and moist     Tongue appears pink and moist with no lesions  Floor of mouth pink, moist, and smooth  Submandibular ducts patent with clear saliva  Parotid ducts patent with clear saliva  Oral mucosa pink and moist     Hard palate normal in appearance without any lesions  OROPHARYNX:    Soft palate pink and moist without any lesions  Uvula midline without any lesions  Tonsils absent  Posterior pharynx pink and moist without any lesions    NECK:    Supple and symmetric  No masses noted  Trachea midline  No thyromegaly or nodules noted  LYMPH:    No palpable adenopathy in left or right neck    SKIN:    No rashes  No lesions noted       HEART:   Regular rate and rhythm    LUNGS:  Clear to auscultation bilaterally

## 2021-08-30 DIAGNOSIS — I10 ESSENTIAL HYPERTENSION: ICD-10-CM

## 2021-08-30 RX ORDER — LISINOPRIL 20 MG/1
20 TABLET ORAL DAILY
Qty: 90 TABLET | Refills: 1 | Status: SHIPPED | OUTPATIENT
Start: 2021-08-30 | End: 2021-10-25 | Stop reason: SDUPTHER

## 2021-08-31 ENCOUNTER — APPOINTMENT (OUTPATIENT)
Dept: LAB | Facility: CLINIC | Age: 81
End: 2021-08-31
Payer: COMMERCIAL

## 2021-08-31 DIAGNOSIS — D68.9 COAGULOPATHY (HCC): ICD-10-CM

## 2021-08-31 DIAGNOSIS — C07 MALIGNANT NEOPLASM OF PAROTID GLAND (HCC): ICD-10-CM

## 2021-08-31 LAB
ALBUMIN SERPL BCP-MCNC: 3.9 G/DL (ref 3.5–5)
ALP SERPL-CCNC: 50 U/L (ref 46–116)
ALT SERPL W P-5'-P-CCNC: 26 U/L (ref 12–78)
ANION GAP SERPL CALCULATED.3IONS-SCNC: 2 MMOL/L (ref 4–13)
APTT PPP: 25 SECONDS (ref 23–37)
AST SERPL W P-5'-P-CCNC: 20 U/L (ref 5–45)
BASOPHILS # BLD AUTO: 0.03 THOUSANDS/ΜL (ref 0–0.1)
BASOPHILS NFR BLD AUTO: 0 % (ref 0–1)
BILIRUB SERPL-MCNC: 1.75 MG/DL (ref 0.2–1)
BUN SERPL-MCNC: 27 MG/DL (ref 5–25)
CALCIUM SERPL-MCNC: 9.5 MG/DL (ref 8.3–10.1)
CHLORIDE SERPL-SCNC: 106 MMOL/L (ref 100–108)
CHOLEST SERPL-MCNC: 140 MG/DL (ref 50–200)
CO2 SERPL-SCNC: 32 MMOL/L (ref 21–32)
CREAT SERPL-MCNC: 1.43 MG/DL (ref 0.6–1.3)
EOSINOPHIL # BLD AUTO: 0.35 THOUSAND/ΜL (ref 0–0.61)
EOSINOPHIL NFR BLD AUTO: 5 % (ref 0–6)
ERYTHROCYTE [DISTWIDTH] IN BLOOD BY AUTOMATED COUNT: 13 % (ref 11.6–15.1)
GFR SERPL CREATININE-BSD FRML MDRD: 45 ML/MIN/1.73SQ M
GLUCOSE P FAST SERPL-MCNC: 137 MG/DL (ref 65–99)
HCT VFR BLD AUTO: 46.1 % (ref 36.5–49.3)
HDLC SERPL-MCNC: 42 MG/DL
HGB BLD-MCNC: 15.5 G/DL (ref 12–17)
IMM GRANULOCYTES # BLD AUTO: 0.01 THOUSAND/UL (ref 0–0.2)
IMM GRANULOCYTES NFR BLD AUTO: 0 % (ref 0–2)
INR PPP: 1 (ref 0.84–1.19)
LDLC SERPL CALC-MCNC: 67 MG/DL (ref 0–100)
LYMPHOCYTES # BLD AUTO: 1.16 THOUSANDS/ΜL (ref 0.6–4.47)
LYMPHOCYTES NFR BLD AUTO: 17 % (ref 14–44)
MCH RBC QN AUTO: 33.5 PG (ref 26.8–34.3)
MCHC RBC AUTO-ENTMCNC: 33.6 G/DL (ref 31.4–37.4)
MCV RBC AUTO: 100 FL (ref 82–98)
MONOCYTES # BLD AUTO: 0.56 THOUSAND/ΜL (ref 0.17–1.22)
MONOCYTES NFR BLD AUTO: 8 % (ref 4–12)
NEUTROPHILS # BLD AUTO: 4.65 THOUSANDS/ΜL (ref 1.85–7.62)
NEUTS SEG NFR BLD AUTO: 70 % (ref 43–75)
NRBC BLD AUTO-RTO: 0 /100 WBCS
PLATELET # BLD AUTO: 210 THOUSANDS/UL (ref 149–390)
PMV BLD AUTO: 10.1 FL (ref 8.9–12.7)
POTASSIUM SERPL-SCNC: 4.8 MMOL/L (ref 3.5–5.3)
PROT SERPL-MCNC: 7.1 G/DL (ref 6.4–8.2)
PROTHROMBIN TIME: 13.2 SECONDS (ref 11.6–14.5)
RBC # BLD AUTO: 4.62 MILLION/UL (ref 3.88–5.62)
SODIUM SERPL-SCNC: 140 MMOL/L (ref 136–145)
TRIGL SERPL-MCNC: 157 MG/DL
TSH SERPL DL<=0.05 MIU/L-ACNC: 1.9 UIU/ML (ref 0.36–3.74)
WBC # BLD AUTO: 6.76 THOUSAND/UL (ref 4.31–10.16)

## 2021-08-31 PROCEDURE — 80061 LIPID PANEL: CPT

## 2021-08-31 PROCEDURE — 85025 COMPLETE CBC W/AUTO DIFF WBC: CPT

## 2021-08-31 PROCEDURE — 85610 PROTHROMBIN TIME: CPT

## 2021-08-31 PROCEDURE — 85730 THROMBOPLASTIN TIME PARTIAL: CPT

## 2021-08-31 PROCEDURE — 36415 COLL VENOUS BLD VENIPUNCTURE: CPT

## 2021-08-31 PROCEDURE — 80053 COMPREHEN METABOLIC PANEL: CPT

## 2021-08-31 PROCEDURE — 84443 ASSAY THYROID STIM HORMONE: CPT

## 2021-09-01 ENCOUNTER — ANESTHESIA EVENT (OUTPATIENT)
Dept: PERIOP | Facility: HOSPITAL | Age: 81
End: 2021-09-01
Payer: COMMERCIAL

## 2021-09-01 ENCOUNTER — RA CDI HCC (OUTPATIENT)
Dept: OTHER | Facility: HOSPITAL | Age: 81
End: 2021-09-01

## 2021-09-01 NOTE — PROGRESS NOTES
[facility]  coding opportunities             Chart Reviewed * (Number of) Inbasket suggestions sent to Provider: 2     Problem listed updated  Provider Accepted, (number of) suggestions accepted: 2            Number of suggestions NOT actually used: 2     Patients insurance company: [address]  (Medicare Advantage and [employer])     Visit status: Patient arrived for their scheduled appointment        [facility] 75  coding opportunities             Chart Reviewed * (Number of) Inbasket suggestions sent to Provider: 2     Problem listed updated   Provider Accepted, (number of) suggestions accepted: 2               Patients insurance company: [address]  (Medicare [employer]vantage and [employer])           [place] [address]  coding opportunities        DX: 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

## 2021-09-07 RX ORDER — BRINZOLAMIDE 1 %
1 SUSPENSION, DROPS(FINAL DOSAGE FORM)(ML) OPHTHALMIC (EYE) 2 TIMES DAILY
COMMUNITY
Start: 2021-08-06 | End: 2021-09-09 | Stop reason: ALTCHOICE

## 2021-09-08 ENCOUNTER — CONSULT (OUTPATIENT)
Dept: INTERNAL MEDICINE CLINIC | Facility: CLINIC | Age: 81
End: 2021-09-08
Payer: COMMERCIAL

## 2021-09-08 VITALS
WEIGHT: 167 LBS | TEMPERATURE: 98.5 F | BODY MASS INDEX: 26.21 KG/M2 | HEIGHT: 67 IN | HEART RATE: 71 BPM | RESPIRATION RATE: 18 BRPM | OXYGEN SATURATION: 94 % | DIASTOLIC BLOOD PRESSURE: 78 MMHG | SYSTOLIC BLOOD PRESSURE: 160 MMHG

## 2021-09-08 DIAGNOSIS — E11.59 TYPE 2 DIABETES MELLITUS WITH OTHER CIRCULATORY COMPLICATION, WITHOUT LONG-TERM CURRENT USE OF INSULIN (HCC): ICD-10-CM

## 2021-09-08 DIAGNOSIS — I10 ESSENTIAL HYPERTENSION: ICD-10-CM

## 2021-09-08 DIAGNOSIS — I25.10 CORONARY ARTERY DISEASE INVOLVING NATIVE CORONARY ARTERY OF NATIVE HEART WITHOUT ANGINA PECTORIS: ICD-10-CM

## 2021-09-08 DIAGNOSIS — Z01.818 PRE-OP EVALUATION: Primary | ICD-10-CM

## 2021-09-08 PROCEDURE — 99213 OFFICE O/P EST LOW 20 MIN: CPT | Performed by: INTERNAL MEDICINE

## 2021-09-08 PROCEDURE — 1036F TOBACCO NON-USER: CPT | Performed by: INTERNAL MEDICINE

## 2021-09-08 PROCEDURE — 1160F RVW MEDS BY RX/DR IN RCRD: CPT | Performed by: INTERNAL MEDICINE

## 2021-09-08 NOTE — PROGRESS NOTES
Subjective: Mana Izquierdo is a 78 y o  male who presents to the office today for a preoperative consultation at the request of surgeon Dr Sammy Palacios who plans on performing Right Total Parotidectomy and radical neck disection  on September 10  This consultation is requested for the specific conditions prompting preoperative evaluation (i e  because of potential affect on operative risk): DM, HTN and CAD  Planned anesthesia: general  The patient has the following known anesthesia issues: N/A  Patients bleeding risk: no recent abnormal bleeding  Patient does not have objections to receiving blood products if needed  The following portions of the patient's history were reviewed and updated as appropriate:   He  has a past medical history of Anxiety state (8/5/2018), Basal cell carcinoma of skin (11/15/2016), Cancer ([facility] ), Chronic kidney disease, Chronic obstructive pulmonary disease ([facility] ) (9/5/2012), Constipation, COPD (chronic obstructive pulmonary disease) ([facility] ), Coronary artery disease (5/22/2012), Diabetes mellitus ([facility] ), Forearm laceration, right, initial encounter (5/28/2020), Hydronephrosis, Hypertension, Osteoporosis (5/22/2012), Pacemaker, SSS (sick sinus syndrome) ([facility] ), and Trifascicular block    He   Patient Active Problem List    Diagnosis Date Noted    Type 2 diabetes mellitus with diabetic peripheral angiopathy without gangrene ([facility] ) 05/04/2021    Type 2 diabetes mellitus with chronic kidney disease ([facility] ) 05/04/2021    Platelets decreased ([facility] ) 03/31/2021    UTI (urinary tract infection) 03/18/2021    BRENDA (acute kidney injury) ([facility] ) 03/17/2021    Troponin level elevated 03/17/2021    Dizziness 03/17/2021    Mixed hyperlipidemia 05/29/2020    Pacemaker 07/01/2019    Constipation 11/02/2018    Skin lesion 10/10/2018    Anxiety state 08/05/2018    Bradycardia 08/05/2018    Basal cell carcinoma of skin 11/15/2016    Abnormal nuclear stress test 08/04/2016    BPH (benign prostatic hyperplasia) 11/18/2015    Sleep related leg cramps 01/31/2013    Coronary artery disease involving native coronary artery of native heart without angina pectoris 05/22/2012    Type 2 diabetes mellitus with circulatory disorder, without long-term current use of insulin ([facility] ) 05/22/2012    Hypercholesterolemia 05/22/2012    Essential hypertension 05/22/2012    Osteoporosis 05/22/2012    Peripheral arterial disease ([facility] ) 05/22/2012     He  has a past surgical history that includes Cardiac pacemaker placement (11/2009); Cataract extraction w/  intraocular lens implant; Kidney stone surgery; Tonsillectomy; pr transurethral elec-surg prostatectom (N/A, 5/9/2016); CYSTOSCOPY (N/A, 5/9/2016); Other surgical history; and Inguinal hernia repair  His family history includes Coronary artery disease in his family; Stroke in his father  He  reports that he quit smoking about 41 years ago  His smoking use included cigars  He has quit using smokeless tobacco  He reports current alcohol use of about 2 0 standard drinks of alcohol per week  He reports that he does not use drugs  Current Outpatient Medications   Medication Sig Dispense Refill    amLODIPine (NORVASC) 10 mg tablet Take 1 tablet by mouth once daily 30 tablet 0    atenolol (TENORMIN) 50 mg tablet Take 1 tablet (50 mg total) by mouth daily 90 tablet 1    Azopt 1 % ophthalmic suspension Administer 1 drop to both eyes 2 (two) times a day      Blephamide 10-0 2 % ophthalmic suspension Administer 2 drops to both eyes daily at bedtime 10 mL 5    calcium carbonate (TUMS) 500 mg chewable tablet Chew 1 tablet as needed for indigestion or heartburn        cloNIDine (CATAPRES) 0 1 mg tablet Take 1 tablet (0 1 mg total) by mouth daily for 180 doses 90 tablet 1    Combigan 0 2-0 5 % INSTILL 1 DROP INTO RIGHT EYE EVERY 12 HOURS      cyclobenzaprine (FLEXERIL) 10 mg tablet Take 1 tablet (10 mg total) by mouth 2 (two) times a day 60 tablet 5    finasteride (PROSCAR) 5 mg tablet Take 1 tablet (5 mg total) by mouth daily 90 tablet 1    glucose blood (ONE TOUCH ULTRA TEST) test strip Test sugar once daily 50 each 5    lisinopril (ZESTRIL) 20 mg tablet Take 1 tablet (20 mg total) by mouth daily 90 tablet 1    Lumigan 0 01 % ophthalmic drops INSTILL 1 DROP AT BEDTIME INTO RIGHT EYE      magnesium hydroxide (MILK OF MAGNESIA) 400 mg/5 mL oral suspension Take by mouth as needed for constipation   meclizine (ANTIVERT) 25 mg tablet TAKE 1 TABLET BY MOUTH THREE TIMES DAILY AS NEEDED FOR DIZZINESS FOR  UP  TO  10  DAYS 30 tablet 0    oxyCODONE-acetaminophen (PERCOCET) 5-325 mg per tablet Take 1 tablet by mouth every 6 (six) hours as needed for moderate painMax Daily Amount: 4 tablets 20 tablet 0    simvastatin (ZOCOR) 10 mg tablet TAKE 1 TABLET BY MOUTH ONCE DAILY IN THE EVENING 90 tablet 1    tamsulosin (FLOMAX) 0 4 mg Take 1 capsule (0 4 mg total) by mouth daily with dinner 30 capsule 5    aspirin (ECOTRIN LOW STRENGTH) 81 mg EC tablet Take 81 mg by mouth daily (Patient not taking: Reported on 9/8/2021)      Multiple Vitamin (MULTIVITAMIN) capsule Take 1 capsule by mouth daily  (Patient not taking: Reported on 9/8/2021)       No current facility-administered medications for this visit  Current Outpatient Medications on File Prior to Visit   Medication Sig    amLODIPine (NORVASC) 10 mg tablet Take 1 tablet by mouth once daily    atenolol (TENORMIN) 50 mg tablet Take 1 tablet (50 mg total) by mouth daily    Azopt 1 % ophthalmic suspension Administer 1 drop to both eyes 2 (two) times a day    Blephamide 10-0 2 % ophthalmic suspension Administer 2 drops to both eyes daily at bedtime    calcium carbonate (TUMS) 500 mg chewable tablet Chew 1 tablet as needed for indigestion or heartburn      cloNIDine (CATAPRES) 0 1 mg tablet Take 1 tablet (0 1 mg total) by mouth daily for 180 doses    Combigan 0 2-0 5 % INSTILL 1 DROP INTO RIGHT EYE EVERY 12 HOURS    cyclobenzaprine (FLEXERIL) 10 mg tablet Take 1 tablet (10 mg total) by mouth 2 (two) times a day    finasteride (PROSCAR) 5 mg tablet Take 1 tablet (5 mg total) by mouth daily    glucose blood (ONE TOUCH ULTRA TEST) test strip Test sugar once daily    lisinopril (ZESTRIL) 20 mg tablet Take 1 tablet (20 mg total) by mouth daily    Lumigan 0 01 % ophthalmic drops INSTILL 1 DROP AT BEDTIME INTO RIGHT EYE    magnesium hydroxide (MILK OF MAGNESIA) 400 mg/5 mL oral suspension Take by mouth as needed for constipation   meclizine (ANTIVERT) 25 mg tablet TAKE 1 TABLET BY MOUTH THREE TIMES DAILY AS NEEDED FOR DIZZINESS FOR  UP  TO  10  DAYS    oxyCODONE-acetaminophen (PERCOCET) 5-325 mg per tablet Take 1 tablet by mouth every 6 (six) hours as needed for moderate painMax Daily Amount: 4 tablets    simvastatin (ZOCOR) 10 mg tablet TAKE 1 TABLET BY MOUTH ONCE DAILY IN THE EVENING    tamsulosin (FLOMAX) 0 4 mg Take 1 capsule (0 4 mg total) by mouth daily with dinner    aspirin (ECOTRIN LOW STRENGTH) 81 mg EC tablet Take 81 mg by mouth daily (Patient not taking: Reported on 9/8/2021)    Multiple Vitamin (MULTIVITAMIN) capsule Take 1 capsule by mouth daily  (Patient not taking: Reported on 9/8/2021)     No current facility-administered medications on file prior to visit  He has No Known Allergies       Review of Systems  Pertinent items are noted in HPI       Objective:     /78 (BP Location: Right arm, Patient Position: Sitting, Cuff Size: Adult)   Pulse 71   Temp 98 5 °F (36 9 °C) (Temporal)   Resp 18   Ht 5' 7" (1 702 m)   Wt 75 8 kg (167 lb)   SpO2 94%   BMI 26 16 kg/m²     General Appearance:    Alert, cooperative, no distress, appears stated age   Head:    Normocephalic, without obvious abnormality, atraumatic   Eyes:    PERRL, conjunctiva/corneas clear, EOM's intact, fundi     benign, both eyes        Ears:    Normal TM's and external ear canals, both ears   Nose:   Nares normal, septum midline, mucosa normal, no drainage    or sinus tenderness   Throat:   Lips, mucosa, and tongue normal; teeth and gums normal   Neck:   Supple, symmetrical, trachea midline, no adenopathy;        thyroid:  No enlargement/tenderness/nodules; no carotid    bruit or JVD   Back:     Symmetric, no curvature, ROM normal, no CVA tenderness   Lungs:     Clear to auscultation bilaterally, respirations unlabored   Chest wall:    No tenderness or deformity   Heart:    Regular rate and rhythm, S1 and S2 normal, no murmur, rub   or gallop   Abdomen:     Soft, non-tender, bowel sounds active all four quadrants,     no masses, no organomegaly   Genitalia:    Normal male without lesion, discharge or tenderness   Rectal:    Normal tone, normal prostate, no masses or tenderness;    guaiac negative stool   Extremities:   Extremities normal, atraumatic, no cyanosis or edema   Pulses:   2+ and symmetric all extremities   Skin:   Skin color, texture, turgor normal, no rashes or lesions   Lymph nodes:   Cervical, supraclavicular, and axillary nodes normal   Neurologic:   CNII-XII intact  Normal strength, sensation and reflexes       throughout       Predictors of intubation difficulty:   Morbid obesity? no   Anatomically abnormal facies? no   Prominent incisors? no   Receding mandible? no   Short, thick neck? no   Neck range of motion: normal   Mallampati score: I (soft palate, uvula, fauces, and tonsillar pillars visible)   Thyromental distance: < 6cm   Mouth openin cm   Dentition: No chipped, loose, or missing teeth  Cardiographics  EC% Venticular Paced  Echocardiogram: not done    Imaging  Chest x-ray: Not Done     Lab Review   Appointment on 2021   Component Date Value    PTT 2021 25     Protime 2021 13 2     INR 2021 1 00    Hospital Outpatient Visit on 2021   Component Date Value    POC Glucose 2021 111         Assessment:     78 y o  male with planned surgery as above      Known risk factors for perioperative complications: None    Difficulty with intubation is not anticipated  Cardiac Risk Estimation: Low    Current medications which may produce withdrawal symptoms if withheld perioperatively: Atenolol      Plan:     1  Preoperative workup as follows none  2  Change in medication regimen before surgery: discontinue ASA 14 days before surgery  3  Prophylaxis for cardiac events with perioperative beta-blockers: Will take the day of surgery  4  Invasive hemodynamic monitoring perioperatively: not indicated  5  Deep vein thrombosis prophylaxis postoperatively:regimen to be chosen by surgical team   6  Surveillance for postoperative MI with ECG immediately postoperatively and on postoperative days 1 and 2 AND troponin levels 24 hours postoperatively and on day 4 or hospital discharge (whichever comes first): not indicated  7  Other measures: none    8   Cleared for Surgery/Low Cardiac Risk

## 2021-09-09 PROBLEM — N18.9 CHRONIC KIDNEY DISEASE: Status: ACTIVE | Noted: 2021-09-09

## 2021-09-09 PROCEDURE — 93000 ELECTROCARDIOGRAM COMPLETE: CPT | Performed by: INTERNAL MEDICINE

## 2021-09-09 NOTE — PRE-PROCEDURE INSTRUCTIONS
Pre-Surgery Instructions:   Medication Instructions    amLODIPine (NORVASC) 10 mg tablet Instructed patient per Anesthesia Guidelines   aspirin (ECOTRIN LOW STRENGTH) 81 mg EC tablet Instructed patient per Anesthesia Guidelines   atenolol (TENORMIN) 50 mg tablet Instructed patient per Anesthesia Guidelines   Blephamide 10-0 2 % ophthalmic suspension Instructed patient per Anesthesia Guidelines   calcium carbonate (TUMS) 500 mg chewable tablet Instructed patient per Anesthesia Guidelines   cloNIDine (CATAPRES) 0 1 mg tablet Instructed patient per Anesthesia Guidelines   Combigan 0 2-0 5 % Instructed patient per Anesthesia Guidelines   cyclobenzaprine (FLEXERIL) 10 mg tablet Instructed patient per Anesthesia Guidelines   finasteride (PROSCAR) 5 mg tablet Instructed patient per Anesthesia Guidelines   lisinopril (ZESTRIL) 20 mg tablet- hold DOP Instructed patient per Anesthesia Guidelines   meclizine (ANTIVERT) 25 mg tablet Instructed patient per Anesthesia Guidelines   oxyCODONE-acetaminophen (PERCOCET) 5-325 mg per tablet Instructed patient per Anesthesia Guidelines   simvastatin (ZOCOR) 10 mg tablet Instructed patient per Anesthesia Guidelines   tamsulosin (FLOMAX) 0 4 mg Instructed patient per Anesthesia Guidelines       Pt's sonSang understanding of the following:  - Hold Lisinopril DOP, other meds can be taken with a sip of water  - NPO after MN  - Last dose ASA 9/3  - bathing instructions, will get Dial

## 2021-09-10 ENCOUNTER — HOSPITAL ENCOUNTER (OUTPATIENT)
Facility: HOSPITAL | Age: 81
Setting detail: OUTPATIENT SURGERY
Discharge: HOME | End: 2021-09-10
Attending: OTOLARYNGOLOGY | Admitting: OTOLARYNGOLOGY
Payer: COMMERCIAL

## 2021-09-10 ENCOUNTER — ANESTHESIA (OUTPATIENT)
Dept: PERIOP | Facility: HOSPITAL | Age: 81
End: 2021-09-10
Payer: COMMERCIAL

## 2021-09-10 VITALS
HEIGHT: 67 IN | DIASTOLIC BLOOD PRESSURE: 80 MMHG | RESPIRATION RATE: 16 BRPM | TEMPERATURE: 97.9 F | HEART RATE: 58 BPM | SYSTOLIC BLOOD PRESSURE: 160 MMHG | OXYGEN SATURATION: 91 % | BODY MASS INDEX: 26.21 KG/M2 | WEIGHT: 167 LBS

## 2021-09-10 DIAGNOSIS — N28.9 RENAL INSUFFICIENCY: Primary | ICD-10-CM

## 2021-09-10 DIAGNOSIS — I10 ESSENTIAL HYPERTENSION: ICD-10-CM

## 2021-09-10 PROBLEM — C07 PRIMARY MALIGNANT NEOPLASM OF PAROTID GLAND (HCC): Status: ACTIVE | Noted: 2021-09-10

## 2021-09-10 LAB
GLUCOSE SERPL-MCNC: 134 MG/DL (ref 65–140)
GLUCOSE SERPL-MCNC: 137 MG/DL (ref 65–140)

## 2021-09-10 PROCEDURE — 82948 REAGENT STRIP/BLOOD GLUCOSE: CPT

## 2021-09-10 RX ORDER — METOPROLOL TARTRATE 5 MG/5ML
2.5 INJECTION INTRAVENOUS ONCE
Status: COMPLETED | OUTPATIENT
Start: 2021-09-10 | End: 2021-09-10

## 2021-09-10 RX ORDER — ONDANSETRON 2 MG/ML
4 INJECTION INTRAMUSCULAR; INTRAVENOUS ONCE AS NEEDED
Status: DISCONTINUED | OUTPATIENT
Start: 2021-09-10 | End: 2021-09-10 | Stop reason: HOSPADM

## 2021-09-10 RX ORDER — LISINOPRIL 20 MG/1
20 TABLET ORAL DAILY
Status: COMPLETED | OUTPATIENT
Start: 2021-09-10 | End: 2021-09-10

## 2021-09-10 RX ORDER — ATENOLOL 50 MG/1
50 TABLET ORAL DAILY
Status: DISCONTINUED | OUTPATIENT
Start: 2021-09-10 | End: 2021-09-10 | Stop reason: HOSPADM

## 2021-09-10 RX ORDER — FENTANYL CITRATE 50 UG/ML
INJECTION, SOLUTION INTRAMUSCULAR; INTRAVENOUS AS NEEDED
Status: DISCONTINUED | OUTPATIENT
Start: 2021-09-10 | End: 2021-09-10

## 2021-09-10 RX ORDER — CLONIDINE HYDROCHLORIDE 0.1 MG/1
0.1 TABLET ORAL ONCE
Status: COMPLETED | OUTPATIENT
Start: 2021-09-10 | End: 2021-09-10

## 2021-09-10 RX ORDER — CEFAZOLIN SODIUM 2 G/50ML
2000 SOLUTION INTRAVENOUS ONCE
Status: DISCONTINUED | OUTPATIENT
Start: 2021-09-10 | End: 2021-09-10 | Stop reason: HOSPADM

## 2021-09-10 RX ORDER — CEFAZOLIN SODIUM 2 G/50ML
SOLUTION INTRAVENOUS AS NEEDED
Status: DISCONTINUED | OUTPATIENT
Start: 2021-09-10 | End: 2021-09-10

## 2021-09-10 RX ORDER — SODIUM CHLORIDE 9 MG/ML
125 INJECTION, SOLUTION INTRAVENOUS CONTINUOUS
Status: DISCONTINUED | OUTPATIENT
Start: 2021-09-10 | End: 2021-09-10 | Stop reason: HOSPADM

## 2021-09-10 RX ORDER — AMLODIPINE BESYLATE 10 MG/1
10 TABLET ORAL DAILY
Status: COMPLETED | OUTPATIENT
Start: 2021-09-10 | End: 2021-09-10

## 2021-09-10 RX ORDER — FENTANYL CITRATE/PF 50 MCG/ML
25 SYRINGE (ML) INJECTION
Status: DISCONTINUED | OUTPATIENT
Start: 2021-09-10 | End: 2021-09-10 | Stop reason: HOSPADM

## 2021-09-10 RX ADMIN — ATENOLOL 50 MG: 50 TABLET ORAL at 09:07

## 2021-09-10 RX ADMIN — LISINOPRIL 20 MG: 20 TABLET ORAL at 09:09

## 2021-09-10 RX ADMIN — CLONIDINE HYDROCHLORIDE 0.1 MG: 0.1 TABLET ORAL at 09:08

## 2021-09-10 RX ADMIN — CEFAZOLIN SODIUM 2000 MG: 2 SOLUTION INTRAVENOUS at 07:49

## 2021-09-10 RX ADMIN — FENTANYL CITRATE 50 MCG: 50 INJECTION INTRAMUSCULAR; INTRAVENOUS at 08:13

## 2021-09-10 RX ADMIN — AMLODIPINE BESYLATE 10 MG: 10 TABLET ORAL at 09:07

## 2021-09-10 RX ADMIN — METOROPROLOL TARTRATE 2.5 MG: 5 INJECTION, SOLUTION INTRAVENOUS at 07:01

## 2021-09-10 RX ADMIN — SODIUM CHLORIDE 125 ML/HR: 0.9 INJECTION, SOLUTION INTRAVENOUS at 07:01

## 2021-09-10 NOTE — NURSING NOTE
Mr Anastasia Hester came into the OR room and while anesthesia was assessing his blood pressure on right arm, got a  reading of 220/104  Second reading was 216/107  Anesthesia gave  medication to help lower the high blood pressure but it didn't come down to a safe reading and final blood pressure read 207/102 prior to leaving the Operating room  Surgeons with anesthesia agreed on that surgery wasn't safe for the patient and cancelled the procedure  Patient was sent to PACU to monitor BP and a medical consult was ordered

## 2021-09-10 NOTE — PERIOPERATIVE NURSING NOTE
Dr Tea eMndez notified that patient's BP has improved to 149/75  Per Dr Tea Mendez, patient will be discharged home

## 2021-09-10 NOTE — QUICK NOTE
Patient was scheduled to have a right total parotidectomy with facial nerve preservation along with a right modified neck dissection  Preoperatively the case was delayed due to issues with the pacemaker  Ultimately once we determined if the magnet could be used he was placed on the OR table  Unfortunately his blood pressure was extremely high  He is typically on 4 blood pressure medications in preoperatively his blood pressure is always been well controlled  Unfortunately he did not take any of his medications this morning  He was given some IV medication upon arrival to the hospital but the blood pressure at this time still remains 217/107  I am unable to proceed with surgery due to risk of bleeding and stroke  His family was notified  He will be going to the recovery room were medicine consult will be placed  They will determine whether not the patient requires admission for his hypertensive crisis    Call placed to [facility] to help management

## 2021-09-10 NOTE — ANESTHESIA POSTPROCEDURE EVALUATION
Post-Op Assessment Note    CV Status:  Stable    Pain management: adequate     Mental Status:  Alert and awake   Hydration Status:  Euvolemic   PONV Controlled:  Controlled   Airway Patency:  Patent      Post Op Vitals Reviewed: Yes      Staff: Anesthesiologist   Comments: Procedure cancelled due to elevated blood pressure prior to inducation  He did not take any of his blood pressure medication this morning  He stated that he was running late so he did not take the medication  Patient being evaluated by internal medicine for elevated blood pressure  No complications documented      BP      Temp      Pulse     Resp      SpO2

## 2021-09-10 NOTE — ANESTHESIA PREPROCEDURE EVALUATION
Procedure:  TOTAL PAROTIDECTOMY WITH  NIMS; RIGHT MODIFIED RADICAL NECK DISSECTION (Right Neck)    Relevant Problems   ANESTHESIA (within normal limits)      CARDIO   (+) Coronary artery disease involving native coronary artery of native heart without angina pectoris   (+) Essential hypertension   (+) Hypercholesterolemia   (+) Mixed hyperlipidemia   (+) Pacemaker      ENDO   (+) Type 2 diabetes mellitus with chronic kidney disease (HCC)      /RENAL   (+) BRENDA (acute kidney injury) (HCC)   (+) BPH (benign prostatic hyperplasia)   (+) Chronic kidney disease      HEMATOLOGY   (+) Platelets decreased (HCC)      NEURO/PSYCH   (+) Anxiety state      Other   (+) Basal cell carcinoma of skin        Physical Exam    Airway    Mallampati score: II  TM Distance: <3 FB  Neck ROM: full     Dental       Cardiovascular  Rhythm: regular, Rate: normal, Cardiovascular exam normal    Pulmonary  Pulmonary exam normal Breath sounds clear to auscultation,     Other Findings        Anesthesia Plan  ASA Score- 3     Anesthesia Type- general with ASA Monitors  Additional Monitors:   Airway Plan: ETT  Comment: LEFT VENTRICLE:  Systolic function was at the lower limits of normal  Ejection fraction was estimated to be 50 %  There was mild hypokinesis of the distal inferoseptum and apical inferior wall(s)  Wall thickness was at the upper limits of normal      I spoke to Dr Sheila Cho about patient's pacemaker  He consulted [employer] representative  He states that patients response to magnet is asynchronous at 85 bmp  He recommended using a magnet or short bursts of cautery          Plan Factors-    Chart reviewed  EKG reviewed  Existing labs reviewed  Patient summary reviewed  Patient is not a current smoker  Induction- intravenous  Postoperative Plan-     Informed Consent- Anesthetic plan and risks discussed with patient and son

## 2021-09-17 ENCOUNTER — RA CDI HCC (OUTPATIENT)
Dept: OTHER | Facility: HOSPITAL | Age: 81
End: 2021-09-17

## 2021-09-17 NOTE — PROGRESS NOTES
[facility]  coding opportunities             Chart Reviewed * (Number of) Inbasket suggestions sent to Provider: 2     Problem listed updated  Provider Accepted, (number of) suggestions accepted: 2               Patients insurance company: [address]  (Med[employer]are [employer]vant[employer] and [employer])     Visit status: Patient canceled the appointment        [facility]  coding opportunities             Chart Reviewed * (Number of) Inbasket suggestions sent to Provider: 2     Problem listed updated   Provider Accepted, (number of) suggestions accepted: 2               Patients insurance company: [address]  (Med[employer] and [employer])           [place] [address]  coding opportunities        DX: 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

## 2021-09-22 ENCOUNTER — ANESTHESIA EVENT (OUTPATIENT)
Dept: PERIOP | Facility: HOSPITAL | Age: 81
DRG: 142 | End: 2021-09-22
Payer: COMMERCIAL

## 2021-09-27 ENCOUNTER — TELEPHONE (OUTPATIENT)
Dept: INTERNAL MEDICINE CLINIC | Facility: CLINIC | Age: 81
End: 2021-09-27

## 2021-09-27 DIAGNOSIS — I10 ESSENTIAL HYPERTENSION: ICD-10-CM

## 2021-09-28 RX ORDER — AMLODIPINE BESYLATE 10 MG/1
10 TABLET ORAL DAILY
Qty: 30 TABLET | Refills: 0 | Status: SHIPPED | OUTPATIENT
Start: 2021-09-28 | End: 2021-11-08 | Stop reason: SDUPTHER

## 2021-09-30 RX ORDER — FENTANYL CITRATE 50 UG/ML
50 INJECTION, SOLUTION INTRAMUSCULAR; INTRAVENOUS
Status: CANCELLED | OUTPATIENT
Start: 2021-09-30

## 2021-09-30 RX ORDER — ONDANSETRON 2 MG/ML
4 INJECTION INTRAMUSCULAR; INTRAVENOUS EVERY 6 HOURS PRN
Status: CANCELLED | OUTPATIENT
Start: 2021-09-30

## 2021-10-01 ENCOUNTER — ANESTHESIA (OUTPATIENT)
Dept: PERIOP | Facility: HOSPITAL | Age: 81
DRG: 142 | End: 2021-10-01
Payer: COMMERCIAL

## 2021-10-01 ENCOUNTER — HOSPITAL ENCOUNTER (INPATIENT)
Facility: HOSPITAL | Age: 81
LOS: 1 days | Discharge: HOME | DRG: 142 | End: 2021-10-02
Attending: OTOLARYNGOLOGY | Admitting: OTOLARYNGOLOGY
Payer: COMMERCIAL

## 2021-10-01 DIAGNOSIS — R22.1 NECK MASS: ICD-10-CM

## 2021-10-01 DIAGNOSIS — C07 MALIGNANT NEOPLASM OF PAROTID GLAND (HCC): ICD-10-CM

## 2021-10-01 DIAGNOSIS — N28.9 RENAL INSUFFICIENCY: Primary | ICD-10-CM

## 2021-10-01 LAB — GLUCOSE SERPL-MCNC: 160 MG/DL (ref 65–140)

## 2021-10-01 PROCEDURE — 99222 1ST HOSP IP/OBS MODERATE 55: CPT | Performed by: INTERNAL MEDICINE

## 2021-10-01 PROCEDURE — 07T10ZZ RESECTION OF RIGHT NECK LYMPHATIC, OPEN APPROACH: ICD-10-PCS | Performed by: OTOLARYNGOLOGY

## 2021-10-01 PROCEDURE — 0CT80ZZ RESECTION OF RIGHT PAROTID GLAND, OPEN APPROACH: ICD-10-PCS | Performed by: OTOLARYNGOLOGY

## 2021-10-01 PROCEDURE — 88342 IMHCHEM/IMCYTCHM 1ST ANTB: CPT | Performed by: PATHOLOGY

## 2021-10-01 PROCEDURE — 88313 SPECIAL STAINS GROUP 2: CPT | Performed by: PATHOLOGY

## 2021-10-01 PROCEDURE — 88307 TISSUE EXAM BY PATHOLOGIST: CPT | Performed by: PATHOLOGY

## 2021-10-01 PROCEDURE — 38724 REMOVAL OF LYMPH NODES NECK: CPT | Performed by: OTOLARYNGOLOGY

## 2021-10-01 PROCEDURE — 88341 IMHCHEM/IMCYTCHM EA ADD ANTB: CPT | Performed by: PATHOLOGY

## 2021-10-01 PROCEDURE — 42420 EXCISE PAROTID GLAND/LESION: CPT | Performed by: OTOLARYNGOLOGY

## 2021-10-01 PROCEDURE — 88309 TISSUE EXAM BY PATHOLOGIST: CPT | Performed by: PATHOLOGY

## 2021-10-01 PROCEDURE — 88342 IMHCHEM/IMCYTCHM 1ST ANTB: CPT

## 2021-10-01 PROCEDURE — 82948 REAGENT STRIP/BLOOD GLUCOSE: CPT

## 2021-10-01 PROCEDURE — 88341 IMHCHEM/IMCYTCHM EA ADD ANTB: CPT

## 2021-10-01 PROCEDURE — 0CT90ZZ RESECTION OF LEFT PAROTID GLAND, OPEN APPROACH: ICD-10-PCS | Performed by: OTOLARYNGOLOGY

## 2021-10-01 RX ORDER — FINASTERIDE 5 MG/1
5 TABLET, FILM COATED ORAL DAILY
Status: DISCONTINUED | OUTPATIENT
Start: 2021-10-01 | End: 2021-10-02 | Stop reason: HOSPADM

## 2021-10-01 RX ORDER — ATENOLOL 50 MG/1
50 TABLET ORAL DAILY
Status: DISCONTINUED | OUTPATIENT
Start: 2021-10-01 | End: 2021-10-01

## 2021-10-01 RX ORDER — SODIUM CHLORIDE 9 MG/ML
100 INJECTION, SOLUTION INTRAVENOUS CONTINUOUS
Status: DISCONTINUED | OUTPATIENT
Start: 2021-10-01 | End: 2021-10-02 | Stop reason: HOSPADM

## 2021-10-01 RX ORDER — GINSENG 100 MG
CAPSULE ORAL AS NEEDED
Status: DISCONTINUED | OUTPATIENT
Start: 2021-10-01 | End: 2021-10-01 | Stop reason: HOSPADM

## 2021-10-01 RX ORDER — DEXTROSE AND SODIUM CHLORIDE 5; .9 G/100ML; G/100ML
100 INJECTION, SOLUTION INTRAVENOUS CONTINUOUS
Status: DISCONTINUED | OUTPATIENT
Start: 2021-10-01 | End: 2021-10-02 | Stop reason: HOSPADM

## 2021-10-01 RX ORDER — LIDOCAINE HYDROCHLORIDE AND EPINEPHRINE 10; 10 MG/ML; UG/ML
INJECTION, SOLUTION INFILTRATION; PERINEURAL AS NEEDED
Status: DISCONTINUED | OUTPATIENT
Start: 2021-10-01 | End: 2021-10-01 | Stop reason: HOSPADM

## 2021-10-01 RX ORDER — ATENOLOL 50 MG/1
50 TABLET ORAL DAILY
Status: DISCONTINUED | OUTPATIENT
Start: 2021-10-02 | End: 2021-10-02 | Stop reason: HOSPADM

## 2021-10-01 RX ORDER — TAMSULOSIN HYDROCHLORIDE 0.4 MG/1
0.4 CAPSULE ORAL
Status: DISCONTINUED | OUTPATIENT
Start: 2021-10-01 | End: 2021-10-02 | Stop reason: HOSPADM

## 2021-10-01 RX ORDER — DORZOLAMIDE HYDROCHLORIDE AND TIMOLOL MALEATE 20; 5 MG/ML; MG/ML
1 SOLUTION/ DROPS OPHTHALMIC EVERY 12 HOURS SCHEDULED
Status: DISCONTINUED | OUTPATIENT
Start: 2021-10-01 | End: 2021-10-02 | Stop reason: HOSPADM

## 2021-10-01 RX ORDER — PROPOFOL 10 MG/ML
INJECTION, EMULSION INTRAVENOUS AS NEEDED
Status: DISCONTINUED | OUTPATIENT
Start: 2021-10-01 | End: 2021-10-01

## 2021-10-01 RX ORDER — PROPOFOL 10 MG/ML
INJECTION, EMULSION INTRAVENOUS CONTINUOUS PRN
Status: DISCONTINUED | OUTPATIENT
Start: 2021-10-01 | End: 2021-10-01

## 2021-10-01 RX ORDER — CLONIDINE HYDROCHLORIDE 0.1 MG/1
0.1 TABLET ORAL DAILY
Status: DISCONTINUED | OUTPATIENT
Start: 2021-10-02 | End: 2021-10-02 | Stop reason: HOSPADM

## 2021-10-01 RX ORDER — MAGNESIUM HYDROXIDE 1200 MG/15ML
LIQUID ORAL AS NEEDED
Status: DISCONTINUED | OUTPATIENT
Start: 2021-10-01 | End: 2021-10-01 | Stop reason: HOSPADM

## 2021-10-01 RX ORDER — CALCIUM CARBONATE 200(500)MG
500 TABLET,CHEWABLE ORAL DAILY PRN
Status: DISCONTINUED | OUTPATIENT
Start: 2021-10-01 | End: 2021-10-02 | Stop reason: HOSPADM

## 2021-10-01 RX ORDER — GINSENG 100 MG
1 CAPSULE ORAL 2 TIMES DAILY
Status: DISCONTINUED | OUTPATIENT
Start: 2021-10-01 | End: 2021-10-02 | Stop reason: HOSPADM

## 2021-10-01 RX ORDER — CLONIDINE HYDROCHLORIDE 0.1 MG/1
0.1 TABLET ORAL DAILY
Status: DISCONTINUED | OUTPATIENT
Start: 2021-10-01 | End: 2021-10-01

## 2021-10-01 RX ORDER — ONDANSETRON 2 MG/ML
INJECTION INTRAMUSCULAR; INTRAVENOUS AS NEEDED
Status: DISCONTINUED | OUTPATIENT
Start: 2021-10-01 | End: 2021-10-01

## 2021-10-01 RX ORDER — ROCURONIUM BROMIDE 10 MG/ML
INJECTION, SOLUTION INTRAVENOUS AS NEEDED
Status: DISCONTINUED | OUTPATIENT
Start: 2021-10-01 | End: 2021-10-01

## 2021-10-01 RX ORDER — OXYCODONE HYDROCHLORIDE AND ACETAMINOPHEN 5; 325 MG/1; MG/1
2 TABLET ORAL EVERY 4 HOURS PRN
Status: DISCONTINUED | OUTPATIENT
Start: 2021-10-01 | End: 2021-10-02 | Stop reason: HOSPADM

## 2021-10-01 RX ORDER — ONDANSETRON 2 MG/ML
4 INJECTION INTRAMUSCULAR; INTRAVENOUS EVERY 6 HOURS PRN
Status: DISCONTINUED | OUTPATIENT
Start: 2021-10-01 | End: 2021-10-01 | Stop reason: HOSPADM

## 2021-10-01 RX ORDER — FENTANYL CITRATE 50 UG/ML
INJECTION, SOLUTION INTRAMUSCULAR; INTRAVENOUS AS NEEDED
Status: DISCONTINUED | OUTPATIENT
Start: 2021-10-01 | End: 2021-10-01

## 2021-10-01 RX ORDER — CEFAZOLIN SODIUM 1 G/50ML
1000 SOLUTION INTRAVENOUS EVERY 8 HOURS
Status: DISCONTINUED | OUTPATIENT
Start: 2021-10-01 | End: 2021-10-02 | Stop reason: HOSPADM

## 2021-10-01 RX ORDER — ONDANSETRON 2 MG/ML
4 INJECTION INTRAMUSCULAR; INTRAVENOUS EVERY 6 HOURS PRN
Status: DISCONTINUED | OUTPATIENT
Start: 2021-10-01 | End: 2021-10-02 | Stop reason: HOSPADM

## 2021-10-01 RX ORDER — AMLODIPINE BESYLATE 10 MG/1
10 TABLET ORAL DAILY
Status: DISCONTINUED | OUTPATIENT
Start: 2021-10-02 | End: 2021-10-02 | Stop reason: HOSPADM

## 2021-10-01 RX ORDER — DEXAMETHASONE SODIUM PHOSPHATE 4 MG/ML
INJECTION, SOLUTION INTRA-ARTICULAR; INTRALESIONAL; INTRAMUSCULAR; INTRAVENOUS; SOFT TISSUE AS NEEDED
Status: DISCONTINUED | OUTPATIENT
Start: 2021-10-01 | End: 2021-10-01

## 2021-10-01 RX ORDER — PRAVASTATIN SODIUM 10 MG
10 TABLET ORAL
Status: DISCONTINUED | OUTPATIENT
Start: 2021-10-01 | End: 2021-10-02 | Stop reason: HOSPADM

## 2021-10-01 RX ORDER — SODIUM CHLORIDE, SODIUM LACTATE, POTASSIUM CHLORIDE, CALCIUM CHLORIDE 600; 310; 30; 20 MG/100ML; MG/100ML; MG/100ML; MG/100ML
INJECTION, SOLUTION INTRAVENOUS CONTINUOUS PRN
Status: DISCONTINUED | OUTPATIENT
Start: 2021-10-01 | End: 2021-10-01

## 2021-10-01 RX ORDER — ACETAMINOPHEN 325 MG/1
650 TABLET ORAL EVERY 4 HOURS PRN
Status: DISCONTINUED | OUTPATIENT
Start: 2021-10-01 | End: 2021-10-02 | Stop reason: HOSPADM

## 2021-10-01 RX ORDER — LISINOPRIL 20 MG/1
20 TABLET ORAL DAILY
Status: DISCONTINUED | OUTPATIENT
Start: 2021-10-01 | End: 2021-10-01

## 2021-10-01 RX ORDER — FENTANYL CITRATE 50 UG/ML
25 INJECTION, SOLUTION INTRAMUSCULAR; INTRAVENOUS
Status: COMPLETED | OUTPATIENT
Start: 2021-10-01 | End: 2021-10-01

## 2021-10-01 RX ORDER — AMLODIPINE BESYLATE 10 MG/1
10 TABLET ORAL DAILY
Status: DISCONTINUED | OUTPATIENT
Start: 2021-10-01 | End: 2021-10-01

## 2021-10-01 RX ORDER — CYCLOBENZAPRINE HCL 10 MG
10 TABLET ORAL 2 TIMES DAILY
Status: DISCONTINUED | OUTPATIENT
Start: 2021-10-01 | End: 2021-10-02 | Stop reason: HOSPADM

## 2021-10-01 RX ORDER — CEFAZOLIN SODIUM 2 G/50ML
2000 SOLUTION INTRAVENOUS ONCE
Status: COMPLETED | OUTPATIENT
Start: 2021-10-01 | End: 2021-10-01

## 2021-10-01 RX ORDER — NEOMYCIN SULFATE, POLYMYXIN B SULFATE AND DEXAMETHASONE 3.5; 10000; 1 MG/ML; [USP'U]/ML; MG/ML
2 SUSPENSION/ DROPS OPHTHALMIC
Status: DISCONTINUED | OUTPATIENT
Start: 2021-10-01 | End: 2021-10-02 | Stop reason: HOSPADM

## 2021-10-01 RX ADMIN — PRAVASTATIN SODIUM 10 MG: 10 TABLET ORAL at 17:00

## 2021-10-01 RX ADMIN — DEXTROSE AND SODIUM CHLORIDE 100 ML/HR: 5; .9 INJECTION, SOLUTION INTRAVENOUS at 23:24

## 2021-10-01 RX ADMIN — TAMSULOSIN HYDROCHLORIDE 0.4 MG: 0.4 CAPSULE ORAL at 17:00

## 2021-10-01 RX ADMIN — FENTANYL CITRATE 50 MCG: 50 INJECTION INTRAMUSCULAR; INTRAVENOUS at 11:31

## 2021-10-01 RX ADMIN — FENTANYL CITRATE 25 MCG: 50 INJECTION INTRAMUSCULAR; INTRAVENOUS at 12:58

## 2021-10-01 RX ADMIN — CEFAZOLIN SODIUM 2000 MG: 2 SOLUTION INTRAVENOUS at 11:13

## 2021-10-01 RX ADMIN — CEFAZOLIN SODIUM 1000 MG: 1 SOLUTION INTRAVENOUS at 23:23

## 2021-10-01 RX ADMIN — FENTANYL CITRATE 25 MCG: 50 INJECTION INTRAMUSCULAR; INTRAVENOUS at 07:30

## 2021-10-01 RX ADMIN — FENTANYL CITRATE 25 MCG: 50 INJECTION INTRAMUSCULAR; INTRAVENOUS at 13:38

## 2021-10-01 RX ADMIN — DEXTROSE AND SODIUM CHLORIDE 100 ML/HR: 5; .9 INJECTION, SOLUTION INTRAVENOUS at 13:46

## 2021-10-01 RX ADMIN — SODIUM CHLORIDE, SODIUM LACTATE, POTASSIUM CHLORIDE, AND CALCIUM CHLORIDE: .6; .31; .03; .02 INJECTION, SOLUTION INTRAVENOUS at 07:33

## 2021-10-01 RX ADMIN — FENTANYL CITRATE 25 MCG: 50 INJECTION INTRAMUSCULAR; INTRAVENOUS at 09:36

## 2021-10-01 RX ADMIN — SODIUM CHLORIDE, SODIUM LACTATE, POTASSIUM CHLORIDE, CALCIUM CHLORIDE: 600; 310; 30; 20 INJECTION, SOLUTION INTRAVENOUS at 10:02

## 2021-10-01 RX ADMIN — DORZOLAMIDE HYDROCHLORIDE AND TIMOLOL MALEATE 1 DROP: 20; 5 SOLUTION/ DROPS OPHTHALMIC at 23:25

## 2021-10-01 RX ADMIN — NEOMYCIN SULFATE, POLYMYXIN B SULFATE AND DEXAMETHASONE 2 DROP: 3.5; 10000; 1 SUSPENSION OPHTHALMIC at 23:34

## 2021-10-01 RX ADMIN — FENTANYL CITRATE 25 MCG: 50 INJECTION INTRAMUSCULAR; INTRAVENOUS at 13:32

## 2021-10-01 RX ADMIN — DEXAMETHASONE SODIUM PHOSPHATE 10 MG: 4 INJECTION INTRA-ARTICULAR; INTRALESIONAL; INTRAMUSCULAR; INTRAVENOUS; SOFT TISSUE at 07:31

## 2021-10-01 RX ADMIN — ONDANSETRON 4 MG: 2 INJECTION INTRAMUSCULAR; INTRAVENOUS at 11:35

## 2021-10-01 RX ADMIN — FENTANYL CITRATE 50 MCG: 50 INJECTION INTRAMUSCULAR; INTRAVENOUS at 08:02

## 2021-10-01 RX ADMIN — LIDOCAINE HYDROCHLORIDE 60 MG: 20 INJECTION INTRAVENOUS at 07:30

## 2021-10-01 RX ADMIN — FENTANYL CITRATE 25 MCG: 50 INJECTION INTRAMUSCULAR; INTRAVENOUS at 13:57

## 2021-10-01 RX ADMIN — SODIUM CHLORIDE 100 ML/HR: 0.9 INJECTION, SOLUTION INTRAVENOUS at 06:00

## 2021-10-01 RX ADMIN — PROPOFOL 150 MG: 10 INJECTION, EMULSION INTRAVENOUS at 07:30

## 2021-10-01 RX ADMIN — CYCLOBENZAPRINE HYDROCHLORIDE 10 MG: 10 TABLET, FILM COATED ORAL at 23:24

## 2021-10-01 RX ADMIN — CEFAZOLIN SODIUM 2000 MG: 2 SOLUTION INTRAVENOUS at 07:20

## 2021-10-01 RX ADMIN — REMIFENTANIL HYDROCHLORIDE 0.16 MCG/KG/MIN: 1 INJECTION, POWDER, LYOPHILIZED, FOR SOLUTION INTRAVENOUS at 07:31

## 2021-10-01 RX ADMIN — BACITRACIN ZINC 1 LARGE APPLICATION: 500 OINTMENT TOPICAL at 18:36

## 2021-10-01 RX ADMIN — PROPOFOL 100 MCG/KG/MIN: 10 INJECTION, EMULSION INTRAVENOUS at 07:31

## 2021-10-01 RX ADMIN — ROCURONIUM BROMIDE 35 MG: 50 INJECTION, SOLUTION INTRAVENOUS at 07:30

## 2021-10-01 RX ADMIN — PHENYLEPHRINE HYDROCHLORIDE 30 MCG/MIN: 10 INJECTION INTRAVENOUS at 08:24

## 2021-10-02 VITALS
DIASTOLIC BLOOD PRESSURE: 82 MMHG | RESPIRATION RATE: 20 BRPM | HEART RATE: 67 BPM | TEMPERATURE: 98.4 F | OXYGEN SATURATION: 91 % | SYSTOLIC BLOOD PRESSURE: 164 MMHG

## 2021-10-02 LAB
ANION GAP SERPL CALCULATED.3IONS-SCNC: 10 MMOL/L (ref 4–13)
BUN SERPL-MCNC: 25 MG/DL (ref 5–25)
CALCIUM SERPL-MCNC: 8.7 MG/DL (ref 8.3–10.1)
CHLORIDE SERPL-SCNC: 103 MMOL/L (ref 100–108)
CO2 SERPL-SCNC: 26 MMOL/L (ref 21–32)
CREAT SERPL-MCNC: 1.29 MG/DL (ref 0.6–1.3)
GFR SERPL CREATININE-BSD FRML MDRD: 51 ML/MIN/1.73SQ M
GLUCOSE SERPL-MCNC: 165 MG/DL (ref 65–140)
MAGNESIUM SERPL-MCNC: 2.4 MG/DL (ref 1.6–2.6)
POTASSIUM SERPL-SCNC: 4.1 MMOL/L (ref 3.5–5.3)
SODIUM SERPL-SCNC: 139 MMOL/L (ref 136–145)

## 2021-10-02 PROCEDURE — 80048 BASIC METABOLIC PNL TOTAL CA: CPT | Performed by: INTERNAL MEDICINE

## 2021-10-02 PROCEDURE — 83735 ASSAY OF MAGNESIUM: CPT | Performed by: NURSE PRACTITIONER

## 2021-10-02 PROCEDURE — 99232 SBSQ HOSP IP/OBS MODERATE 35: CPT | Performed by: INTERNAL MEDICINE

## 2021-10-02 RX ADMIN — ATENOLOL 50 MG: 50 TABLET ORAL at 09:29

## 2021-10-02 RX ADMIN — FINASTERIDE 5 MG: 5 TABLET, FILM COATED ORAL at 09:29

## 2021-10-02 RX ADMIN — CYCLOBENZAPRINE HYDROCHLORIDE 10 MG: 10 TABLET, FILM COATED ORAL at 09:29

## 2021-10-02 RX ADMIN — CLONIDINE HYDROCHLORIDE 0.1 MG: 0.1 TABLET ORAL at 09:29

## 2021-10-02 RX ADMIN — ENOXAPARIN SODIUM 40 MG: 40 INJECTION SUBCUTANEOUS at 09:29

## 2021-10-02 RX ADMIN — CEFAZOLIN SODIUM 1000 MG: 1 SOLUTION INTRAVENOUS at 06:33

## 2021-10-02 RX ADMIN — BACITRACIN ZINC 1 LARGE APPLICATION: 500 OINTMENT TOPICAL at 09:29

## 2021-10-02 RX ADMIN — DORZOLAMIDE HYDROCHLORIDE AND TIMOLOL MALEATE 1 DROP: 20; 5 SOLUTION/ DROPS OPHTHALMIC at 09:29

## 2021-10-02 RX ADMIN — AMLODIPINE BESYLATE 10 MG: 10 TABLET ORAL at 09:29

## 2021-10-05 ENCOUNTER — TRANSITIONAL CARE MANAGEMENT (OUTPATIENT)
Dept: INTERNAL MEDICINE CLINIC | Facility: CLINIC | Age: 81
End: 2021-10-05

## 2021-10-05 ENCOUNTER — HOSPITAL ENCOUNTER (EMERGENCY)
Facility: HOSPITAL | Age: 81
Discharge: HOME | End: 2021-10-05
Attending: EMERGENCY MEDICINE | Admitting: EMERGENCY MEDICINE
Payer: COMMERCIAL

## 2021-10-05 VITALS
DIASTOLIC BLOOD PRESSURE: 89 MMHG | RESPIRATION RATE: 23 BRPM | SYSTOLIC BLOOD PRESSURE: 179 MMHG | HEART RATE: 77 BPM | OXYGEN SATURATION: 95 % | TEMPERATURE: 97.2 F

## 2021-10-05 DIAGNOSIS — N17.9 AKI (ACUTE KIDNEY INJURY) (HCC): ICD-10-CM

## 2021-10-05 DIAGNOSIS — R42 LIGHTHEADEDNESS: Primary | ICD-10-CM

## 2021-10-05 LAB
ALBUMIN SERPL BCP-MCNC: 4.4 G/DL (ref 3.5–5.7)
ALP SERPL-CCNC: 43 U/L (ref 55–165)
ALT SERPL W P-5'-P-CCNC: 11 U/L (ref 7–52)
ANION GAP SERPL CALCULATED.3IONS-SCNC: 7 MMOL/L (ref 4–13)
AST SERPL W P-5'-P-CCNC: 19 U/L (ref 13–39)
BASOPHILS # BLD AUTO: 0.1 THOUSANDS/ΜL (ref 0–0.1)
BASOPHILS NFR BLD AUTO: 1 % (ref 0–2)
BILIRUB SERPL-MCNC: 2 MG/DL (ref 0.2–1)
BUN SERPL-MCNC: 31 MG/DL (ref 7–25)
CALCIUM SERPL-MCNC: 9.5 MG/DL (ref 8.6–10.5)
CHLORIDE SERPL-SCNC: 97 MMOL/L (ref 98–107)
CO2 SERPL-SCNC: 30 MMOL/L (ref 21–31)
CREAT SERPL-MCNC: 1.52 MG/DL (ref 0.7–1.3)
D DIMER PPP FEU-MCNC: 0.47 UG/ML FEU
EOSINOPHIL # BLD AUTO: 0.3 THOUSAND/ΜL (ref 0–0.61)
EOSINOPHIL NFR BLD AUTO: 4 % (ref 0–5)
ERYTHROCYTE [DISTWIDTH] IN BLOOD BY AUTOMATED COUNT: 13.1 % (ref 11.5–14.5)
GFR SERPL CREATININE-BSD FRML MDRD: 42 ML/MIN/1.73SQ M
GLUCOSE SERPL-MCNC: 139 MG/DL (ref 65–99)
HCT VFR BLD AUTO: 41.2 % (ref 42–47)
HGB BLD-MCNC: 14.3 G/DL (ref 14–18)
LYMPHOCYTES # BLD AUTO: 1.1 THOUSANDS/ΜL (ref 0.6–4.47)
LYMPHOCYTES NFR BLD AUTO: 14 % (ref 21–51)
MCH RBC QN AUTO: 33.7 PG (ref 26–34)
MCHC RBC AUTO-ENTMCNC: 34.7 G/DL (ref 31–37)
MCV RBC AUTO: 97 FL (ref 81–99)
MONOCYTES # BLD AUTO: 0.7 THOUSAND/ΜL (ref 0.17–1.22)
MONOCYTES NFR BLD AUTO: 9 % (ref 2–12)
NEUTROPHILS # BLD AUTO: 5.6 THOUSANDS/ΜL (ref 1.4–6.5)
NEUTS SEG NFR BLD AUTO: 73 % (ref 42–75)
PLATELET # BLD AUTO: 222 THOUSANDS/UL (ref 149–390)
PMV BLD AUTO: 8.1 FL (ref 8.6–11.7)
POTASSIUM SERPL-SCNC: 3.7 MMOL/L (ref 3.5–5.5)
PROT SERPL-MCNC: 7.1 G/DL (ref 6.4–8.9)
RBC # BLD AUTO: 4.25 MILLION/UL (ref 4.3–5.9)
SODIUM SERPL-SCNC: 134 MMOL/L (ref 134–143)
TROPONIN I SERPL-MCNC: 0.03 NG/ML
WBC # BLD AUTO: 7.8 THOUSAND/UL (ref 4.8–10.8)

## 2021-10-05 PROCEDURE — 80053 COMPREHEN METABOLIC PANEL: CPT | Performed by: EMERGENCY MEDICINE

## 2021-10-05 PROCEDURE — 96361 HYDRATE IV INFUSION ADD-ON: CPT

## 2021-10-05 PROCEDURE — 85379 FIBRIN DEGRADATION QUANT: CPT | Performed by: EMERGENCY MEDICINE

## 2021-10-05 PROCEDURE — 36415 COLL VENOUS BLD VENIPUNCTURE: CPT | Performed by: EMERGENCY MEDICINE

## 2021-10-05 PROCEDURE — 93005 ELECTROCARDIOGRAM TRACING: CPT

## 2021-10-05 PROCEDURE — 84484 ASSAY OF TROPONIN QUANT: CPT | Performed by: EMERGENCY MEDICINE

## 2021-10-05 PROCEDURE — 96360 HYDRATION IV INFUSION INIT: CPT

## 2021-10-05 PROCEDURE — 85025 COMPLETE CBC W/AUTO DIFF WBC: CPT | Performed by: EMERGENCY MEDICINE

## 2021-10-05 PROCEDURE — 99284 EMERGENCY DEPT VISIT MOD MDM: CPT

## 2021-10-05 PROCEDURE — 99285 EMERGENCY DEPT VISIT HI MDM: CPT | Performed by: EMERGENCY MEDICINE

## 2021-10-05 RX ADMIN — SODIUM CHLORIDE 1000 ML: 0.9 INJECTION, SOLUTION INTRAVENOUS at 12:43

## 2021-10-06 ENCOUNTER — RA CDI HCC (OUTPATIENT)
Dept: OTHER | Facility: HOSPITAL | Age: 81
End: 2021-10-06

## 2021-10-06 LAB
ATRIAL RATE: 80 BPM
P AXIS: 31 DEGREES
PR INTERVAL: 188 MS
QRS AXIS: -50 DEGREES
QRSD INTERVAL: 202 MS
QT INTERVAL: 472 MS
QTC INTERVAL: 544 MS
T WAVE AXIS: 122 DEGREES
VENTRICULAR RATE: 80 BPM

## 2021-10-06 PROCEDURE — 93010 ELECTROCARDIOGRAM REPORT: CPT | Performed by: INTERNAL MEDICINE

## 2021-10-12 ENCOUNTER — OFFICE VISIT (OUTPATIENT)
Dept: INTERNAL MEDICINE CLINIC | Facility: CLINIC | Age: 81
End: 2021-10-12
Payer: COMMERCIAL

## 2021-10-12 VITALS
BODY MASS INDEX: 24.66 KG/M2 | DIASTOLIC BLOOD PRESSURE: 60 MMHG | OXYGEN SATURATION: 95 % | TEMPERATURE: 98.4 F | SYSTOLIC BLOOD PRESSURE: 130 MMHG | WEIGHT: 157.13 LBS | HEIGHT: 67 IN | HEART RATE: 60 BPM

## 2021-10-12 DIAGNOSIS — R63.4 WEIGHT LOSS: ICD-10-CM

## 2021-10-12 DIAGNOSIS — E11.59 TYPE 2 DIABETES MELLITUS WITH OTHER CIRCULATORY COMPLICATION, WITHOUT LONG-TERM CURRENT USE OF INSULIN (HCC): Primary | ICD-10-CM

## 2021-10-12 DIAGNOSIS — Z23 ENCOUNTER FOR VACCINATION: ICD-10-CM

## 2021-10-12 PROCEDURE — 99495 TRANSJ CARE MGMT MOD F2F 14D: CPT | Performed by: INTERNAL MEDICINE

## 2021-10-12 PROCEDURE — G0008 ADMIN INFLUENZA VIRUS VAC: HCPCS | Performed by: INTERNAL MEDICINE

## 2021-10-12 PROCEDURE — 90662 IIV NO PRSV INCREASED AG IM: CPT | Performed by: INTERNAL MEDICINE

## 2021-10-12 RX ORDER — LACTOSE-REDUCED FOOD
237 LIQUID (ML) ORAL DAILY
Qty: 7110 ML | Refills: 1 | Status: SHIPPED | OUTPATIENT
Start: 2021-10-12 | End: 2022-05-18

## 2021-10-25 DIAGNOSIS — I10 ESSENTIAL HYPERTENSION: ICD-10-CM

## 2021-10-25 DIAGNOSIS — G47.62 SLEEP RELATED LEG CRAMPS: ICD-10-CM

## 2021-10-25 DIAGNOSIS — I25.10 CORONARY ARTERY DISEASE INVOLVING NATIVE HEART WITHOUT ANGINA PECTORIS, UNSPECIFIED VESSEL OR LESION TYPE: ICD-10-CM

## 2021-10-25 RX ORDER — ATENOLOL 50 MG/1
50 TABLET ORAL DAILY
Qty: 90 TABLET | Refills: 1 | Status: SHIPPED | OUTPATIENT
Start: 2021-10-25 | End: 2022-04-27 | Stop reason: SDUPTHER

## 2021-10-25 RX ORDER — CYCLOBENZAPRINE HCL 10 MG
10 TABLET ORAL 2 TIMES DAILY
Qty: 60 TABLET | Refills: 5 | Status: SHIPPED | OUTPATIENT
Start: 2021-10-25 | End: 2022-06-23 | Stop reason: SDUPTHER

## 2021-10-25 RX ORDER — LISINOPRIL 20 MG/1
20 TABLET ORAL DAILY
Qty: 90 TABLET | Refills: 1 | Status: SHIPPED | OUTPATIENT
Start: 2021-10-25 | End: 2022-04-19 | Stop reason: SDUPTHER

## 2021-11-08 DIAGNOSIS — E78.00 HYPERCHOLESTEREMIA: ICD-10-CM

## 2021-11-08 DIAGNOSIS — I10 ESSENTIAL HYPERTENSION: ICD-10-CM

## 2021-11-08 DIAGNOSIS — R33.9 URINARY RETENTION: ICD-10-CM

## 2021-11-08 RX ORDER — SIMVASTATIN 10 MG
10 TABLET ORAL EVERY EVENING
Qty: 90 TABLET | Refills: 1 | Status: SHIPPED | OUTPATIENT
Start: 2021-11-08 | End: 2022-04-27

## 2021-11-08 RX ORDER — AMLODIPINE BESYLATE 10 MG/1
10 TABLET ORAL DAILY
Qty: 30 TABLET | Refills: 0 | Status: SHIPPED | OUTPATIENT
Start: 2021-11-08 | End: 2021-12-10 | Stop reason: SDUPTHER

## 2021-11-08 RX ORDER — FINASTERIDE 5 MG/1
5 TABLET, FILM COATED ORAL DAILY
Qty: 90 TABLET | Refills: 1 | Status: SHIPPED | OUTPATIENT
Start: 2021-11-08 | End: 2022-02-14 | Stop reason: SDUPTHER

## 2021-11-09 ENCOUNTER — CLINICAL SUPPORT (OUTPATIENT)
Dept: RADIATION ONCOLOGY | Facility: CLINIC | Age: 81
End: 2021-11-09
Attending: INTERNAL MEDICINE
Payer: COMMERCIAL

## 2021-11-09 ENCOUNTER — RADIATION ONCOLOGY CONSULT (OUTPATIENT)
Dept: RADIATION ONCOLOGY | Facility: CLINIC | Age: 81
End: 2021-11-09
Attending: INTERNAL MEDICINE

## 2021-11-09 VITALS
DIASTOLIC BLOOD PRESSURE: 80 MMHG | WEIGHT: 160.7 LBS | SYSTOLIC BLOOD PRESSURE: 144 MMHG | BODY MASS INDEX: 25.22 KG/M2 | HEIGHT: 67 IN | OXYGEN SATURATION: 95 % | TEMPERATURE: 98.7 F | HEART RATE: 60 BPM | RESPIRATION RATE: 20 BRPM

## 2021-11-09 DIAGNOSIS — C79.89 SECONDARY MALIGNANT NEOPLASM OF PAROTID GLAND (HCC): Primary | ICD-10-CM

## 2021-11-09 DIAGNOSIS — C07 PRIMARY MALIGNANT NEOPLASM OF PAROTID GLAND (HCC): Primary | ICD-10-CM

## 2021-11-09 DIAGNOSIS — C07 MALIGNANT NEOPLASM OF PAROTID GLAND (HCC): ICD-10-CM

## 2021-11-09 PROCEDURE — 99204 OFFICE O/P NEW MOD 45 MIN: CPT | Performed by: INTERNAL MEDICINE

## 2021-11-10 ENCOUNTER — TELEPHONE (OUTPATIENT)
Dept: NUTRITION | Facility: CLINIC | Age: 81
End: 2021-11-10

## 2021-11-10 DIAGNOSIS — I10 ESSENTIAL HYPERTENSION: ICD-10-CM

## 2021-11-10 RX ORDER — CLONIDINE HYDROCHLORIDE 0.1 MG/1
0.1 TABLET ORAL DAILY
Qty: 90 TABLET | Refills: 1 | Status: SHIPPED | OUTPATIENT
Start: 2021-11-10 | End: 2021-12-13 | Stop reason: SDUPTHER

## 2021-11-15 DIAGNOSIS — N40.1 BPH WITH URINARY OBSTRUCTION: ICD-10-CM

## 2021-11-15 DIAGNOSIS — N13.8 BPH WITH URINARY OBSTRUCTION: ICD-10-CM

## 2021-11-15 RX ORDER — TAMSULOSIN HYDROCHLORIDE 0.4 MG/1
0.4 CAPSULE ORAL
Qty: 30 CAPSULE | Refills: 5 | Status: SHIPPED | OUTPATIENT
Start: 2021-11-15 | End: 2022-06-02 | Stop reason: SDUPTHER

## 2021-11-22 ENCOUNTER — IN-CLINIC DEVICE VISIT (OUTPATIENT)
Dept: CARDIOLOGY CLINIC | Facility: CLINIC | Age: 81
End: 2021-11-22
Payer: COMMERCIAL

## 2021-11-22 DIAGNOSIS — I49.5 SICK SINUS SYNDROME (HCC): Primary | ICD-10-CM

## 2021-11-22 DIAGNOSIS — Z45.010 ENCOUNTER FOR CHECKING AND TESTING OF CARDIAC PACEMAKER PULSE GENERATOR (BATTERY): ICD-10-CM

## 2021-11-22 PROCEDURE — 93280 PM DEVICE PROGR EVAL DUAL: CPT | Performed by: INTERNAL MEDICINE

## 2021-12-08 ENCOUNTER — APPOINTMENT (EMERGENCY)
Dept: RADIOLOGY | Facility: HOSPITAL | Age: 81
End: 2021-12-08
Payer: COMMERCIAL

## 2021-12-08 ENCOUNTER — HOSPITAL ENCOUNTER (EMERGENCY)
Facility: HOSPITAL | Age: 81
Discharge: HOME | End: 2021-12-08
Attending: EMERGENCY MEDICINE
Payer: COMMERCIAL

## 2021-12-08 VITALS
RESPIRATION RATE: 18 BRPM | HEIGHT: 67 IN | TEMPERATURE: 97.4 F | HEART RATE: 62 BPM | BODY MASS INDEX: 25.11 KG/M2 | SYSTOLIC BLOOD PRESSURE: 161 MMHG | WEIGHT: 160 LBS | DIASTOLIC BLOOD PRESSURE: 78 MMHG | OXYGEN SATURATION: 94 %

## 2021-12-08 DIAGNOSIS — S22.20XA STERNAL FRACTURE: Primary | ICD-10-CM

## 2021-12-08 DIAGNOSIS — S00.81XA ABRASION OF FACE, INITIAL ENCOUNTER: ICD-10-CM

## 2021-12-08 PROCEDURE — 71046 X-RAY EXAM CHEST 2 VIEWS: CPT

## 2021-12-08 PROCEDURE — 71120 X-RAY EXAM BREASTBONE 2/>VWS: CPT

## 2021-12-08 PROCEDURE — 90715 TDAP VACCINE 7 YRS/> IM: CPT | Performed by: EMERGENCY MEDICINE

## 2021-12-08 PROCEDURE — 99284 EMERGENCY DEPT VISIT MOD MDM: CPT

## 2021-12-08 PROCEDURE — 90471 IMMUNIZATION ADMIN: CPT

## 2021-12-08 PROCEDURE — 93005 ELECTROCARDIOGRAM TRACING: CPT

## 2021-12-08 PROCEDURE — 99285 EMERGENCY DEPT VISIT HI MDM: CPT | Performed by: EMERGENCY MEDICINE

## 2021-12-08 RX ORDER — ASPIRIN 81 MG/1
81 TABLET, CHEWABLE ORAL DAILY
COMMUNITY
End: 2021-12-13

## 2021-12-08 RX ORDER — GINSENG 100 MG
1 CAPSULE ORAL ONCE
Status: COMPLETED | OUTPATIENT
Start: 2021-12-08 | End: 2021-12-08

## 2021-12-08 RX ADMIN — BACITRACIN 1 SMALL APPLICATION: 500 OINTMENT TOPICAL at 09:16

## 2021-12-08 RX ADMIN — TETANUS TOXOID, REDUCED DIPHTHERIA TOXOID AND ACELLULAR PERTUSSIS VACCINE, ADSORBED 0.5 ML: 5; 2.5; 8; 8; 2.5 SUSPENSION INTRAMUSCULAR at 09:01

## 2021-12-09 LAB
ATRIAL RATE: 66 BPM
P AXIS: 31 DEGREES
PR INTERVAL: 182 MS
QRS AXIS: 6 DEGREES
QRSD INTERVAL: 204 MS
QT INTERVAL: 492 MS
QTC INTERVAL: 515 MS
T WAVE AXIS: 134 DEGREES
VENTRICULAR RATE: 66 BPM

## 2021-12-09 PROCEDURE — 93010 ELECTROCARDIOGRAM REPORT: CPT | Performed by: INTERNAL MEDICINE

## 2021-12-10 ENCOUNTER — TELEPHONE (OUTPATIENT)
Dept: INTERNAL MEDICINE CLINIC | Facility: CLINIC | Age: 81
End: 2021-12-10

## 2021-12-10 DIAGNOSIS — I10 ESSENTIAL HYPERTENSION: ICD-10-CM

## 2021-12-10 RX ORDER — AMLODIPINE BESYLATE 10 MG/1
10 TABLET ORAL DAILY
Qty: 30 TABLET | Refills: 0 | Status: SHIPPED | OUTPATIENT
Start: 2021-12-10 | End: 2022-01-11 | Stop reason: SDUPTHER

## 2021-12-13 ENCOUNTER — OFFICE VISIT (OUTPATIENT)
Dept: INTERNAL MEDICINE CLINIC | Facility: CLINIC | Age: 81
End: 2021-12-13
Payer: COMMERCIAL

## 2021-12-13 VITALS
HEIGHT: 67 IN | DIASTOLIC BLOOD PRESSURE: 78 MMHG | HEART RATE: 68 BPM | SYSTOLIC BLOOD PRESSURE: 144 MMHG | BODY MASS INDEX: 25.46 KG/M2 | RESPIRATION RATE: 14 BRPM | WEIGHT: 162.2 LBS | OXYGEN SATURATION: 93 % | TEMPERATURE: 99.4 F

## 2021-12-13 DIAGNOSIS — S22.22XA CLOSED FRACTURE OF BODY OF STERNUM, INITIAL ENCOUNTER: ICD-10-CM

## 2021-12-13 DIAGNOSIS — E11.59 TYPE 2 DIABETES MELLITUS WITH OTHER CIRCULATORY COMPLICATION, WITHOUT LONG-TERM CURRENT USE OF INSULIN (HCC): Primary | ICD-10-CM

## 2021-12-13 DIAGNOSIS — I10 ESSENTIAL HYPERTENSION: ICD-10-CM

## 2021-12-13 LAB — SL AMB POCT HEMOGLOBIN AIC: 6.2 (ref ?–6.5)

## 2021-12-13 PROCEDURE — 99214 OFFICE O/P EST MOD 30 MIN: CPT | Performed by: INTERNAL MEDICINE

## 2021-12-13 PROCEDURE — 83036 HEMOGLOBIN GLYCOSYLATED A1C: CPT | Performed by: INTERNAL MEDICINE

## 2021-12-13 RX ORDER — CLONIDINE HYDROCHLORIDE 0.1 MG/1
0.1 TABLET ORAL EVERY 12 HOURS SCHEDULED
Qty: 180 TABLET | Refills: 0 | Status: SHIPPED | OUTPATIENT
Start: 2021-12-13 | End: 2022-02-17

## 2021-12-13 RX ORDER — HYDROCODONE BITARTRATE AND ACETAMINOPHEN 5; 325 MG/1; MG/1
1 TABLET ORAL EVERY 6 HOURS PRN
Qty: 60 TABLET | Refills: 0 | Status: SHIPPED | OUTPATIENT
Start: 2021-12-13 | End: 2022-01-12

## 2022-01-11 DIAGNOSIS — I10 ESSENTIAL HYPERTENSION: ICD-10-CM

## 2022-01-11 RX ORDER — AMLODIPINE BESYLATE 10 MG/1
10 TABLET ORAL DAILY
Qty: 30 TABLET | Refills: 0 | Status: SHIPPED | OUTPATIENT
Start: 2022-01-11 | End: 2022-02-14 | Stop reason: SDUPTHER

## 2022-02-14 DIAGNOSIS — I10 ESSENTIAL HYPERTENSION: ICD-10-CM

## 2022-02-14 DIAGNOSIS — R33.9 URINARY RETENTION: ICD-10-CM

## 2022-02-14 RX ORDER — FINASTERIDE 5 MG/1
5 TABLET, FILM COATED ORAL DAILY
Qty: 90 TABLET | Refills: 1 | Status: SHIPPED | OUTPATIENT
Start: 2022-02-14

## 2022-02-14 RX ORDER — AMLODIPINE BESYLATE 10 MG/1
10 TABLET ORAL DAILY
Qty: 90 TABLET | Refills: 1 | Status: SHIPPED | OUTPATIENT
Start: 2022-02-14

## 2022-02-15 ENCOUNTER — OFFICE VISIT (OUTPATIENT)
Dept: INTERNAL MEDICINE CLINIC | Facility: CLINIC | Age: 82
End: 2022-02-15
Payer: COMMERCIAL

## 2022-02-15 VITALS
TEMPERATURE: 99 F | WEIGHT: 161.2 LBS | OXYGEN SATURATION: 93 % | SYSTOLIC BLOOD PRESSURE: 136 MMHG | RESPIRATION RATE: 14 BRPM | BODY MASS INDEX: 25.3 KG/M2 | HEIGHT: 67 IN | DIASTOLIC BLOOD PRESSURE: 68 MMHG | HEART RATE: 70 BPM

## 2022-02-15 DIAGNOSIS — C79.89 SECONDARY MALIGNANT NEOPLASM OF PAROTID GLAND (HCC): ICD-10-CM

## 2022-02-15 DIAGNOSIS — E11.59 TYPE 2 DIABETES MELLITUS WITH OTHER CIRCULATORY COMPLICATION, WITHOUT LONG-TERM CURRENT USE OF INSULIN (HCC): ICD-10-CM

## 2022-02-15 DIAGNOSIS — N18.32 STAGE 3B CHRONIC KIDNEY DISEASE (HCC): ICD-10-CM

## 2022-02-15 DIAGNOSIS — R35.0 URINE FREQUENCY: Primary | ICD-10-CM

## 2022-02-15 DIAGNOSIS — R31.9 HEMATURIA, UNSPECIFIED TYPE: ICD-10-CM

## 2022-02-15 DIAGNOSIS — I73.9 PERIPHERAL ARTERIAL DISEASE (HCC): ICD-10-CM

## 2022-02-15 DIAGNOSIS — R31.9 URINARY TRACT INFECTION WITH HEMATURIA, SITE UNSPECIFIED: ICD-10-CM

## 2022-02-15 DIAGNOSIS — N39.0 URINARY TRACT INFECTION WITH HEMATURIA, SITE UNSPECIFIED: ICD-10-CM

## 2022-02-15 PROBLEM — N18.4 CHRONIC KIDNEY DISEASE, STAGE 4 (SEVERE) (HCC): Status: ACTIVE | Noted: 2021-09-09

## 2022-02-15 LAB
SL AMB  POCT GLUCOSE, UA: NEGATIVE
SL AMB LEUKOCYTE ESTERASE,UA: ABNORMAL
SL AMB POCT BILIRUBIN,UA: NEGATIVE
SL AMB POCT BLOOD,UA: ABNORMAL
SL AMB POCT CLARITY,UA: ABNORMAL
SL AMB POCT COLOR,UA: YELLOW
SL AMB POCT KETONES,UA: ABNORMAL
SL AMB POCT NITRITE,UA: POSITIVE
SL AMB POCT PH,UA: 5
SL AMB POCT SPECIFIC GRAVITY,UA: 1.01
SL AMB POCT URINE PROTEIN: NEGATIVE
SL AMB POCT UROBILINOGEN: NORMAL

## 2022-02-15 PROCEDURE — 87186 SC STD MICRODIL/AGAR DIL: CPT | Performed by: INTERNAL MEDICINE

## 2022-02-15 PROCEDURE — 99214 OFFICE O/P EST MOD 30 MIN: CPT | Performed by: INTERNAL MEDICINE

## 2022-02-15 PROCEDURE — 3078F DIAST BP <80 MM HG: CPT | Performed by: INTERNAL MEDICINE

## 2022-02-15 PROCEDURE — 87077 CULTURE AEROBIC IDENTIFY: CPT | Performed by: INTERNAL MEDICINE

## 2022-02-15 PROCEDURE — 1036F TOBACCO NON-USER: CPT | Performed by: INTERNAL MEDICINE

## 2022-02-15 PROCEDURE — 81002 URINALYSIS NONAUTO W/O SCOPE: CPT | Performed by: INTERNAL MEDICINE

## 2022-02-15 PROCEDURE — 3075F SYST BP GE 130 - 139MM HG: CPT | Performed by: INTERNAL MEDICINE

## 2022-02-15 PROCEDURE — 87086 URINE CULTURE/COLONY COUNT: CPT | Performed by: INTERNAL MEDICINE

## 2022-02-15 PROCEDURE — 1160F RVW MEDS BY RX/DR IN RCRD: CPT | Performed by: INTERNAL MEDICINE

## 2022-02-15 RX ORDER — SULFAMETHOXAZOLE AND TRIMETHOPRIM 800; 160 MG/1; MG/1
1 TABLET ORAL EVERY 12 HOURS SCHEDULED
Qty: 14 TABLET | Refills: 0 | Status: SHIPPED | OUTPATIENT
Start: 2022-02-15 | End: 2022-02-22

## 2022-02-15 NOTE — PROGRESS NOTES
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[facility])       Endocrine    Type 2 diabetes mellitus with circulatory disorder, without long-term current use of insulin (HCC)       Cardiovascular and Mediastinum    Peripheral arterial disease (HCC)       Genitourinary    UTI (urinary tract infection)    Relevant Medications    sulfamethoxazole-trimethoprim (BACTRIM DS) 800-160 mg per tablet    Other Relevant Orders    Urine culture    UA w Reflex to Microscopic w Reflex to Culture - Clinic Collect    Chronic kidney disease, stage 4 (severe) (HCC)      Other Visit Diagnoses     Urine frequency    -  Primary    Bactrim DS BID    Relevant Medications    sulfamethoxazole-trimethoprim (BACTRIM DS) 800-160 mg per tablet    Other Relevant Orders    POCT urine dip (Completed)    Urine culture    UA w Reflex to Microscopic w Reflex to Culture - Clinic Collect    Hematuria, unspecified type        Relevant Orders    US kidney and bladder    UA w Reflex to Microscopic w Reflex to Culture - Clinic Collect           Diagnoses and all orders for this visit:    Urine frequency  Comments:  Bactrim DS BID  Orders:  -     POCT urine dip  -     Urine culture; Future  -     Urine culture  -     sulfamethoxazole-trimethoprim (BACTRIM DS) 800-160 mg per tablet; Take 1 tablet by mouth every 12 (twelve) hours for 7 days  -     UA w Reflex to Microscopic w Reflex to Culture - Clinic Collect    Urinary tract infection with hematuria, site unspecified  Comments: Follow up with US of Bladder  UA and Urine C&S  Orders:  -     Urine culture; Future  -     Urine culture  -     sulfamethoxazole-trimethoprim (BACTRIM DS) 800-160 mg per tablet; Take 1 tablet by mouth every 12 (twelve) hours for 7 days  -     UA w Reflex to Microscopic w Reflex to Culture - Clinic Collect    Hematuria, unspecified type  -     US kidney and bladder;  Future  -     UA w Reflex to Microscopic w Reflex to Culture - Clinic Collect    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[facility])  Comments: The patient is doing well and no concerns    Type 2 diabetes mellitus with other circulatory complication, without long-term current use of insulin (HCC)  Comments:  A1c is good and no concerns    Peripheral arterial disease (HCC)    Stage 3b chronic kidney disease ([facility] )  Comments:  CKD stable and no change        No problem-specific Assessment & Plan notes found for this encounter  Subjective:      Patient ID: Gissel Mercer is a 78 y o  male  The patient is having issues with increased frequency  UA (+) for blood and leukocytes      The following portions of the patient's history were reviewed and updated as appropriate:   He has a past medical history of Ambulates with cane, Anxiety state (8/5/2018), Basal cell carcinoma of skin (11/15/2016), Cancer ([facility] ), Chronic kidney disease, Chronic obstructive pulmonary disease ([facility] ) (9/5/2012), Constipation, COPD (chronic obstructive pulmonary disease) [facility] ), Coronary artery disease (5/22/2012), Diabetes mellitus ([facility] ), Forearm laceration, right, initial encounter (5/28/2020), Hydronephrosis, Hypertension, Mass of right side of neck, Osteoporosis (5/22/2012), Pacemaker, SSS (sick sinus syndrome) ([facility] ), and Trifascicular block  ,  does not have any pertinent problems on file  ,   has a past surgical history that includes Cardiac pacemaker placement (11/2009); Cataract extraction w/  intraocular lens implant; Kidney stone surgery; Tonsillectomy; pr transurethral elec-surg prostatectom (N/A, 5/9/2016); CYSTOSCOPY (N/A, 5/9/2016); Other surgical history; Inguinal hernia repair; and Parotidectomy (Right, 10/1/2021)  ,  family history includes Coronary artery disease in his family; Stroke in his father  ,   reports that he quit smoking about 41 years ago  His smoking use included cigars and cigarettes  He has a 48 00 pack-year smoking history   He has quit using smokeless tobacco  He reports previous alcohol use of about 2 0 standard drinks of alcohol per week  He reports that he does not use drugs  ,  has No Known Allergies     Current Outpatient Medications   Medication Sig Dispense Refill    amLODIPine (NORVASC) 10 mg tablet Take 1 tablet (10 mg total) by mouth daily 90 tablet 1    atenolol (TENORMIN) 50 mg tablet Take 1 tablet (50 mg total) by mouth daily 90 tablet 1    Blephamide 10-0 2 % ophthalmic suspension Administer 2 drops to both eyes daily at bedtime 10 mL 5    calcium carbonate (TUMS) 500 mg chewable tablet Chew 1 tablet as needed for indigestion or heartburn   cloNIDine (CATAPRES) 0 1 mg tablet Take 1 tablet (0 1 mg total) by mouth every 12 (twelve) hours for 180 doses 180 tablet 0    Combigan 0 2-0 5 % INSTILL 1 DROP INTO RIGHT EYE EVERY 12 HOURS      cyclobenzaprine (FLEXERIL) 10 mg tablet Take 1 tablet (10 mg total) by mouth 2 (two) times a day 60 tablet 5    finasteride (PROSCAR) 5 mg tablet Take 1 tablet (5 mg total) by mouth daily 90 tablet 1    glucose blood (ONE TOUCH ULTRA TEST) test strip Test sugar once daily 50 each 5    lisinopril (ZESTRIL) 20 mg tablet Take 1 tablet (20 mg total) by mouth daily 90 tablet 1    meclizine (ANTIVERT) 25 mg tablet TAKE 1 TABLET BY MOUTH THREE TIMES DAILY AS NEEDED FOR DIZZINESS FOR  UP  TO  10  DAYS 30 tablet 0    Nutritional Supplements (Boost) LIQD Take 237 mL by mouth daily 7110 mL 1    simvastatin (ZOCOR) 10 mg tablet Take 1 tablet (10 mg total) by mouth every evening 90 tablet 1    tamsulosin (FLOMAX) 0 4 mg Take 1 capsule (0 4 mg total) by mouth daily with dinner 30 capsule 5    sulfamethoxazole-trimethoprim (BACTRIM DS) 800-160 mg per tablet Take 1 tablet by mouth every 12 (twelve) hours for 7 days 14 tablet 0     No current facility-administered medications for this visit  Review of Systems   Constitutional: Negative for chills, fatigue and fever  HENT: Negative      Respiratory: Negative for cough, chest tightness and shortness of breath  Cardiovascular: Negative for chest pain and palpitations  Gastrointestinal: Negative for abdominal pain, constipation, diarrhea, nausea and vomiting  Genitourinary: Negative  Musculoskeletal: Positive for back pain and myalgias  Skin: Negative  Neurological: Negative  Psychiatric/Behavioral: Negative for dysphoric mood  The patient is not nervous/anxious  Objective:  Vitals:    02/15/22 1458   BP: 136/68   BP Location: Left arm   Patient Position: Sitting   Cuff Size: Standard   Pulse: 70   Resp: 14   Temp: 99 °F (37 2 °C)   SpO2: 93%   Weight: 73 1 kg (161 lb 3 2 oz)   Height: 5' 7" (1 702 m)     Body mass index is 25 25 kg/m²  Physical Exam  Vitals and nursing note reviewed  Constitutional:       Appearance: He is well-developed  HENT:      Head: Normocephalic and atraumatic  Eyes:      Pupils: Pupils are equal, round, and reactive to light  Cardiovascular:      Rate and Rhythm: Normal rate and regular rhythm  Heart sounds: Normal heart sounds  No murmur heard  Pulmonary:      Effort: Pulmonary effort is normal       Breath sounds: Normal breath sounds  No stridor  No rales  Abdominal:      General: Bowel sounds are normal  There is no distension  Palpations: Abdomen is soft  Tenderness: There is no abdominal tenderness  Musculoskeletal:         General: Tenderness present  No deformity  Normal range of motion  Arms:       Cervical back: Normal range of motion and neck supple  Skin:     General: Skin is warm and dry  Neurological:      Mental Status: He is alert and oriented to person, place, and time            PHQ-2/9 Depression Screening

## 2022-02-17 DIAGNOSIS — I10 ESSENTIAL HYPERTENSION: ICD-10-CM

## 2022-02-17 RX ORDER — CLONIDINE HYDROCHLORIDE 0.1 MG/1
TABLET ORAL
Qty: 180 TABLET | Refills: 0 | Status: SHIPPED | OUTPATIENT
Start: 2022-02-17

## 2022-02-18 LAB — BACTERIA UR CULT: ABNORMAL

## 2022-02-21 ENCOUNTER — HOSPITAL ENCOUNTER (OUTPATIENT)
Dept: ULTRASOUND IMAGING | Facility: HOSPITAL | Age: 82
Discharge: HOME | End: 2022-02-21
Payer: COMMERCIAL

## 2022-02-21 DIAGNOSIS — R31.9 HEMATURIA, UNSPECIFIED TYPE: ICD-10-CM

## 2022-02-21 PROCEDURE — 76770 US EXAM ABDO BACK WALL COMP: CPT

## 2022-02-22 ENCOUNTER — REMOTE DEVICE CLINIC VISIT (OUTPATIENT)
Dept: CARDIOLOGY CLINIC | Facility: CLINIC | Age: 82
End: 2022-02-22
Payer: COMMERCIAL

## 2022-02-22 DIAGNOSIS — I49.5 SICK SINUS SYNDROME (HCC): Primary | ICD-10-CM

## 2022-02-22 DIAGNOSIS — Z45.010 ENCOUNTER FOR CHECKING AND TESTING OF CARDIAC PACEMAKER PULSE GENERATOR (BATTERY): ICD-10-CM

## 2022-02-22 PROCEDURE — 93294 REM INTERROG EVL PM/LDLS PM: CPT | Performed by: INTERNAL MEDICINE

## 2022-02-22 PROCEDURE — 93296 REM INTERROG EVL PM/IDS: CPT | Performed by: INTERNAL MEDICINE

## 2022-04-19 DIAGNOSIS — I10 ESSENTIAL HYPERTENSION: ICD-10-CM

## 2022-04-19 RX ORDER — LISINOPRIL 20 MG/1
20 TABLET ORAL DAILY
Qty: 90 TABLET | Refills: 1 | Status: SHIPPED | OUTPATIENT
Start: 2022-04-19 | End: 2022-04-27 | Stop reason: SDUPTHER

## 2022-04-20 PROCEDURE — 88305 TISSUE EXAM BY PATHOLOGIST: CPT | Performed by: PATHOLOGY

## 2022-04-25 NOTE — PROGRESS NOTES
[facility] remote check pacer  No events  Normal battery function        Current Outpatient Medications:     amLODIPine (NORVASC) 10 mg tablet, Take 1 tablet (10 mg total) by mouth daily, Disp: 90 tablet, Rfl: 1    atenolol (TENORMIN) 50 mg tablet, Take 1 tablet (50 mg total) by mouth daily, Disp: 90 tablet, Rfl: 1    Blephamide 10-0 2 % ophthalmic suspension, Administer 2 drops to both eyes daily at bedtime, Disp: 10 mL, Rfl: 5    calcium carbonate (TUMS) 500 mg chewable tablet, Chew 1 tablet as needed for indigestion or heartburn , Disp: , Rfl:     cloNIDine (CATAPRES) 0 1 mg tablet, TAKE 1 TABLET BY MOUTH EVERY 12 HOURS, Disp: 180 tablet, Rfl: 0    Combigan 0 2-0 5 %, INSTILL 1 DROP INTO RIGHT EYE EVERY 12 HOURS, Disp: , Rfl:     cyclobenzaprine (FLEXERIL) 10 mg tablet, Take 1 tablet (10 mg total) by mouth 2 (two) times a day, Disp: 60 tablet, Rfl: 5    finasteride (PROSCAR) 5 mg tablet, Take 1 tablet (5 mg total) by mouth daily, Disp: 90 tablet, Rfl: 1    glucose blood (ONE TOUCH ULTRA TEST) test strip, Test sugar once daily, Disp: 50 each, Rfl: 5    lisinopril (ZESTRIL) 20 mg tablet, Take 1 tablet (20 mg total) by mouth daily, Disp: 90 tablet, Rfl: 1    meclizine (ANTIVERT) 25 mg tablet, TAKE 1 TABLET BY MOUTH THREE TIMES DAILY AS NEEDED FOR DIZZINESS FOR  UP  TO  10  DAYS, Disp: 30 tablet, Rfl: 0    Nutritional Supplements (Boost) LIQD, Take 237 mL by mouth daily, Disp: 7110 mL, Rfl: 1    simvastatin (ZOCOR) 10 mg tablet, Take 1 tablet (10 mg total) by mouth every evening, Disp: 90 tablet, Rfl: 1    tamsulosin (FLOMAX) 0 4 mg, Take 1 capsule (0 4 mg total) by mouth daily with dinner, Disp: 30 capsule, Rfl: 5

## 2022-04-27 DIAGNOSIS — E78.00 HYPERCHOLESTEREMIA: ICD-10-CM

## 2022-04-27 DIAGNOSIS — I25.10 CORONARY ARTERY DISEASE INVOLVING NATIVE HEART WITHOUT ANGINA PECTORIS, UNSPECIFIED VESSEL OR LESION TYPE: ICD-10-CM

## 2022-04-27 DIAGNOSIS — I10 ESSENTIAL HYPERTENSION: ICD-10-CM

## 2022-04-27 RX ORDER — SIMVASTATIN 10 MG
TABLET ORAL
Qty: 90 TABLET | Refills: 0 | Status: SHIPPED | OUTPATIENT
Start: 2022-04-27 | End: 2022-08-10 | Stop reason: SDUPTHER

## 2022-04-27 RX ORDER — LISINOPRIL 20 MG/1
20 TABLET ORAL DAILY
Qty: 90 TABLET | Refills: 1 | Status: SHIPPED | OUTPATIENT
Start: 2022-04-27 | End: 2022-05-18 | Stop reason: SDUPTHER

## 2022-04-27 RX ORDER — ATENOLOL 50 MG/1
50 TABLET ORAL DAILY
Qty: 90 TABLET | Refills: 1 | Status: SHIPPED | OUTPATIENT
Start: 2022-04-27 | End: 2022-05-18 | Stop reason: SDUPTHER

## 2022-05-12 ENCOUNTER — RA CDI HCC (OUTPATIENT)
Dept: OTHER | Facility: HOSPITAL | Age: 82
End: 2022-05-12

## 2022-05-12 NOTE — PROGRESS NOTES
[facility]  coding opportunities     E11 [id number] and E11 [id number]     Chart Reviewed number of suggestions sent to Provider: 2     Patients Insurance     Medicare Insurance: [address] [place] [address]

## 2022-05-18 ENCOUNTER — OFFICE VISIT (OUTPATIENT)
Dept: INTERNAL MEDICINE CLINIC | Facility: CLINIC | Age: 82
End: 2022-05-18
Payer: COMMERCIAL

## 2022-05-18 VITALS
SYSTOLIC BLOOD PRESSURE: 114 MMHG | OXYGEN SATURATION: 95 % | TEMPERATURE: 99.2 F | DIASTOLIC BLOOD PRESSURE: 76 MMHG | WEIGHT: 156.2 LBS | HEART RATE: 67 BPM | RESPIRATION RATE: 16 BRPM | BODY MASS INDEX: 24.52 KG/M2 | HEIGHT: 67 IN

## 2022-05-18 DIAGNOSIS — Z01.00 DIABETIC EYE EXAM (HCC): ICD-10-CM

## 2022-05-18 DIAGNOSIS — I25.10 CORONARY ARTERY DISEASE INVOLVING NATIVE HEART WITHOUT ANGINA PECTORIS, UNSPECIFIED VESSEL OR LESION TYPE: ICD-10-CM

## 2022-05-18 DIAGNOSIS — Z00.00 MEDICARE ANNUAL WELLNESS VISIT, SUBSEQUENT: Primary | ICD-10-CM

## 2022-05-18 DIAGNOSIS — R63.4 WEIGHT LOSS: ICD-10-CM

## 2022-05-18 DIAGNOSIS — E11.9 DIABETIC EYE EXAM (HCC): ICD-10-CM

## 2022-05-18 DIAGNOSIS — C07 PRIMARY MALIGNANT NEOPLASM OF PAROTID GLAND (HCC): ICD-10-CM

## 2022-05-18 DIAGNOSIS — I10 ESSENTIAL HYPERTENSION: ICD-10-CM

## 2022-05-18 DIAGNOSIS — E11.51 TYPE 2 DIABETES MELLITUS WITH DIABETIC PERIPHERAL ANGIOPATHY WITHOUT GANGRENE, WITHOUT LONG-TERM CURRENT USE OF INSULIN (HCC): ICD-10-CM

## 2022-05-18 LAB — SL AMB POCT HEMOGLOBIN AIC: 5.7 (ref ?–6.5)

## 2022-05-18 PROCEDURE — 83036 HEMOGLOBIN GLYCOSYLATED A1C: CPT | Performed by: INTERNAL MEDICINE

## 2022-05-18 PROCEDURE — 3288F FALL RISK ASSESSMENT DOCD: CPT | Performed by: INTERNAL MEDICINE

## 2022-05-18 PROCEDURE — 99214 OFFICE O/P EST MOD 30 MIN: CPT | Performed by: INTERNAL MEDICINE

## 2022-05-18 PROCEDURE — 1036F TOBACCO NON-USER: CPT | Performed by: INTERNAL MEDICINE

## 2022-05-18 PROCEDURE — 1170F FXNL STATUS ASSESSED: CPT | Performed by: INTERNAL MEDICINE

## 2022-05-18 PROCEDURE — 3725F SCREEN DEPRESSION PERFORMED: CPT | Performed by: INTERNAL MEDICINE

## 2022-05-18 PROCEDURE — 3078F DIAST BP <80 MM HG: CPT | Performed by: INTERNAL MEDICINE

## 2022-05-18 PROCEDURE — 3074F SYST BP LT 130 MM HG: CPT | Performed by: INTERNAL MEDICINE

## 2022-05-18 PROCEDURE — G0439 PPPS, SUBSEQ VISIT: HCPCS | Performed by: INTERNAL MEDICINE

## 2022-05-18 PROCEDURE — 1125F AMNT PAIN NOTED PAIN PRSNT: CPT | Performed by: INTERNAL MEDICINE

## 2022-05-18 RX ORDER — ATENOLOL 50 MG/1
50 TABLET ORAL DAILY
Qty: 90 TABLET | Refills: 1 | Status: SHIPPED | OUTPATIENT
Start: 2022-05-18

## 2022-05-18 RX ORDER — LISINOPRIL 20 MG/1
20 TABLET ORAL DAILY
Qty: 90 TABLET | Refills: 1 | Status: SHIPPED | OUTPATIENT
Start: 2022-05-18

## 2022-05-18 NOTE — ASSESSMENT & PLAN NOTE
Lab Results   Component Value Date    HGBA1C 5 7 05/18/2022   Diet controlled and no concerns at this time

## 2022-05-18 NOTE — PATIENT INSTRUCTIONS
Medicare Preventive Visit Patient Instructions  Thank you for completing your Welcome to Medicare Visit or Medicare Annual Wellness Visit today  Your next wellness visit will be due in one year (5/19/2023)  The screening/preventive services that you may require over the next 5-10 years are detailed below  Some tests may not apply to you based off risk factors and/or age  Screening tests ordered at today's visit but not completed yet may show as past due  Also, please note that scanned in results may not display below  Preventive Screenings:  Service Recommendations Previous Testing/Comments   Colorectal Cancer Screening  · Colonoscopy    · Fecal Occult Blood Test (FOBT)/Fecal Immunochemical Test (FIT)  · Fecal DNA/Cologuard Test  · Flexible Sigmoidoscopy Age: 54-65 years old   Colonoscopy: every 10 years (May be performed more frequently if at higher risk)  OR  FOBT/FIT: every 1 year  OR  Cologuard: every 3 years  OR  Sigmoidoscopy: every 5 years  Screening may be recommended earlier than age 48 if at higher risk for colorectal cancer  Also, an individualized decision between you and your healthcare provider will decide whether screening between the ages of 74-80 would be appropriate   Colonoscopy: Not on file  FOBT/FIT: Not on file  Cologuard: Not on file  Sigmoidoscopy: Not on file          Prostate Cancer Screening Individualized decision between patient and health care provider in men between ages of 53-78   Medicare will cover every 12 months beginning on the day after your 50th birthday PSA: No results in last 5 years     Screening Not Indicated     Hepatitis C Screening Once for adults born between [facility]  More frequently in patients at high risk for Hepatitis C Hep C Antibody: Not on file        Diabetes Screening 1-2 times per year if you're at risk for diabetes or have pre-diabetes Fasting glucose: 137 mg/dL   A1C: 6 2    Screening Not Indicated  History Diabetes   Cholesterol Screening Once every 5 years if you don't have a lipid disorder  May order more often based on risk factors  Lipid panel: 08/31/2021    Screening Not Indicated  History Lipid Disorder      Other Preventive Screenings Covered by Medicare:  1  Abdominal Aortic Aneurysm (AAA) Screening: covered once if your at risk  You're considered to be at risk if you have a family history of AAA or a male between the age of 73-68 who smoking at least 100 cigarettes in your lifetime  2  Lung Cancer Screening: covers low dose CT scan once per year if you meet all of the following conditions: (1) Age 50-69; (2) No signs or symptoms of lung cancer; (3) Current smoker or have quit smoking within the last 15 years; (4) You have a tobacco smoking history of at least 30 pack years (packs per day x number of years you smoked); (5) You get a written order from a healthcare provider  3  Glaucoma Screening: covered annually if you're considered high risk: (1) You have diabetes OR (2) Family history of glaucoma OR (3)  aged 48 and older OR (3)  American aged 72 and older  3  Osteoporosis Screening: covered every 2 years if you meet one of the following conditions: (1) Have a vertebral abnormality; (2) On glucocorticoid therapy for more than 3 months; (3) Have primary hyperparathyroidism; (4) On osteoporosis medications and need to assess response to drug therapy  5  HIV Screening: covered annually if you're between the age of 12-76  Also covered annually if you are younger than 13 and older than 72 with risk factors for HIV infection  For pregnant patients, it is covered up to 3 times per pregnancy      Immunizations:  Immunization Recommendations   Influenza Vaccine Annual influenza vaccination during flu season is recommended for all persons aged >= 6 months who do not have contraindications   Pneumococcal Vaccine (Prevnar and Pneumovax)  * Prevnar = PCV13  * Pneumovax = PPSV23 Adults 25-60 years old: 1-3 doses may be recommended based on certain risk factors  Adults 72 years old: Prevnar (PCV13) vaccine recommended followed by Pneumovax (PPSV23) vaccine  If already received PPSV23 since turning 65, then PCV13 recommended at least one year after PPSV23 dose  Hepatitis B Vaccine 3 dose series if at intermediate or high risk (ex: diabetes, end stage renal disease, liver disease)   Tetanus (Td) Vaccine - COST NOT COVERED BY MEDICARE PART B Following completion of primary series, a booster dose should be given every 10 years to maintain immunity against tetanus  Td may also be given as tetanus wound prophylaxis  Tdap Vaccine - COST NOT COVERED BY MEDICARE PART B Recommended at least once for all adults  For pregnant patients, recommended with each pregnancy  Shingles Vaccine (Shingrix) - COST NOT COVERED BY MEDICARE PART B  2 shot series recommended in those aged 48 and above     Health Maintenance Due:  There are no preventive care reminders to display for this patient  Immunizations Due:      Topic Date Due    Pneumococcal Vaccine: 65+ Years (2 - PCV) 07/27/2010    DTaP,Tdap,and Td Vaccines (1 - Tdap) 12/08/2021    COVID-19 Vaccine (4 - Booster for Judene Logan series) 04/26/2022     Advance Directives   What are advance directives? Advance directives are legal documents that state your wishes and plans for medical care  These plans are made ahead of time in case you lose your ability to make decisions for yourself  Advance directives can apply to any medical decision, such as the treatments you want, and if you want to donate organs  What are the types of advance directives? There are many types of advance directives, and each state has rules about how to use them  You may choose a combination of any of the following:  · Living will: This is a written record of the treatment you want  You can also choose which treatments you do not want, which to limit, and which to stop at a certain time   This includes surgery, medicine, IV fluid, and tube feedings  · Durable power of  for healthcare [facility]): This is a written record that states who you want to make healthcare choices for you when you are unable to make them for yourself  This person, called a proxy, is usually a family member or a friend  You may choose more than 1 proxy  · Do not resuscitate (DNR) order:  A DNR order is used in case your heart stops beating or you stop breathing  It is a request not to have certain forms of treatment, such as CPR  A DNR order may be included in other types of advance directives  · Medical directive: This covers the care that you want if you are in a coma, near death, or unable to make decisions for yourself  You can list the treatments you want for each condition  Treatment may include pain medicine, surgery, blood transfusions, dialysis, IV or tube feedings, and a ventilator (breathing machine)  · Values history: This document has questions about your views, beliefs, and how you feel and think about life  This information can help others choose the care that you would choose  Why are advance directives important? An advance directive helps you control your care  Although spoken wishes may be used, it is better to have your wishes written down  Spoken wishes can be misunderstood, or not followed  Treatments may be given even if you do not want them  An advance directive may make it easier for your family to make difficult choices about your care  © Copyright [employer] 2018 Information is for End User's use only and may not be sold, redistributed or otherwise used for commercial purposes   All illustrations and images included in CareNotes® are the copyrighted property of A D A M , Inc  or [address] [place], [place] [address]

## 2022-05-18 NOTE — PROGRESS NOTES
Assessment/Plan:    Problem List Items Addressed This Visit        Digestive    Primary malignant neoplasm of parotid gland [facility])     Reviewed the surgical CT of head and neck              Endocrine    Type 2 diabetes mellitus with diabetic peripheral angiopathy without gangrene ([facility] ) - Primary       Lab Results   Component Value Date    HGBA1C 5 7 05/18/2022   Diet controlled and no concerns at this time           Relevant Orders    POCT hemoglobin A1c (Completed)       Cardiovascular and Mediastinum    Essential hypertension     The patient is well controlled with the current medications           Relevant Medications    lisinopril (ZESTRIL) 20 mg tablet    atenolol (TENORMIN) 50 mg tablet      Other Visit Diagnoses     Diabetic eye exam ([facility] )        Relevant Orders    Ambulatory Referral to Ophthalmology    Coronary artery disease involving native heart without angina pectoris, unspecified vessel or lesion type        Relevant Medications    atenolol (TENORMIN) 50 mg tablet    Weight loss        Relevant Orders    CT abdomen pelvis wo contrast           Diagnoses and all orders for this visit:    Type 2 diabetes mellitus with diabetic peripheral angiopathy without gangrene, without long-term current use of insulin ([facility])  -     POCT hemoglobin A1c    Diabetic eye exam ([place] [address] )  -     Ambulatory Referral to Ophthalmology    Essential hypertension  -     lisinopril (ZESTRIL) 20 mg tablet; Take 1 tablet (20 mg total) by mouth in the morning   -     atenolol (TENORMIN) 50 mg tablet; Take 1 tablet (50 mg total) by mouth in the morning  Coronary artery disease involving native heart without angina pectoris, unspecified vessel or lesion type  -     atenolol (TENORMIN) 50 mg tablet; Take 1 tablet (50 mg total) by mouth in the morning      Weight loss  -     CT abdomen pelvis wo contrast; Future    Primary malignant neoplasm of parotid gland [facility])      Primary malignant neoplasm of parotid gland [facility])  Reviewed the surgical CT of head and neck    Type 2 diabetes mellitus with diabetic peripheral angiopathy without gangrene [facility])    Lab Results   Component Value Date    HGBA1C 5 7 05/18/2022   Diet controlled and no concerns at this time    Essential hypertension  The patient is well controlled with the current medications        Subjective:      Patient ID: Devin Strange is a 78 y o  male  SCC right pinnae with surgery pending  The patient states that he has lost several lbs over the past month  Reviewed imaging and noted that there was no concerns  The patient states that he has no other issues at this time  He notes no fevers, chills or night sweats  He states that he has had no change in bowel habits and is not interested in any evaluation with colonoscopy  He states that there are no other issues at this time  The following portions of the patient's history were reviewed and updated as appropriate:   He has a past medical history of Ambulates with cane, Anxiety state (8/5/2018), Basal cell carcinoma of skin (11/15/2016), Cancer ([facility] ), Chronic kidney disease, Chronic obstructive pulmonary disease ([facility] ) (9/5/2012), Constipation, COPD (chronic obstructive pulmonary disease) ([facility] ), Coronary artery disease (5/22/2012), Diabetes mellitus ([facility] ), Forearm laceration, right, initial encounter (5/28/2020), Hydronephrosis, Hypertension, Mass of right side of neck, Osteoporosis (5/22/2012), Pacemaker, SSS (sick sinus syndrome) ([facility] ), and Trifascicular block  ,  does not have any pertinent problems on file  ,   has a past surgical history that includes Cardiac pacemaker placement (11/2009); Cataract extraction w/  intraocular lens implant; Kidney stone surgery; Tonsillectomy; pr transurethral elec-surg prostatectom (N/A, 5/9/2016); CYSTOSCOPY (N/A, 5/9/2016); Other surgical history; Inguinal hernia repair; and Parotidectomy (Right, 10/1/2021)  ,  family history includes Coronary artery disease in his family; Stroke in his father  , reports that he quit smoking about 42 years ago  His smoking use included cigars and cigarettes  He has a 48 00 pack-year smoking history  He has quit using smokeless tobacco  He reports previous alcohol use of about 2 0 standard drinks of alcohol per week  He reports that he does not use drugs  ,  has No Known Allergies     Current Outpatient Medications   Medication Sig Dispense Refill    amLODIPine (NORVASC) 10 mg tablet Take 1 tablet (10 mg total) by mouth daily 90 tablet 1    atenolol (TENORMIN) 50 mg tablet Take 1 tablet (50 mg total) by mouth in the morning  90 tablet 1    calcium carbonate (TUMS) 500 mg chewable tablet Chew 1 tablet as needed for indigestion or heartburn   cloNIDine (CATAPRES) 0 1 mg tablet TAKE 1 TABLET BY MOUTH EVERY 12 HOURS 180 tablet 0    cyclobenzaprine (FLEXERIL) 10 mg tablet Take 1 tablet (10 mg total) by mouth 2 (two) times a day 60 tablet 5    finasteride (PROSCAR) 5 mg tablet Take 1 tablet (5 mg total) by mouth daily 90 tablet 1    lisinopril (ZESTRIL) 20 mg tablet Take 1 tablet (20 mg total) by mouth in the morning  90 tablet 1    meclizine (ANTIVERT) 25 mg tablet TAKE 1 TABLET BY MOUTH THREE TIMES DAILY AS NEEDED FOR DIZZINESS FOR  UP  TO  10  DAYS 30 tablet 0    Nutritional Supplements (Boost) LIQD Take 237 mL by mouth daily 7110 mL 1    simvastatin (ZOCOR) 10 mg tablet TAKE 1 TABLET BY MOUTH ONCE DAILY IN THE EVENING 90 tablet 0    tamsulosin (FLOMAX) 0 4 mg Take 1 capsule (0 4 mg total) by mouth daily with dinner 30 capsule 5    Blephamide 10-0 2 % ophthalmic suspension Administer 2 drops to both eyes daily at bedtime 10 mL 5    Combigan 0 2-0 5 % INSTILL 1 DROP INTO RIGHT EYE EVERY 12 HOURS      glucose blood (ONE TOUCH ULTRA TEST) test strip Test sugar once daily 50 each 5     No current facility-administered medications for this visit  Review of Systems   Constitutional: Negative for chills, fatigue and fever  HENT: Negative      Respiratory: Negative for cough, chest tightness and shortness of breath  Cardiovascular: Negative for chest pain and palpitations  Gastrointestinal: Negative for abdominal pain, constipation, diarrhea, nausea and vomiting  Genitourinary: Negative  Musculoskeletal: Negative for back pain and myalgias  Skin: Negative  Neurological: Negative  Psychiatric/Behavioral: Negative for dysphoric mood  The patient is not nervous/anxious  Objective:  Vitals:    05/18/22 1327   BP: 114/76   BP Location: Left arm   Patient Position: Sitting   Cuff Size: Large   Pulse: 67   Resp: 16   Temp: 99 2 °F (37 3 °C)   SpO2: 95%   Weight: 70 9 kg (156 lb 3 2 oz)   Height: 5' 7" (1 702 m)     Body mass index is 24 46 kg/m²  Physical Exam  Vitals and nursing note reviewed  Constitutional:       Appearance: He is well-developed  HENT:      Head: Normocephalic and atraumatic  Eyes:      Pupils: Pupils are equal, round, and reactive to light  Cardiovascular:      Rate and Rhythm: Normal rate and regular rhythm  Heart sounds: Normal heart sounds  No murmur heard  Pulmonary:      Effort: Pulmonary effort is normal       Breath sounds: Normal breath sounds  No stridor  No rales  Abdominal:      General: Bowel sounds are normal  There is no distension  Palpations: Abdomen is soft  Tenderness: There is no abdominal tenderness  Musculoskeletal:         General: No deformity  Normal range of motion  Cervical back: Normal range of motion and neck supple  Skin:     General: Skin is warm and dry  Findings: Lesion present  Neurological:      Mental Status: He is alert and oriented to person, place, and time            PHQ-2/9 Depression Screening    Little interest or pleasure in doing things: 0 - not at all  Feeling down, depressed, or hopeless: 0 - not at all  PHQ-2 Score: 0  PHQ-2 Interpretation: Negative depression screen

## 2022-05-18 NOTE — PROGRESS NOTES
Assessment and Plan:     Problem List Items Addressed This Visit        Endocrine    Type 2 diabetes mellitus with diabetic peripheral angiopathy without gangrene ([facility] ) - Primary    Relevant Orders    POCT hemoglobin A1c (Completed)       Cardiovascular and Mediastinum    Essential hypertension      Other Visit Diagnoses     Diabetic eye exam [facility])        Relevant Orders    Ambulatory Referral to Ophthalmology    Coronary artery disease involving native heart without angina pectoris, unspecified vessel or lesion type        Weight loss               Preventive health issues were discussed with patient, and age appropriate screening tests were ordered as noted in patient's After Visit Summary  Personalized health advice and appropriate referrals for health education or preventive services given if needed, as noted in patient's After Visit Summary       History of Present Illness:     Patient presents for Medicare Annual Wellness visit    Patient Care Team:  Gage Pinzon DO as PCP - General (Internal Medicine)  MD Ty Carr CRNP Margot Cunning, MD Odis Grew, MD Elvan Rao, DO Vincenza Guppy (Nutrition)     Problem List:     Patient Active Problem List   Diagnosis    Abnormal nuclear stress test    Basal cell carcinoma of skin    BPH (benign prostatic hyperplasia)    Coronary artery disease involving native coronary artery of native heart without angina pectoris    Type 2 diabetes mellitus with circulatory disorder, without long-term current use of insulin ([address] )    Hypercholesterolemia    Essential hypertension    Osteoporosis    Peripheral arterial disease ([address] )    Sleep related leg cramps    Anxiety state    Bradycardia    Skin lesion    Constipation    Pacemaker    Mixed hyperlipidemia    BRENDA (acute kidney injury) ([facility] )    Troponin level elevated    Dizziness    UTI (urinary tract infection)    Platelets decreased ([address] )    Type 2 diabetes mellitus with diabetic peripheral angiopathy without gangrene (HCC)    Type 2 diabetes mellitus with chronic kidney disease ([place] [address] )    Chronic kidney disease, stage 4 (severe) ([place] [address] )    Primary malignant neoplasm of parotid gland ([place] [address] )    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[facility])      Past Medical and Surgical History:     Past Medical History:   Diagnosis Date    Ambulates with cane     unsure "why" falls at times     last fall 09/27/2021    Anxiety state 8/5/2018    Basal cell carcinoma of skin 11/15/2016    Cancer ([place] [address] )     BASAL CA SKIN    Chronic kidney disease     ACUTE KIDNEY INJURY 3/21    Chronic obstructive pulmonary disease ([place] [address] ) 9/5/2012    Constipation     [facility]ch[facility] disease) [facility]     Coronary artery disease 5/22/2012    Diabetes mellitus ([place] [address] )     TYPE 2    Forearm laceration, right, initial encounter 5/28/2020    Hydronephrosis     LAST ASSESSED: 11/18/15    Hypertension     Mass of right side of neck     radical neck today 10/1/2021    Osteoporosis 5/22/2012    Pacemaker     SSS (sick sinus syndrome) ([place] [address] )     BRADYCARDIA    Trifascicular block      Past Surgical History:   Procedure Laterality Date    CARDIAC PACEMAKER PLACEMENT  11/2009    Medtronic dual chamber     CATARACT EXTRACTION W/  INTRAOCULAR LENS IMPLANT      CYSTOSCOPY N/A 5/9/2016    Procedure: CYSTOSCOPY, evacuation of bladder calculi;  Surgeon: Mj Crook MD;  Location: [facility];  Service:     INGUINAL HERNIA REPAIR      UNILATERAL    KIDNEY STONE SURGERY      OTHER SURGICAL HISTORY      HERNIA REPAIR AS PER ALLSCR[facility]TS (ALREADY IN PERTINENT NEGATIVES)    PAROTIDECTOMY Right 10/1/2021    Procedure: TOTAL PAROTIDECTOMY WITH NIMS; MODIFIED RADICAL NECK DISSECTION;  Surgeon: Santa Crump DO;  Location: [facility];  Service: ENT    OH TRANSURETHRAL ELEC-SURG PROSTATECTOM N/A 5/9/2016    Procedure: TRANSURETHRAL RESECTION OF PROSTATE (TURP);   Surgeon: Mj Crook MD; Location: [facility] OR;  Service: Urology    TONSILLECTOMY        Family History:     Family History   Problem Relation Age of Onset    Stroke Father         SYNDROME    Coronary artery disease Family         less than 61years of age      Social History:     Social History     Socioeconomic History    Marital status:      Spouse name: None    Number of children: None    Years of education: None    Highest education level: None   Occupational History    Occupation: RETIRED   Tobacco Use    Smoking status: Former Smoker     Packs/day: 1 50     Years: 32 00     Pack years: 48 00     Types: Cigars, Cigarettes     Quit date: 1980     Years since quittin 0    Smokeless tobacco: Former User   Vaping Use    Vaping Use: Never used   Substance and Sexual Activity    Alcohol use: Not Currently     Alcohol/week: 2 0 standard drinks     Types: 2 Cans of beer per week     Comment: 2  beers daily and shot by hx/No ETOH x 3wks   Drug use: No    Sexual activity: None   Other Topics Concern    None   Social History Narrative    USES SAFETY EQUIPMENT - [employer]         Social Determinants of Health     Financial Resource Strain: Not on file   Food Insecurity: Not on file   Transportation Needs: Not on file   Physical Activity: Not on file   Stress: Not on file   Social Connections: Not on file   Intimate Partner Violence: Not on file   Housing Stability: Not on file      Medications and Allergies:     Current Outpatient Medications   Medication Sig Dispense Refill    amLODIPine (NORVASC) 10 mg tablet Take 1 tablet (10 mg total) by mouth daily 90 tablet 1    atenolol (TENORMIN) 50 mg tablet Take 1 tablet (50 mg total) by mouth daily 90 tablet 1    calcium carbonate (TUMS) 500 mg chewable tablet Chew 1 tablet as needed for indigestion or heartburn        cloNIDine (CATAPRES) 0 1 mg tablet TAKE 1 TABLET BY MOUTH EVERY 12 HOURS 180 tablet 0    cyclobenzaprine (FLEXERIL) 10 mg tablet Take 1 tablet (10 mg total) by mouth 2 (two) times a day 60 tablet 5    finasteride (PROSCAR) 5 mg tablet Take 1 tablet (5 mg total) by mouth daily 90 tablet 1    lisinopril (ZESTRIL) 20 mg tablet Take 1 tablet (20 mg total) by mouth daily 90 tablet 1    meclizine (ANTIVERT) 25 mg tablet TAKE 1 TABLET BY MOUTH THREE TIMES DAILY AS NEEDED FOR DIZZINESS FOR  UP  TO  10  DAYS 30 tablet 0    Nutritional Supplements (Boost) LIQD Take 237 mL by mouth daily 7110 mL 1    simvastatin (ZOCOR) 10 mg tablet TAKE 1 TABLET BY MOUTH ONCE DAILY IN THE EVENING 90 tablet 0    tamsulosin (FLOMAX) 0 4 mg Take 1 capsule (0 4 mg total) by mouth daily with dinner 30 capsule 5    Blephamide 10-0 2 % ophthalmic suspension Administer 2 drops to both eyes daily at bedtime 10 mL 5    Combigan 0 2-0 5 % INSTILL 1 DROP INTO RIGHT EYE EVERY 12 HOURS      glucose blood (ONE TOUCH ULTRA TEST) test strip Test sugar once daily 50 each 5     No current facility-administered medications for this visit  No Known Allergies   Immunizations:     Immunization History   Administered Date(s) Administered    COVID-19 MODERNA VACC 0 5 ML IM 06/21/2021, 07/19/2021, 01/26/2022    INFLUENZA 12/22/2014, 10/24/2016    Influenza Quadrivalent Preservative Free 3 years and older IM 12/22/2014    Influenza Split High Dose Preservative Free IM 10/24/2016, 09/18/2017    Influenza, high dose seasonal 0 7 mL 09/26/2018, 10/15/2019, 10/30/2020, 10/12/2021    Influenza, seasonal, injectable 10/05/2011, 01/04/2013, 12/05/2013    Pneumococcal Polysaccharide PPV23 07/27/2009    Tdap 05/28/2020, 12/08/2021      Health Maintenance: There are no preventive care reminders to display for this patient        Topic Date Due    Pneumococcal Vaccine: 65+ Years (2 - PCV) 07/27/2010    DTaP,Tdap,and Td Vaccines (1 - Tdap) 12/08/2021    COVID-19 Vaccine (4 - Booster for Moderna series) 04/26/2022      Medicare Health Risk Assessment:     /76 (BP Location: Left arm, Patient Position: Sitting, Cuff Size: Large)   Pulse 67   Temp 99 2 °F (37 3 °C)   Resp 16   Ht 5' 7" (1 702 m)   Wt 70 9 kg (156 lb 3 2 oz)   SpO2 95%   BMI 24 46 kg/m²      Yared [place]la is here for his Subsequent Wellness visit  Last Medicare Wellness visit information reviewed, patient interviewed and updates made to the record today  Health Risk Assessment:   Patient rates overall health as very good  Patient feels that their physical health rating is same  Patient is satisfied with their life  Eyesight was rated as same  Hearing was rated as same  Patient feels that their emotional and mental health rating is same  Patients states they are never, rarely angry  Patient states they are never, rarely unusually tired/fatigued  Pain experienced in the last 7 days has been none  Patient states that he has experienced no weight loss or gain in last 6 months  Depression Screening:   PHQ-2 Score: 0      Fall Risk Screening: In the past year, patient has experienced: no history of falling in past year      Home Safety:  Patient does not have trouble with stairs inside or outside of their home  Patient has working smoke alarms and has no working carbon monoxide detector  Home safety hazards include: none  Nutrition:   Current diet is Regular  Medications:   Patient is currently taking over-the-counter supplements  OTC medications include: see medication list  Patient is able to manage medications  Activities of Daily Living (ADLs)/Instrumental Activities of Daily Living (IADLs):   Walk and transfer into and out of bed and chair?: Yes  Dress and groom yourself?: Yes    Bathe or shower yourself?: Yes    Feed yourself?  Yes  Do your laundry/housekeeping?: Yes  Manage your money, pay your bills and track your expenses?: Yes  Make your own meals?: Yes    Do your own shopping?: Yes    Previous Hospitalizations:   Any hospitalizations or ED visits within the last 12 months?: Yes    How many hospitalizations have you had in the last year?: 1-2    Advance Care Planning:   Living will: Yes    Durable POA for healthcare: No    Advanced directive: Yes    Advanced directive counseling given: Yes    Five wishes given: No    Patient declined ACP directive: No    End of Life Decisions reviewed with patient: Yes    Provider agrees with end of life decisions: Yes      Cognitive Screening:   Provider or family/friend/caregiver concerned regarding cognition?: No    PREVENTIVE SCREENINGS      Cardiovascular Screening:    General: Screening Not Indicated and History Lipid Disorder      Diabetes Screening:     General: Screening Not Indicated and History Diabetes      Colorectal Cancer Screening:     General: Screening Not Indicated      Prostate Cancer Screening:    General: Screening Not Indicated      Osteoporosis Screening:    General: Screening Not Indicated and History Osteoporosis      Abdominal Aortic Aneurysm (AAA) Screening:    Risk factors include: tobacco use        General: Screening Not Indicated      Lung Cancer Screening:     General: Screening Not Indicated      Hepatitis C Screening:    General: Screening Not Indicated    Screening, Brief Intervention, and Referral to Treatment (SBIRT)    Screening  Typical number of drinks in a day: 2  Typical number of drinks in a week: 14  Interpretation: Low risk drinking behavior      Single Item Drug Screening:  How often have you used an illegal drug (including marijuana) or a prescription medication for non-medical reasons in the past year? never    Single Item Drug Screen Score: 0  Interpretation: Negative screen for possible drug use disorder      Claire Colon, DO

## 2022-06-02 DIAGNOSIS — N13.8 BPH WITH URINARY OBSTRUCTION: ICD-10-CM

## 2022-06-02 DIAGNOSIS — N40.1 BPH WITH URINARY OBSTRUCTION: ICD-10-CM

## 2022-06-02 RX ORDER — TAMSULOSIN HYDROCHLORIDE 0.4 MG/1
0.4 CAPSULE ORAL
Qty: 30 CAPSULE | Refills: 5 | Status: SHIPPED | OUTPATIENT
Start: 2022-06-02 | End: 2022-06-29 | Stop reason: SDUPTHER

## 2022-06-23 DIAGNOSIS — G47.62 SLEEP RELATED LEG CRAMPS: ICD-10-CM

## 2022-06-23 RX ORDER — CYCLOBENZAPRINE HCL 10 MG
10 TABLET ORAL 2 TIMES DAILY
Qty: 60 TABLET | Refills: 5 | Status: SHIPPED | OUTPATIENT
Start: 2022-06-23

## 2022-06-29 DIAGNOSIS — N40.1 BPH WITH URINARY OBSTRUCTION: ICD-10-CM

## 2022-06-29 DIAGNOSIS — N13.8 BPH WITH URINARY OBSTRUCTION: ICD-10-CM

## 2022-06-29 RX ORDER — TAMSULOSIN HYDROCHLORIDE 0.4 MG/1
0.4 CAPSULE ORAL
Qty: 30 CAPSULE | Refills: 5 | Status: SHIPPED | OUTPATIENT
Start: 2022-06-29

## 2022-08-10 DIAGNOSIS — E78.00 HYPERCHOLESTEREMIA: ICD-10-CM

## 2022-08-10 RX ORDER — SIMVASTATIN 10 MG
10 TABLET ORAL EVERY EVENING
Qty: 90 TABLET | Refills: 0 | Status: SHIPPED | OUTPATIENT
Start: 2022-08-10 | End: 2022-10-18 | Stop reason: SDUPTHER

## 2022-09-19 DIAGNOSIS — I10 ESSENTIAL HYPERTENSION: ICD-10-CM

## 2022-09-19 DIAGNOSIS — R33.9 URINARY RETENTION: ICD-10-CM

## 2022-09-19 RX ORDER — FINASTERIDE 5 MG/1
TABLET, FILM COATED ORAL
Qty: 90 TABLET | Refills: 0 | Status: SHIPPED | OUTPATIENT
Start: 2022-09-19

## 2022-09-19 RX ORDER — AMLODIPINE BESYLATE 10 MG/1
TABLET ORAL
Qty: 90 TABLET | Refills: 0 | Status: SHIPPED | OUTPATIENT
Start: 2022-09-19

## 2022-10-12 PROBLEM — N39.0 UTI (URINARY TRACT INFECTION): Status: RESOLVED | Noted: 2021-03-18 | Resolved: 2022-10-12

## 2022-10-18 DIAGNOSIS — G47.62 SLEEP RELATED LEG CRAMPS: ICD-10-CM

## 2022-10-18 DIAGNOSIS — N13.8 BPH WITH URINARY OBSTRUCTION: ICD-10-CM

## 2022-10-18 DIAGNOSIS — N40.1 BPH WITH URINARY OBSTRUCTION: ICD-10-CM

## 2022-10-18 DIAGNOSIS — E78.00 HYPERCHOLESTEREMIA: ICD-10-CM

## 2022-10-18 RX ORDER — TAMSULOSIN HYDROCHLORIDE 0.4 MG/1
0.4 CAPSULE ORAL
Qty: 30 CAPSULE | Refills: 5 | Status: SHIPPED | OUTPATIENT
Start: 2022-10-18

## 2022-10-18 RX ORDER — SIMVASTATIN 10 MG
10 TABLET ORAL EVERY EVENING
Qty: 90 TABLET | Refills: 1 | Status: SHIPPED | OUTPATIENT
Start: 2022-10-18

## 2022-10-18 RX ORDER — CYCLOBENZAPRINE HCL 10 MG
10 TABLET ORAL 2 TIMES DAILY
Qty: 60 TABLET | Refills: 5 | Status: SHIPPED | OUTPATIENT
Start: 2022-10-18

## 2022-11-04 ENCOUNTER — OFFICE VISIT (OUTPATIENT)
Dept: INTERNAL MEDICINE CLINIC | Facility: CLINIC | Age: 82
End: 2022-11-04

## 2022-11-04 VITALS
HEART RATE: 67 BPM | WEIGHT: 150 LBS | DIASTOLIC BLOOD PRESSURE: 60 MMHG | SYSTOLIC BLOOD PRESSURE: 110 MMHG | TEMPERATURE: 98.8 F | BODY MASS INDEX: 23.54 KG/M2 | OXYGEN SATURATION: 97 % | HEIGHT: 67 IN

## 2022-11-04 DIAGNOSIS — I25.10 CORONARY ARTERY DISEASE INVOLVING NATIVE HEART WITHOUT ANGINA PECTORIS, UNSPECIFIED VESSEL OR LESION TYPE: ICD-10-CM

## 2022-11-04 DIAGNOSIS — Z23 ENCOUNTER FOR ADMINISTRATION OF VACCINE: ICD-10-CM

## 2022-11-04 DIAGNOSIS — D68.9 COAGULOPATHY (HCC): ICD-10-CM

## 2022-11-04 DIAGNOSIS — R63.4 WEIGHT LOSS: Primary | ICD-10-CM

## 2022-11-04 DIAGNOSIS — I10 ESSENTIAL HYPERTENSION: ICD-10-CM

## 2022-11-04 RX ORDER — ATENOLOL 50 MG/1
50 TABLET ORAL DAILY
Qty: 90 TABLET | Refills: 1 | Status: SHIPPED | OUTPATIENT
Start: 2022-11-04

## 2022-11-04 RX ORDER — ASPIRIN 81 MG/1
81 TABLET, CHEWABLE ORAL DAILY
COMMUNITY

## 2022-11-04 NOTE — PROGRESS NOTES
Assessment/Plan:    Problem List Items Addressed This Visit        Cardiovascular and Mediastinum    Essential hypertension     The patient states there are no other issues at this time with the Amlodipine and Catapres         Relevant Medications    atenolol (TENORMIN) 50 mg tablet      Other Visit Diagnoses     Weight loss    -  Primary    follow up with a CT of the chest abdomen and pelvis    Relevant Orders    PSA, Total Screen    CT chest abdomen pelvis wo contrast    Coronary artery disease involving native heart without angina pectoris, unspecified vessel or lesion type        Relevant Medications    atenolol (TENORMIN) 50 mg tablet    Encounter for administration of vaccine        Relevant Orders    influenza vaccine, high-dose, PF 0 7 mL (FLUZONE HIGH-DOSE) (Completed)    Coagulopathy ([address] )               Diagnoses and all orders for this visit:    Weight loss  Comments:  follow up with a CT of the chest abdomen and pelvis  Orders:  -     PSA, Total Screen; Future  -     CT chest abdomen pelvis wo contrast; Future    Essential hypertension  -     atenolol (TENORMIN) 50 mg tablet; Take 1 tablet (50 mg total) by mouth daily    Coronary artery disease involving native heart without angina pectoris, unspecified vessel or lesion type  -     atenolol (TENORMIN) 50 mg tablet; Take 1 tablet (50 mg total) by mouth daily    Encounter for administration of vaccine  -     influenza vaccine, high-dose, PF 0 7 mL (FLUZONE HIGH-DOSE)    Coagulopathy (HCC)    Other orders  -     aspirin 81 mg chewable tablet; Chew 81 mg daily      Essential hypertension  The patient states there are no other issues at this time with the Amlodipine and Catapres        Subjective:      Patient ID: Itzel Powell is a 78 y o  male  The patient was seen and examined and noted to have issue with a 17 lb weight loss in the past year  We discussed depression and the patient denies this and does not feel treatment is appropriate    The patient notes some mild SOB with squating for a protracted period of time  The following portions of the patient's history were reviewed and updated as appropriate:   He has a past medical history of Ambulates with cane, Anxiety state (8/5/2018), Basal cell carcinoma of skin (11/15/2016), Cancer ([place] [address] ), Chronic kidney disease, Chronic obstructive pulmonary disease ([facility] ) (9/5/2012), Constipation, COPD (chronic obstructive pulmonary disease) ([place] [address] ), Coronary artery disease (5/22/2012), Diabetes mellitus ([place] [address] ), Forearm laceration, right, initial encounter (5/28/2020), Hydronephrosis, Hypertension, Mass of right side of neck, Osteoporosis (5/22/2012), Pacemaker, SSS (sick sinus syndrome) ([place] [address] ), and Trifascicular block  ,  does not have any pertinent problems on file  ,   has a past surgical history that includes Cardiac pacemaker placement (11/2009); Cataract extraction w/  intraocular lens implant; Kidney stone surgery; Tonsillectomy; pr transurethral elec-surg prostatectom (N/A, 05/09/2016); CYSTOSCOPY (N/A, 05/09/2016); Other surgical history; Inguinal hernia repair; Parotidectomy (Right, 10/01/2021); and Mohs surgery (Right, 08/31/2022)  ,  family history includes Coronary artery disease in his family; Stroke in his father  ,   reports that he quit smoking about 42 years ago  His smoking use included cigars and cigarettes  He started smoking about 74 years ago  He has a 48 00 pack-year smoking history  He has quit using smokeless tobacco  He reports previous alcohol use of about 2 0 standard drinks of alcohol per week  He reports that he does not use drugs  ,  has No Known Allergies     Current Outpatient Medications   Medication Sig Dispense Refill   • amLODIPine (NORVASC) 10 mg tablet Take 1 tablet by mouth once daily 90 tablet 0   • aspirin 81 mg chewable tablet Chew 81 mg daily     • atenolol (TENORMIN) 50 mg tablet Take 1 tablet (50 mg total) by mouth daily 90 tablet 1   • Blephamide 10-0 2 % ophthalmic suspension Administer 2 drops to both eyes daily at bedtime 10 mL 5   • calcium carbonate (TUMS) 500 mg chewable tablet Chew 1 tablet as needed for indigestion or heartburn  • cloNIDine (CATAPRES) 0 1 mg tablet TAKE 1 TABLET BY MOUTH EVERY 12 HOURS 180 tablet 0   • Combigan 0 2-0 5 % INSTILL 1 DROP INTO RIGHT EYE EVERY 12 HOURS     • cyclobenzaprine (FLEXERIL) 10 mg tablet Take 1 tablet (10 mg total) by mouth 2 (two) times a day 60 tablet 5   • finasteride (PROSCAR) 5 mg tablet Take 1 tablet by mouth once daily 90 tablet 0   • glucose blood (ONE TOUCH ULTRA TEST) test strip Test sugar once daily 50 each 5   • lisinopril (ZESTRIL) 20 mg tablet Take 1 tablet (20 mg total) by mouth in the morning  90 tablet 1   • simvastatin (ZOCOR) 10 mg tablet Take 1 tablet (10 mg total) by mouth every evening 90 tablet 1   • tamsulosin (FLOMAX) 0 4 mg Take 1 capsule (0 4 mg total) by mouth daily with dinner 30 capsule 5   • Nutritional Supplements (Boost) LIQD Take 237 mL by mouth daily 7110 mL 1     No current facility-administered medications for this visit  Review of Systems   Constitutional: Negative for chills, fatigue and fever  HENT: Negative  Respiratory: Negative for cough, chest tightness and shortness of breath  Cardiovascular: Negative for chest pain and palpitations  Gastrointestinal: Negative for abdominal pain, constipation, diarrhea, nausea and vomiting  Genitourinary: Negative  Musculoskeletal: Negative for back pain and myalgias  Skin: Negative  Neurological: Negative  Psychiatric/Behavioral: Negative for dysphoric mood  The patient is not nervous/anxious  Objective:  Vitals:    11/04/22 1242   BP: 110/60   Pulse: 67   Temp: 98 8 °F (37 1 °C)   TempSrc: Tympanic   SpO2: 97%   Weight: 68 kg (150 lb)   Height: 5' 7" (1 702 m)     Body mass index is 23 49 kg/m²  Physical Exam  Vitals and nursing note reviewed  Constitutional:       Appearance: He is well-developed     HENT: Head: Normocephalic and atraumatic  Eyes:      Pupils: Pupils are equal, round, and reactive to light  Cardiovascular:      Rate and Rhythm: Normal rate and regular rhythm  Heart sounds: Normal heart sounds  No murmur heard  Pulmonary:      Effort: Pulmonary effort is normal       Breath sounds: Normal breath sounds  No stridor  No rales  Abdominal:      General: Bowel sounds are normal  There is no distension  Palpations: Abdomen is soft  Tenderness: There is no abdominal tenderness  Musculoskeletal:         General: No deformity  Normal range of motion  Cervical back: Normal range of motion and neck supple  Skin:     General: Skin is warm and dry  Neurological:      Mental Status: He is alert and oriented to person, place, and time            PHQ-2/9 Depression Screening

## 2022-11-04 NOTE — PROGRESS NOTES
Review of Systems  Diabetic Foot Exam    Patient's shoes and socks removed  Right Foot/Ankle   Right Foot Inspection  Skin Exam: skin normal and skin intact  No dry skin, no warmth, no callus, no erythema, no maceration, no abnormal color, no pre-ulcer, no ulcer and no callus  Toe Exam: ROM and strength within normal limits  no right toe deformity    Sensory   Monofilament testing: diminished    Vascular  Capillary refills: < 3 seconds  The right DP pulse is 2+  The right PT pulse is 2+  Left Foot/Ankle  Left Foot Inspection  Skin Exam: skin normal and skin intact  No dry skin, no warmth, no erythema, no maceration, normal color, no pre-ulcer, no ulcer and no callus  Toe Exam: ROM and strength within normal limits  No left toe deformity  Sensory   Monofilament testing: diminished    Vascular  Capillary refills: < 3 seconds  The left DP pulse is 2+  The left PT pulse is 2+       Assign Risk Category  No deformity present  Loss of protective sensation  No weak pulses  Risk: 1

## 2022-11-21 ENCOUNTER — LAB (OUTPATIENT)
Dept: LAB | Facility: HOSPITAL | Age: 82
End: 2022-11-21

## 2022-11-21 ENCOUNTER — HOSPITAL ENCOUNTER (OUTPATIENT)
Dept: CT IMAGING | Facility: HOSPITAL | Age: 82
Discharge: HOME | End: 2022-11-21

## 2022-11-21 DIAGNOSIS — N30.00 ACUTE CYSTITIS WITHOUT HEMATURIA: Primary | ICD-10-CM

## 2022-11-21 DIAGNOSIS — R63.4 WEIGHT LOSS: ICD-10-CM

## 2022-11-21 LAB
BACTERIA UR QL AUTO: ABNORMAL /HPF
BILIRUB UR QL STRIP: NEGATIVE
CLARITY UR: ABNORMAL
COLOR UR: YELLOW
GLUCOSE UR STRIP-MCNC: NEGATIVE MG/DL
HGB UR QL STRIP.AUTO: ABNORMAL
KETONES UR STRIP-MCNC: NEGATIVE MG/DL
LEUKOCYTE ESTERASE UR QL STRIP: ABNORMAL
NITRITE UR QL STRIP: POSITIVE
NON-SQ EPI CELLS URNS QL MICRO: ABNORMAL /HPF
OTHER STN SPEC: ABNORMAL
PH UR STRIP.AUTO: 6 [PH]
PROT UR STRIP-MCNC: NEGATIVE MG/DL
RBC #/AREA URNS AUTO: ABNORMAL /HPF
SP GR UR STRIP.AUTO: 1.02
UROBILINOGEN UR QL STRIP.AUTO: 0.2 E.U./DL
WBC #/AREA URNS AUTO: ABNORMAL /HPF

## 2022-11-21 RX ORDER — SULFAMETHOXAZOLE AND TRIMETHOPRIM 800; 160 MG/1; MG/1
1 TABLET ORAL EVERY 12 HOURS SCHEDULED
Qty: 14 TABLET | Refills: 0 | Status: SHIPPED | OUTPATIENT
Start: 2022-11-21 | End: 2022-11-28

## 2022-11-22 LAB — PSA SERPL-MCNC: 0.4 NG/ML (ref 0–4)

## 2022-11-23 LAB — BACTERIA UR CULT: ABNORMAL

## 2022-11-28 ENCOUNTER — OFFICE VISIT (OUTPATIENT)
Dept: CARDIOLOGY CLINIC | Facility: CLINIC | Age: 82
End: 2022-11-28

## 2022-11-28 ENCOUNTER — IN-CLINIC DEVICE VISIT (OUTPATIENT)
Dept: CARDIOLOGY CLINIC | Facility: CLINIC | Age: 82
End: 2022-11-28

## 2022-11-28 VITALS
DIASTOLIC BLOOD PRESSURE: 60 MMHG | WEIGHT: 149 LBS | SYSTOLIC BLOOD PRESSURE: 112 MMHG | HEART RATE: 64 BPM | BODY MASS INDEX: 23.39 KG/M2 | HEIGHT: 67 IN

## 2022-11-28 DIAGNOSIS — I49.5 SICK SINUS SYNDROME (HCC): Primary | ICD-10-CM

## 2022-11-28 DIAGNOSIS — I10 ESSENTIAL HYPERTENSION: ICD-10-CM

## 2022-11-28 DIAGNOSIS — Z45.010 ENCOUNTER FOR CHECKING AND TESTING OF CARDIAC PACEMAKER PULSE GENERATOR (BATTERY): ICD-10-CM

## 2022-11-28 DIAGNOSIS — I25.10 CORONARY ARTERY DISEASE INVOLVING NATIVE CORONARY ARTERY OF NATIVE HEART WITHOUT ANGINA PECTORIS: ICD-10-CM

## 2022-11-28 DIAGNOSIS — R00.1 BRADYCARDIA: ICD-10-CM

## 2022-11-28 NOTE — PROGRESS NOTES
Patient ID: Mukund Herr is a 78 y o  male  Plan:      Coronary artery disease involving native coronary artery of native heart without angina pectoris  Presumed present based on myoview but no current symptoms  Essential hypertension  Too well controlled  Will stop clonidine  Bradycardia  Pacer surveillance continues  Follow up Plan/Other summary comments:  Return in about 1 year (around 11/28/2023)  HPI: Patient seen in f/u regarding the above issues  NO recent CP or change in exertional capacity  Patient had DDD MDT placed 11/6/2009  Results for orders placed or performed in visit on 11/28/22   POCT ECG    Impression    NSR  Atrial sense and V pacing  Most recent or relevant cardiac/vascular testing:    [employer]tronic DDD: 11/6/2009  Echo 4/25/2019: Normal LV function  Mild hypokinesia of the  apical inferior wall  Myoview: 7/10/2018: Normal LV function  Small zone of inferoapical ischemia  Past Surgical History:   Procedure Laterality Date   • CARDIAC PACEMAKER PLACEMENT  11/2009    Medtronic dual chamber    • CATARACT EXTRACTION W/  INTRAOCULAR LENS IMPLANT     • CYSTOSCOPY N/A 05/09/2016    Procedure: CYSTOSCOPY, evacuation of bladder calculi;  Surgeon: Adair Teague MD;  Location: [facility];  Service:    • INGUINAL HERNIA REPAIR      UNILATERAL   • KIDNEY STONE SURGERY     • MOHS SURGERY Right 08/31/2022    removal of right ear BCCA with free graft nd reconstruction - Dr Burt Carrasquillo and Dr Lizy Kingsley   • [address] AS PER ALLSCRIPTS (ALREADY IN PERTINENT NEGATIVES)   • PAROTIDECTOMY Right 10/01/2021    Procedure: TOTAL PAROTIDECTOMY WITH NIMS; MODIFIED RADICAL NECK DISSECTION;  Surgeon: Jordan Villatoro DO;  Location: [facility];  Service: ENT   • IL TRANSURETHRAL ELEC-SURG PROSTATECTOM N/A 05/09/2016    Procedure: TRANSURETHRAL RESECTION OF PROSTATE (TURP);   Surgeon: Adair Teague MD;  Location: [facility];  Service: Urology • TONSILLECTOMY         Lipid Profile:   Lab Results   Component Value Date    CHOL 142 08/24/2015    TRIG 157 (H) 08/31/2021    TRIG 182 08/24/2015    HDL 42 08/31/2021    HDL 41 08/24/2015         Review of Systems   10  point ROS  was otherwise non pertinent or negative except as per HPI or as below  Gait: Normal          Objective:     /60 (BP Location: Right arm, Patient Position: Sitting)   Pulse 64   Ht 5' 7" (1 702 m)   Wt 67 6 kg (149 lb)   BMI 23 34 kg/m²     PHYSICAL EXAM:    General:  Normal appearance in no distress  Eyes:  Anicteric  Oral mucosa:  Moist   Neck:  No JVD  Carotid upstrokes are brisk without bruits  No masses  Left subclavian pacer  Chest:  Clear to auscultation and percussion  Cardiac:  Normal PMI  Normal S1 and S2  No murmur gallop or rub  Abdomen:  Soft and nontender  No palpable organomegaly or aortic enlargement  Extremities:  No peripheral edema  Musculoskeletal:  Symmetric  Vascular:  Femoral pulses are brisk without bruits  Popliteal pulses are intact bilaterally  Pedal pulses are intact  Neuro:  Grossly symmetric  Psych:  Alert and oriented x3          Current Outpatient Medications:   •  amLODIPine (NORVASC) 10 mg tablet, Take 1 tablet by mouth once daily, Disp: 90 tablet, Rfl: 0  •  aspirin 81 mg chewable tablet, Chew 81 mg daily, Disp: , Rfl:   •  atenolol (TENORMIN) 50 mg tablet, Take 1 tablet (50 mg total) by mouth daily, Disp: 90 tablet, Rfl: 1  •  Blephamide 10-0 2 % ophthalmic suspension, Administer 2 drops to both eyes daily at bedtime, Disp: 10 mL, Rfl: 5  •  calcium carbonate (TUMS) 500 mg chewable tablet, Chew 1 tablet as needed for indigestion or heartburn , Disp: , Rfl:   •  Combigan 0 2-0 5 %, INSTILL 1 DROP INTO RIGHT EYE EVERY 12 HOURS, Disp: , Rfl:   •  cyclobenzaprine (FLEXERIL) 10 mg tablet, Take 1 tablet (10 mg total) by mouth 2 (two) times a day, Disp: 60 tablet, Rfl: 5  •  finasteride (PROSCAR) 5 mg tablet, Take 1 tablet by mouth once daily, Disp: 90 tablet, Rfl: 0  •  glucose blood (ONE TOUCH ULTRA TEST) test strip, Test sugar once daily, Disp: 50 each, Rfl: 5  •  lisinopril (ZESTRIL) 20 mg tablet, Take 1 tablet (20 mg total) by mouth in the morning , Disp: 90 tablet, Rfl: 1  •  simvastatin (ZOCOR) 10 mg tablet, Take 1 tablet (10 mg total) by mouth every evening, Disp: 90 tablet, Rfl: 1  •  sulfamethoxazole-trimethoprim (BACTRIM DS) 800-160 mg per tablet, Take 1 tablet by mouth every 12 (twelve) hours for 7 days, Disp: 14 tablet, Rfl: 0  •  tamsulosin (FLOMAX) 0 4 mg, Take 1 capsule (0 4 mg total) by mouth daily with dinner, Disp: 30 capsule, Rfl: 5  •  Nutritional Supplements (Boost) LIQD, Take 237 mL by mouth daily, Disp: 7110 mL, Rfl: 1  No Known Allergies  Past Medical History:   Diagnosis Date   • Ambulates with cane     unsure "why" falls at times     last fall 2021   • Anxiety state 2018   • Basal cell carcinoma of skin 11/15/2016   • Cancer ([facility] )     BASAL CA SKIN   • Chronic kidney disease     ACUTE KIDNEY INJURY 3/21   • Chronic obstructive pulmonary disease ([facility] ) 2012   • Constipation    • COPD (chronic obstructive pulmonary disease) ([facility] )    • Coronary artery disease 2012   • Diabetes mellitus ([facility] )     TYPE 2   • Forearm laceration, right, initial encounter 2020   • Hydronephrosis     LAST ASSESSED: 11/18/15   • Hypertension    • Mass of right side of neck     radical neck today 10/1/2021   • Osteoporosis 2012   • Pacemaker    • SSS (sick sinus syndrome) ([facility])     BRADYCARDIA   • Trifascicular block            Social History     Tobacco Use   Smoking Status Former   • Packs/day: 1 50   • Years: 32 00   • Pack years: 48 00   • Types: Cigars, Cigarettes   • Start date: 54   • Quit date: 1980   • Years since quittin 6   Smokeless Tobacco Former

## 2022-12-02 NOTE — PROGRESS NOTES
Device check in person pacer  No events  2 months left on battery    Will bring in office monthly      Current Outpatient Medications:   •  amLODIPine (NORVASC) 10 mg tablet, Take 1 tablet by mouth once daily, Disp: 90 tablet, Rfl: 0  •  aspirin 81 mg chewable tablet, Chew 81 mg daily, Disp: , Rfl:   •  atenolol (TENORMIN) 50 mg tablet, Take 1 tablet (50 mg total) by mouth daily, Disp: 90 tablet, Rfl: 1  •  Blephamide 10-0 2 % ophthalmic suspension, Administer 2 drops to both eyes daily at bedtime, Disp: 10 mL, Rfl: 5  •  calcium carbonate (TUMS) 500 mg chewable tablet, Chew 1 tablet as needed for indigestion or heartburn , Disp: , Rfl:   •  Combigan 0 2-0 5 %, INSTILL 1 DROP INTO RIGHT EYE EVERY 12 HOURS, Disp: , Rfl:   •  cyclobenzaprine (FLEXERIL) 10 mg tablet, Take 1 tablet (10 mg total) by mouth 2 (two) times a day, Disp: 60 tablet, Rfl: 5  •  finasteride (PROSCAR) 5 mg tablet, Take 1 tablet by mouth once daily, Disp: 90 tablet, Rfl: 0  •  glucose blood (ONE TOUCH ULTRA TEST) test strip, Test sugar once daily, Disp: 50 each, Rfl: 5  •  lisinopril (ZESTRIL) 20 mg tablet, Take 1 tablet (20 mg total) by mouth in the morning , Disp: 90 tablet, Rfl: 1  •  Nutritional Supplements (Boost) LIQD, Take 237 mL by mouth daily, Disp: 7110 mL, Rfl: 1  •  simvastatin (ZOCOR) 10 mg tablet, Take 1 tablet (10 mg total) by mouth every evening, Disp: 90 tablet, Rfl: 1  •  tamsulosin (FLOMAX) 0 4 mg, Take 1 capsule (0 4 mg total) by mouth daily with dinner, Disp: 30 capsule, Rfl: 5

## 2022-12-09 DIAGNOSIS — G47.62 SLEEP RELATED LEG CRAMPS: ICD-10-CM

## 2022-12-09 RX ORDER — CYCLOBENZAPRINE HCL 10 MG
10 TABLET ORAL 2 TIMES DAILY
Qty: 60 TABLET | Refills: 5 | Status: SHIPPED | OUTPATIENT
Start: 2022-12-09

## 2022-12-29 ENCOUNTER — RA CDI HCC (OUTPATIENT)
Dept: OTHER | Facility: HOSPITAL | Age: 82
End: 2022-12-29

## 2022-12-29 NOTE — PROGRESS NOTES
[address]  coding opportunities     E11 22     Chart Reviewed number of suggestions sent to Provider: 1     Patients Insurance     Medicare Insurance: [address] [place] [id number]

## 2023-01-04 ENCOUNTER — OFFICE VISIT (OUTPATIENT)
Dept: INTERNAL MEDICINE CLINIC | Facility: CLINIC | Age: 83
End: 2023-01-04

## 2023-01-04 VITALS
RESPIRATION RATE: 16 BRPM | SYSTOLIC BLOOD PRESSURE: 138 MMHG | BODY MASS INDEX: 23.95 KG/M2 | TEMPERATURE: 98.7 F | HEART RATE: 66 BPM | OXYGEN SATURATION: 95 % | HEIGHT: 67 IN | DIASTOLIC BLOOD PRESSURE: 82 MMHG | WEIGHT: 152.6 LBS

## 2023-01-04 DIAGNOSIS — I49.5 SICK SINUS SYNDROME (HCC): ICD-10-CM

## 2023-01-04 DIAGNOSIS — R33.9 URINARY RETENTION: ICD-10-CM

## 2023-01-04 DIAGNOSIS — E11.22 TYPE 2 DIABETES MELLITUS WITH CHRONIC KIDNEY DISEASE, WITHOUT LONG-TERM CURRENT USE OF INSULIN, UNSPECIFIED CKD STAGE (HCC): Primary | ICD-10-CM

## 2023-01-04 DIAGNOSIS — D68.9 COAGULOPATHY (HCC): ICD-10-CM

## 2023-01-04 DIAGNOSIS — N18.4 CHRONIC KIDNEY DISEASE, STAGE 4 (SEVERE) (HCC): ICD-10-CM

## 2023-01-04 DIAGNOSIS — I10 ESSENTIAL HYPERTENSION: ICD-10-CM

## 2023-01-04 DIAGNOSIS — E11.51 TYPE 2 DIABETES MELLITUS WITH DIABETIC PERIPHERAL ANGIOPATHY WITHOUT GANGRENE, WITHOUT LONG-TERM CURRENT USE OF INSULIN (HCC): ICD-10-CM

## 2023-01-04 DIAGNOSIS — C79.89 SECONDARY MALIGNANT NEOPLASM OF PAROTID GLAND (HCC): ICD-10-CM

## 2023-01-04 LAB — SL AMB POCT HEMOGLOBIN AIC: 5.8 (ref ?–6.5)

## 2023-01-04 RX ORDER — AMLODIPINE BESYLATE 10 MG/1
10 TABLET ORAL DAILY
Qty: 90 TABLET | Refills: 1 | Status: SHIPPED | OUTPATIENT
Start: 2023-01-04

## 2023-01-04 RX ORDER — FINASTERIDE 5 MG/1
5 TABLET, FILM COATED ORAL DAILY
Qty: 90 TABLET | Refills: 1 | Status: SHIPPED | OUTPATIENT
Start: 2023-01-04

## 2023-01-04 NOTE — ASSESSMENT & PLAN NOTE
Reviewed radiation oncology note from 10/21    Reviewed the CT of the Chest, Abdomen and Pelvis  No change at this time and continue to follow

## 2023-01-04 NOTE — ASSESSMENT & PLAN NOTE
Lab Results   Component Value Date    EGFR 42 10/05/2021    EGFR 51 10/02/2021    EGFR 45 08/31/2021    CREATININE 1 52 (H) 10/05/2021    CREATININE 1 29 10/02/2021    CREATININE 1 43 (H) 08/31/2021   Follow up on renal function on labs

## 2023-01-04 NOTE — ASSESSMENT & PLAN NOTE
Lab Results   Component Value Date    HGBA1C 5 8 01/04/2023   A1c is good and no change    Diet controlled

## 2023-01-04 NOTE — PROGRESS NOTES
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[facility])     Reviewed radiation oncology note from 10/21  Reviewed the CT of the Chest, Abdomen and Pelvis  No change at this time and continue to follow            Endocrine    Type 2 diabetes mellitus with diabetic peripheral angiopathy without gangrene ([facility] )       Lab Results   Component Value Date    HGBA1C 5 8 01/04/2023   A1c is good and no change  Diet controlled         Relevant Orders    Comprehensive metabolic panel    Lipid Panel with Direct LDL reflex    CBC and differential    Type 2 diabetes mellitus with chronic kidney disease ([facility] ) - Primary    Relevant Orders    POCT hemoglobin A1c (Completed)    Microalbumin / creatinine urine ratio       Cardiovascular and Mediastinum    Essential hypertension     Continue to follow BP    Improved with recheck and no change         Relevant Medications    amLODIPine (NORVASC) 10 mg tablet    Other Relevant Orders    Microalbumin / creatinine urine ratio    Sick sinus syndrome (HCC)     Continue to follow the pacer polling without change         Relevant Medications    amLODIPine (NORVASC) 10 mg tablet       Genitourinary    Chronic kidney disease, stage 4 (severe) (HCC)     Lab Results   Component Value Date    EGFR 42 10/05/2021    EGFR 51 10/02/2021    EGFR 45 08/31/2021    CREATININE 1 52 (H) 10/05/2021    CREATININE 1 29 10/02/2021    CREATININE 1 43 (H) 08/31/2021   Follow up on renal function on labs        Other Visit Diagnoses     Urinary retention        Relevant Medications    finasteride (PROSCAR) 5 mg tablet    Coagulopathy (HCC)               Diagnoses and all orders for this visit:    Type 2 diabetes mellitus with chronic kidney disease, without long-term current use of insulin, unspecified CKD stage (HCC)  -     POCT hemoglobin A1c  -     Microalbumin / creatinine urine ratio    Essential hypertension  -     Microalbumin / creatinine urine ratio  -     amLODIPine (NORVASC) 10 mg tablet; Take 1 tablet (10 mg total) by mouth daily    Urinary retention  -     finasteride (PROSCAR) 5 mg tablet; Take 1 tablet (5 mg total) by mouth daily    Type 2 diabetes mellitus with diabetic peripheral angiopathy without gangrene, without long-term current use of insulin (HCC)  -     Comprehensive metabolic panel; Future  -     Lipid Panel with Direct LDL reflex; Future  -     CBC and differential; Future    Sick sinus syndrome (HCC)    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(HCC)    Chronic kidney disease, stage 4 (severe) (HCC)    Coagulopathy (HCC)      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[facility])  Reviewed radiation oncology note from 10/21  Reviewed the CT of the Chest, Abdomen and Pelvis  No change at this time and continue to follow    Type 2 diabetes mellitus with diabetic peripheral angiopathy without gangrene [facility])    Lab Results   Component Value Date    HGBA1C 5 8 01/04/2023   A1c is good and no change  Diet controlled    Sick sinus syndrome ([address] )  Continue to follow the pacer polling without change    Essential hypertension  Continue to follow BP  Improved with recheck and no change    Chronic kidney disease, stage 4 (severe) (HCC)  Lab Results   Component Value Date    EGFR 42 10/05/2021    EGFR 51 10/02/2021    EGFR 45 08/31/2021    CREATININE 1 52 (H) 10/05/2021    CREATININE 1 29 10/02/2021    CREATININE 1 43 (H) 08/31/2021   Follow up on renal function on labs        Subjective:      Patient ID: Souleymane Berkowitz is a 78 y o  male  The patient was noted to have had a parotid mass with excision and subsequent radiation  Weight stable and the patient notes no concerns at this time  Reviewed the ENT note as well as radiation oncology in 2021  CT Chest Abdomen and Pelvis negative 11/22        The following portions of the patient's history were reviewed and updated as appropriate:   He has a past medical history of Ambulates with cane, Anxiety state (8/5/2018), Basal cell carcinoma of skin (11/15/2016), Cancer ([place] [address] ), Chronic kidney disease, Chronic obstructive pulmonary disease ([place] [address] ) (9/5/2012), Constipation, COPD (chronic obstructive pulmonary disease) ([place] [address] ), Coronary artery disease (5/22/2012), Diabetes mellitus ([place] [address] ), Forearm laceration, right, initial encounter (5/28/2020), Hydronephrosis, Hypertension, Mass of right side of neck, Osteoporosis (5/22/2012), Pacemaker, SSS (sick sinus syndrome) ([place] [address] ), and Trifascicular block  ,  does not have any pertinent problems on file  ,   has a past surgical history that includes Cardiac pacemaker placement (11/2009); Cataract extraction w/  intraocular lens implant; Kidney stone surgery; Tonsillectomy; pr trurl electrosurg rescj prostate bleed complete (N/A, 05/09/2016); CYSTOSCOPY (N/A, 05/09/2016); Other surgical history; Inguinal hernia repair; Parotidectomy (Right, 10/01/2021); and Mohs surgery (Right, 08/31/2022)  ,  family history includes Coronary artery disease in his family; Stroke in his father  ,   reports that he quit smoking about 42 years ago  His smoking use included cigars and cigarettes  He started smoking about 75 years ago  He has a 48 00 pack-year smoking history  He has quit using smokeless tobacco  He reports that he does not currently use alcohol after a past usage of about 2 0 standard drinks per week  He reports that he does not use drugs  ,  has No Known Allergies     Current Outpatient Medications   Medication Sig Dispense Refill   • amLODIPine (NORVASC) 10 mg tablet Take 1 tablet (10 mg total) by mouth daily 90 tablet 1   • aspirin 81 mg chewable tablet Chew 81 mg daily     • atenolol (TENORMIN) 50 mg tablet Take 1 tablet (50 mg total) by mouth daily 90 tablet 1   • Blephamide 10-0 2 % ophthalmic suspension Administer 2 drops to both eyes daily at bedtime 10 mL 5   • calcium carbonate (TUMS) 500 mg chewable tablet Chew 1 tablet as needed for indigestion or heartburn  • Combigan 0 2-0 5 % INSTILL 1 DROP INTO RIGHT EYE EVERY 12 HOURS     • cyclobenzaprine (FLEXERIL) 10 mg tablet Take 1 tablet (10 mg total) by mouth 2 (two) times a day 60 tablet 5   • finasteride (PROSCAR) 5 mg tablet Take 1 tablet (5 mg total) by mouth daily 90 tablet 1   • glucose blood (ONE TOUCH ULTRA TEST) test strip Test sugar once daily 50 each 5   • lisinopril (ZESTRIL) 20 mg tablet Take 1 tablet (20 mg total) by mouth in the morning  90 tablet 1   • tamsulosin (FLOMAX) 0 4 mg Take 1 capsule (0 4 mg total) by mouth daily with dinner 30 capsule 5   • Nutritional Supplements (Boost) LIQD Take 237 mL by mouth daily 7110 mL 1   • simvastatin (ZOCOR) 10 mg tablet Take 1 tablet (10 mg total) by mouth every evening 90 tablet 1     No current facility-administered medications for this visit  Review of Systems   Constitutional: Negative for chills, fatigue and fever  HENT: Negative  Respiratory: Negative for cough, chest tightness and shortness of breath  Cardiovascular: Negative for chest pain and palpitations  Gastrointestinal: Negative for abdominal pain, constipation, diarrhea, nausea and vomiting  Genitourinary: Negative  Musculoskeletal: Negative for back pain and myalgias  Skin: Negative  Neurological: Negative  Psychiatric/Behavioral: Negative for dysphoric mood  The patient is not nervous/anxious  Objective:  Vitals:    01/04/23 1349 01/04/23 1437   BP: 166/84 138/82   Pulse: 66    Resp: 16    Temp: 98 7 °F (37 1 °C)    SpO2: 95%    Weight: 69 2 kg (152 lb 9 6 oz)    Height: 5' 7" (1 702 m)      Body mass index is 23 9 kg/m²  Physical Exam  Vitals and nursing note reviewed  Constitutional:       Appearance: He is well-developed  HENT:      Head: Normocephalic and atraumatic  Eyes:      Pupils: Pupils are equal, round, and reactive to light  Cardiovascular:      Rate and Rhythm: Normal rate and regular rhythm        Heart sounds: Normal heart sounds  No murmur heard  Pulmonary:      Effort: Pulmonary effort is normal       Breath sounds: Normal breath sounds  No stridor  No rales  Abdominal:      General: Bowel sounds are normal  There is no distension  Palpations: Abdomen is soft  Tenderness: There is no abdominal tenderness  Musculoskeletal:         General: No deformity  Normal range of motion  Cervical back: Normal range of motion and neck supple  Skin:     General: Skin is warm and dry  Neurological:      Mental Status: He is alert and oriented to person, place, and time            PHQ-2/9 Depression Screening

## 2023-02-13 ENCOUNTER — LAB (OUTPATIENT)
Dept: LAB | Facility: CLINIC | Age: 83
End: 2023-02-13

## 2023-02-13 DIAGNOSIS — E11.51 TYPE 2 DIABETES MELLITUS WITH DIABETIC PERIPHERAL ANGIOPATHY WITHOUT GANGRENE, WITHOUT LONG-TERM CURRENT USE OF INSULIN (HCC): ICD-10-CM

## 2023-02-13 LAB
ALBUMIN SERPL BCP-MCNC: 4.2 G/DL (ref 3.5–5)
ALP SERPL-CCNC: 49 U/L (ref 46–116)
ALT SERPL W P-5'-P-CCNC: 24 U/L (ref 12–78)
ANION GAP SERPL CALCULATED.3IONS-SCNC: 6 MMOL/L (ref 4–13)
AST SERPL W P-5'-P-CCNC: 21 U/L (ref 5–45)
BASOPHILS # BLD AUTO: 0.02 THOUSANDS/ÂΜL (ref 0–0.1)
BASOPHILS NFR BLD AUTO: 0 % (ref 0–1)
BILIRUB SERPL-MCNC: 1.37 MG/DL (ref 0.2–1)
BUN SERPL-MCNC: 31 MG/DL (ref 5–25)
CALCIUM SERPL-MCNC: 9.6 MG/DL (ref 8.3–10.1)
CHLORIDE SERPL-SCNC: 104 MMOL/L (ref 96–108)
CHOLEST SERPL-MCNC: 134 MG/DL
CO2 SERPL-SCNC: 27 MMOL/L (ref 21–32)
CREAT SERPL-MCNC: 1.63 MG/DL (ref 0.6–1.3)
EOSINOPHIL # BLD AUTO: 0.2 THOUSAND/ÂΜL (ref 0–0.61)
EOSINOPHIL NFR BLD AUTO: 3 % (ref 0–6)
ERYTHROCYTE [DISTWIDTH] IN BLOOD BY AUTOMATED COUNT: 12.6 % (ref 11.6–15.1)
GFR SERPL CREATININE-BSD FRML MDRD: 38 ML/MIN/1.73SQ M
GLUCOSE P FAST SERPL-MCNC: 133 MG/DL (ref 65–99)
HCT VFR BLD AUTO: 46 % (ref 36.5–49.3)
HDLC SERPL-MCNC: 51 MG/DL
HGB BLD-MCNC: 15.3 G/DL (ref 12–17)
IMM GRANULOCYTES # BLD AUTO: 0.01 THOUSAND/UL (ref 0–0.2)
IMM GRANULOCYTES NFR BLD AUTO: 0 % (ref 0–2)
LDLC SERPL CALC-MCNC: 56 MG/DL (ref 0–100)
LYMPHOCYTES # BLD AUTO: 1.02 THOUSANDS/ÂΜL (ref 0.6–4.47)
LYMPHOCYTES NFR BLD AUTO: 16 % (ref 14–44)
MCH RBC QN AUTO: 33.9 PG (ref 26.8–34.3)
MCHC RBC AUTO-ENTMCNC: 33.3 G/DL (ref 31.4–37.4)
MCV RBC AUTO: 102 FL (ref 82–98)
MONOCYTES # BLD AUTO: 0.47 THOUSAND/ÂΜL (ref 0.17–1.22)
MONOCYTES NFR BLD AUTO: 8 % (ref 4–12)
NEUTROPHILS # BLD AUTO: 4.5 THOUSANDS/ÂΜL (ref 1.85–7.62)
NEUTS SEG NFR BLD AUTO: 73 % (ref 43–75)
NRBC BLD AUTO-RTO: 0 /100 WBCS
PLATELET # BLD AUTO: 221 THOUSANDS/UL (ref 149–390)
PMV BLD AUTO: 11.3 FL (ref 8.9–12.7)
POTASSIUM SERPL-SCNC: 4.2 MMOL/L (ref 3.5–5.3)
PROT SERPL-MCNC: 7.1 G/DL (ref 6.4–8.4)
RBC # BLD AUTO: 4.51 MILLION/UL (ref 3.88–5.62)
SODIUM SERPL-SCNC: 137 MMOL/L (ref 135–147)
TRIGL SERPL-MCNC: 134 MG/DL
WBC # BLD AUTO: 6.22 THOUSAND/UL (ref 4.31–10.16)

## 2023-02-14 LAB
CREAT UR-MCNC: 51.5 MG/DL
MICROALBUMIN UR-MCNC: 150 MG/L (ref 0–20)
MICROALBUMIN/CREAT 24H UR: 291 MG/G CREATININE (ref 0–30)

## 2023-02-17 DIAGNOSIS — I10 ESSENTIAL HYPERTENSION: ICD-10-CM

## 2023-02-17 RX ORDER — LISINOPRIL 20 MG/1
20 TABLET ORAL DAILY
Qty: 90 TABLET | Refills: 1 | Status: SHIPPED | OUTPATIENT
Start: 2023-02-17

## 2023-02-20 ENCOUNTER — IN-CLINIC DEVICE VISIT (OUTPATIENT)
Dept: CARDIOLOGY CLINIC | Facility: CLINIC | Age: 83
End: 2023-02-20

## 2023-02-20 DIAGNOSIS — I49.5 SICK SINUS SYNDROME (HCC): Primary | ICD-10-CM

## 2023-02-20 DIAGNOSIS — Z45.010 ENCOUNTER FOR CHECKING AND TESTING OF CARDIAC PACEMAKER PULSE GENERATOR (BATTERY): ICD-10-CM

## 2023-02-22 NOTE — PROGRESS NOTES
Device check in person pacer  Battery SUKHDEV set up for tiffanie for change out          Current Outpatient Medications:   •  amLODIPine (NORVASC) 10 mg tablet, Take 1 tablet (10 mg total) by mouth daily, Disp: 90 tablet, Rfl: 1  •  aspirin 81 mg chewable tablet, Chew 81 mg daily, Disp: , Rfl:   •  atenolol (TENORMIN) 50 mg tablet, Take 1 tablet (50 mg total) by mouth daily, Disp: 90 tablet, Rfl: 1  •  Blephamide 10-0 2 % ophthalmic suspension, Administer 2 drops to both eyes daily at bedtime, Disp: 10 mL, Rfl: 5  •  calcium carbonate (TUMS) 500 mg chewable tablet, Chew 1 tablet as needed for indigestion or heartburn , Disp: , Rfl:   •  Combigan 0 2-0 5 %, INSTILL 1 DROP INTO RIGHT EYE EVERY 12 HOURS, Disp: , Rfl:   •  cyclobenzaprine (FLEXERIL) 10 mg tablet, Take 1 tablet (10 mg total) by mouth 2 (two) times a day, Disp: 60 tablet, Rfl: 5  •  finasteride (PROSCAR) 5 mg tablet, Take 1 tablet (5 mg total) by mouth daily, Disp: 90 tablet, Rfl: 1  •  glucose blood (ONE TOUCH ULTRA TEST) test strip, Test sugar once daily, Disp: 50 each, Rfl: 5  •  lisinopril (ZESTRIL) 20 mg tablet, Take 1 tablet (20 mg total) by mouth daily, Disp: 90 tablet, Rfl: 1  •  Nutritional Supplements (Boost) LIQD, Take 237 mL by mouth daily, Disp: 7110 mL, Rfl: 1  •  simvastatin (ZOCOR) 10 mg tablet, Take 1 tablet (10 mg total) by mouth every evening, Disp: 90 tablet, Rfl: 1  •  tamsulosin (FLOMAX) 0 4 mg, Take 1 capsule (0 4 mg total) by mouth daily with dinner, Disp: 30 capsule, Rfl: 5

## 2023-03-15 ENCOUNTER — HOSPITAL ENCOUNTER (EMERGENCY)
Facility: HOSPITAL | Age: 83
Discharge: HOME | End: 2023-03-15
Attending: FAMILY MEDICINE

## 2023-03-15 VITALS
HEART RATE: 65 BPM | SYSTOLIC BLOOD PRESSURE: 133 MMHG | OXYGEN SATURATION: 93 % | DIASTOLIC BLOOD PRESSURE: 75 MMHG | TEMPERATURE: 98.1 F | RESPIRATION RATE: 16 BRPM

## 2023-03-15 DIAGNOSIS — S41.112A LACERATION OF LEFT UPPER EXTREMITY, INITIAL ENCOUNTER: Primary | ICD-10-CM

## 2023-03-15 RX ORDER — GINSENG 100 MG
1 CAPSULE ORAL ONCE
Status: COMPLETED | OUTPATIENT
Start: 2023-03-15 | End: 2023-03-15

## 2023-03-15 RX ADMIN — BACITRACIN ZINC 1 SMALL APPLICATION: 500 OINTMENT TOPICAL at 09:05

## 2023-03-15 NOTE — ED NOTES
Bacitracin, non-adherent, and Helene applied to skin tear on left forearm       Neal Pepe, [facility]  03/15/23 5837

## 2023-03-15 NOTE — ED PROVIDER NOTES
History  Chief Complaint   Patient presents with   • Laceration     Skin tear noted on left forearm from 2 days ago  Patient reports the storm door fell on his arm      HPI  Is a 78-year-old male presenting with complaint of left arm abrasion  Patient stated that she sustained a skin tear on the left forearm 2 days ago  States that he was talking to his but he recommended to come to the ED  Patient states that he has been cleaning his wound and applying Neosporin on patient also states that he has dressing  Up-to-date with his tetanus  Prior to Admission Medications   Prescriptions Last Dose Informant Patient Reported? Taking? Blephamide 10-0 2 % ophthalmic suspension   No No   Sig: Administer 2 drops to both eyes daily at bedtime   Combigan 0 2-0 5 %   Yes No   Sig: INSTILL 1 DROP INTO RIGHT EYE EVERY 12 HOURS   Nutritional Supplements (Boost) LIQD   No No   Sig: Take 237 mL by mouth daily   amLODIPine (NORVASC) 10 mg tablet   No No   Sig: Take 1 tablet (10 mg total) by mouth daily   aspirin 81 mg chewable tablet   Yes No   Sig: Chew 81 mg daily   atenolol (TENORMIN) 50 mg tablet   No No   Sig: Take 1 tablet (50 mg total) by mouth daily   calcium carbonate (TUMS) 500 mg chewable tablet   Yes No   Sig: Chew 1 tablet as needed for indigestion or heartburn     cyclobenzaprine (FLEXERIL) 10 mg tablet   No No   Sig: Take 1 tablet (10 mg total) by mouth 2 (two) times a day   finasteride (PROSCAR) 5 mg tablet   No No   Sig: Take 1 tablet (5 mg total) by mouth daily   glucose blood (ONE TOUCH ULTRA TEST) test strip   No No   Sig: Test sugar once daily   lisinopril (ZESTRIL) 20 mg tablet   No No   Sig: Take 1 tablet (20 mg total) by mouth daily   simvastatin (ZOCOR) 10 mg tablet   No No   Sig: Take 1 tablet (10 mg total) by mouth every evening   tamsulosin (FLOMAX) 0 4 mg   No No   Sig: Take 1 capsule (0 4 mg total) by mouth daily with dinner      Facility-Administered Medications: None       Past Medical History: Diagnosis Date   • Ambulates with cane     unsure "why" falls at times     last fall 09/27/2021   • Anxiety state 8/5/2018   • Basal cell carcinoma of skin 11/15/2016   • Cancer ([facility] )     BASAL CA SKIN   • Chronic kidney disease     ACUTE KIDNEY INJURY 3/21   • Chronic obstructive pulmonary disease ([facility] ) 9/5/2012   • Constipation    • COPD (chronic obstructive pulmonary disease) ([facility])    • Coronary artery disease 5/22/2012   • Diabetes mellitus ([place] [address] )     TYPE 2   • Forearm laceration, right, initial encounter 5/28/2020   • Hydronephrosis     LAST ASSESSED: 11/18/15   • Hypertension    • Mass of right side of neck     radical neck today 10/1/2021   • Osteoporosis 5/22/2012   • Pacemaker    • SSS (sick sinus syndrome) ([place] [address] )     BRADYCARDIA   • Trifascicular block        Past Surgical History:   Procedure Laterality Date   • CARDIAC PACEMAKER PLACEMENT  11/2009    Medtronic dual chamber    • CATARACT EXTRACTION W/  INTRAOCULAR LENS IMPLANT     • CYSTOSCOPY N/A 05/09/2016    Procedure: CYSTOSCOPY, evacuation of bladder calculi;  Surgeon: Venita Hinojosa MD;  Location: [facility];  Service:    • INGUINAL HERNIA REPAIR      UNILATERAL   • KIDNEY STONE SURGERY     • MOHS SURGERY Right 08/31/2022    removal of right ear BCCA with free graft nd reconstruction - Dr Tawny Lewis and Dr Jennifer Jalloh   • [address] AS PER ALLSCRIPTS (ALREADY IN PERTINENT NEGATIVES)   • PAROTIDECTOMY Right 10/01/2021    Procedure: TOTAL PAROTIDECTOMY WITH NIMS; MODIFIED RADICAL NECK DISSECTION;  Surgeon: Arti Briceno DO;  Location: [facility];  Service: ENT   • [facility] TRURL ELECTROSURG RESCJ PROSTATE BLEED COMPLETE N/A 05/09/2016    Procedure: TRANSURETHRAL RESECTION OF PROSTATE (TURP);   Surgeon: Venita Hinojosa MD;  Location: [facility];  Service: Urology   • TONSILLECTOMY         Family History   Problem Relation Age of Onset   • Stroke Father         SYNDROME   • Coronary artery disease Family         less than 61years of age     I have reviewed and agree with the history as documented  E-Cigarette/Vaping   • E-Cigarette Use Never User      E-Cigarette/Vaping Substances   • Nicotine No    • THC No    • CBD No    • Flavoring No    • Other No    • Unknown No      Social History     Tobacco Use   • Smoking status: Former     Packs/day: 1 50     Years: 32 00     Pack years: 48 00     Types: Cigars, Cigarettes     Start date: 54     Quit date: 1980     Years since quittin 9   • Smokeless tobacco: Former   Vaping Use   • Vaping Use: Never used   Substance Use Topics   • Alcohol use: Not Currently     Alcohol/week: 2 0 standard drinks     Types: 2 Cans of beer per week     Comment: 2  beers daily and shot by hx/No ETOH x 3wks  • Drug use: No       Review of Systems   Constitutional: Negative  HENT: Negative  Eyes: Negative  Respiratory: Negative  Cardiovascular: Negative  Gastrointestinal: Negative  Genitourinary: Negative  Musculoskeletal: Negative  Skin: Positive for wound  Neurological: Negative  Psychiatric/Behavioral: Negative  Physical Exam  Physical Exam  Vitals and nursing note reviewed  Constitutional:       Appearance: Normal appearance  HENT:      Head: Normocephalic and atraumatic  Right Ear: External ear normal       Left Ear: External ear normal       Nose: Nose normal    Eyes:      Extraocular Movements: Extraocular movements intact  Conjunctiva/sclera: Conjunctivae normal       Pupils: Pupils are equal, round, and reactive to light  Cardiovascular:      Rate and Rhythm: Normal rate and regular rhythm  Pulmonary:      Effort: Pulmonary effort is normal       Breath sounds: Normal breath sounds  Musculoskeletal:         General: Normal range of motion  Skin:     Findings: Abrasion present  Neurological:      General: No focal deficit present  Mental Status: He is alert and oriented to person, place, and time     Psychiatric: Mood and Affect: Mood normal          Vital Signs  ED Triage Vitals [03/15/23 0849]   Temperature Pulse Respirations Blood Pressure SpO2   98 1 °F (36 7 °C) 65 16 133/75 93 %      Temp Source Heart Rate Source Patient Position - Orthostatic VS BP Location FiO2 (%)   Temporal Monitor Sitting Right arm --      Pain Score       1           Vitals:    03/15/23 0849   BP: 133/75   Pulse: 65   Patient Position - Orthostatic VS: Sitting         Visual Acuity      ED Medications  Medications   tetanus-diphtheria-acellular pertussis (BOOSTRIX) IM injection 0 5 mL (0 5 mL Intramuscular Not Given 3/15/23 0909)   bacitracin topical ointment 1 small application (1 small application Topical Given 3/15/23 0905)       Diagnostic Studies  Results Reviewed     None                 No orders to display              Procedures  Procedures         ED Course                               SBIRT 20yo+    Flowsheet Row Most Recent Value   SBIRT (25 yo +)    In order to provide better care to our patients, we are screening all of our patients for alcohol and drug use  Would it be okay to ask you these screening questions? Yes Filed at: 03/15/2023 0120   Initial Alcohol Screen: US AUDIT-C     1  How often do you have a drink containing alcohol? 0 Filed at: 03/15/2023 0853   2  How many drinks containing alcohol do you have on a typical day you are drinking? 0 Filed at: 03/15/2023 0853   3a  Male UNDER 65: How often do you have five or more drinks on one occasion? 0 Filed at: 03/15/2023 0853   3b  FEMALE Any Age, or MALE 65+: How often do you have 4 or more drinks on one occassion? 0 Filed at: 03/15/2023 0853   Audit-C Score 0 Filed at: 03/15/2023 0146   CHRISTIN: How many times in the past year have you    Used an illegal drug or used a prescription medication for non-medical reasons? Never Filed at: 03/15/2023 6941                    Medical Decision Making  78-year-old male presented to ED with left forearm abrasion    Differential diagnoses included laceration abscess cellulitis low suspicion for any of the above  On exam appears to be clean abrasion no signs of infection noted  Does not require antibiotics at this time wound is cleaned dressing applied with the bacitracin recommending continue with wound care at home  Precaution provided regarding return  Risk  OTC drugs  Prescription drug management  Disposition  Final diagnoses:   Laceration of left upper extremity, initial encounter     Time reflects when diagnosis was documented in both MDM as applicable and the Disposition within this note     Time User Action Codes Description Comment    3/15/2023  9:22 AM Roderick Beaulieu Add [[id number]] Laceration of left upper extremity, initial encounter       ED Disposition     ED Disposition   Discharge    Condition   Stable    Date/Time   Wed Mar 15, 2023  9:22 AM    Comment   Agueda Dennison discharge to home/self care                 Follow-up Information     Follow up With Specialties Details Why [address], DO Internal Medicine Schedule an appointment as soon as possible for a visit in 2 days If symptoms worsen [place]  [address]  [phone]            Discharge Medication List as of 3/15/2023  9:22 AM      CONTINUE these medications which have NOT CHANGED    Details   amLODIPine (NORVASC) 10 mg tablet Take 1 tablet (10 mg total) by mouth daily, Starting Wed 1/4/2023, Normal      aspirin 81 mg chewable tablet Chew 81 mg daily, Historical Med      atenolol (TENORMIN) 50 mg tablet Take 1 tablet (50 mg total) by mouth daily, Starting Fri 11/4/2022, Normal      Blephamide 10-0 2 % ophthalmic suspension Administer 2 drops to both eyes daily at bedtime, Starting Wed 8/26/2020, Normal      calcium carbonate (TUMS) 500 mg chewable tablet Chew 1 tablet as needed for indigestion or heartburn , Historical Med      Combigan 0 2-0 5 % INSTILL 1 DROP INTO RIGHT EYE EVERY 12 HOURS, Historical Med cyclobenzaprine (FLEXERIL) 10 mg tablet Take 1 tablet (10 mg total) by mouth 2 (two) times a day, Starting Fri 12/9/2022, Normal      finasteride (PROSCAR) 5 mg tablet Take 1 tablet (5 mg total) by mouth daily, Starting Wed 1/4/2023, Normal      glucose blood (ONE TOUCH ULTRA TEST) test strip Test sugar once daily, Normal      lisinopril (ZESTRIL) 20 mg tablet Take 1 tablet (20 mg total) by mouth daily, Starting Fri 2/17/2023, Normal      Nutritional Supplements (Boost) LIQD Take 237 mL by mouth daily, Starting Tue 10/12/2021, Until Wed 8/31/2022, Normal      simvastatin (ZOCOR) 10 mg tablet Take 1 tablet (10 mg total) by mouth every evening, Starting Tue 10/18/2022, Normal      tamsulosin (FLOMAX) 0 4 mg Take 1 capsule (0 4 mg total) by mouth daily with dinner, Starting Tue 10/18/2022, Normal             No discharge procedures on file      PDMP Review       Value Time User    PDMP Reviewed  Yes 8/31/2022  5:56 PM Armen Sahu DO          ED Provider  Electronically Signed by           Braden Ochoa MD  03/15/23 0269

## 2023-03-17 ENCOUNTER — RA CDI HCC (OUTPATIENT)
Dept: OTHER | Facility: HOSPITAL | Age: 83
End: 2023-03-17

## 2023-03-17 NOTE — PROGRESS NOTES
[facility]  coding opportunities        E11 22, E11 59 and E11 22  Chart Reviewed number of suggestions sent to Provider: 3     Patients Insurance     Medicare Insurance: [address] [place] [address]

## 2023-03-22 ENCOUNTER — OFFICE VISIT (OUTPATIENT)
Dept: INTERNAL MEDICINE CLINIC | Facility: CLINIC | Age: 83
End: 2023-03-22

## 2023-03-22 ENCOUNTER — CONSULT (OUTPATIENT)
Dept: CARDIOLOGY CLINIC | Facility: CLINIC | Age: 83
End: 2023-03-22

## 2023-03-22 ENCOUNTER — PREP FOR PROCEDURE (OUTPATIENT)
Dept: CARDIOLOGY CLINIC | Facility: CLINIC | Age: 83
End: 2023-03-22

## 2023-03-22 VITALS
BODY MASS INDEX: 25.21 KG/M2 | RESPIRATION RATE: 16 BRPM | OXYGEN SATURATION: 94 % | WEIGHT: 160.6 LBS | DIASTOLIC BLOOD PRESSURE: 54 MMHG | SYSTOLIC BLOOD PRESSURE: 116 MMHG | TEMPERATURE: 96.8 F | HEART RATE: 66 BPM | HEIGHT: 67 IN

## 2023-03-22 VITALS
DIASTOLIC BLOOD PRESSURE: 80 MMHG | HEART RATE: 65 BPM | WEIGHT: 160 LBS | HEIGHT: 67 IN | BODY MASS INDEX: 25.11 KG/M2 | SYSTOLIC BLOOD PRESSURE: 128 MMHG

## 2023-03-22 DIAGNOSIS — Z45.010 PACER AT END OF BATTERY LIFE: Primary | ICD-10-CM

## 2023-03-22 DIAGNOSIS — S51.012A SKIN TEAR OF LEFT ELBOW WITHOUT COMPLICATION, INITIAL ENCOUNTER: Primary | ICD-10-CM

## 2023-03-22 DIAGNOSIS — I49.5 SICK SINUS SYNDROME (HCC): ICD-10-CM

## 2023-03-22 DIAGNOSIS — R00.1 BRADYCARDIA: ICD-10-CM

## 2023-03-22 DIAGNOSIS — H10.31 ACUTE BACTERIAL CONJUNCTIVITIS OF RIGHT EYE: ICD-10-CM

## 2023-03-22 DIAGNOSIS — I25.10 CORONARY ARTERY DISEASE INVOLVING NATIVE CORONARY ARTERY OF NATIVE HEART WITHOUT ANGINA PECTORIS: Primary | ICD-10-CM

## 2023-03-22 DIAGNOSIS — I10 ESSENTIAL HYPERTENSION: ICD-10-CM

## 2023-03-22 DIAGNOSIS — G47.62 SLEEP RELATED LEG CRAMPS: ICD-10-CM

## 2023-03-22 DIAGNOSIS — Z45.010 PACER AT END OF BATTERY LIFE: ICD-10-CM

## 2023-03-22 DIAGNOSIS — E11.59 TYPE 2 DIABETES MELLITUS WITH OTHER CIRCULATORY COMPLICATION, WITHOUT LONG-TERM CURRENT USE OF INSULIN (HCC): ICD-10-CM

## 2023-03-22 RX ORDER — SULFACETAMIDE SODIUM AND PREDNISOLONE ACETATE 100; 2 MG/ML; MG/ML
2 SUSPENSION/ DROPS OPHTHALMIC
Qty: 10 ML | Refills: 5 | Status: SHIPPED | OUTPATIENT
Start: 2023-03-22 | End: 2023-03-27

## 2023-03-22 RX ORDER — CEFAZOLIN SODIUM 2 G/50ML
2000 SOLUTION INTRAVENOUS ONCE
Status: CANCELLED | OUTPATIENT
Start: 2023-03-24 | End: 2023-03-22

## 2023-03-22 NOTE — ASSESSMENT & PLAN NOTE
Device end-of-life parameters  Change is required and was reviewed with patient  Patient consents to proceed

## 2023-03-22 NOTE — PROGRESS NOTES
Patient ID: Souleymane Berkowitz is a 78 y o  male  Plan:      Coronary artery disease involving native coronary artery of native heart without angina pectoris  Presumed present based on myoview but no current symptoms  Sick sinus syndrome (HCC)  Seem comments regarding device  Pacer at end of battery life  Device end-of-life parameters  Change is required and was reviewed with patient  Patient consents to proceed  Follow up Plan/Other summary comments: Follow-up after device change  HPI: Patient is seen in follow-up today  His device has triggered end-of-life parameters and he is VVI pacing  No shortness of breath  No chest pain  No syncope or near syncope  Patient had DDD MDT placed 11/6/2009  Most recent or relevant cardiac/vascular testing:    EKG today: VVI pacing with underlying complete heart block  Medtronic DDD: 11/6/2009  Echo 4/25/2019: Normal LV function  Mild hypokinesia of the  apical inferior wall  Myoview: 7/10/2018: Normal LV function  Small zone of inferoapical ischemia        Past Surgical History:   Procedure Laterality Date   • CARDIAC PACEMAKER PLACEMENT  11/2009    Medtronic dual chamber    • CATARACT EXTRACTION W/  INTRAOCULAR LENS IMPLANT     • CYSTOSCOPY N/A 05/09/2016    Procedure: CYSTOSCOPY, evacuation of bladder calculi;  Surgeon: Marlys Rg MD;  [facility];  Service:    • INGUINAL HERNIA REPAIR      UNILATERAL   • KIDNEY STONE SURGERY     • MOHS SURGERY Right 08/31/2022    removal of right ear BCCA with free graft nd reconstruction - Dr Tammy Henderson and   [facility]   • [address] AS PER ALLSCRIPTS (ALREADY IN PERTINENT NEGATIVES)   • PAROTIDECTOMY Right 10/01/2021    Procedure: TOTAL PAROTIDECTOMY WITH NIMS; MODIFIED RADICAL NECK DISSECTION;  Surgeon: Pepper Garcia DO;  [facility];  Service: ENT   • [place] TRURL ELECTROSURG [address] [place] [address] N/A 05/09/2016    Procedure: TRANSURETHRAL RESECTION OF PROSTATE (TURP); Surgeon: Pretty Vance MD;  [facility];  Service: Urology   • TONSILLECTOMY         Lipid Profile:   Lab Results   Component Value Date    CHOL 142 08/24/2015    TRIG 134 02/13/2023    TRIG 182 08/24/2015    HDL 51 02/13/2023    HDL 41 08/24/2015         Review of Systems   10  point ROS  was otherwise non pertinent or negative except as per HPI or as below  Gait: Normal         Objective:     /80   Pulse 65   Ht 5' 7" (1 702 m)   Wt 72 6 kg (160 lb)   BMI 25 06 kg/m²     PHYSICAL EXAM:    General:  Normal appearance in no distress  Eyes:  Anicteric  Oral mucosa:  Moist   Neck:  No JVD  Carotid upstrokes are brisk without bruits  No masses  Chest:  Clear to auscultation  Left subclavian pacemaker  Cardiac:  No palpable PMI  Normal S1 and S2  No murmur gallop or rub  Abdomen:  Soft and nontender  No palpable organomegaly or aortic enlargement  Extremities:  No peripheral edema  Musculoskeletal:  Symmetric  Vascular:  Femoral pulses are brisk without bruits  Popliteal pulses are intact bilaterally  Pedal pulses are intact  Neuro:  Grossly symmetric  Psych:  Alert and oriented x3          Current Outpatient Medications:   •  amLODIPine (NORVASC) 10 mg tablet, Take 1 tablet (10 mg total) by mouth daily, Disp: 90 tablet, Rfl: 1  •  aspirin 81 mg chewable tablet, Chew 81 mg daily, Disp: , Rfl:   •  Blephamide 10-0 2 % ophthalmic suspension, Administer 2 drops to both eyes daily at bedtime, Disp: 10 mL, Rfl: 5  •  calcium carbonate (TUMS) 500 mg chewable tablet, Chew 1 tablet as needed for indigestion or heartburn , Disp: , Rfl:   •  Combigan 0 2-0 5 %, INSTILL 1 DROP INTO RIGHT EYE EVERY 12 HOURS, Disp: , Rfl:   •  cyclobenzaprine (FLEXERIL) 10 mg tablet, Take 1 tablet (10 mg total) by mouth 2 (two) times a day, Disp: 60 tablet, Rfl: 5  •  finasteride (PROSCAR) 5 mg tablet, Take 1 tablet (5 mg total) by mouth daily, Disp: 90 tablet, Rfl: 1  •  glucose blood (ONE TOUCH ULTRA TEST) test strip, Test sugar once daily, Disp: 50 each, Rfl: 5  •  lisinopril (ZESTRIL) 20 mg tablet, Take 1 tablet (20 mg total) by mouth daily, Disp: 90 tablet, Rfl: 1  •  Nutritional Supplements (Boost) LIQD, Take 237 mL by mouth daily, Disp: 7110 mL, Rfl: 1  •  simvastatin (ZOCOR) 10 mg tablet, Take 1 tablet (10 mg total) by mouth every evening, Disp: 90 tablet, Rfl: 1  •  tamsulosin (FLOMAX) 0 4 mg, Take 1 capsule (0 4 mg total) by mouth daily with dinner, Disp: 30 capsule, Rfl: 5  No Known Allergies  Past Medical History:   Diagnosis Date   • Ambulates with cane     unsure "why" falls at times     last fall 2021   • Anxiety state 2018   • Basal cell carcinoma of skin 11/15/2016   • Cancer ([facility] )     BASAL CA SKIN   • Chronic kidney disease     ACUTE KIDNEY INJURY 3/21   • Chronic obstructive pulmonary disease ([facility] ) 2012   • Constipation    • COPD (chronic obstructive pulmonary disease) ([facility] )    • Coronary artery disease 2012   • Diabetes mellitus ([facility] )     TYPE 2   • Forearm laceration, right, initial encounter 2020   • Hydronephrosis     LAST ASSESSED: 11/18/15   • Hypertension    • Mass of right side of neck     radical neck today 10/1/2021   • Osteoporosis 2012   • Pacemaker    • SSS (sick sinus syndrome) ([facility])     BRADYCARDIA   • Trifascicular block            Social History     Tobacco Use   Smoking Status Former   • Packs/day: 1 50   • Years: 32 00   • Pack years: 48 00   • Types: Cigars, Cigarettes   • Start date: 54   • Quit date: 1980   • Years since quittin 11   Smokeless Tobacco Former

## 2023-03-22 NOTE — ASSESSMENT & PLAN NOTE
Lab Results   Component Value Date    HGBA1C 5 8 01/04/2023   DM doing well and no changes at this time  Notes no hypoglycemia

## 2023-03-22 NOTE — H&P (VIEW-ONLY)
Patient ID: Leyla Aparicio is a 78 y o  male  Plan:      Coronary artery disease involving native coronary artery of native heart without angina pectoris  Presumed present based on myoview but no current symptoms  Sick sinus syndrome (HCC)  Seem comments regarding device  Pacer at end of battery life  Device end-of-life parameters  Change is required and was reviewed with patient  Patient consents to proceed  Follow up Plan/Other summary comments: Follow-up after device change  HPI: Patient is seen in follow-up today  His device has triggered end-of-life parameters and he is VVI pacing  No shortness of breath  No chest pain  No syncope or near syncope  Patient had DDD MDT placed 11/6/2009  Most recent or relevant cardiac/vascular testing:    EKG today: VVI pacing with underlying complete heart block  Medtronic DDD: 11/6/2009  Echo 4/25/2019: Normal LV function  Mild hypokinesia of the  apical inferior wall  Myoview: 7/10/2018: Normal LV function  Small zone of inferoapical ischemia        Past Surgical History:   Procedure Laterality Date   • CARDIAC PACEMAKER PLACEMENT  11/2009    Medtronic dual chamber    • CATARACT EXTRACTION W/  INTRAOCULAR LENS IMPLANT     • CYSTOSCOPY N/A 05/09/2016    Procedure: CYSTOSCOPY, evacuation of bladder calculi;  Surgeon: Chauncey Colon MD;  [facility];  Service:    • INGUINAL HERNIA REPAIR      UNILATERAL   • KIDNEY STONE SURGERY     • MOHS SURGERY Right 08/31/2022    removal of right ear BCCA with free graft nd reconstruction - Dr Kelley Braxton and Dr Monie Silverman   • [address] AS PER ALLSCRIPTS (ALREADY IN PERTINENT NEGATIVES)   • PAROTIDECTOMY Right 10/01/2021    Procedure: TOTAL PAROTIDECTOMY WITH NIMS; MODIFIED RADICAL NECK DISSECTION;  Surgeon: Eloina Gutierrez DO;  [facility];  Service: ENT   • [place] TROSMIN ELECTROSURG [address] [place] [address] N/A 05/09/2016    Procedure: TRANSURETHRAL RESECTION OF PROSTATE (TURP); Surgeon: Tracey Sweet MD;  [facility];  Service: Urology   • TONSILLECTOMY         Lipid Profile:   Lab Results   Component Value Date    CHOL 142 08/24/2015    TRIG 134 02/13/2023    TRIG 182 08/24/2015    HDL 51 02/13/2023    HDL 41 08/24/2015         Review of Systems   10  point ROS  was otherwise non pertinent or negative except as per HPI or as below  Gait: Normal         Objective:     /80   Pulse 65   Ht 5' 7" (1 702 m)   Wt 72 6 kg (160 lb)   BMI 25 06 kg/m²     PHYSICAL EXAM:    General:  Normal appearance in no distress  Eyes:  Anicteric  Oral mucosa:  Moist   Neck:  No JVD  Carotid upstrokes are brisk without bruits  No masses  Chest:  Clear to auscultation  Left subclavian pacemaker  Cardiac:  No palpable PMI  Normal S1 and S2  No murmur gallop or rub  Abdomen:  Soft and nontender  No palpable organomegaly or aortic enlargement  Extremities:  No peripheral edema  Musculoskeletal:  Symmetric  Vascular:  Femoral pulses are brisk without bruits  Popliteal pulses are intact bilaterally  Pedal pulses are intact  Neuro:  Grossly symmetric  Psych:  Alert and oriented x3          Current Outpatient Medications:   •  amLODIPine (NORVASC) 10 mg tablet, Take 1 tablet (10 mg total) by mouth daily, Disp: 90 tablet, Rfl: 1  •  aspirin 81 mg chewable tablet, Chew 81 mg daily, Disp: , Rfl:   •  Blephamide 10-0 2 % ophthalmic suspension, Administer 2 drops to both eyes daily at bedtime, Disp: 10 mL, Rfl: 5  •  calcium carbonate (TUMS) 500 mg chewable tablet, Chew 1 tablet as needed for indigestion or heartburn , Disp: , Rfl:   •  Combigan 0 2-0 5 %, INSTILL 1 DROP INTO RIGHT EYE EVERY 12 HOURS, Disp: , Rfl:   •  cyclobenzaprine (FLEXERIL) 10 mg tablet, Take 1 tablet (10 mg total) by mouth 2 (two) times a day, Disp: 60 tablet, Rfl: 5  •  finasteride (PROSCAR) 5 mg tablet, Take 1 tablet (5 mg total) by mouth daily, Disp: 90 tablet, Rfl: 1  •  glucose blood (ONE TOUCH ULTRA TEST) test strip, Test sugar once daily, Disp: 50 each, Rfl: 5  •  lisinopril (ZESTRIL) 20 mg tablet, Take 1 tablet (20 mg total) by mouth daily, Disp: 90 tablet, Rfl: 1  •  Nutritional Supplements (Boost) LIQD, Take 237 mL by mouth daily, Disp: 7110 mL, Rfl: 1  •  simvastatin (ZOCOR) 10 mg tablet, Take 1 tablet (10 mg total) by mouth every evening, Disp: 90 tablet, Rfl: 1  •  tamsulosin (FLOMAX) 0 4 mg, Take 1 capsule (0 4 mg total) by mouth daily with dinner, Disp: 30 capsule, Rfl: 5  No Known Allergies  Past Medical History:   Diagnosis Date   • Ambulates with cane     unsure "why" falls at times     last fall 2021   • Anxiety state 2018   • Basal cell carcinoma of skin 11/15/2016   • Cancer ([facility] )     BASAL CA SKIN   • Chronic kidney disease     ACUTE KIDNEY INJURY 3/21   • Chronic [facility] ) 2012   • Constipation    • COPD (chronic obstructive pulmonary disease) ([facility] )    • Coronary artery disease 2012   • Diabetes mellitus ([facility] )     TYPE 2   • Forearm laceration, right, initial encounter 2020   • Hydronephrosis     LAST ASSESSED: 11/18/15   • Hypertension    • Mass of right side of neck     radical neck today 10/1/2021   • Osteoporosis 2012   • Pacemaker    • SSS (sick sinus syndrome) ([facility])     BRADYCARDIA   • Trifascicular block            Social History     Tobacco Use   Smoking Status Former   • Packs/day: 1 50   • Years: 32 00   • Pack years: 48 00   • Types: Cigars, Cigarettes   • Start date: 54   • Quit date: 1980   • Years since quittin 11   Smokeless Tobacco Former

## 2023-03-22 NOTE — PROGRESS NOTES
Assessment/Plan:    Problem List Items Addressed This Visit        Endocrine    Type 2 diabetes mellitus with circulatory disorder, without long-term current use of insulin ([address] )       Lab Results   Component Value Date    HGBA1C 5 8 01/04/2023   DM doing well and no changes at this time  Notes no hypoglycemia  Cardiovascular and Mediastinum    Essential hypertension    Sick sinus syndrome (HCC)       Other    Sleep related leg cramps    Relevant Medications    Blephamide 10-0 2 % ophthalmic suspension   Other Visit Diagnoses     Skin tear of left elbow without complication, initial encounter    -  Primary    Given dressing to help with healing and to provent sticking  Acute bacterial conjunctivitis of right eye        Relevant Medications    Blephamide 10-0 2 % ophthalmic suspension           Diagnoses and all orders for this visit:    Skin tear of left elbow without complication, initial encounter  Comments:  Given dressing to help with healing and to provent sticking  Sleep related leg cramps  -     Blephamide 10-0 2 % ophthalmic suspension; Administer 2 drops to both eyes daily at bedtime    Type 2 diabetes mellitus with other circulatory complication, without long-term current use of insulin (HCC)    Acute bacterial conjunctivitis of right eye  -     Blephamide 10-0 2 % ophthalmic suspension; Administer 2 drops to both eyes daily at bedtime    Essential hypertension  Comments:  BP good on recheck    Sick sinus syndrome ([address] )      Type 2 diabetes mellitus with circulatory disorder, without long-term current use of insulin (HCC)    Lab Results   Component Value Date    HGBA1C 5 8 01/04/2023   DM doing well and no changes at this time  Notes no hypoglycemia  Subjective:      Patient ID: Tobi Roman is a 78 y o  male      HPI    The following portions of the patient's history were reviewed and updated as appropriate:   He has a past medical history of Ambulates with cane, Anxiety state (8/5/2018), Basal cell carcinoma of skin (11/15/2016), Cancer ([place] [address] ), Chronic kidney disease, Chronic obstructive pulmonary disease ([place] [address] ) (9/5/2012), Constipation, COPD (chronic obstructive pulmonary disease) ([place] [address] ), Coronary artery disease (5/22/2012), Diabetes mellitus ([place] [address] ), Forearm laceration, right, initial encounter (5/28/2020), Hydronephrosis, Hypertension, Mass of right side of neck, Osteoporosis (5/22/2012), Pacemaker, SSS (sick sinus syndrome) ([place] [address] ), and Trifascicular block  ,  does not have any pertinent problems on file  ,   has a past surgical history that includes Cardiac pacemaker placement (11/2009); Cataract extraction w/  intraocular lens implant; Kidney stone surgery; Tonsillectomy; pr trurl electrosurg rescj prostate bleed complete (N/A, 05/09/2016); CYSTOSCOPY (N/A, 05/09/2016); Other surgical history; Inguinal hernia repair; Parotidectomy (Right, 10/01/2021); and Mohs surgery (Right, 08/31/2022)  ,  family history includes Coronary artery disease in his family; Stroke in his father  ,   reports that he quit smoking about 42 years ago  His smoking use included cigars and cigarettes  He started smoking about 75 years ago  He has a 48 00 pack-year smoking history  He has quit using smokeless tobacco  He reports that he does not currently use alcohol after a past usage of about 2 0 standard drinks per week  He reports that he does not use drugs  ,  has No Known Allergies     Current Outpatient Medications   Medication Sig Dispense Refill   • amLODIPine (NORVASC) 10 mg tablet Take 1 tablet (10 mg total) by mouth daily 90 tablet 1   • aspirin 81 mg chewable tablet Chew 81 mg daily     • atenolol (TENORMIN) 50 mg tablet Take 1 tablet (50 mg total) by mouth daily 90 tablet 1   • Blephamide 10-0 2 % ophthalmic suspension Administer 2 drops to both eyes daily at bedtime 10 mL 5   • calcium carbonate (TUMS) 500 mg chewable tablet Chew 1 tablet as needed for indigestion or heartburn       • Combigan 0 2-0 5 % INSTILL 1 DROP INTO RIGHT EYE EVERY 12 HOURS     • cyclobenzaprine (FLEXERIL) 10 mg tablet Take 1 tablet (10 mg total) by mouth 2 (two) times a day 60 tablet 5   • finasteride (PROSCAR) 5 mg tablet Take 1 tablet (5 mg total) by mouth daily 90 tablet 1   • glucose blood (ONE TOUCH ULTRA TEST) test strip Test sugar once daily 50 each 5   • lisinopril (ZESTRIL) 20 mg tablet Take 1 tablet (20 mg total) by mouth daily 90 tablet 1   • simvastatin (ZOCOR) 10 mg tablet Take 1 tablet (10 mg total) by mouth every evening 90 tablet 1   • tamsulosin (FLOMAX) 0 4 mg Take 1 capsule (0 4 mg total) by mouth daily with dinner 30 capsule 5   • Nutritional Supplements (Boost) LIQD Take 237 mL by mouth daily 7110 mL 1     No current facility-administered medications for this visit  Review of Systems   Constitutional: Negative for chills, fatigue and fever  HENT: Negative  Respiratory: Negative for cough, chest tightness and shortness of breath  Cardiovascular: Negative for chest pain and palpitations  Gastrointestinal: Negative for abdominal pain, constipation, diarrhea, nausea and vomiting  Genitourinary: Negative  Musculoskeletal: Negative for back pain and myalgias  Skin: Positive for wound  Neurological: Negative  Psychiatric/Behavioral: Negative for dysphoric mood  The patient is not nervous/anxious  Objective:  Vitals:    03/22/23 0838   BP: 116/54   Pulse: 66   Resp: 16   Temp: (!) 96 8 °F (36 °C)   SpO2: 94%   Weight: 72 8 kg (160 lb 9 6 oz)   Height: 5' 7" (1 702 m)     Body mass index is 25 15 kg/m²  Physical Exam  Vitals and nursing note reviewed  Constitutional:       Appearance: He is well-developed  HENT:      Head: Normocephalic and atraumatic  Eyes:      Pupils: Pupils are equal, round, and reactive to light  Cardiovascular:      Rate and Rhythm: Normal rate and regular rhythm  Heart sounds: Normal heart sounds  No murmur heard    Pulmonary:      Effort: Pulmonary effort is normal       Breath sounds: Normal breath sounds  No stridor  No rales  Abdominal:      General: Bowel sounds are normal  There is no distension  Palpations: Abdomen is soft  Tenderness: There is no abdominal tenderness  Musculoskeletal:         General: No deformity  Normal range of motion  Cervical back: Normal range of motion and neck supple  Skin:     General: Skin is warm and dry  Neurological:      Mental Status: He is alert and oriented to person, place, and time            PHQ-2/9 Depression Screening

## 2023-03-24 ENCOUNTER — ANESTHESIA EVENT (OUTPATIENT)
Dept: PERIOP | Facility: HOSPITAL | Age: 83
End: 2023-03-24

## 2023-03-24 ENCOUNTER — HOSPITAL ENCOUNTER (OUTPATIENT)
Facility: HOSPITAL | Age: 83
Setting detail: OUTPATIENT SURGERY
Discharge: HOME | End: 2023-03-24
Attending: INTERNAL MEDICINE | Admitting: INTERNAL MEDICINE

## 2023-03-24 ENCOUNTER — ANESTHESIA (OUTPATIENT)
Dept: PERIOP | Facility: HOSPITAL | Age: 83
End: 2023-03-24

## 2023-03-24 VITALS
WEIGHT: 160 LBS | BODY MASS INDEX: 25.11 KG/M2 | SYSTOLIC BLOOD PRESSURE: 172 MMHG | RESPIRATION RATE: 17 BRPM | DIASTOLIC BLOOD PRESSURE: 84 MMHG | HEART RATE: 64 BPM | TEMPERATURE: 98 F | HEIGHT: 67 IN | OXYGEN SATURATION: 94 %

## 2023-03-24 LAB
GLUCOSE SERPL-MCNC: 124 MG/DL (ref 65–140)
GLUCOSE SERPL-MCNC: 132 MG/DL (ref 65–140)

## 2023-03-24 DEVICE — IPG W1DR01 AZURE XT DR MRI USA
Type: IMPLANTABLE DEVICE | Site: CHEST | Status: FUNCTIONAL
Brand: AZURE™ XT DR MRI SURESCAN™

## 2023-03-24 RX ORDER — DEXMEDETOMIDINE HYDROCHLORIDE 100 UG/ML
INJECTION, SOLUTION INTRAVENOUS AS NEEDED
Status: DISCONTINUED | OUTPATIENT
Start: 2023-03-24 | End: 2023-03-24

## 2023-03-24 RX ORDER — PROPOFOL 10 MG/ML
INJECTION, EMULSION INTRAVENOUS AS NEEDED
Status: DISCONTINUED | OUTPATIENT
Start: 2023-03-24 | End: 2023-03-24

## 2023-03-24 RX ORDER — SODIUM CHLORIDE 9 MG/ML
INJECTION, SOLUTION INTRAVENOUS AS NEEDED
Status: DISCONTINUED | OUTPATIENT
Start: 2023-03-24 | End: 2023-03-24 | Stop reason: HOSPADM

## 2023-03-24 RX ORDER — CEFAZOLIN SODIUM 2 G/50ML
2000 SOLUTION INTRAVENOUS ONCE
Status: COMPLETED | OUTPATIENT
Start: 2023-03-24 | End: 2023-03-24

## 2023-03-24 RX ORDER — LIDOCAINE HYDROCHLORIDE 10 MG/ML
INJECTION, SOLUTION EPIDURAL; INFILTRATION; INTRACAUDAL; PERINEURAL AS NEEDED
Status: DISCONTINUED | OUTPATIENT
Start: 2023-03-24 | End: 2023-03-24 | Stop reason: HOSPADM

## 2023-03-24 RX ORDER — FENTANYL CITRATE 50 UG/ML
INJECTION, SOLUTION INTRAMUSCULAR; INTRAVENOUS AS NEEDED
Status: DISCONTINUED | OUTPATIENT
Start: 2023-03-24 | End: 2023-03-24

## 2023-03-24 RX ORDER — LIDOCAINE HYDROCHLORIDE 10 MG/ML
INJECTION, SOLUTION EPIDURAL; INFILTRATION; INTRACAUDAL; PERINEURAL AS NEEDED
Status: DISCONTINUED | OUTPATIENT
Start: 2023-03-24 | End: 2023-03-24

## 2023-03-24 RX ORDER — SODIUM CHLORIDE, SODIUM LACTATE, POTASSIUM CHLORIDE, CALCIUM CHLORIDE 600; 310; 30; 20 MG/100ML; MG/100ML; MG/100ML; MG/100ML
50 INJECTION, SOLUTION INTRAVENOUS CONTINUOUS
Status: DISCONTINUED | OUTPATIENT
Start: 2023-03-24 | End: 2023-03-24

## 2023-03-24 RX ORDER — PHENYLEPHRINE HYDROCHLORIDE 10 MG/ML
INJECTION INTRAVENOUS AS NEEDED
Status: DISCONTINUED | OUTPATIENT
Start: 2023-03-24 | End: 2023-03-24

## 2023-03-24 RX ORDER — ACETAMINOPHEN 325 MG/1
650 TABLET ORAL EVERY 4 HOURS PRN
Status: DISCONTINUED | OUTPATIENT
Start: 2023-03-24 | End: 2023-03-24 | Stop reason: HOSPADM

## 2023-03-24 RX ORDER — PROPOFOL 10 MG/ML
INJECTION, EMULSION INTRAVENOUS CONTINUOUS PRN
Status: DISCONTINUED | OUTPATIENT
Start: 2023-03-24 | End: 2023-03-24

## 2023-03-24 RX ORDER — ONDANSETRON 2 MG/ML
4 INJECTION INTRAMUSCULAR; INTRAVENOUS ONCE AS NEEDED
Status: DISCONTINUED | OUTPATIENT
Start: 2023-03-24 | End: 2023-03-24

## 2023-03-24 RX ADMIN — LIDOCAINE HYDROCHLORIDE 50 MG: 10 INJECTION, SOLUTION EPIDURAL; INFILTRATION; INTRACAUDAL; PERINEURAL at 08:11

## 2023-03-24 RX ADMIN — PHENYLEPHRINE HYDROCHLORIDE 200 MCG: 10 INJECTION INTRAVENOUS at 08:33

## 2023-03-24 RX ADMIN — DEXMEDETOMIDINE HCL 8 MCG: 100 INJECTION INTRAVENOUS at 08:12

## 2023-03-24 RX ADMIN — SODIUM CHLORIDE, SODIUM LACTATE, POTASSIUM CHLORIDE, AND CALCIUM CHLORIDE 50 ML/HR: .6; .31; .03; .02 INJECTION, SOLUTION INTRAVENOUS at 07:42

## 2023-03-24 RX ADMIN — FENTANYL CITRATE 25 MCG: 50 INJECTION, SOLUTION INTRAMUSCULAR; INTRAVENOUS at 08:11

## 2023-03-24 RX ADMIN — PROPOFOL 20 MG: 10 INJECTION, EMULSION INTRAVENOUS at 08:13

## 2023-03-24 RX ADMIN — PROPOFOL 100 MCG/KG/MIN: 10 INJECTION, EMULSION INTRAVENOUS at 08:12

## 2023-03-24 RX ADMIN — PROPOFOL 50 MG: 10 INJECTION, EMULSION INTRAVENOUS at 08:11

## 2023-03-24 RX ADMIN — CEFAZOLIN SODIUM 2000 MG: 2 SOLUTION INTRAVENOUS at 08:04

## 2023-03-24 RX ADMIN — PROPOFOL 80 MCG/KG/MIN: 10 INJECTION, EMULSION INTRAVENOUS at 08:04

## 2023-03-24 NOTE — ANESTHESIA PREPROCEDURE EVALUATION
Procedure:  INSERTION SINGLE LEAD PACEMAKER BATTERY REMOVAL AND REPLACE  CHANGE OUT (Left: Chest)    Relevant Problems   CARDIO   (+) Coronary artery disease involving native coronary artery of native heart without angina pectoris   (+) Essential hypertension   (+) Hypercholesterolemia   (+) Mixed hyperlipidemia   (+) Sick sinus syndrome (HCC)   (+) Type 2 diabetes mellitus with diabetic peripheral angiopathy without gangrene (HCC)      ENDO   (+) Type 2 diabetes mellitus with chronic kidney disease (HCC)   (+) Type 2 diabetes mellitus with circulatory disorder, without long-term current use of insulin (HCC)   (+) Type 2 diabetes mellitus with diabetic peripheral angiopathy without gangrene (HCC)      /RENAL   (+) BRENDA (acute kidney injury) (HCC)   (+) BPH (benign prostatic hyperplasia)   (+) Chronic kidney disease, stage 4 (severe) ([facility])      NEURO/PSYCH   (+) Anxiety state        Physical Exam    Airway    Mallampati score: III  TM Distance: >3 FB  Neck ROM: full     Dental       Cardiovascular      Pulmonary      Other Findings  Small mouth opening      Anesthesia Plan  ASA Score- 3     Anesthesia Type- IV sedation with anesthesia with ASA Monitors  Additional Monitors:   Airway Plan:     Comment: Light sedation  Plan Factors-Exercise tolerance (METS): >4 METS  Chart reviewed  Patient is not a current smoker  Patient did not smoke on day of surgery  Obstructive sleep apnea risk education given perioperatively  Induction- intravenous  Postoperative Plan-     Informed Consent- Anesthetic plan and risks discussed with patient  I personally reviewed this patient with the CRNA  Discussed and agreed on the Anesthesia Plan with the CRNA           NPO appropriate  Discussed benefits/risks of monitored anesthetic care and discussed providing a dynamic level of mild to deep sedation   Risks include awareness, airway obstruction, aspiration which may necessitate conversion to general anesthesia  All questions answered  Patient understands and wishes to proceed  Anesthesia plan and consent discussed with Rosemary Flores who expressed understanding and agreement  Risks/benefits and alternatives discussed with patient including possible PONV, sore throat, damage to teeth/lips/gums and possibility of rare anesthetic and surgical emergencies

## 2023-03-24 NOTE — ANESTHESIA POSTPROCEDURE EVALUATION
Post-Op Assessment Note    CV Status:  Stable  Pain Score: 0    Pain management: adequate  Multimodal analgesia used between 6 hours prior to anesthesia start to PACU discharge    Mental Status:  Alert and awake   Hydration Status:  Euvolemic and stable   PONV Controlled:  None   Airway Patency:  Patent      Post Op Vitals Reviewed: Yes      Staff: Anesthesiologist, CRNA         No notable events documented      BP   149/73   Temp      Pulse  63   Resp   18   SpO2   93

## 2023-03-24 NOTE — INTERVAL H&P NOTE
Update: (This section must be completed if the H&P was completed greater than 24 hrs to procedure or admission)    No interval changes  Patient seems pacer dependent at this time  Patient re-evaluated   Accept as history and physical     Fide Izquierdo MD/March 24, 2023/8:12 AM

## 2023-03-24 NOTE — OP NOTE
OPERATIVE REPORT  PATIENT NAME: Altagracia Hayes    :  1940  MRN: [id number]  Pt Location: [facility] ROOM 01    SURGERY DATE: 3/24/2023    Surgeon(s) and Role:     * Sierra Gamble MD - Primary    Preop Diagnosis:  Pacer at end of battery life [Z45 010]  Sick sinus syndrome ([address] ) [I49 5]  Complete heart block    Post-Op Diagnosis Codes:     * Pacer at end of battery life [Z45 010]     * Sick sinus syndrome ([address] ) [I49 5]   Complete heart block    Procedure(s):  Left - DUAL LEAD PACEMAKER BATTERY REMOVAL AND REPLACE  Procedure:  Dual chamber pacemaker explant and new pacemaker implant  Indication:  Device battery end of life  Complete heart block    Sick sinus syndrome    Site:  Left subclavian  Complications:  None  Estimated blood loss:  Less than 10 cc: Anesthesia:  Local with sedation  Device Information:      Procedure details: The patient was prepped and draped in the usual sterile manner after consent was obtained and they were probably it identified  It should be additionally noted that although the consent stated single lead device change out, patient was aware that his device has two leads  Incision was made around the prior device implantation scar  Cutdown was then made with sharp and blunt dissection to the device pocket  The pocket was opened sharply  The prior device was removed from the pocket and detached from the leads  The pocket was copiously irrigated with sterile saline  There was good hemostasis  The new device was brought to the table, attached to the leads,  placed into the pocket,  and secured to the underlying tissue entry site with 0 silk through the device header block  2-0 Vicryl was then used for interrupted closure of the pocket  2-0 Vicryl was then used for a running closure of the subcutaneous fascia and fat  4-0 Vicryl was then used for interrupted subcuticular closure of the skin    Dermabond was placed on the wound and the patient was returned to the recovery room in good condition              SIGNATURE: Sarabjit Ortiz MD  DATE: March 24, 2023  TIME: 8:54 AM

## 2023-03-24 NOTE — DISCHARGE INSTR - AVS FIRST PAGE
Instructions for going home after your Pacemaker or Defibrillator Surgery        WHAT YOU NEED TO KNOW:   A pacemaker or defirillator is a battery-powered device that is implanted into your chest to help regulate your heart rate or prevent fainting or arrhythmia  DISCHARGE INSTRUCTIONS:   Medicines:   Pain medicine: You may need medicine to take away or decrease pain  Learn how to take your medicine  Ask what medicine and how much you should take  Be sure you know how, when, and how often to take it  Do not wait until the pain is severe before you take your medicine  Tell caregivers if your pain does not decrease  Follow up with your cardiologist after your procedure: You may need a follow-up visit 7 to 10 days after you leave the hospital  Your cardiologist will check your wound and make sure that your device is working correctly  These should already be scheduled but if not or the time is inconvenient, call the office promptly  Follow the instructions to check your device:  Your cardiologist will check your device and the battery regularly, usually every 3 to 6 months  A computer may be used to  check your device over the telephone between visits  Pacemaker or defibrillator batteries usually last 5 to 11 years  The entire unit will be replaced when the battery gets low  This is a simpler procedure than the original one to implant your pacemaker or defibrillator  Wound care:  Keep your incisions clean and dry for 7 to 10 days after your procedure  There may be glue on the wound  This needs 24 hours to cure so no washing of the site before then  Afterwards if the wound gets wet just pat it dry  Do not scrub it  Contact the office if the area is around the device is unusually red or painful, tender, or has drainage  Also the office needs to be contacted if there is fever  Arm movement and lifting:  Be careful using the arm on the side of your device       Living with your pacemaker or defibrillator:     Carry your pacemaker or defibrillator ID card: Make sure you receive a pacemaker or defibrillator ID card  Carry it with you at all times  It lists important information about your pacemaker  Show it to airport security if you travel  Avoid electrical interference:  Avoid welding equipment, MRI machines, and other equipment with large magnets or electric fields  These things could interfere with how your pacemaker works  Use your cell phone on the ear opposite from your pacemaker  Do not carry your cell phone in your shirt pocket over your chest   Most new devices however are compatible with an MRI if none under the proper circumstances  Seek care immediately if:     You feel your heart suddenly beating very slowly or quickly  You become too weak or dizzy to stand, or you pass out  You feel lightheaded, short of breath, or have chest pain

## 2023-03-27 ENCOUNTER — TELEPHONE (OUTPATIENT)
Dept: INTERNAL MEDICINE CLINIC | Facility: CLINIC | Age: 83
End: 2023-03-27

## 2023-03-27 DIAGNOSIS — G47.62 SLEEP RELATED LEG CRAMPS: ICD-10-CM

## 2023-03-27 DIAGNOSIS — H10.31 ACUTE BACTERIAL CONJUNCTIVITIS OF RIGHT EYE: ICD-10-CM

## 2023-03-27 RX ORDER — SULFACETAMIDE/PREDNISOLONE 10 %-0.2 %
2 SUSPENSION, DROPS(FINAL DOSAGE FORM)(ML) OPHTHALMIC (EYE)
Qty: 10 ML | Refills: 0 | Status: SHIPPED | OUTPATIENT
Start: 2023-03-27 | End: 2023-06-14 | Stop reason: SDUPTHER

## 2023-03-27 NOTE — TELEPHONE ENCOUNTER
[address] called   No longer able to get the Blephamide eye drops  Name brand or generic  States you can no longer get  Sulfacetamide Prednisolone combination         You can send in a new script for something different

## 2023-03-28 NOTE — PROGRESS NOTES
Assessment/Plan:    Problem List Items Addressed This Visit        Other    Sleep related leg cramps   Other Visit Diagnoses     Acute bacterial conjunctivitis of right eye        Relevant Medications    sulfacetamide-prednisolone (BLEPHAMIDE) 10-0.2 % ophthalmic suspension           Diagnoses and all orders for this visit:    Sleep related leg cramps    Acute bacterial conjunctivitis of right eye  -     sulfacetamide-prednisolone (BLEPHAMIDE) 10-0.2 % ophthalmic suspension; Administer 2 drops to both eyes daily at bedtime      No problem-specific Assessment & Plan notes found for this encounter. Subjective:      Patient ID: Franky Ramirez is a 78 y.o. male. HPI    The following portions of the patient's history were reviewed and updated as appropriate:   He has a past medical history of Ambulates with cane, Anxiety state (8/5/2018), Basal cell carcinoma of skin (11/15/2016), Cancer ([address]), Chronic kidney disease, Chronic obstructive pulmonary disease ([address]) (9/5/2012), Constipation, COPD (chronic obstructive pulmonary disease) ([address]), Coronary artery disease (5/22/2012), Diabetes mellitus ([address]), Forearm laceration, right, initial encounter (5/28/2020), Hydronephrosis, Hypertension, Mass of right side of neck, Osteoporosis (5/22/2012), Pacemaker, SSS (sick sinus syndrome) ([address]), and Trifascicular block. ,  does not have any pertinent problems on file. ,   has a past surgical history that includes Cardiac pacemaker placement (11/2009); Cataract extraction w/  intraocular lens implant; Kidney stone surgery; Tonsillectomy; pr trurl electrosurg rescj prostate bleed complete (N/A, 05/09/2016); CYSTOSCOPY (N/A, 05/09/2016); Other surgical history; Inguinal hernia repair; Parotidectomy (Right, 10/01/2021); Mohs surgery (Right, 08/31/2022); and Cardiac pacemaker placement (Left, 3/24/2023). ,  family history includes Coronary artery disease in his family; Stroke in his father. ,   reports that he quit smoking about 42 years ago. His smoking use included cigars and cigarettes. He started smoking about 75 years ago. He has a 48.00 pack-year smoking history. He has quit using smokeless tobacco. He reports that he does not currently use alcohol after a past usage of about 2.0 standard drinks per week. He reports that he does not use drugs. ,  has No Known Allergies. .  Current Outpatient Medications   Medication Sig Dispense Refill   • sulfacetamide-prednisolone (BLEPHAMIDE) 10-0.2 % ophthalmic suspension Administer 2 drops to both eyes daily at bedtime 10 mL 0   • amLODIPine (NORVASC) 10 mg tablet Take 1 tablet (10 mg total) by mouth daily 90 tablet 1   • aspirin 81 mg chewable tablet Chew 81 mg daily     • calcium carbonate (TUMS) 500 mg chewable tablet Chew 1 tablet as needed for indigestion or heartburn. • Combigan 0.2-0.5 % INSTILL 1 DROP INTO RIGHT EYE EVERY 12 HOURS     • cyclobenzaprine (FLEXERIL) 10 mg tablet Take 1 tablet (10 mg total) by mouth 2 (two) times a day 60 tablet 5   • finasteride (PROSCAR) 5 mg tablet Take 1 tablet (5 mg total) by mouth daily 90 tablet 1   • glucose blood (ONE TOUCH ULTRA TEST) test strip Test sugar once daily 50 each 5   • lisinopril (ZESTRIL) 20 mg tablet Take 1 tablet (20 mg total) by mouth daily 90 tablet 1   • Nutritional Supplements (Boost) LIQD Take 237 mL by mouth daily 7110 mL 1   • simvastatin (ZOCOR) 10 mg tablet Take 1 tablet (10 mg total) by mouth every evening 90 tablet 1   • tamsulosin (FLOMAX) 0.4 mg Take 1 capsule (0.4 mg total) by mouth daily with dinner 30 capsule 5     No current facility-administered medications for this visit. Review of Systems      Objective: There were no vitals filed for this visit. There is no height or weight on file to calculate BMI.      Physical Exam     PHQ-2/9 Depression Screening

## 2023-03-31 ENCOUNTER — CLINICAL SUPPORT (OUTPATIENT)
Dept: CARDIOLOGY CLINIC | Facility: CLINIC | Age: 83
End: 2023-03-31

## 2023-03-31 DIAGNOSIS — Z45.010 PACER AT END OF BATTERY LIFE: Primary | ICD-10-CM

## 2023-03-31 NOTE — PROGRESS NOTES
Pt presents for wound check s/p PPM generator change    Denies fever/chills    L SC incision is well approximated without bleeding or drainage  No erythema, edema, warmth  Reviewed s/s infection with pt  Will continue to follow in device clinic

## 2023-04-03 ENCOUNTER — APPOINTMENT (OUTPATIENT)
Dept: LAB | Facility: CLINIC | Age: 83
End: 2023-04-03

## 2023-04-03 DIAGNOSIS — R00.1 BRADYCARDIA: ICD-10-CM

## 2023-04-03 DIAGNOSIS — I49.5 SICK SINUS SYNDROME (HCC): ICD-10-CM

## 2023-04-03 DIAGNOSIS — Z45.010 PACER AT END OF BATTERY LIFE: ICD-10-CM

## 2023-04-03 LAB
ANION GAP SERPL CALCULATED.3IONS-SCNC: 4 MMOL/L (ref 4–13)
BASOPHILS # BLD AUTO: 0.02 THOUSANDS/ÂΜL (ref 0–0.1)
BASOPHILS NFR BLD AUTO: 0 % (ref 0–1)
BUN SERPL-MCNC: 32 MG/DL (ref 5–25)
CALCIUM SERPL-MCNC: 9.8 MG/DL (ref 8.3–10.1)
CHLORIDE SERPL-SCNC: 103 MMOL/L (ref 96–108)
CO2 SERPL-SCNC: 27 MMOL/L (ref 21–32)
CREAT SERPL-MCNC: 1.68 MG/DL (ref 0.6–1.3)
EOSINOPHIL # BLD AUTO: 0.17 THOUSAND/ÂΜL (ref 0–0.61)
EOSINOPHIL NFR BLD AUTO: 3 % (ref 0–6)
ERYTHROCYTE [DISTWIDTH] IN BLOOD BY AUTOMATED COUNT: 12.3 % (ref 11.6–15.1)
GFR SERPL CREATININE-BSD FRML MDRD: 36 ML/MIN/1.73SQ M
GLUCOSE P FAST SERPL-MCNC: 125 MG/DL (ref 65–99)
HCT VFR BLD AUTO: 47.1 % (ref 36.5–49.3)
HGB BLD-MCNC: 15.3 G/DL (ref 12–17)
IMM GRANULOCYTES # BLD AUTO: 0.01 THOUSAND/UL (ref 0–0.2)
IMM GRANULOCYTES NFR BLD AUTO: 0 % (ref 0–2)
LYMPHOCYTES # BLD AUTO: 1.38 THOUSANDS/ÂΜL (ref 0.6–4.47)
LYMPHOCYTES NFR BLD AUTO: 23 % (ref 14–44)
MCH RBC QN AUTO: 32.6 PG (ref 26.8–34.3)
MCHC RBC AUTO-ENTMCNC: 32.5 G/DL (ref 31.4–37.4)
MCV RBC AUTO: 100 FL (ref 82–98)
MONOCYTES # BLD AUTO: 0.6 THOUSAND/ÂΜL (ref 0.17–1.22)
MONOCYTES NFR BLD AUTO: 10 % (ref 4–12)
NEUTROPHILS # BLD AUTO: 3.71 THOUSANDS/ÂΜL (ref 1.85–7.62)
NEUTS SEG NFR BLD AUTO: 64 % (ref 43–75)
NRBC BLD AUTO-RTO: 0 /100 WBCS
PLATELET # BLD AUTO: 227 THOUSANDS/UL (ref 149–390)
PMV BLD AUTO: 9.6 FL (ref 8.9–12.7)
POTASSIUM SERPL-SCNC: 4.1 MMOL/L (ref 3.5–5.3)
RBC # BLD AUTO: 4.7 MILLION/UL (ref 3.88–5.62)
SODIUM SERPL-SCNC: 134 MMOL/L (ref 135–147)
WBC # BLD AUTO: 5.89 THOUSAND/UL (ref 4.31–10.16)

## 2023-04-27 DIAGNOSIS — G47.62 SLEEP RELATED LEG CRAMPS: ICD-10-CM

## 2023-04-27 RX ORDER — CYCLOBENZAPRINE HCL 10 MG
10 TABLET ORAL 2 TIMES DAILY
Qty: 60 TABLET | Refills: 5 | Status: SHIPPED | OUTPATIENT
Start: 2023-04-27

## 2023-05-05 ENCOUNTER — TELEPHONE (OUTPATIENT)
Dept: INTERNAL MEDICINE CLINIC | Facility: CLINIC | Age: 83
End: 2023-05-05

## 2023-05-05 DIAGNOSIS — I10 ESSENTIAL HYPERTENSION: ICD-10-CM

## 2023-05-05 DIAGNOSIS — G47.62 SLEEP RELATED LEG CRAMPS: ICD-10-CM

## 2023-05-05 DIAGNOSIS — R33.9 URINARY RETENTION: ICD-10-CM

## 2023-05-05 RX ORDER — FINASTERIDE 5 MG/1
5 TABLET, FILM COATED ORAL DAILY
Qty: 90 TABLET | Refills: 1 | Status: SHIPPED | OUTPATIENT
Start: 2023-05-05

## 2023-05-05 RX ORDER — AMLODIPINE BESYLATE 10 MG/1
10 TABLET ORAL DAILY
Qty: 90 TABLET | Refills: 1 | Status: SHIPPED | OUTPATIENT
Start: 2023-05-05

## 2023-05-08 DIAGNOSIS — E78.00 HYPERCHOLESTEREMIA: ICD-10-CM

## 2023-05-08 RX ORDER — SIMVASTATIN 10 MG
10 TABLET ORAL EVERY EVENING
Qty: 90 TABLET | Refills: 3 | Status: SHIPPED | OUTPATIENT
Start: 2023-05-08

## 2023-05-15 ENCOUNTER — OFFICE VISIT (OUTPATIENT)
Dept: CARDIOLOGY CLINIC | Facility: CLINIC | Age: 83
End: 2023-05-15

## 2023-05-15 ENCOUNTER — IN-CLINIC DEVICE VISIT (OUTPATIENT)
Dept: CARDIOLOGY CLINIC | Facility: CLINIC | Age: 83
End: 2023-05-15

## 2023-05-15 VITALS
HEART RATE: 60 BPM | BODY MASS INDEX: 24.8 KG/M2 | WEIGHT: 158 LBS | DIASTOLIC BLOOD PRESSURE: 70 MMHG | HEIGHT: 67 IN | SYSTOLIC BLOOD PRESSURE: 140 MMHG

## 2023-05-15 DIAGNOSIS — Z45.010 ENCOUNTER FOR CHECKING AND TESTING OF CARDIAC PACEMAKER PULSE GENERATOR (BATTERY): ICD-10-CM

## 2023-05-15 DIAGNOSIS — E78.2 MIXED HYPERLIPIDEMIA: ICD-10-CM

## 2023-05-15 DIAGNOSIS — I49.5 SICK SINUS SYNDROME (HCC): ICD-10-CM

## 2023-05-15 DIAGNOSIS — I49.5 SICK SINUS SYNDROME (HCC): Primary | ICD-10-CM

## 2023-05-15 DIAGNOSIS — I25.10 CORONARY ARTERY DISEASE INVOLVING NATIVE CORONARY ARTERY OF NATIVE HEART WITHOUT ANGINA PECTORIS: Primary | ICD-10-CM

## 2023-05-15 NOTE — PROGRESS NOTES
Patient ID: Casandra Lebron is a 78 y o  male  Plan:      Coronary artery disease involving native coronary artery of native heart without angina pectoris  Presumed present based on myoview but no current symptoms  Sick sinus syndrome [facility])  Pacer surveillance continues  Mixed hyperlipidemia  Tolerating  statin therapy and will continue current regimen  Follow up Plan/Other summary comments:  Return in about 1 year (around 5/15/2024)  HPI: Patient seen in f/u regarding the above issues  No CP ro dyspnea  Device change out since the last visit  Patient had DDD MDT placed 11/6/2009  Medtronic change out 3/24/2023: Most recent or relevant cardiac/vascular testing:       Echo 4/25/2019: Normal LV function  Mild hypokinesia of the  apical inferior wall  Myoview: 7/10/2018: Normal LV function  Small zone of inferoapical ischemia          Past Surgical History:   Procedure Laterality Date   • CARDIAC PACEMAKER PLACEMENT  11/2009    Medtronic dual chamber    • CARDIAC PACEMAKER PLACEMENT Left 3/24/2023    Procedure: DUAL LEAD PACEMAKER BATTERY REMOVAL AND REPLACE ;  Surgeon: Yaw Melendez MD;  Location: [facility];  Service: Cardiology   • CATARACT EXTRACTION W/  INTRAOCULAR LENS IMPLANT     • CYSTOSCOPY N/A 05/09/2016    Procedure: CYSTOSCOPY, evacuation of bladder calculi;  Surgeon: Dinesh Meade MD;  Location: [facility];  Service:    • INGUINAL HERNIA REPAIR      UNILATERAL   • KIDNEY STONE SURGERY     • MOHS SURGERY Right 08/31/2022    removal of right ear BCCA with free graft nd reconstruction - Dr Jagdeep High and Dr Ja Hoffman   • [address] AS PER ALLSCRIPTS (ALREADY IN PERTINENT NEGATIVES)   • PAROTIDECTOMY Right 10/01/2021    Procedure: TOTAL PAROTIDECTOMY WITH NIMS; MODIFIED RADICAL NECK DISSECTION;  Surgeon: Urban Orourke DO;  Location: [facility];  Service: ENT   • AK TRURL ELECTROSURG 5 [facility] N/A 05/09/2016 "Procedure: TRANSURETHRAL RESECTION OF PROSTATE (TURP); Surgeon: Meagan Osborne MD;  Location: [facility] OR;  Service: Urology   • TONSILLECTOMY         Lipid Profile:   Lab Results   Component Value Date    CHOL 142 08/24/2015    TRIG 134 02/13/2023    TRIG 182 08/24/2015    HDL 51 02/13/2023    HDL 41 08/24/2015         Review of Systems   10  point ROS  was otherwise non pertinent or negative except as per HPI or as below  Gait: Using a cane  Objective:     /70   Pulse 60   Ht 5' 7\" (1 702 m)   Wt 71 7 kg (158 lb)   BMI 24 75 kg/m²     PHYSICAL EXAM:     General:  Normal appearance in no distress  Eyes:  Anicteric  Oral mucosa:  Moist   Neck:  No JVD  Carotid upstrokes are brisk without bruits  No masses  Left subclavian pacer  Chest:  Clear to auscultation and percussion  Cardiac:  Normal PMI  Normal S1 and S2  No murmur gallop or rub  Abdomen:  Soft and nontender  No palpable organomegaly or aortic enlargement  Extremities:  No peripheral edema  Musculoskeletal:  Symmetric  Vascular:  Femoral pulses are brisk without bruits  Popliteal pulses are intact bilaterally  Pedal pulses are intact  Neuro:  Grossly symmetric  Psych:  Alert and oriented x3          Current Outpatient Medications:   •  amLODIPine (NORVASC) 10 mg tablet, Take 1 tablet (10 mg total) by mouth daily, Disp: 90 tablet, Rfl: 1  •  aspirin 81 mg chewable tablet, Chew 81 mg daily, Disp: , Rfl:   •  calcium carbonate (TUMS) 500 mg chewable tablet, Chew 1 tablet as needed for indigestion or heartburn , Disp: , Rfl:   •  Combigan 0 2-0 5 %, INSTILL 1 DROP INTO RIGHT EYE EVERY 12 HOURS, Disp: , Rfl:   •  cyclobenzaprine (FLEXERIL) 10 mg tablet, Take 1 tablet (10 mg total) by mouth 2 (two) times a day, Disp: 60 tablet, Rfl: 5  •  finasteride (PROSCAR) 5 mg tablet, Take 1 tablet (5 mg total) by mouth daily, Disp: 90 tablet, Rfl: 1  •  glucose blood (ONE TOUCH ULTRA TEST) test strip, Test sugar once daily, Disp: 50 " "each, Rfl: 5  •  lisinopril (ZESTRIL) 20 mg tablet, Take 1 tablet (20 mg total) by mouth daily, Disp: 90 tablet, Rfl: 1  •  simvastatin (ZOCOR) 10 mg tablet, Take 1 tablet (10 mg total) by mouth every evening, Disp: 90 tablet, Rfl: 3  •  tamsulosin (FLOMAX) 0 4 mg, Take 1 capsule (0 4 mg total) by mouth daily with dinner, Disp: 30 capsule, Rfl: 5  •  Nutritional Supplements (Boost) LIQD, Take 237 mL by mouth daily, Disp: 7110 mL, Rfl: 1  •  sulfacetamide-prednisolone (BLEPHAMIDE) 10-0 2 % ophthalmic suspension, Administer 2 drops to both eyes daily at bedtime, Disp: 10 mL, Rfl: 0  No Known Allergies  Past Medical History:   Diagnosis Date   • Ambulates with cane     unsure \"why\" falls at times     last fall 2021   • Anxiety state 2018   • Basal cell carcinoma of skin 11/15/2016   • Cancer ([facility] )     BASAL CA SKIN   • Chronic kidney disease     ACUTE KIDNEY INJURY 3/21   • Constipation    • COPD (chronic obstructive pulmonary disease) ([facility] )    • Coronary artery disease 2012   • Diabetes mellitus ([facility] )     TYPE 2   • Forearm laceration, right, initial encounter 2020   • Hydronephrosis     LAST ASSESSED: 11/18/15   • Hypertension    • Mass of right side of neck     radical neck today 10/1/2021   • Osteoporosis 2012   • Pacemaker    • SSS (sick sinus syndrome) ([facility] )     s/p generator changeout 3/24/2023   • Trifascicular block            Social History     Tobacco Use   Smoking Status Former   • Packs/day: 1 50   • Years: 32 00   • Pack years: 48 00   • Types: Cigars, Cigarettes   • Start date: 54   • Quit date: 1980   • Years since quittin 0   Smokeless Tobacco Former             "

## 2023-06-01 NOTE — PROGRESS NOTES
Device check in person pacer  Pacer SUKHDEV  Had pacer change out        Current Outpatient Medications:   •  amLODIPine (NORVASC) 10 mg tablet, Take 1 tablet (10 mg total) by mouth daily, Disp: 90 tablet, Rfl: 1  •  aspirin 81 mg chewable tablet, Chew 81 mg daily, Disp: , Rfl:   •  calcium carbonate (TUMS) 500 mg chewable tablet, Chew 1 tablet as needed for indigestion or heartburn , Disp: , Rfl:   •  Combigan 0 2-0 5 %, INSTILL 1 DROP INTO RIGHT EYE EVERY 12 HOURS, Disp: , Rfl:   •  cyclobenzaprine (FLEXERIL) 10 mg tablet, Take 1 tablet (10 mg total) by mouth 2 (two) times a day, Disp: 60 tablet, Rfl: 5  •  finasteride (PROSCAR) 5 mg tablet, Take 1 tablet (5 mg total) by mouth daily, Disp: 90 tablet, Rfl: 1  •  glucose blood (ONE TOUCH ULTRA TEST) test strip, Test sugar once daily, Disp: 50 each, Rfl: 5  •  lisinopril (ZESTRIL) 20 mg tablet, Take 1 tablet (20 mg total) by mouth daily, Disp: 90 tablet, Rfl: 1  •  Nutritional Supplements (Boost) LIQD, Take 237 mL by mouth daily, Disp: 7110 mL, Rfl: 1  •  simvastatin (ZOCOR) 10 mg tablet, Take 1 tablet (10 mg total) by mouth every evening, Disp: 90 tablet, Rfl: 3  •  sulfacetamide-prednisolone (BLEPHAMIDE) 10-0 2 % ophthalmic suspension, Administer 2 drops to both eyes daily at bedtime, Disp: 10 mL, Rfl: 0  •  tamsulosin (FLOMAX) 0 4 mg, Take 1 capsule (0 4 mg total) by mouth daily with dinner, Disp: 30 capsule, Rfl: 5

## 2023-06-14 ENCOUNTER — OFFICE VISIT (OUTPATIENT)
Dept: INTERNAL MEDICINE CLINIC | Facility: CLINIC | Age: 83
End: 2023-06-14
Payer: COMMERCIAL

## 2023-06-14 VITALS
DIASTOLIC BLOOD PRESSURE: 66 MMHG | BODY MASS INDEX: 25.33 KG/M2 | RESPIRATION RATE: 14 BRPM | SYSTOLIC BLOOD PRESSURE: 132 MMHG | HEIGHT: 67 IN | OXYGEN SATURATION: 93 % | HEART RATE: 71 BPM | WEIGHT: 161.4 LBS | TEMPERATURE: 98.3 F

## 2023-06-14 DIAGNOSIS — H10.31 ACUTE BACTERIAL CONJUNCTIVITIS OF RIGHT EYE: ICD-10-CM

## 2023-06-14 DIAGNOSIS — J44.9 CHRONIC OBSTRUCTIVE PULMONARY DISEASE, UNSPECIFIED COPD TYPE (HCC): ICD-10-CM

## 2023-06-14 DIAGNOSIS — Z00.00 MEDICARE ANNUAL WELLNESS VISIT, SUBSEQUENT: Primary | ICD-10-CM

## 2023-06-14 DIAGNOSIS — K59.01 SLOW TRANSIT CONSTIPATION: ICD-10-CM

## 2023-06-14 DIAGNOSIS — E11.59 TYPE 2 DIABETES MELLITUS WITH OTHER CIRCULATORY COMPLICATION, WITHOUT LONG-TERM CURRENT USE OF INSULIN (HCC): ICD-10-CM

## 2023-06-14 LAB — SL AMB POCT HEMOGLOBIN AIC: 5.8 (ref ?–6.5)

## 2023-06-14 PROCEDURE — 83036 HEMOGLOBIN GLYCOSYLATED A1C: CPT | Performed by: INTERNAL MEDICINE

## 2023-06-14 PROCEDURE — G0439 PPPS, SUBSEQ VISIT: HCPCS | Performed by: INTERNAL MEDICINE

## 2023-06-14 PROCEDURE — 99214 OFFICE O/P EST MOD 30 MIN: CPT | Performed by: INTERNAL MEDICINE

## 2023-06-14 RX ORDER — SULFACETAMIDE/PREDNISOLONE 10 %-0.2 %
2 SUSPENSION, DROPS(FINAL DOSAGE FORM)(ML) OPHTHALMIC (EYE)
Qty: 10 ML | Refills: 0 | Status: SHIPPED | OUTPATIENT
Start: 2023-06-14 | End: 2023-07-14

## 2023-06-14 RX ORDER — POLYETHYLENE GLYCOL 3350 17 G/17G
17 POWDER, FOR SOLUTION ORAL DAILY
Qty: 510 G | Refills: 5 | Status: SHIPPED | OUTPATIENT
Start: 2023-06-14 | End: 2023-12-11

## 2023-06-14 NOTE — ASSESSMENT & PLAN NOTE
Lab Results   Component Value Date    HGBA1C 5 8 06/14/2023   A1c is noted to be good and no change    Diet control

## 2023-06-14 NOTE — PATIENT INSTRUCTIONS
Medicare Preventive Visit Patient Instructions  Thank you for completing your Welcome to Medicare Visit or Medicare Annual Wellness Visit today  Your next wellness visit will be due in one year (6/14/2024)  The screening/preventive services that you may require over the next 5-10 years are detailed below  Some tests may not apply to you based off risk factors and/or age  Screening tests ordered at today's visit but not completed yet may show as past due  Also, please note that scanned in results may not display below  Preventive Screenings:  Service Recommendations Previous Testing/Comments   Colorectal Cancer Screening  · Colonoscopy    · Fecal Occult Blood Test (FOBT)/Fecal Immunochemical Test (FIT)  · Fecal DNA/Cologuard Test  · Flexible Sigmoidoscopy Age: 39-70 years old   Colonoscopy: every 10 years (May be performed more frequently if at higher risk)  OR  FOBT/FIT: every 1 year  OR  Cologuard: every 3 years  OR  Sigmoidoscopy: every 5 years  Screening may be recommended earlier than age 39 if at higher risk for colorectal cancer  Also, an individualized decision between you and your healthcare provider will decide whether screening between the ages of 74-80 would be appropriate   Colonoscopy: Not on file  FOBT/FIT: Not on file  Cologuard: Not on file  Sigmoidoscopy: Not on file    Screening Not Indicated     Prostate Cancer Screening Individualized decision between patient and health care provider in men between ages of 53-78   Medicare will cover every 12 months beginning on the day after your 50th birthday PSA: 0 4 ng/mL     Screening Not Indicated     Hepatitis C Screening Once for adults born between 1945 and 1965  More frequently in patients at high risk for Hepatitis C Hep C Antibody: Not on file        Diabetes Screening 1-2 times per year if you're at risk for diabetes or have pre-diabetes Fasting glucose: 125 mg/dL (4/3/2023)  A1C: 5 8 (1/4/2023)  Screening Not Indicated  History Diabetes Cholesterol Screening Once every 5 years if you don't have a lipid disorder  May order more often based on risk factors  Lipid panel: 02/13/2023  Screening Not Indicated  History Lipid Disorder      Other Preventive Screenings Covered by Medicare:  1  Abdominal Aortic Aneurysm (AAA) Screening: covered once if your at risk  You're considered to be at risk if you have a family history of AAA or a male between the age of 73-68 who smoking at least 100 cigarettes in your lifetime  2  Lung Cancer Screening: covers low dose CT scan once per year if you meet all of the following conditions: (1) Age 50-69; (2) No signs or symptoms of lung cancer; (3) Current smoker or have quit smoking within the last 15 years; (4) You have a tobacco smoking history of at least 20 pack years (packs per day x number of years you smoked); (5) You get a written order from a healthcare provider  3  Glaucoma Screening: covered annually if you're considered high risk: (1) You have diabetes OR (2) Family history of glaucoma OR (3)  aged 48 and older OR (3)  American aged 72 and older  3  Osteoporosis Screening: covered every 2 years if you meet one of the following conditions: (1) Have a vertebral abnormality; (2) On glucocorticoid therapy for more than 3 months; (3) Have primary hyperparathyroidism; (4) On osteoporosis medications and need to assess response to drug therapy  5  HIV Screening: covered annually if you're between the age of 12-76  Also covered annually if you are younger than 13 and older than 72 with risk factors for HIV infection  For pregnant patients, it is covered up to 3 times per pregnancy      Immunizations:  Immunization Recommendations   Influenza Vaccine Annual influenza vaccination during flu season is recommended for all persons aged >= 6 months who do not have contraindications   Pneumococcal Vaccine   * Pneumococcal conjugate vaccine = PCV13 (Prevnar 13), PCV15 (Vaxneuvance), PCV20 (Prevnar 20)  * Pneumococcal polysaccharide vaccine = PPSV23 (Pneumovax) Adults 2364 years old: 1-3 doses may be recommended based on certain risk factors  Adults 72 years old: 1-2 doses may be recommended based off what pneumonia vaccine you previously received   Hepatitis B Vaccine 3 dose series if at intermediate or high risk (ex: diabetes, end stage renal disease, liver disease)   Tetanus (Td) Vaccine - COST NOT COVERED BY MEDICARE PART B Following completion of primary series, a booster dose should be given every 10 years to maintain immunity against tetanus  Td may also be given as tetanus wound prophylaxis  Tdap Vaccine - COST NOT COVERED BY MEDICARE PART B Recommended at least once for all adults  For pregnant patients, recommended with each pregnancy  Shingles Vaccine (Shingrix) - COST NOT COVERED BY MEDICARE PART B  2 shot series recommended in those aged 48 and above     Health Maintenance Due:  There are no preventive care reminders to display for this patient  Immunizations Due:      Topic Date Due   • Pneumococcal Vaccine: 65+ Years (2 - PCV) 07/27/2010   • COVID-19 Vaccine (4 - Booster for Moderna series) 03/23/2022     Advance Directives   What are advance directives? Advance directives are legal documents that state your wishes and plans for medical care  These plans are made ahead of time in case you lose your ability to make decisions for yourself  Advance directives can apply to any medical decision, such as the treatments you want, and if you want to donate organs  What are the types of advance directives? There are many types of advance directives, and each state has rules about how to use them  You may choose a combination of any of the following:  · Living will: This is a written record of the treatment you want  You can also choose which treatments you do not want, which to limit, and which to stop at a certain time  This includes surgery, medicine, IV fluid, and tube feedings  · Durable power of  for healthcare [facility]): This is a written record that states who you want to make healthcare choices for you when you are unable to make them for yourself  This person, called a proxy, is usually a family member or a friend  You may choose more than 1 proxy  · Do not resuscitate (DNR) order:  A DNR order is used in case your heart stops beating or you stop breathing  It is a request not to have certain forms of treatment, such as CPR  A DNR order may be included in other types of advance directives  · Medical directive: This covers the care that you want if you are in a coma, near death, or unable to make decisions for yourself  You can list the treatments you want for each condition  Treatment may include pain medicine, surgery, blood transfusions, dialysis, IV or tube feedings, and a ventilator (breathing machine)  · Values history: This document has questions about your views, beliefs, and how you feel and think about life  This information can help others choose the care that you would choose  Why are advance directives important? An advance directive helps you control your care  Although spoken wishes may be used, it is better to have your wishes written down  Spoken wishes can be misunderstood, or not followed  Treatments may be given even if you do not want them  An advance directive may make it easier for your family to make difficult choices about your care  Weight Management   Why it is important to manage your weight:  Being overweight increases your risk of health conditions such as heart disease, high blood pressure, type 2 diabetes, and certain types of cancer  It can also increase your risk for osteoarthritis, sleep apnea, and other respiratory problems  Aim for a slow, steady weight loss  Even a small amount of weight loss can lower your risk of health problems    How to lose weight safely:  A safe and healthy way to lose weight is to eat fewer calories and get regular exercise  You can lose up about 1 pound a week by decreasing the number of calories you eat by 500 calories each day  Healthy meal plan for weight management:  A healthy meal plan includes a variety of foods, contains fewer calories, and helps you stay healthy  A healthy meal plan includes the following:  · Eat whole-grain foods more often  A healthy meal plan should contain fiber  Fiber is the part of grains, fruits, and vegetables that is not broken down by your body  Whole-grain foods are healthy and provide extra fiber in your diet  Some examples of whole-grain foods are whole-wheat breads and pastas, oatmeal, brown rice, and bulgur  · Eat a variety of vegetables every day  Include dark, leafy greens such as spinach, kale, emir greens, and mustard greens  Eat yellow and orange vegetables such as carrots, sweet potatoes, and winter squash  · Eat a variety of fruits every day  Choose fresh or canned fruit (canned in its own juice or light syrup) instead of juice  Fruit juice has very little or no fiber  · Eat low-fat dairy foods  Drink fat-free (skim) milk or 1% milk  Eat fat-free yogurt and low-fat cottage cheese  Try low-fat cheeses such as mozzarella and other reduced-fat cheeses  · Choose meat and other protein foods that are low in fat  Choose beans or other legumes such as split peas or lentils  Choose fish, skinless poultry (chicken or turkey), or lean cuts of red meat (beef or pork)  Before you cook meat or poultry, cut off any visible fat  · Use less fat and oil  Try baking foods instead of frying them  Add less fat, such as margarine, sour cream, regular salad dressing and mayonnaise to foods  Eat fewer high-fat foods  Some examples of high-fat foods include french fries, doughnuts, ice cream, and cakes  · Eat fewer sweets  Limit foods and drinks that are high in sugar  This includes candy, cookies, regular soda, and sweetened drinks    Exercise:  Exercise at least 30 minutes per day on most days of the week  Some examples of exercise include walking, biking, dancing, and swimming  You can also fit in more physical activity by taking the stairs instead of the elevator or parking farther away from stores  Ask your healthcare provider about the best exercise plan for you  © Copyright [employer] 2018 Information is for End User's use only and may not be sold, redistributed or otherwise used for commercial purposes   All illustrations and images included in CareNotes® are the copyrighted property of [employer]  or [address] [place] [address] [employer]

## 2023-06-14 NOTE — PROGRESS NOTES
Assessment and Plan:     Problem List Items Addressed This Visit        Endocrine    Type 2 diabetes mellitus with circulatory disorder, without long-term current use of insulin ([facility] [address] )       Lab Results   Component Value Date    HGBA1C 5 8 06/14/2023   A1c is noted to be good and no change  Diet control         Relevant Orders    POCT hemoglobin A1c (Completed)       Respiratory    Chronic obstructive pulmonary disease, unspecified COPD type (HCC)     Stable and no change breathing reasonable with Albuterol MDI            Other    Constipation     Increase hydration  Increase fiber  Miralax Daily          Relevant Medications    polyethylene glycol (MIRALAX) 17 g packet   Other Visit Diagnoses     Medicare annual wellness visit, subsequent    -  Primary    Acute bacterial conjunctivitis of right eye        Relevant Medications    sulfacetamide-prednisolone (BLEPHAMIDE) 10-0 2 % ophthalmic suspension           Preventive health issues were discussed with patient, and age appropriate screening tests were ordered as noted in patient's After Visit Summary  Personalized health advice and appropriate referrals for health education or preventive services given if needed, as noted in patient's After Visit Summary  History of Present Illness:     Patient presents for a Medicare Wellness Visit    The patient was seen and examined and noted to have issues with chronic constipation  He has been using Milk of Mag and is noted to have problems with decreased renal function and regular use  We discussed and he was amendable to follow up Miralax  We discussed need to maintain dehydration  Patient Care Team:  Anh Londono DO as PCP - General (Internal Medicine)  MD Loren Killian CRNP Orion Diego, MD Aliene Bach, MD Kirsten Sellers, DO Sedrick Ryder (Nutrition)     Review of Systems:     Review of Systems   Constitutional: Negative for chills, fatigue and fever  HENT: Negative  "  Respiratory: Negative for cough, chest tightness and shortness of breath  Cardiovascular: Negative for chest pain and palpitations  Gastrointestinal: Negative for abdominal pain, constipation, diarrhea, nausea and vomiting  Genitourinary: Negative  Musculoskeletal: Negative for back pain and myalgias  Skin: Negative  Neurological: Negative  Psychiatric/Behavioral: Negative for dysphoric mood  The patient is not nervous/anxious           Problem List:     Patient Active Problem List   Diagnosis   • Abnormal nuclear stress test   • Basal cell carcinoma of skin   • BPH (benign prostatic hyperplasia)   • Coronary artery disease involving native coronary artery of native heart without angina pectoris   • Type 2 diabetes mellitus with circulatory disorder, without long-term current use of insulin ([facility])   • Essential hypertension   • Osteoporosis   • Peripheral arterial disease (HCC)   • Sleep related leg cramps   • Anxiety state   • Bradycardia   • Skin lesion   • Constipation   • Pacemaker   • Mixed hyperlipidemia   • BRENDA (acute kidney injury) ([facility] [address] )   • Troponin level elevated   • Dizziness   • Platelets decreased ([facility])   • Type 2 diabetes mellitus with diabetic peripheral angiopathy without gangrene ([facility])   • Type 2 diabetes mellitus with chronic kidney disease ([facility])   • Chronic kidney disease, stage 4 (severe) ([facility])   • Primary malignant neoplasm of parotid gland (HCC)   • 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(HCC)   • Sick sinus syndrome ([facility] [address] )   • Pacer at end of battery life   • Chronic obstructive pulmonary disease, unspecified COPD type ([facility] [address] )      Past Medical and Surgical History:     Past Medical History:   Diagnosis Date   • Ambulates with cane     unsure \"why\" falls at times     last fall 09/27/2021   • Anxiety state 08/05/2018   • Basal cell carcinoma of skin 11/15/2016   • Cancer ([facility] [address] )     BASAL CA SKIN   • Chronic kidney disease     ACUTE KIDNEY INJURY 3/21   • Constipation    • " COPD (chronic obstructive pulmonary disease) ([facility])    • Coronary artery disease 05/22/2012   • Diabetes mellitus ([facility] [address] )     TYPE 2   • Forearm laceration, right, initial encounter 05/28/2020   • Hydronephrosis     LAST ASSESSED: 11/18/15   • Hypertension    • Mass of right side of neck     radical neck today 10/1/2021   • Osteoporosis 05/22/2012   • Pacemaker    • SSS (sick sinus syndrome) [facility])     s/p generator changeout 3/24/2023   • Trifascicular block      Past Surgical History:   Procedure Laterality Date   • CARDIAC PACEMAKER PLACEMENT  11/2009    Medtronic dual chamber    • CARDIAC PACEMAKER PLACEMENT Left 3/24/2023    Procedure: DUAL LEAD PACEMAKER BATTERY REMOVAL AND REPLACE ;  Surgeon: Chu Mccurdy MD;  Location: [facility];  Service: Cardiology   • CATARACT EXTRACTION W/  INTRAOCULAR LENS IMPLANT     • CYSTOSCOPY N/A 05/09/2016    Procedure: CYSTOSCOPY, evacuation of bladder calculi;  Surgeon: Gucci Perea MD;  Location: [facility];  Service:    • INGUINAL HERNIA REPAIR      UNILATERAL   • KIDNEY STONE SURGERY     • MOHS SURGERY Right 08/31/2022    removal of right ear BCCA with free graft nd reconstruction - Dr Nic Fierro and Dr Ariadne Vance   • [address] AS PER ALLSCRIPTS (ALREADY IN PERTINENT NEGATIVES)   • PAROTIDECTOMY Right 10/01/2021    Procedure: TOTAL PAROTIDECTOMY WITH NIMS; MODIFIED RADICAL NECK DISSECTION;  Surgeon: Hollis Finn DO;  Location: [facility];  Service: ENT   • [place] TR[address]MIN [address] N/A 05/09/2016    Procedure: TRANSURETHRAL RESECTION OF PROSTATE (TURP); Surgeon: Gucci Perea MD;  Location: [facility];  Service: Urology   • TONSILLECTOMY        Family History:     Family History   Problem Relation Age of Onset   • Stroke Father         SYNDROME   • Coronary artery disease Family         less than 61years of age      Social History:     Social History     Socioeconomic History   • Marital status:   Spouse name: None   • Number of children: None   • Years of education: None   • Highest education level: None   Occupational History   • Occupation: RETIRED   Tobacco Use   • Smoking status: Former     Packs/day: 1 50     Years: 32 00     Total pack years: 48 00     Types: Cigars, Cigarettes     Start date: 54     Quit date: 1980     Years since quittin 1   • Smokeless tobacco: Former   Vaping Use   • Vaping Use: Never used   Substance and Sexual Activity   • Alcohol use: Not Currently     Alcohol/week: 2 0 standard drinks of alcohol     Types: 2 Cans of beer per week     Comment: 2  beers daily and shot by hx/No ETOH x 3wks  • Drug use: No   • Sexual activity: Not Currently   Other Topics Concern   • None   Social History Narrative    USES SAFETY EQUIPMENT - [employer]         Social Determinants of Health     Financial Resource Strain: Low Risk  (2023)    Overall Financial Resource Strain (CARDIA)    • Difficulty of Paying Living Expenses: Not very hard   Food Insecurity: Not on file   Transportation Needs: No Transportation Needs (2023)    PRAPARE - Transportation    • Lack of Transportation (Medical): No    • Lack of Transportation (Non-Medical): No   Physical Activity: Not on file   Stress: Not on file   Social Connections: Not on file   Intimate Partner Violence: Not on file   Housing Stability: Not on file      Medications and Allergies:     Current Outpatient Medications   Medication Sig Dispense Refill   • amLODIPine (NORVASC) 10 mg tablet Take 1 tablet (10 mg total) by mouth daily 90 tablet 1   • aspirin 81 mg chewable tablet Chew 81 mg daily     • calcium carbonate (TUMS) 500 mg chewable tablet Chew 1 tablet as needed for indigestion or heartburn       • Combigan 0 2-0 5 % INSTILL 1 DROP INTO RIGHT EYE EVERY 12 HOURS     • cyclobenzaprine (FLEXERIL) 10 mg tablet Take 1 tablet (10 mg total) by mouth 2 (two) times a day 60 tablet 5   • finasteride (PROSCAR) 5 mg tablet Take 1 tablet (5 mg total) by mouth daily 90 tablet 1   • glucose blood (ONE TOUCH ULTRA TEST) test strip Test sugar once daily 50 each 5   • lisinopril (ZESTRIL) 20 mg tablet Take 1 tablet (20 mg total) by mouth daily 90 tablet 1   • polyethylene glycol (MIRALAX) 17 g packet Take 17 g by mouth daily 510 g 5   • simvastatin (ZOCOR) 10 mg tablet Take 1 tablet (10 mg total) by mouth every evening 90 tablet 3   • sulfacetamide-prednisolone (BLEPHAMIDE) 10-0 2 % ophthalmic suspension Administer 2 drops to both eyes daily at bedtime 10 mL 0   • tamsulosin (FLOMAX) 0 4 mg Take 1 capsule (0 4 mg total) by mouth daily with dinner 30 capsule 5   • Nutritional Supplements (Boost) LIQD Take 237 mL by mouth daily 7110 mL 1     No current facility-administered medications for this visit  No Known Allergies   Immunizations:     Immunization History   Administered Date(s) Administered   • COVID-19 MODERNA VACC 0 5 ML IM 06/21/2021, 07/19/2021, 01/26/2022   • INFLUENZA 12/22/2014, 10/24/2016, 11/04/2022   • Influenza Quadrivalent Preservative Free 3 years and older IM 12/22/2014   • Influenza Split High Dose Preservative Free IM 10/24/2016, 09/18/2017   • Influenza, high dose seasonal 0 7 mL 09/26/2018, 10/15/2019, 10/30/2020, 10/12/2021, 11/04/2022   • Influenza, seasonal, injectable 10/05/2011, 01/04/2013, 12/05/2013   • Pneumococcal Polysaccharide PPV23 07/27/2009   • Tdap 05/28/2020, 12/08/2021      Health Maintenance: There are no preventive care reminders to display for this patient  Topic Date Due   • Pneumococcal Vaccine: 65+ Years (2 - PCV) 07/27/2010   • COVID-19 Vaccine (4 - Booster for Moderna series) 03/23/2022      Medicare Screening Tests and Risk Assessments:     Deangelo Pisano is here for his Initial Wellness visit  Last Medicare Wellness visit information reviewed, patient interviewed and updates made to the record today  Health Risk Assessment:   Patient rates overall health as very good   Patient feels that their physical health rating is same  Patient is satisfied with their life  Eyesight was rated as same  Hearing was rated as same  Patient feels that their emotional and mental health rating is same  Patients states they are never, rarely angry  Patient states they are never, rarely unusually tired/fatigued  Pain experienced in the last 7 days has been none  Patient states that he has experienced no weight loss or gain in last 6 months  Depression Screening:   PHQ-2 Score: 0      Fall Risk Screening: In the past year, patient has experienced: no history of falling in past year      Home Safety:  Patient does not have trouble with stairs inside or outside of their home  Patient has working smoke alarms and has working carbon monoxide detector  Home safety hazards include: none  Nutrition:   Current diet is Regular  Medications:   Patient is currently taking over-the-counter supplements  OTC medications include: see medication list  Patient is able to manage medications  Activities of Daily Living (ADLs)/Instrumental Activities of Daily Living (IADLs):   Walk and transfer into and out of bed and chair?: Yes  Dress and groom yourself?: Yes    Bathe or shower yourself?: Yes    Feed yourself? Yes  Do your laundry/housekeeping?: Yes  Manage your money, pay your bills and track your expenses?: Yes  Make your own meals?: Yes    Do your own shopping?: Yes    Previous Hospitalizations:   Any hospitalizations or ED visits within the last 12 months?: No      Advance Care Planning:   Living will: Yes    Durable POA for healthcare:  Yes    Advanced directive: Yes    Advanced directive counseling given: No    Five wishes given: No    Patient declined ACP directive: No    End of Life Decisions reviewed with patient: No    Provider agrees with end of life decisions: Yes      Cognitive Screening:   Provider or family/friend/caregiver concerned regarding cognition?: No    PREVENTIVE SCREENINGS      Cardiovascular "Screening:    General: Screening Not Indicated and History Lipid Disorder      Diabetes Screening:     General: Screening Not Indicated and History Diabetes      Colorectal Cancer Screening:     General: Screening Not Indicated      Prostate Cancer Screening:    General: Screening Not Indicated      Osteoporosis Screening:    General: Screening Not Indicated and History Osteoporosis      Abdominal Aortic Aneurysm (AAA) Screening:    Risk factors include: tobacco use        Lung Cancer Screening:     General: Screening Not Indicated      Hepatitis C Screening:    General: Screening Not Indicated    Screening, Brief Intervention, and Referral to Treatment (SBIRT)    Screening  Typical number of drinks in a day: 3  Typical number of drinks in a week: 16  Interpretation: Low risk drinking behavior  Single Item Drug Screening:  How often have you used an illegal drug (including marijuana) or a prescription medication for non-medical reasons in the past year? never    Single Item Drug Screen Score: 0  Interpretation: Negative screen for possible drug use disorder    No results found  Physical Exam:     /66   Pulse 71   Temp 98 3 °F (36 8 °C)   Resp 14   Ht 5' 7\" (1 702 m)   Wt 73 2 kg (161 lb 6 4 oz)   SpO2 93%   BMI 25 28 kg/m²     Physical Exam  Vitals and nursing note reviewed  Constitutional:       General: He is not in acute distress  Appearance: He is well-developed  HENT:      Head: Normocephalic and atraumatic  Eyes:      Conjunctiva/sclera: Conjunctivae normal    Cardiovascular:      Rate and Rhythm: Normal rate and regular rhythm  Heart sounds: No murmur heard  Pulmonary:      Effort: Pulmonary effort is normal  No respiratory distress  Breath sounds: Normal breath sounds  Abdominal:      Palpations: Abdomen is soft  Tenderness: There is no abdominal tenderness  Musculoskeletal:         General: No swelling  Cervical back: Neck supple     Skin:     General: " Skin is warm and dry  Capillary Refill: Capillary refill takes less than 2 seconds  Neurological:      Mental Status: He is alert     Psychiatric:         Mood and Affect: Mood normal           Jayesh Calle DO

## 2023-06-26 DIAGNOSIS — H10.31 ACUTE BACTERIAL CONJUNCTIVITIS OF RIGHT EYE: ICD-10-CM

## 2023-06-26 RX ORDER — SULFACETAMIDE SODIUM AND PREDNISOLONE ACETATE 100; 2 MG/ML; MG/ML
SUSPENSION/ DROPS OPHTHALMIC
Qty: 10 ML | Refills: 0 | Status: SHIPPED | OUTPATIENT
Start: 2023-06-26

## 2023-07-12 NOTE — INTERVAL H&P NOTE
Cristhian Joseph,    I have had the opportunity to review your recent results and an interpretation is as follows:  Your urinalysis does show evidence of blood in the urine with yeast appreciated and I suspect you have a contaminated sample.  Your urine culture did not grow any organisms, and as such no antibiotics will be recommended at this time.    If symptoms of urinary incontinence persist, a follow-up with urology would be recommended    Sincerely,  Damian Russ MD H&P reviewed  After examining the patient I find no changes in the patients condition since the H&P had been written      Vitals:    09/10/21 0622   BP: (!) 178/91   Pulse: 60   Resp: 18   Temp: 99 °F (37 2 °C)   SpO2: 91%

## 2023-07-27 DIAGNOSIS — I10 ESSENTIAL HYPERTENSION: ICD-10-CM

## 2023-07-27 RX ORDER — LISINOPRIL 20 MG/1
20 TABLET ORAL DAILY
Qty: 90 TABLET | Refills: 0 | Status: SHIPPED | OUTPATIENT
Start: 2023-07-27

## 2023-08-15 ENCOUNTER — REMOTE DEVICE CLINIC VISIT (OUTPATIENT)
Dept: CARDIOLOGY CLINIC | Facility: CLINIC | Age: 83
End: 2023-08-15
Payer: COMMERCIAL

## 2023-08-15 DIAGNOSIS — Z95.0 CARDIAC PACEMAKER IN SITU: Primary | ICD-10-CM

## 2023-08-15 PROCEDURE — 93294 REM INTERROG EVL PM/LDLS PM: CPT | Performed by: INTERNAL MEDICINE

## 2023-08-15 PROCEDURE — 93296 REM INTERROG EVL PM/IDS: CPT | Performed by: INTERNAL MEDICINE

## 2023-08-15 NOTE — PROGRESS NOTES
Results for orders placed or performed in visit on 08/15/23   Cardiac EP device report    Narrative    MDT DUAL PM/ NOT MRI CONDITIONAL  CARELINK TRANSMISSION: BATTERY STATUS "6 YRS." AP 28%  98%. ALL AVAILABLE LEAD PARAMETERS WITHIN NORMAL LIMITS. NO SIGNIFICANT HIGH RATE EPISODES. NORMAL DEVICE FUNCTION.  NC

## 2023-10-17 ENCOUNTER — VBI (OUTPATIENT)
Dept: ADMINISTRATIVE | Facility: OTHER | Age: 83
End: 2023-10-17

## 2023-10-17 NOTE — TELEPHONE ENCOUNTER
10/17/23 1:23 PM    Patient contacted (no answer/ line busy) to bring Advance Directive, POLST, or Living Will document to next scheduled pcp visit. Thank you.   Nadira Swift  [facility]

## 2023-10-18 ENCOUNTER — OFFICE VISIT (OUTPATIENT)
Dept: INTERNAL MEDICINE CLINIC | Facility: CLINIC | Age: 83
End: 2023-10-18
Payer: COMMERCIAL

## 2023-10-18 VITALS
OXYGEN SATURATION: 92 % | SYSTOLIC BLOOD PRESSURE: 110 MMHG | HEART RATE: 92 BPM | BODY MASS INDEX: 25.11 KG/M2 | WEIGHT: 160 LBS | HEIGHT: 67 IN | DIASTOLIC BLOOD PRESSURE: 64 MMHG | TEMPERATURE: 98.6 F

## 2023-10-18 DIAGNOSIS — R35.1 BENIGN PROSTATIC HYPERPLASIA WITH NOCTURIA: ICD-10-CM

## 2023-10-18 DIAGNOSIS — N40.1 BENIGN PROSTATIC HYPERPLASIA WITH NOCTURIA: ICD-10-CM

## 2023-10-18 DIAGNOSIS — G89.29 CHRONIC BILATERAL LOW BACK PAIN WITHOUT SCIATICA: ICD-10-CM

## 2023-10-18 DIAGNOSIS — H40.9 GLAUCOMA OF RIGHT EYE, UNSPECIFIED GLAUCOMA TYPE: ICD-10-CM

## 2023-10-18 DIAGNOSIS — Z23 ENCOUNTER FOR IMMUNIZATION: Primary | ICD-10-CM

## 2023-10-18 DIAGNOSIS — N18.4 CHRONIC KIDNEY DISEASE, STAGE 4 (SEVERE) (HCC): ICD-10-CM

## 2023-10-18 DIAGNOSIS — Z01.00 DIABETIC EYE EXAM (HCC): ICD-10-CM

## 2023-10-18 DIAGNOSIS — E11.9 DIABETIC EYE EXAM (HCC): ICD-10-CM

## 2023-10-18 DIAGNOSIS — M54.50 CHRONIC BILATERAL LOW BACK PAIN WITHOUT SCIATICA: ICD-10-CM

## 2023-10-18 DIAGNOSIS — I10 ESSENTIAL HYPERTENSION: ICD-10-CM

## 2023-10-18 DIAGNOSIS — E11.59 TYPE 2 DIABETES MELLITUS WITH OTHER CIRCULATORY COMPLICATION, WITHOUT LONG-TERM CURRENT USE OF INSULIN (HCC): ICD-10-CM

## 2023-10-18 DIAGNOSIS — R33.9 URINARY RETENTION: ICD-10-CM

## 2023-10-18 PROCEDURE — 99214 OFFICE O/P EST MOD 30 MIN: CPT | Performed by: INTERNAL MEDICINE

## 2023-10-18 RX ORDER — FINASTERIDE 5 MG/1
5 TABLET, FILM COATED ORAL DAILY
Qty: 90 TABLET | Refills: 1 | Status: SHIPPED | OUTPATIENT
Start: 2023-10-18

## 2023-10-18 RX ORDER — BRIMONIDINE TARTRATE, TIMOLOL MALEATE 2; 5 MG/ML; MG/ML
1 SOLUTION/ DROPS OPHTHALMIC EVERY 12 HOURS SCHEDULED
Qty: 45 ML | Refills: 1 | Status: SHIPPED | OUTPATIENT
Start: 2023-10-18 | End: 2024-04-15

## 2023-10-18 RX ORDER — HYDROCODONE BITARTRATE AND ACETAMINOPHEN 5; 325 MG/1; MG/1
1 TABLET ORAL EVERY 6 HOURS PRN
Qty: 30 TABLET | Refills: 0 | Status: SHIPPED | OUTPATIENT
Start: 2023-10-18 | End: 2023-11-17

## 2023-10-18 NOTE — ASSESSMENT & PLAN NOTE
Lab Results   Component Value Date    HGBA1C 5.8 06/14/2023   The patient was seen and examined and noted to have and well controlled A1c with diet control only

## 2023-10-18 NOTE — ASSESSMENT & PLAN NOTE
Patient brought old medication (Atenolol and Clonidine) with him and we will hold off on refilling those medications secondary to controlled BP

## 2023-10-18 NOTE — ASSESSMENT & PLAN NOTE
Lab Results   Component Value Date    EGFR 36 04/03/2023    EGFR 38 02/13/2023    EGFR 42 10/05/2021    CREATININE 1.68 (H) 04/03/2023    CREATININE 1.63 (H) 02/13/2023    CREATININE 1.52 (H) 10/05/2021   Avoid NSAIDs  Tylenol 650 mg TID for pain  Or hydrocodone 5/325 mg PRN pain

## 2023-10-18 NOTE — PROGRESS NOTES
Assessment/Plan:    Problem List Items Addressed This Visit     BPH (benign prostatic hyperplasia)     On flomax and Finasteride and no change at this time         Type 2 diabetes mellitus with circulatory disorder, without long-term current use of insulin ([facility])       Lab Results   Component Value Date    HGBA1C 5.8 06/14/2023   The patient was seen and examined and noted to have and well controlled A1c with diet control only         Essential hypertension     Patient brought old medication (Atenolol and Clonidine) with him and we will hold off on refilling those medications secondary to controlled BP         Chronic kidney disease, stage 4 (severe) ([facility])     Lab Results   Component Value Date    EGFR 36 04/03/2023    EGFR 38 02/13/2023    EGFR 42 10/05/2021    CREATININE 1.68 (H) 04/03/2023    CREATININE 1.63 (H) 02/13/2023    CREATININE 1.52 (H) 10/05/2021   Avoid NSAIDs  Tylenol 650 mg TID for pain  Or hydrocodone 5/325 mg PRN pain        Other Visit Diagnoses     Encounter for immunization    -  Primary    Diabetic eye exam ([facility])        Relevant Orders    Ambulatory Referral to Ophthalmology    Urinary retention        Relevant Medications    finasteride (PROSCAR) 5 mg tablet    Chronic bilateral low back pain without sciatica        Relevant Medications    HYDROcodone-acetaminophen (Norco) 5-325 mg per tablet    Glaucoma of right eye, unspecified glaucoma type        Relevant Medications    Combigan 0.2-0.5 %           Diagnoses and all orders for this visit:    Encounter for immunization  -     Cancel: influenza vaccine, high-dose, PF 0.7 mL (FLUZONE HIGH-DOSE)    Type 2 diabetes mellitus with other circulatory complication, without long-term current use of insulin ([facility])  -     Cancel: POCT hemoglobin A1c    Chronic kidney disease, stage 4 (severe) ([facility]    Diabetic eye exam ([facility])  -     Ambulatory Referral to Ophthalmology; Future    Urinary retention  -     finasteride (PROSCAR) 5 mg tablet;  Take 1 tablet (5 mg total) by mouth daily    Chronic bilateral low back pain without sciatica  -     HYDROcodone-acetaminophen (Norco) 5-325 mg per tablet; Take 1 tablet by mouth every 6 (six) hours as needed for pain Max Daily Amount: 4 tablets    Essential hypertension    Benign prostatic hyperplasia with nocturia    Glaucoma of right eye, unspecified glaucoma type  -     Combigan 0.2-0.5 %; Administer 1 drop to the right eye every 12 (twelve) hours        Type 2 diabetes mellitus with circulatory disorder, without long-term current use of insulin ([facility])    Lab Results   Component Value Date    HGBA1C 5.8 06/14/2023   The patient was seen and examined and noted to have and well controlled A1c with diet control only    Essential hypertension  Patient brought old medication (Atenolol and Clonidine) with him and we will hold off on refilling those medications secondary to controlled BP    BPH (benign prostatic hyperplasia)  On flomax and Finasteride and no change at this time    Chronic kidney disease, stage 4 (severe) ([facility])  Lab Results   Component Value Date    EGFR 36 04/03/2023    EGFR 38 02/13/2023    EGFR 42 10/05/2021    CREATININE 1.68 (H) 04/03/2023    CREATININE 1.63 (H) 02/13/2023    CREATININE 1.52 (H) 10/05/2021   Avoid NSAIDs  Tylenol 650 mg TID for pain  Or hydrocodone 5/325 mg PRN pain        Subjective:      Patient ID: Thiago Lujan is a 78 y.o. male.     Chronic OA pain and no other concerns at this time        The following portions of the patient's history were reviewed and updated as appropriate:   He has a past medical history of Ambulates with cane, Anxiety state (08/05/2018), Basal cell carcinoma of skin (11/15/2016), Cancer ([address]), Chronic kidney disease, Constipation, COPD (chronic obstructive pulmonary disease) ([address]), Coronary artery disease (05/22/2012), Diabetes mellitus ([address]), Forearm laceration, right, initial encounter (05/28/2020), Hydronephrosis, Hypertension, Mass of right side of neck, Osteoporosis (05/22/2012), Pacemaker, SSS (sick sinus syndrome) ([address]), and Trifascicular block. ,  does not have any pertinent problems on file. ,   has a past surgical history that includes Cardiac pacemaker placement (11/2009); Cataract extraction w/  intraocular lens implant; Kidney stone surgery; Tonsillectomy; pr trurl electrosurg rescj prostate bleed complete (N/A, 05/09/2016); CYSTOSCOPY (N/A, 05/09/2016); Other surgical history; Inguinal hernia repair; Parotidectomy (Right, 10/01/2021); Mohs surgery (Right, 08/31/2022); and Cardiac pacemaker placement (Left, 3/24/2023). ,  family history includes Coronary artery disease in his family; Stroke in his father. ,   reports that he quit smoking about 43 years ago. His smoking use included cigars and cigarettes. He started smoking about 75 years ago. He has a 48.00 pack-year smoking history. He has quit using smokeless tobacco. He reports that he does not currently use alcohol after a past usage of about 2.0 standard drinks of alcohol per week. He reports that he does not use drugs. ,  has No Known Allergies. .  Current Outpatient Medications   Medication Sig Dispense Refill   • amLODIPine (NORVASC) 10 mg tablet Take 1 tablet (10 mg total) by mouth daily 90 tablet 1   • aspirin 81 mg chewable tablet Chew 81 mg daily     • calcium carbonate (TUMS) 500 mg chewable tablet Chew 1 tablet as needed for indigestion or heartburn.      • Combigan 0.2-0.5 % Administer 1 drop to the right eye every 12 (twelve) hours 45 mL 1   • cyclobenzaprine (FLEXERIL) 10 mg tablet Take 1 tablet (10 mg total) by mouth 2 (two) times a day 60 tablet 5   • finasteride (PROSCAR) 5 mg tablet Take 1 tablet (5 mg total) by mouth daily 90 tablet 1   • glucose blood (ONE TOUCH ULTRA TEST) test strip Test sugar once daily 50 each 5   • HYDROcodone-acetaminophen (Norco) 5-325 mg per tablet Take 1 tablet by mouth every 6 (six) hours as needed for pain Max Daily Amount: 4 tablets 30 tablet 0   • lisinopril (ZESTRIL) 20 mg tablet Take 1 tablet by mouth once daily 90 tablet 0   • polyethylene glycol (MIRALAX) 17 g packet Take 17 g by mouth daily 510 g 5   • simvastatin (ZOCOR) 10 mg tablet Take 1 tablet (10 mg total) by mouth every evening 90 tablet 3   • tamsulosin (FLOMAX) 0.4 mg Take 1 capsule (0.4 mg total) by mouth daily with dinner 30 capsule 5   • Nutritional Supplements (Boost) LIQD Take 237 mL by mouth daily 7110 mL 1     No current facility-administered medications for this visit. Review of Systems   Constitutional:  Negative for chills, fatigue and fever. HENT: Negative. Respiratory:  Negative for cough, chest tightness and shortness of breath. Cardiovascular:  Negative for chest pain and palpitations. Gastrointestinal:  Negative for abdominal pain, constipation, diarrhea, nausea and vomiting. Genitourinary: Negative. Musculoskeletal:  Negative for back pain and myalgias. Skin: Negative. Neurological: Negative. Psychiatric/Behavioral:  Negative for dysphoric mood. The patient is not nervous/anxious. Objective:  Vitals:    10/18/23 1354   BP: 110/64   Pulse: 92   Temp: 98.6 °F (37 °C)   SpO2: 92%   Weight: 72.6 kg (160 lb)   Height: 5' 7" (1.702 m)     Body mass index is 25.06 kg/m². Physical Exam  Vitals and nursing note reviewed. Constitutional:       Appearance: He is well-developed. HENT:      Head: Normocephalic and atraumatic. Eyes:      Pupils: Pupils are equal, round, and reactive to light. Cardiovascular:      Rate and Rhythm: Normal rate and regular rhythm. Heart sounds: Normal heart sounds. No murmur heard. Pulmonary:      Effort: Pulmonary effort is normal.      Breath sounds: Normal breath sounds. No stridor. No rales. Abdominal:      General: Bowel sounds are normal. There is no distension. Palpations: Abdomen is soft. Tenderness: There is no abdominal tenderness. Musculoskeletal:         General: No deformity. Normal range of motion. Cervical back: Normal range of motion and neck supple. Skin:     General: Skin is warm and dry. Neurological:      Mental Status: He is alert and oriented to person, place, and time.           PHQ-2/9 Depression Screening

## 2023-10-19 ENCOUNTER — TELEPHONE (OUTPATIENT)
Dept: INTERNAL MEDICINE CLINIC | Facility: CLINIC | Age: 83
End: 2023-10-19

## 2023-11-15 ENCOUNTER — REMOTE DEVICE CLINIC VISIT (OUTPATIENT)
Dept: CARDIOLOGY CLINIC | Facility: CLINIC | Age: 83
End: 2023-11-15
Payer: COMMERCIAL

## 2023-11-15 DIAGNOSIS — Z95.0 CARDIAC PACEMAKER IN SITU: Primary | ICD-10-CM

## 2023-11-15 PROCEDURE — 93296 REM INTERROG EVL PM/IDS: CPT | Performed by: INTERNAL MEDICINE

## 2023-11-15 PROCEDURE — 93294 REM INTERROG EVL PM/LDLS PM: CPT | Performed by: INTERNAL MEDICINE

## 2023-11-16 NOTE — PROGRESS NOTES
Results for orders placed or performed in visit on 11/15/23   Cardiac EP device report    Narrative    MDT DUAL PM/ NOT MRI CONDITIONAL  CARELINK TRANSMISSION: BATTERY STATUS "7 YRS." AP 1%  99%. ALL AVAILABLE LEAD PARAMETERS WITHIN NORMAL LIMITS. NO SIGNIFICANT HIGH RATE EPISODES. NORMAL DEVICE FUNCTION.  NC

## 2023-12-12 NOTE — ED NOTES
----- Message from Mignon Hogan MD sent at [id number]/[phone]  4:58 PM EST -----  Recent blood work for patient and her  was stable.   Continue current regimen of medications Pt  States had a pocket of fluid on outside of ear for the past week  States must have resolved during night       Agnes Way RN  06/26/21 [id number]

## 2024-01-18 ENCOUNTER — TELEPHONE (OUTPATIENT)
Dept: ADMINISTRATIVE | Facility: OTHER | Age: 84
End: 2024-01-18

## 2024-01-18 NOTE — TELEPHONE ENCOUNTER
01/18/24 2:11 PM    Patient contacted (no answer/ line busy) to bring Advance Directive, POLST, or Living Will document to next scheduled pcp visit.    Thank you.  Reyna Veliz  [facility]

## 2024-01-24 DIAGNOSIS — G47.62 SLEEP RELATED LEG CRAMPS: ICD-10-CM

## 2024-01-24 RX ORDER — CYCLOBENZAPRINE HCL 10 MG
10 TABLET ORAL 2 TIMES DAILY
Qty: 60 TABLET | Refills: 0 | Status: SHIPPED | OUTPATIENT
Start: 2024-01-24

## 2024-02-15 ENCOUNTER — REMOTE DEVICE CLINIC VISIT (OUTPATIENT)
Dept: CARDIOLOGY CLINIC | Facility: CLINIC | Age: 84
End: 2024-02-15
Payer: COMMERCIAL

## 2024-02-15 DIAGNOSIS — Z95.0 CARDIAC PACEMAKER IN SITU: Primary | ICD-10-CM

## 2024-02-15 PROCEDURE — 93296 REM INTERROG EVL PM/IDS: CPT | Performed by: INTERNAL MEDICINE

## 2024-02-15 PROCEDURE — 93294 REM INTERROG EVL PM/LDLS PM: CPT | Performed by: INTERNAL MEDICINE

## 2024-02-15 NOTE — PROGRESS NOTES
"Results for orders placed or performed in visit on 02/15/24   Cardiac EP device report    Narrative    MDT DUAL PM/ NOT MRI CONDITIONAL  CARELINK TRANSMISSION: BATTERY STATUS \"7 YRS.\" AP 2%  98%. ALL AVAILABLE LEAD PARAMETERS WITHIN NORMAL LIMITS. NO SIGNIFICANT HIGH RATE EPISODES. NORMAL DEVICE FUNCTION. NC         "

## 2024-06-14 ENCOUNTER — TELEPHONE (OUTPATIENT)
Dept: ADMINISTRATIVE | Facility: OTHER | Age: 84
End: 2024-06-14

## 2024-06-14 NOTE — TELEPHONE ENCOUNTER
06/14/24 12:28 PM    Patient contacted to bring Advance Directive, POLST, or Living Will document to next scheduled pcp visit.[facility] Department was unable to leave a message; no answer/ line busy.    Thank you.  Nadira Swift  [facility]

## 2024-06-16 ENCOUNTER — APPOINTMENT (EMERGENCY)
Dept: RADIOLOGY | Facility: HOSPITAL | Age: 84
End: 2024-06-16
Payer: COMMERCIAL

## 2024-06-16 ENCOUNTER — APPOINTMENT (EMERGENCY)
Dept: RADIOLOGY | Facility: HOSPITAL | Age: 84
DRG: 054 | End: 2024-06-16
Payer: COMMERCIAL

## 2024-06-16 ENCOUNTER — APPOINTMENT (INPATIENT)
Dept: RADIOLOGY | Facility: HOSPITAL | Age: 84
DRG: 054 | End: 2024-06-16
Payer: COMMERCIAL

## 2024-06-16 ENCOUNTER — HOSPITAL ENCOUNTER (EMERGENCY)
Facility: HOSPITAL | Age: 84
End: 2024-06-16
Attending: STUDENT IN AN ORGANIZED HEALTH CARE EDUCATION/TRAINING PROGRAM | Admitting: STUDENT IN AN ORGANIZED HEALTH CARE EDUCATION/TRAINING PROGRAM
Payer: COMMERCIAL

## 2024-06-16 ENCOUNTER — APPOINTMENT (EMERGENCY)
Dept: CT IMAGING | Facility: HOSPITAL | Age: 84
End: 2024-06-16
Payer: COMMERCIAL

## 2024-06-16 ENCOUNTER — HOSPITAL ENCOUNTER (INPATIENT)
Facility: HOSPITAL | Age: 84
LOS: 3 days | Discharge: HOME HEALTH | DRG: 054 | End: 2024-06-19
Attending: SURGERY | Admitting: SURGERY
Payer: COMMERCIAL

## 2024-06-16 VITALS
DIASTOLIC BLOOD PRESSURE: 110 MMHG | WEIGHT: 160 LBS | RESPIRATION RATE: 18 BRPM | SYSTOLIC BLOOD PRESSURE: 195 MMHG | HEIGHT: 67 IN | TEMPERATURE: 97.3 F | BODY MASS INDEX: 25.11 KG/M2 | HEART RATE: 95 BPM | OXYGEN SATURATION: 91 %

## 2024-06-16 DIAGNOSIS — R41.82 ALTERED MENTAL STATUS, UNSPECIFIED ALTERED MENTAL STATUS TYPE: ICD-10-CM

## 2024-06-16 DIAGNOSIS — R29.6 RECURRENT FALLS: ICD-10-CM

## 2024-06-16 DIAGNOSIS — F10.90 ALCOHOL USE DISORDER: ICD-10-CM

## 2024-06-16 DIAGNOSIS — D32.9 MENINGIOMA (HCC): ICD-10-CM

## 2024-06-16 DIAGNOSIS — S51.812A FOREARM LACERATION, LEFT, INITIAL ENCOUNTER: ICD-10-CM

## 2024-06-16 DIAGNOSIS — I10 PRIMARY HYPERTENSION: ICD-10-CM

## 2024-06-16 DIAGNOSIS — S06.4XAA EPIDURAL HEMATOMA (HCC): Primary | ICD-10-CM

## 2024-06-16 DIAGNOSIS — G93.40 ENCEPHALOPATHY: ICD-10-CM
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
## 2024-06-16 DIAGNOSIS — F10.10 ALCOHOL ABUSE: ICD-10-CM

## 2024-06-16 DIAGNOSIS — I16.9 HYPERTENSIVE CRISIS: ICD-10-CM

## 2024-06-16 LAB
2HR DELTA HS TROPONIN: 6 NG/L
4HR DELTA HS TROPONIN: 9 NG/L
ABO GROUP BLD: NORMAL
ABO GROUP BLD: NORMAL
ALBUMIN SERPL BCP-MCNC: 4.4 G/DL (ref 3.5–5)
ALBUMIN SERPL BCP-MCNC: 4.5 G/DL (ref 3.5–5)
ALP SERPL-CCNC: 46 U/L (ref 34–104)
ALP SERPL-CCNC: 55 U/L (ref 34–104)
ALT SERPL W P-5'-P-CCNC: 10 U/L (ref 7–52)
ALT SERPL W P-5'-P-CCNC: 10 U/L (ref 7–52)
ANION GAP SERPL CALCULATED.3IONS-SCNC: 10 MMOL/L (ref 4–13)
ANION GAP SERPL CALCULATED.3IONS-SCNC: 7 MMOL/L (ref 4–13)
APTT PPP: 26 SECONDS (ref 23–37)
AST SERPL W P-5'-P-CCNC: 14 U/L (ref 13–39)
AST SERPL W P-5'-P-CCNC: 15 U/L (ref 13–39)
ATRIAL RATE: 83 BPM
ATRIAL RATE: 93 BPM
ATRIAL RATE: 93 BPM
BACTERIA UR QL AUTO: ABNORMAL /HPF
BASE EXCESS BLDA CALC-SCNC: 6 MMOL/L (ref -2–3)
BASOPHILS # BLD AUTO: 0.02 THOUSANDS/ÂΜL (ref 0–0.1)
BASOPHILS # BLD AUTO: 0.03 THOUSANDS/ÂΜL (ref 0–0.1)
BASOPHILS NFR BLD AUTO: 0 % (ref 0–1)
BASOPHILS NFR BLD AUTO: 0 % (ref 0–1)
BILIRUB SERPL-MCNC: 1.55 MG/DL (ref 0.2–1)
BILIRUB SERPL-MCNC: 1.62 MG/DL (ref 0.2–1)
BILIRUB UR QL STRIP: NEGATIVE
BLD GP AB SCN SERPL QL: NEGATIVE
BLD GP AB SCN SERPL QL: NEGATIVE
BUN SERPL-MCNC: 27 MG/DL (ref 5–25)
BUN SERPL-MCNC: 27 MG/DL (ref 5–25)
CA-I BLD-SCNC: 1.16 MMOL/L (ref 1.12–1.32)
CALCIUM SERPL-MCNC: 10 MG/DL (ref 8.4–10.2)
CALCIUM SERPL-MCNC: 9.8 MG/DL (ref 8.4–10.2)
CARDIAC TROPONIN I PNL SERPL HS: 45 NG/L
CARDIAC TROPONIN I PNL SERPL HS: 51 NG/L
CARDIAC TROPONIN I PNL SERPL HS: 54 NG/L
CHLORIDE SERPL-SCNC: 102 MMOL/L (ref 96–108)
CHLORIDE SERPL-SCNC: 103 MMOL/L (ref 96–108)
CLARITY UR: ABNORMAL
CO2 SERPL-SCNC: 27 MMOL/L (ref 21–32)
CO2 SERPL-SCNC: 31 MMOL/L (ref 21–32)
COLOR UR: ABNORMAL
CREAT SERPL-MCNC: 1.35 MG/DL (ref 0.6–1.3)
CREAT SERPL-MCNC: 1.57 MG/DL (ref 0.6–1.3)
EOSINOPHIL # BLD AUTO: 0.4 THOUSAND/ÂΜL (ref 0–0.61)
EOSINOPHIL # BLD AUTO: 0.41 THOUSAND/ÂΜL (ref 0–0.61)
EOSINOPHIL NFR BLD AUTO: 5 % (ref 0–6)
EOSINOPHIL NFR BLD AUTO: 6 % (ref 0–6)
ERYTHROCYTE [DISTWIDTH] IN BLOOD BY AUTOMATED COUNT: 12.5 % (ref 11.6–15.1)
ERYTHROCYTE [DISTWIDTH] IN BLOOD BY AUTOMATED COUNT: 12.6 % (ref 11.6–15.1)
ETHANOL SERPL-MCNC: <10 MG/DL
GFR SERPL CREATININE-BSD FRML MDRD: 39 ML/MIN/1.73SQ M
GFR SERPL CREATININE-BSD FRML MDRD: 47 ML/MIN/1.73SQ M
GLUCOSE SERPL-MCNC: 108 MG/DL (ref 65–140)
GLUCOSE SERPL-MCNC: 112 MG/DL (ref 65–140)
GLUCOSE SERPL-MCNC: 113 MG/DL (ref 65–140)
GLUCOSE SERPL-MCNC: 124 MG/DL (ref 65–140)
GLUCOSE UR STRIP-MCNC: NEGATIVE MG/DL
HCO3 BLDA-SCNC: 30.1 MMOL/L (ref 24–30)
HCT VFR BLD AUTO: 44 % (ref 36.5–49.3)
HCT VFR BLD AUTO: 44.7 % (ref 36.5–49.3)
HCT VFR BLD CALC: 43 % (ref 36.5–49.3)
HGB BLD-MCNC: 14.4 G/DL (ref 12–17)
HGB BLD-MCNC: 15 G/DL (ref 12–17)
HGB BLDA-MCNC: 14.6 G/DL (ref 12–17)
HGB UR QL STRIP.AUTO: ABNORMAL
IMM GRANULOCYTES # BLD AUTO: 0.01 THOUSAND/UL (ref 0–0.2)
IMM GRANULOCYTES # BLD AUTO: 0.02 THOUSAND/UL (ref 0–0.2)
IMM GRANULOCYTES NFR BLD AUTO: 0 % (ref 0–2)
IMM GRANULOCYTES NFR BLD AUTO: 0 % (ref 0–2)
INR PPP: 1.03 (ref 0.84–1.19)
INR PPP: 1.12 (ref 0.84–1.19)
KETONES UR STRIP-MCNC: NEGATIVE MG/DL
LEUKOCYTE ESTERASE UR QL STRIP: ABNORMAL
LYMPHOCYTES # BLD AUTO: 1.3 THOUSANDS/ÂΜL (ref 0.6–4.47)
LYMPHOCYTES # BLD AUTO: 1.65 THOUSANDS/ÂΜL (ref 0.6–4.47)
LYMPHOCYTES NFR BLD AUTO: 18 % (ref 14–44)
LYMPHOCYTES NFR BLD AUTO: 19 % (ref 14–44)
MCH RBC QN AUTO: 33.6 PG (ref 26.8–34.3)
MCH RBC QN AUTO: 33.9 PG (ref 26.8–34.3)
MCHC RBC AUTO-ENTMCNC: 32.7 G/DL (ref 31.4–37.4)
MCHC RBC AUTO-ENTMCNC: 33.6 G/DL (ref 31.4–37.4)
MCV RBC AUTO: 101 FL (ref 82–98)
MCV RBC AUTO: 103 FL (ref 82–98)
MONOCYTES # BLD AUTO: 0.73 THOUSAND/ÂΜL (ref 0.17–1.22)
MONOCYTES # BLD AUTO: 0.74 THOUSAND/ÂΜL (ref 0.17–1.22)
MONOCYTES NFR BLD AUTO: 11 % (ref 4–12)
MONOCYTES NFR BLD AUTO: 8 % (ref 4–12)
NEUTROPHILS # BLD AUTO: 4.29 THOUSANDS/ÂΜL (ref 1.85–7.62)
NEUTROPHILS # BLD AUTO: 6.26 THOUSANDS/ÂΜL (ref 1.85–7.62)
NEUTS SEG NFR BLD AUTO: 64 % (ref 43–75)
NEUTS SEG NFR BLD AUTO: 69 % (ref 43–75)
NITRITE UR QL STRIP: NEGATIVE
NON-SQ EPI CELLS URNS QL MICRO: ABNORMAL /HPF
NRBC BLD AUTO-RTO: 0 /100 WBCS
NRBC BLD AUTO-RTO: 0 /100 WBCS
P AXIS: 16 DEGREES
P AXIS: 34 DEGREES
P AXIS: 40 DEGREES
PCO2 BLD: 31 MMOL/L (ref 21–32)
PCO2 BLD: 39.3 MM HG (ref 42–50)
PH BLD: 7.49 [PH] (ref 7.3–7.4)
PH UR STRIP.AUTO: 7.5 [PH]
PLATELET # BLD AUTO: 216 THOUSANDS/UL (ref 149–390)
PLATELET # BLD AUTO: 216 THOUSANDS/UL (ref 149–390)
PMV BLD AUTO: 10.3 FL (ref 8.9–12.7)
PMV BLD AUTO: 10.4 FL (ref 8.9–12.7)
PO2 BLD: 48 MM HG (ref 35–45)
POTASSIUM BLD-SCNC: 3.6 MMOL/L (ref 3.5–5.3)
POTASSIUM SERPL-SCNC: 3.6 MMOL/L (ref 3.5–5.3)
POTASSIUM SERPL-SCNC: 4.2 MMOL/L (ref 3.5–5.3)
PR INTERVAL: 154 MS
PR INTERVAL: 188 MS
PR INTERVAL: 190 MS
PROT SERPL-MCNC: 6.5 G/DL (ref 6.4–8.4)
PROT SERPL-MCNC: 7 G/DL (ref 6.4–8.4)
PROT UR STRIP-MCNC: ABNORMAL MG/DL
PROTHROMBIN TIME: 13.7 SECONDS (ref 11.6–14.5)
PROTHROMBIN TIME: 14.3 SECONDS (ref 11.6–14.5)
QRS AXIS: -30 DEGREES
QRS AXIS: -30 DEGREES
QRS AXIS: -35 DEGREES
QRSD INTERVAL: 198 MS
QRSD INTERVAL: 202 MS
QRSD INTERVAL: 208 MS
QT INTERVAL: 450 MS
QT INTERVAL: 456 MS
QT INTERVAL: 462 MS
QTC INTERVAL: 535 MS
QTC INTERVAL: 559 MS
QTC INTERVAL: 574 MS
RBC # BLD AUTO: 4.29 MILLION/UL (ref 3.88–5.62)
RBC # BLD AUTO: 4.42 MILLION/UL (ref 3.88–5.62)
RBC #/AREA URNS AUTO: ABNORMAL /HPF
RH BLD: POSITIVE
RH BLD: POSITIVE
SAO2 % BLD FROM PO2: 87 % (ref 60–85)
SODIUM BLD-SCNC: 140 MMOL/L (ref 136–145)
SODIUM SERPL-SCNC: 140 MMOL/L (ref 135–147)
SODIUM SERPL-SCNC: 140 MMOL/L (ref 135–147)
SP GR UR STRIP.AUTO: 1.01 (ref 1–1.03)
SPECIMEN EXPIRATION DATE: NORMAL
SPECIMEN EXPIRATION DATE: NORMAL
SPECIMEN SOURCE: ABNORMAL
T WAVE AXIS: 146 DEGREES
T WAVE AXIS: 151 DEGREES
T WAVE AXIS: 160 DEGREES
UROBILINOGEN UR STRIP-ACNC: <2 MG/DL
VENTRICULAR RATE: 83 BPM
VENTRICULAR RATE: 93 BPM
VENTRICULAR RATE: 93 BPM
WBC # BLD AUTO: 6.75 THOUSAND/UL (ref 4.31–10.16)
WBC # BLD AUTO: 9.11 THOUSAND/UL (ref 4.31–10.16)
WBC #/AREA URNS AUTO: ABNORMAL /HPF

## 2024-06-16 PROCEDURE — 80053 COMPREHEN METABOLIC PANEL: CPT | Performed by: STUDENT IN AN ORGANIZED HEALTH CARE EDUCATION/TRAINING PROGRAM

## 2024-06-16 PROCEDURE — 72125 CT NECK SPINE W/O DYE: CPT

## 2024-06-16 PROCEDURE — 36415 COLL VENOUS BLD VENIPUNCTURE: CPT | Performed by: STUDENT IN AN ORGANIZED HEALTH CARE EDUCATION/TRAINING PROGRAM

## 2024-06-16 PROCEDURE — 86900 BLOOD TYPING SEROLOGIC ABO: CPT | Performed by: STUDENT IN AN ORGANIZED HEALTH CARE EDUCATION/TRAINING PROGRAM

## 2024-06-16 PROCEDURE — 71250 CT THORAX DX C-: CPT

## 2024-06-16 PROCEDURE — 85610 PROTHROMBIN TIME: CPT | Performed by: STUDENT IN AN ORGANIZED HEALTH CARE EDUCATION/TRAINING PROGRAM

## 2024-06-16 PROCEDURE — 82948 REAGENT STRIP/BLOOD GLUCOSE: CPT

## 2024-06-16 PROCEDURE — 84484 ASSAY OF TROPONIN QUANT: CPT | Performed by: STUDENT IN AN ORGANIZED HEALTH CARE EDUCATION/TRAINING PROGRAM

## 2024-06-16 PROCEDURE — 85730 THROMBOPLASTIN TIME PARTIAL: CPT | Performed by: SURGERY

## 2024-06-16 PROCEDURE — 96375 TX/PRO/DX INJ NEW DRUG ADDON: CPT

## 2024-06-16 PROCEDURE — 99223 1ST HOSP IP/OBS HIGH 75: CPT | Performed by: EMERGENCY MEDICINE

## 2024-06-16 PROCEDURE — 99291 CRITICAL CARE FIRST HOUR: CPT | Performed by: STUDENT IN AN ORGANIZED HEALTH CARE EDUCATION/TRAINING PROGRAM

## 2024-06-16 PROCEDURE — 85610 PROTHROMBIN TIME: CPT | Performed by: SURGERY

## 2024-06-16 PROCEDURE — 82947 ASSAY GLUCOSE BLOOD QUANT: CPT

## 2024-06-16 PROCEDURE — 96365 THER/PROPH/DIAG IV INF INIT: CPT

## 2024-06-16 PROCEDURE — 80053 COMPREHEN METABOLIC PANEL: CPT | Performed by: SURGERY

## 2024-06-16 PROCEDURE — 82077 ASSAY SPEC XCP UR&BREATH IA: CPT | Performed by: STUDENT IN AN ORGANIZED HEALTH CARE EDUCATION/TRAINING PROGRAM

## 2024-06-16 PROCEDURE — 74176 CT ABD & PELVIS W/O CONTRAST: CPT

## 2024-06-16 PROCEDURE — 82330 ASSAY OF CALCIUM: CPT

## 2024-06-16 PROCEDURE — 70470 CT HEAD/BRAIN W/O & W/DYE: CPT

## 2024-06-16 PROCEDURE — 84132 ASSAY OF SERUM POTASSIUM: CPT

## 2024-06-16 PROCEDURE — 85025 COMPLETE CBC W/AUTO DIFF WBC: CPT | Performed by: SURGERY

## 2024-06-16 PROCEDURE — 85025 COMPLETE CBC W/AUTO DIFF WBC: CPT | Performed by: STUDENT IN AN ORGANIZED HEALTH CARE EDUCATION/TRAINING PROGRAM

## 2024-06-16 PROCEDURE — 83036 HEMOGLOBIN GLYCOSYLATED A1C: CPT

## 2024-06-16 PROCEDURE — 82803 BLOOD GASES ANY COMBINATION: CPT

## 2024-06-16 PROCEDURE — 71045 X-RAY EXAM CHEST 1 VIEW: CPT

## 2024-06-16 PROCEDURE — 81001 URINALYSIS AUTO W/SCOPE: CPT

## 2024-06-16 PROCEDURE — 86900 BLOOD TYPING SEROLOGIC ABO: CPT | Performed by: SURGERY

## 2024-06-16 PROCEDURE — 85014 HEMATOCRIT: CPT

## 2024-06-16 PROCEDURE — 99291 CRITICAL CARE FIRST HOUR: CPT | Performed by: SURGERY

## 2024-06-16 PROCEDURE — 86901 BLOOD TYPING SEROLOGIC RH(D): CPT | Performed by: SURGERY

## 2024-06-16 PROCEDURE — 86850 RBC ANTIBODY SCREEN: CPT | Performed by: SURGERY

## 2024-06-16 PROCEDURE — 84295 ASSAY OF SERUM SODIUM: CPT

## 2024-06-16 PROCEDURE — 93005 ELECTROCARDIOGRAM TRACING: CPT

## 2024-06-16 PROCEDURE — 70450 CT HEAD/BRAIN W/O DYE: CPT

## 2024-06-16 PROCEDURE — 86901 BLOOD TYPING SEROLOGIC RH(D): CPT | Performed by: STUDENT IN AN ORGANIZED HEALTH CARE EDUCATION/TRAINING PROGRAM

## 2024-06-16 PROCEDURE — 99285 EMERGENCY DEPT VISIT HI MDM: CPT

## 2024-06-16 PROCEDURE — 93010 ELECTROCARDIOGRAM REPORT: CPT | Performed by: INTERNAL MEDICINE

## 2024-06-16 PROCEDURE — 86850 RBC ANTIBODY SCREEN: CPT | Performed by: STUDENT IN AN ORGANIZED HEALTH CARE EDUCATION/TRAINING PROGRAM

## 2024-06-16 RX ORDER — POLYETHYLENE GLYCOL 3350 17 G/17G
17 POWDER, FOR SOLUTION ORAL DAILY PRN
Status: DISCONTINUED | OUTPATIENT
Start: 2024-06-16 | End: 2024-06-19 | Stop reason: HOSPADM

## 2024-06-16 RX ORDER — CHLORHEXIDINE GLUCONATE ORAL RINSE 1.2 MG/ML
15 SOLUTION DENTAL EVERY 12 HOURS SCHEDULED
Status: DISCONTINUED | OUTPATIENT
Start: 2024-06-16 | End: 2024-06-19 | Stop reason: HOSPADM

## 2024-06-16 RX ORDER — SODIUM CHLORIDE 9 MG/ML
3 INJECTION INTRAVENOUS
Status: DISCONTINUED | OUTPATIENT
Start: 2024-06-16 | End: 2024-06-16 | Stop reason: HOSPADM

## 2024-06-16 RX ORDER — INSULIN LISPRO 100 [IU]/ML
1-5 INJECTION, SOLUTION INTRAVENOUS; SUBCUTANEOUS
Status: DISCONTINUED | OUTPATIENT
Start: 2024-06-17 | End: 2024-06-19 | Stop reason: HOSPADM

## 2024-06-16 RX ORDER — GINSENG 100 MG
1 CAPSULE ORAL ONCE
Status: COMPLETED | OUTPATIENT
Start: 2024-06-16 | End: 2024-06-16

## 2024-06-16 RX ORDER — GINSENG 100 MG
1 CAPSULE ORAL 2 TIMES DAILY
Status: DISCONTINUED | OUTPATIENT
Start: 2024-06-16 | End: 2024-06-19 | Stop reason: HOSPADM

## 2024-06-16 RX ORDER — HYDRALAZINE HYDROCHLORIDE 20 MG/ML
5 INJECTION INTRAMUSCULAR; INTRAVENOUS ONCE
Status: COMPLETED | OUTPATIENT
Start: 2024-06-16 | End: 2024-06-16

## 2024-06-16 RX ORDER — NICARDIPINE HYDROCHLORIDE 0.1 MG/ML
1-15 INJECTION INTRAVENOUS
Status: DISCONTINUED | OUTPATIENT
Start: 2024-06-16 | End: 2024-06-16 | Stop reason: HOSPADM

## 2024-06-16 RX ORDER — LANOLIN ALCOHOL/MO/W.PET/CERES
100 CREAM (GRAM) TOPICAL DAILY
Status: DISCONTINUED | OUTPATIENT
Start: 2024-06-17 | End: 2024-06-18

## 2024-06-16 RX ORDER — FOLIC ACID 1 MG/1
1 TABLET ORAL DAILY
Status: DISCONTINUED | OUTPATIENT
Start: 2024-06-17 | End: 2024-06-19 | Stop reason: HOSPADM

## 2024-06-16 RX ADMIN — BACITRACIN ZINC 1 SMALL APPLICATION: 500 OINTMENT TOPICAL at 12:45

## 2024-06-16 RX ADMIN — BACITRACIN 1 SMALL APPLICATION: 500 OINTMENT TOPICAL at 21:05

## 2024-06-16 RX ADMIN — IOHEXOL 80 ML: 350 INJECTION, SOLUTION INTRAVENOUS at 20:13

## 2024-06-16 RX ADMIN — NICARDIPINE HYDROCHLORIDE 5 MG/HR: 2.5 INJECTION, SOLUTION INTRAVENOUS at 19:37

## 2024-06-16 RX ADMIN — CHLORHEXIDINE GLUCONATE 0.12% ORAL RINSE 15 ML: 1.2 LIQUID ORAL at 21:05

## 2024-06-16 RX ADMIN — NICARDIPINE HYDROCHLORIDE 2.5 MG/HR: 0.1 INJECTION INTRAVENOUS at 16:47

## 2024-06-16 RX ADMIN — HYDRALAZINE HYDROCHLORIDE 5 MG: 20 INJECTION INTRAMUSCULAR; INTRAVENOUS at 15:33

## 2024-06-16 NOTE — ASSESSMENT & PLAN NOTE
-Patient reports fall yesterday  - CTH: Isodense epidural hematoma along the right frontal convexity measuring 2.1 x 1.3 x 1.1 cm, favored to be subacute. There is associated mild mass effect with approximately 6 mm leftward midline shift.   -Reportedly confused at outside hospital and transported priority 1 on a Cardene drip  -On arrival, GCS 14 on Cardene drip 7.5  -Repeat CT head now pain  -Neurosurgery consulted, appreciate recommendations  -Continue Cardene drip with goal systolic blood pressure less than 160  -Every hour neurochecks  -Patient denies being on blood thinners

## 2024-06-16 NOTE — ASSESSMENT & PLAN NOTE
"Lab Results   Component Value Date    HGBA1C 5.8 06/14/2023       No results for input(s): \"POCGLU\" in the last 72 hours.    Blood Sugar Average: Last 72 hrs:    -Initiate insulin sliding scale  -Frequent glucose monitoring  -Hypoglycemia protocol  "

## 2024-06-16 NOTE — ASSESSMENT & PLAN NOTE
- Currently on 2 L nasal cannula but no wheezing or focal lung findings  -As needed albuterol  -Monitor respiratory status

## 2024-06-16 NOTE — CONSULTS
Consult - Critical Care   To Dennison 84 y.o. male MRN: [id number]  Unit/Bed#: ICU 08 Encounter: [id number]      -------------------------------------------------------------------------------------------------------------  Chief Complaint: Epidural Hematoma     History of Present Illness   HX and PE limited by: nothing  To Dennison is a 84 y.o. male with PMHx of DM2, HTN, CKD4 (baseline Cr ~1.6), glaucoma R eye, chronic lower back pain on norco, presented to [facility] for evaluation of L forearm laceration 2/2 to mechanical fall last night. Pt denied head strike. Pt admits to driniking a large amount of alcohol daily, but denies being intoxicated at time of fall.     Per chart review, patient initially presented to the ER for LUE skin tear after falling onto hands and knees. Patient had multiple cuts/bruises in various stages of healing. Ultimately, CTH was ordered which was read as an epidural hematoma along the right frontal convexity measuring 2.1 x 1.3 x 1.1 cm, approximately 6 mm leftward midline shift. Pt transferred by air to trauma service .    History obtained from chart review.  -------------------------------------------------------------------------------------------------------------  Assessment and Plan:    Neuro:   Diagnosis: Epidural hematoma vs subdural hematoma vs meningioma  Plan:   Appreciate neurosurgery recommendations  Patient with MRI-incompatible PPM; CT head w/wo contrast to determine nature of CT imaging finding   Q1h neuro checks  SBP < 160  Cardene gtt until epidural hematoma ruled out on MRI   Diagnosis: Alcohol abuse   Plan: CIWA  High dose thiamine, folate   Diagnosis: Chronic back pain   Plan: Norco at home;     CV:   Diagnosis: HTN, CAD, Sick sinus syndrome s/p PPM, ascending aortic ectasia measuring 42 mm (seen on CT CAP 6/16/24)  Plan:   Hold home amlodipine 10mg  Hold home aspirin 81mg   Hold home lisinopril 20mg   1 year interval imaging to monitor aortic ectasia      Pulm:  Diagnosis: COPD  Plan:   Maintain SpO2 88-92%  Does not require home oxygen    GI:   Diagnosis: No active issues   Miralax daily PRN      :   Diagnosis: CKD4 w/baseline Cr ~ 1.6  Plan: Avoid NSAIDS and nephrotoxic medications   Monitor BMPs    F/E/N:   F: No maintenance fluids  E: Replete as needed for K > 4, phos > 3, Mg > 2  N: Regular diet       Heme/Onc:   Diagnosis: Hx basal cell carcinoma, 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,   Plan:   Trend CBC    Endo:   Diagnosis: DM2  Plan:  SSI algorithm 2    ID:   Diagnosis: Concern for UTI  Plan:   UA with reflex to microscopic    MSK/Skin:   PT/OT as able  Up and out of bed as able  Diagnosis: L forearm skin tear  Plan:   Bacitracin BID    Disposition: Admit to Critical Care   Code Status: Level 1 - Full Code  --------------------------------------------------------------------------------------------------------------  Review of Systems    A 12-point, complete review of systems was reviewed and negative except as stated above     Physical Exam  Constitutional:       Appearance: Normal appearance.      Comments: Cervical collar    HENT:      Head: Normocephalic and atraumatic.      Mouth/Throat:      Mouth: Mucous membranes are moist.      Pharynx: Oropharynx is clear.   Eyes:      Extraocular Movements: Extraocular movements intact.      Pupils: Pupils are equal, round, and reactive to light.   Cardiovascular:      Rate and Rhythm: Normal rate and regular rhythm.   Pulmonary:      Effort: Pulmonary effort is normal.      Breath sounds: Normal breath sounds.   Abdominal:      General: Abdomen is flat. There is no distension.      Tenderness: There is no abdominal tenderness. There is no guarding.   Skin:     General: Skin is warm.   Neurological:      General: No focal deficit present.      Mental Status: He is alert and oriented to person, place, and time.      Cranial Nerves: No cranial nerve deficit.      Sensory: No sensory  "deficit.      Motor: No weakness.      Coordination: Coordination normal.         --------------------------------------------------------------------------------------------------------------  Vitals:   Vitals:    06/16/24 1744 06/16/24 1800 06/16/24 1815 06/16/24 1854   BP: (!) 171/86 (!) 203/108 (!) 193/100 112/80   Pulse: (!) 115 (!) 112 (!) 112 82   Resp: 18 18 20 (!) 25   Temp: 100.5 °F (38.1 °C)  97.7 °F (36.5 °C) 97.7 °F (36.5 °C)   TempSrc: Tympanic  Oral    SpO2: 95% 96% 96% 94%   Weight: 67.2 kg (148 lb 2.4 oz)        Temp  Min: 97.3 °F (36.3 °C)  Max: 100.5 °F (38.1 °C)        Body mass index is 23.2 kg/m².      Laboratory and Diagnostics:  Results from last 7 days   Lab Units 06/16/24 1749 06/16/24 1747 06/16/24  1303   WBC Thousand/uL 9.11  --  6.75   HEMOGLOBIN g/dL 15.0  --  14.4   I STAT HEMOGLOBIN g/dl  --  14.6  --    HEMATOCRIT % 44.7  --  44.0   HEMATOCRIT, ISTAT %  --  43  --    PLATELETS Thousands/uL 216  --  216   SEGS PCT % 69  --  64   MONO PCT % 8  --  11   EOS PCT % 5  --  6     Results from last 7 days   Lab Units 06/16/24 1749 06/16/24 1747 06/16/24  1303   SODIUM mmol/L 140  --  140   POTASSIUM mmol/L 3.6  --  4.2   CHLORIDE mmol/L 103  --  102   CO2 mmol/L 27  --  31   CO2, I-STAT mmol/L  --  31  --    ANION GAP mmol/L 10  --  7   BUN mg/dL 27*  --  27*   CREATININE mg/dL 1.35*  --  1.57*   CALCIUM mg/dL 9.8  --  10.0   GLUCOSE RANDOM mg/dL 108  --  112   ALT U/L 10  --  10   AST U/L 14  --  15   ALK PHOS U/L 55  --  46   ALBUMIN g/dL 4.4  --  4.5   TOTAL BILIRUBIN mg/dL 1.55*  --  1.62*          Results from last 7 days   Lab Units 06/16/24  1749 06/16/24  1413   INR  1.12 1.03   PTT seconds 26  --               ABG:    VBG:          Micro:        EKG:   Imaging: I have personally reviewed pertinent reports.      Historical Information   Past Medical History:   Diagnosis Date    Ambulates with cane     unsure \"why\" falls at times     last fall 09/27/2021    Anxiety state " 08/05/2018    Basal cell carcinoma of skin 11/15/2016    Cancer (HCC)     BASAL CA SKIN    Chronic kidney disease     ACUTE KIDNEY INJURY 3/21    Constipation     COPD (chronic obstructive pulmonary disease) ([facility])     Coronary artery disease 05/22/2012    Diabetes mellitus ([facility])     TYPE 2    Forearm laceration, right, initial encounter 05/28/2020    Hydronephrosis     LAST ASSESSED: 11/18/15    Hypertension     Mass of right side of neck     radical neck today 10/1/2021    Osteoporosis 05/22/2012    Pacemaker     SSS (sick sinus syndrome) ([facility])     s/p generator changeout 3/24/2023    Trifascicular block      Past Surgical History:   Procedure Laterality Date    CARDIAC PACEMAKER PLACEMENT  11/2009    Medtronic dual chamber     CARDIAC PACEMAKER PLACEMENT Left 3/24/2023    Procedure: DUAL LEAD PACEMAKER BATTERY REMOVAL AND REPLACE.;  Surgeon: To Neff MD;  Location: [facility];  Service: Cardiology    CATARACT EXTRACTION W/  INTRAOCULAR LENS IMPLANT      CYSTOSCOPY N/A 05/09/2016    Procedure: CYSTOSCOPY, evacuation of bladder calculi;  Surgeon: Haim Melendez MD;  Location: [facility];  Service:     INGUINAL HERNIA REPAIR      UNILATERAL    KIDNEY STONE SURGERY      MOHS SURGERY Right 08/31/2022    removal of right ear BCCA with free graft nd reconstruction - Dr. Aguilar and Dr. Newman    OTHER SURGICAL HISTORY      HERNIA REPAIR AS PER [facility] (ALREADY IN PERTINENT NEGATIVES)    PAROTIDECTOMY Right 10/01/2021    Procedure: TOTAL PAROTIDECTOMY WITH NIMS; MODIFIED RADICAL NECK DISSECTION;  Surgeon: Karsten Newman DO;  Location: [facility];  Service: ENT    ID TRURL ELECTROSURG RESCJ PROSTATE BLEED COMPLETE N/A 05/09/2016    Procedure: TRANSURETHRAL RESECTION OF PROSTATE (TURP);  Surgeon: Haim Melendez MD;  Location: [facility];  Service: Urology    TONSILLECTOMY       Social History   Social History     Substance and Sexual Activity   Alcohol Use Not Currently    Alcohol/week: 2.0 standard  drinks of alcohol    Types: 2 Cans of beer per week    Comment: 2  beers daily and shot by hx/No ETOH x 3wks.      Social History     Substance and Sexual Activity   Drug Use No     Social History     Tobacco Use   Smoking Status Former    Current packs/day: 0.00    Average packs/day: 1.5 packs/day for 32.3 years (48.5 ttl pk-yrs)    Types: Cigars, Cigarettes    Start date:     Quit date: 1980    Years since quittin.1   Smokeless Tobacco Former     Family History:   Family History   Problem Relation Age of Onset    Stroke Father         SYNDROME    Coronary artery disease Family         less than 60 years of age           Medications:  Current Facility-Administered Medications   Medication Dose Route Frequency    bacitracin topical ointment 1 small application  1 small application Topical BID    chlorhexidine (PERIDEX) 0.12 % oral rinse 15 mL  15 mL Mouth/Throat Q12H RENATO    [START ON 2024] folic acid (FOLVITE) tablet 1 mg  1 mg Oral Daily    [START ON 2024] insulin lispro (HumALOG/ADMELOG) 100 units/mL subcutaneous injection 1-5 Units  1-5 Units Subcutaneous TID AC    [START ON 2024] multivitamin-minerals (CENTRUM) tablet 1 tablet  1 tablet Oral Daily    niCARdipine (CARDENE) 25 mg (STANDARD CONCENTRATION) in sodium chloride 0.9% 250 mL  1-15 mg/hr Intravenous Titrated    polyethylene glycol (MIRALAX) packet 17 g  17 g Oral Daily PRN    [START ON 2024] thiamine tablet 100 mg  100 mg Oral Daily     Home medications:  Prior to Admission Medications   Prescriptions Last Dose Informant Patient Reported? Taking?   Combigan 0.2-0.5 %   No No   Sig: Administer 1 drop to the right eye every 12 (twelve) hours   Nutritional Supplements (Boost) LIQD  Self No No   Sig: Take 237 mL by mouth daily   amLODIPine (NORVASC) 10 mg tablet   No No   Sig: Take 1 tablet (10 mg total) by mouth daily   aspirin 81 mg chewable tablet   Yes No   Sig: Chew 81 mg daily   Patient not taking: Reported on 2024  "  calcium carbonate (TUMS) 500 mg chewable tablet  Self Yes No   Sig: Chew 1 tablet as needed for indigestion or heartburn.   cyclobenzaprine (FLEXERIL) 10 mg tablet   No No   Sig: Take 1 tablet by mouth twice daily   finasteride (PROSCAR) 5 mg tablet   No No   Sig: Take 1 tablet (5 mg total) by mouth daily   glucose blood (ONE TOUCH ULTRA TEST) test strip  Self No No   Sig: Test sugar once daily   lisinopril (ZESTRIL) 20 mg tablet   No No   Sig: Take 1 tablet by mouth once daily   polyethylene glycol (MIRALAX) 17 g packet   No No   Sig: Take 17 g by mouth daily   simvastatin (ZOCOR) 10 mg tablet   No No   Sig: Take 1 tablet (10 mg total) by mouth every evening   tamsulosin (FLOMAX) 0.4 mg   No No   Sig: Take 1 capsule (0.4 mg total) by mouth daily with dinner      Facility-Administered Medications: None     Allergies:  No Known Allergies  ------------------------------------------------------------------------------------------------------------  Advance Directive and Living Will:      Power of :    POLST:    ------------------------------------------------------------------------------------------------------------  Anticipated Length of Stay is > 2 midnights        Gilda Nice MD        Portions of the record may have been created with voice recognition software.  Occasional wrong word or \"sound a like\" substitutions may have occurred due to the inherent limitations of voice recognition software.  Read the chart carefully and recognize, using context, where substitutions have occurred   "

## 2024-06-16 NOTE — EMTALA/ACUTE CARE TRANSFER
[facility] EMERGENCY DEPARTMENT  [address] [place] [address]  Dept: [phone]      EMTALA TRANSFER CONSENT    NAME To Dennison                                         1940                              MRN [id number]    I have been informed of my rights regarding examination, treatment, and transfer   by Dr. Lm Elizabeth MD    Benefits: Specialized equipment and/or services available at the receiving facility (Include comment)________________________    Risks: Potential for delay in receiving treatment      Transfer Request   I acknowledge that my medical condition has been evaluated and explained to me by the emergency department physician or other qualified medical person and/or my attending physician who has recommended and offered to me further medical examination and treatment. I understand the Hospital's obligation with respect to the treatment and stabilization of my emergency medical condition. I nevertheless request to be transferred. I release the Hospital, the doctor, and any other persons caring for me from all responsibility or liability for any injury or ill effects that may result from my transfer and agree to accept all responsibility for the consequences of my choice to transfer, rather than receive stabilizing treatment at the Hospital. I understand that because the transfer is my request, my insurance may not provide reimbursement for the services.  The Hospital will assist and direct me and my family in how to make arrangements for transfer, but the hospital is not liable for any fees charged by the transport service.  In spite of this understanding, I refuse to consent to further medical examination and treatment which has been offered to me, and request transfer to  . I authorize the performance of emergency medical procedures and treatments upon me in both transit and upon arrival at the receiving facility.  Additionally, I authorize the release  of any and all medical records to the receiving facility and request they be transported with me, if possible.    I authorize the performance of emergency medical procedures and treatments upon me in both transit and upon arrival at the receiving facility.  Additionally, I authorize the release of any and all medical records to the receiving facility and request they be transported with me, if possible.  I understand that the safest mode of transportation during a medical emergency is an ambulance and that the Hospital advocates the use of this mode of transport. Risks of traveling to the receiving facility by car, including absence of medical control, life sustaining equipment, such as oxygen, and medical personnel has been explained to me and I fully understand them.    (DAVE CORRECT BOX BELOW)  [  ]  I consent to the stated transfer and to be transported by ambulance/helicopter.  [  ]  I consent to the stated transfer, but refuse transportation by ambulance and accept full responsibility for my transportation by car.  I understand the risks of non-ambulance transfers and I exonerate the Hospital and its staff from any deterioration in my condition that results from this refusal.    X___________________________________________    DATE  24  TIME________  Signature of patient or legally responsible individual signing on patient behalf           RELATIONSHIP TO PATIENT_________________________          Provider Certification    NAME To Dennison                                         1940                              MRN [id number]    A medical screening exam was performed on the above named patient.  Based on the examination:    Condition Necessitating Transfer The primary encounter diagnosis was Epidural hematoma (HCC). Diagnoses of Forearm laceration, left, initial encounter, Recurrent falls, Altered mental status, unspecified altered mental status type, and Alcohol use disorder were also  pertinent to this visit.    Patient Condition: The patient has been stabilized such that within reasonable medical probability, no material deterioration of the patient condition or the condition of the unborn child(sy) is likely to result from the transfer    Reason for Transfer: Level of Care needed not available at this facility    Transfer Requirements: Facility     Space available and qualified personnel available for treatment as acknowledged by    Agreed to accept transfer and to provide appropriate medical treatment as acknowledged by       [facility]  Appropriate medical records of the examination and treatment of the patient are provided at the time of transfer   STAFF INITIAL WHEN COMPLETED _______  Transfer will be performed by qualified personnel from    and appropriate transfer equipment as required, including the use of necessary and appropriate life support measures.    Provider Certification: I have examined the patient and explained the following risks and benefits of being transferred/refusing transfer to the patient/family:         Based on these reasonable risks and benefits to the patient and/or the unborn child(sy), and based upon the information available at the time of the patient’s examination, I certify that the medical benefits reasonably to be expected from the provision of appropriate medical treatments at another medical facility outweigh the increasing risks, if any, to the individual’s medical condition, and in the case of labor to the unborn child, from effecting the transfer.    X____________________________________________ DATE 06/16/24        TIME_______      ORIGINAL - SEND TO MEDICAL RECORDS   COPY - SEND WITH PATIENT DURING TRANSFER

## 2024-06-16 NOTE — ASSESSMENT & PLAN NOTE
- Currently on Cardene drip  -Patient says he is intermittently compliant with his home regimen but cannot recall what he is on

## 2024-06-16 NOTE — ED NOTES
Cardene 20mg in 200ml started at 5mg/hr from [facility]. Ordered dose at [facility] 25mg/250ml therefore medication unable to be scanned.      Corinne D Frick, RN  06/16/24 1074

## 2024-06-16 NOTE — H&P
"  Trauma Alert: Level A   Model of Arrival: Helicopter    Trauma Team: Attending [facility] and Residents Supriya  Consultants: Neurosurgery    History of Present Illness     Chief Complaint: Fall  Mechanism:Fall     HPI:    To Dennison is a 84 y.o. male who presents with fall.  Patient states that he sustained a fall yesterday after tripping over his feet.  He sustained a laceration to his left forearm but initially stated that he did not hit his head or neck.  He presented to [facility] for evaluation of this.  At that time he admitted to drinking a large amount alcohol daily.  He was found on CT imaging to have an isodense epidural hematoma with 6 mm leftward midline shift.  He was also noted to be confused at outside hospital.  Patient was transported prior to he wants and arrived in the trauma bay as a level a trauma for neurosurgical evaluation.    Review of Systems   Constitutional:  Negative for chills and fever.   HENT:  Positive for ear pain.    Eyes:  Negative for pain and visual disturbance.   Respiratory:  Negative for cough and shortness of breath.    Cardiovascular:  Negative for chest pain.   Gastrointestinal:  Negative for abdominal pain, nausea and vomiting.   Musculoskeletal:  Negative for back pain and neck pain.   Neurological:  Negative for speech difficulty, weakness and headaches.     12-point, complete review of systems was reviewed and negative except as stated above.     Historical Information     Past Medical History:   Diagnosis Date    Ambulates with cane     unsure \"why\" falls at times     last fall 09/27/2021    Anxiety state 08/05/2018    Basal cell carcinoma of skin 11/15/2016    Cancer (HCC)     BASAL CA SKIN    Chronic kidney disease     ACUTE KIDNEY INJURY 3/21    Constipation     COPD (chronic obstructive pulmonary disease) ([facility])     Coronary artery disease 05/22/2012    Diabetes mellitus (HCC)     TYPE 2    Forearm laceration, right, initial encounter " 2020    Hydronephrosis     LAST ASSESSED: 11/18/15    Hypertension     Mass of right side of neck     radical neck today 10/1/2021    Osteoporosis 2012    Pacemaker     SSS (sick sinus syndrome) (HCC)     s/p generator changeout 3/24/2023    Trifascicular block      Past Surgical History:   Procedure Laterality Date    CARDIAC PACEMAKER PLACEMENT  2009    Medtronic dual chamber     CARDIAC PACEMAKER PLACEMENT Left 3/24/2023    Procedure: DUAL LEAD PACEMAKER BATTERY REMOVAL AND REPLACE.;  Surgeon: To Neff MD;  Location: [facility];  Service: Cardiology    CATARACT EXTRACTION W/  INTRAOCULAR LENS IMPLANT      CYSTOSCOPY N/A 2016    Procedure: CYSTOSCOPY, evacuation of bladder calculi;  Surgeon: Haim Melendez MD;  Location: [facility];  Service:     INGUINAL HERNIA REPAIR      UNILATERAL    KIDNEY STONE SURGERY      MOHS SURGERY Right 2022    removal of right ear BCCA with free graft nd reconstruction - Dr. Aguilar and Dr. Newman    OTHER SURGICAL HISTORY      HERNIA REPAIR AS PER [facility]TS (ALREADY IN PERTINENT NEGATIVES)    PAROTIDECTOMY Right 10/01/2021    Procedure: TOTAL PAROTIDECTOMY WITH NIMS; MODIFIED RADICAL NECK DISSECTION;  Surgeon: Karsten Newman DO;  Location: [facility];  Service: ENT    OH TRURL ELECTROSURG RESCJ PROSTATE BLEED COMPLETE N/A 2016    Procedure: TRANSURETHRAL RESECTION OF PROSTATE (TURP);  Surgeon: Haim Melendez MD;  Location: [facility];  Service: Urology    TONSILLECTOMY          Social History     Tobacco Use    Smoking status: Former     Current packs/day: 0.00     Average packs/day: 1.5 packs/day for 32.3 years (48.5 ttl pk-yrs)     Types: Cigars, Cigarettes     Start date:      Quit date: 1980     Years since quittin.1    Smokeless tobacco: Former   Vaping Use    Vaping status: Never Used   Substance Use Topics    Alcohol use: Not Currently     Alcohol/week: 2.0 standard drinks of alcohol     Types: 2 Cans of beer  per week     Comment: 2  beers daily and shot by hx/No ETOH x 3wks.     Drug use: No     Immunization History   Administered Date(s) Administered    COVID-19 MODERNA VACC 0.5 ML IM 06/21/2021, 07/19/2021, 01/26/2022    INFLUENZA 12/22/2014, 10/24/2016, 11/04/2022    Influenza Quadrivalent Preservative Free 3 years and older IM 12/22/2014    Influenza Split High Dose Preservative Free IM 10/24/2016, 09/18/2017    Influenza, high dose seasonal 0.7 mL 09/26/2018, 10/15/2019, 10/30/2020, 10/12/2021, 11/04/2022    Influenza, seasonal, injectable 10/05/2011, 01/04/2013, 12/05/2013    Pneumococcal Polysaccharide PPV23 07/27/2009    Tdap 05/28/2020, 12/08/2021     Last Tetanus: 2021  Family History: Non-contributory    1. Before the illness or injury that brought you to the Emergency, did you need someone to help you on a regular basis? 0=No   2. Since the illness or injury that brought you to the Emergency, have you needed more help than usual to take care of yourself? 0=No   3. Have you been hospitalized for one or more nights during the past 6 months (excluding a stay in the Emergency Department)? 0=No   4. In general, do you see well? 0=Yes   5. In general, do you have serious problems with your memory? 0=No   6. Do you take more than three different medications everyday? 1=Yes   TOTAL   1     Did you order a geriatric consult if the score was 2 or greater?: yes     Meds/Allergies   all current active meds have been reviewed, current meds:   Current Facility-Administered Medications   Medication Dose Route Frequency    chlorhexidine (PERIDEX) 0.12 % oral rinse 15 mL  15 mL Mouth/Throat Q12H RENATO    niCARdipine (CARDENE) 25 mg (STANDARD CONCENTRATION) in sodium chloride 0.9% 250 mL  1-15 mg/hr Intravenous Titrated   , and PTA meds:   Prior to Admission Medications   Prescriptions Last Dose Informant Patient Reported? Taking?   Combigan 0.2-0.5 %   No No   Sig: Administer 1 drop to the right eye every 12 (twelve) hours    Nutritional Supplements (Boost) LIQD  Self No No   Sig: Take 237 mL by mouth daily   amLODIPine (NORVASC) 10 mg tablet   No No   Sig: Take 1 tablet (10 mg total) by mouth daily   aspirin 81 mg chewable tablet   Yes No   Sig: Chew 81 mg daily   Patient not taking: Reported on 6/16/2024   calcium carbonate (TUMS) 500 mg chewable tablet  Self Yes No   Sig: Chew 1 tablet as needed for indigestion or heartburn.   cyclobenzaprine (FLEXERIL) 10 mg tablet   No No   Sig: Take 1 tablet by mouth twice daily   finasteride (PROSCAR) 5 mg tablet   No No   Sig: Take 1 tablet (5 mg total) by mouth daily   glucose blood (ONE TOUCH ULTRA TEST) test strip  Self No No   Sig: Test sugar once daily   lisinopril (ZESTRIL) 20 mg tablet   No No   Sig: Take 1 tablet by mouth once daily   polyethylene glycol (MIRALAX) 17 g packet   No No   Sig: Take 17 g by mouth daily   simvastatin (ZOCOR) 10 mg tablet   No No   Sig: Take 1 tablet (10 mg total) by mouth every evening   tamsulosin (FLOMAX) 0.4 mg   No No   Sig: Take 1 capsule (0.4 mg total) by mouth daily with dinner      Facility-Administered Medications: None     Allergies have not been reviewed;  No Known Allergies    Objective   Initial Vitals:   Temperature: 100.5 °F (38.1 °C) (06/16/24 1744)  Pulse: (!) 116 (06/16/24 1740)  Respirations: 18 (06/16/24 1740)  Blood Pressure: 167/92 (06/16/24 1740)    Primary Survey:   Airway:        Status: patent;        Pre-hospital Interventions: none        Hospital Interventions: none  Breathing:        Pre-hospital Interventions: none       Effort: normal       Right breath sounds: normal       Left breath sounds: normal  Circulation:        Rhythm:           Right Pulses Left Pulses    R radial: 2+    R pedal: 2+     L radial: 2+    L pedal: 2+       Disability:        GCS: Eye: 4; Verbal: 5 Motor: 6 Total: 15       Right Pupil: round;  reactive         Left Pupil:  round;  reactive      R Motor Strength L Motor Strength    R : 5/5  R  dorsiflex: 5/5  R plantarflex: 5/5 L : 5/5  L dorsiflex: 5/5  L plantarflex: 5/5        Sensory:  No sensory deficit  Exposure:       Completed: Yes      Secondary Survey:  Physical Exam  Vitals and nursing note reviewed. Exam conducted with a chaperone present.   Constitutional:       General: He is not in acute distress.     Appearance: Normal appearance. He is normal weight. He is not ill-appearing, toxic-appearing or diaphoretic.   HENT:      Head: Normocephalic and atraumatic.      Right Ear: Tympanic membrane and ear canal normal.      Left Ear: Tympanic membrane, ear canal and external ear normal.      Ears:      Comments: Wound to the right ear with small amount of oozing of blood.  No periauricular hematoma     Nose: Nose normal.      Mouth/Throat:      Mouth: Mucous membranes are moist.      Pharynx: Oropharynx is clear.   Eyes:      Conjunctiva/sclera: Conjunctivae normal.      Pupils: Pupils are equal, round, and reactive to light.   Cardiovascular:      Rate and Rhythm: Normal rate and regular rhythm.      Pulses: Normal pulses.      Heart sounds: Normal heart sounds.   Pulmonary:      Effort: Pulmonary effort is normal.      Breath sounds: Normal breath sounds.   Abdominal:      General: Abdomen is flat. Bowel sounds are normal.      Palpations: Abdomen is soft.      Tenderness: There is no abdominal tenderness. There is no guarding.   Genitourinary:     Penis: Normal.       Testes: Normal.      Rectum: Normal.   Musculoskeletal:         General: Normal range of motion.      Cervical back: Normal range of motion and neck supple.   Skin:     General: Skin is warm and dry.      Capillary Refill: Capillary refill takes less than 2 seconds.      Comments: Erythema of the right axillary fold   Neurological:      General: No focal deficit present.      Mental Status: He is alert and oriented to person, place, and time. Mental status is at baseline.      Cranial Nerves: No cranial nerve deficit.       Sensory: No sensory deficit.      Motor: No weakness.         Invasive Devices       Peripheral Intravenous Line  Duration             Peripheral IV 06/16/24 Distal;Right;Upper;Ventral (anterior) Arm <1 day    Peripheral IV 06/16/24 Right Arm <1 day                  Lab Results: I have personally reviewed all pertinent laboratory/test results from 06/16/24, including the preceding 24 hours.  Recent Labs     06/16/24  1303 06/16/24  1413 06/16/24  1507 06/16/24  1747 06/16/24  1749   WBC 6.75  --   --   --  9.11   HGB 14.4  --   --  14.6 15.0   HCT 44.0  --   --  43 44.7     --   --   --  216   SODIUM 140  --   --   --  140   K 4.2  --   --   --  3.6     --   --   --  103   CO2 31  --   --  31 27   BUN 27*  --   --   --  27*   CREATININE 1.57*  --   --   --  1.35*   GLUC 112  --   --   --  108   CAIONIZED  --   --   --  1.16  --    AST 15  --   --   --  14   ALT 10  --   --   --  10   ALB 4.5  --   --   --  4.4   TBILI 1.62*  --   --   --  1.55*   ALKPHOS 46  --   --   --  55   PTT  --   --   --   --  26   INR  --    < >  --   --  1.12   HSTNI0 45  --   --   --   --    HSTNI2  --   --  51*  --   --     < > = values in this interval not displayed.       Imaging Results: I have personally reviewed pertinent images saved in PACS. CT scan findings (and other pertinent positive findings on images) were discussed with radiology. My interpretation of the images/reports are as follows:  Chest Xray(s): negative for acute findings   FAST exam(s): negative for acute findings   CT Scan(s): positive for acute findings: Isodense epidural hematoma along the right frontal convexity measuring 2.1 x 1.3 x 1.1 cm, favored to be subacute. There is associated mild mass effect with approximately 6 mm leftward midline shift.   Additional Xray(s): N/A     Other Studies: N/A    Code Status: Level 1 - Full Code  Advance Directive and Living Will:      Power of :    POLST:     [facility]  "[place]  H&P  Name: To Dennison 84 y.o. male I MRN: [id number]  Unit/Bed#: ICU 08 I Date of Admission: 6/16/2024   Date of Service: 6/16/2024 I Hospital Day: 0      Assessment & Plan   Fall  Assessment & Plan  - Status post fall with the below noted injuries.  - Fall precautions.  - Geriatric Medicine consultation for evaluation, medication review and recommendations.  - PT and OT evaluation and treatment as indicated.  - Case Management consultation for disposition planning.      Alcohol abuse  Assessment & Plan  - Reports drinking heavily daily  -Initiate CIWA protocol    Epidural hematoma (HCC)  Assessment & Plan  -Patient reports fall yesterday  - CTH: Isodense epidural hematoma along the right frontal convexity measuring 2.1 x 1.3 x 1.1 cm, favored to be subacute. There is associated mild mass effect with approximately 6 mm leftward midline shift.   -Reportedly confused at outside hospital and transported priority 1 on a Cardene drip  -On arrival, GCS 14 on Cardene drip 7.5  -Repeat CT head now pain  -Neurosurgery consulted, appreciate recommendations  -Continue Cardene drip with goal systolic blood pressure less than 160  -Every hour neurochecks  -Patient denies being on blood thinners     Chronic obstructive pulmonary disease, unspecified COPD type (HCC)  Assessment & Plan  - Currently on 2 L nasal cannula but no wheezing or focal lung findings  -As needed albuterol  -Monitor respiratory status    Mixed hyperlipidemia  Assessment & Plan  - Continue home statin    HTN (hypertension)  Assessment & Plan  - Currently on Cardene drip  -Patient says he is intermittently compliant with his home regimen but cannot recall what he is on    Type 2 diabetes mellitus with circulatory disorder, without long-term current use of insulin (HCC)  Assessment & Plan  Lab Results   Component Value Date    HGBA1C 5.8 06/14/2023       No results for input(s): \"POCGLU\" in the last 72 hours.    Blood Sugar Average: Last 72 " hrs:    -Initiate insulin sliding scale  -Frequent glucose monitoring  -Hypoglycemia protocol

## 2024-06-16 NOTE — ED PROVIDER NOTES
History  Chief Complaint   Patient presents with    Fall     Pt reports fall last night, tripped over step; landed on knees, denies head strike; skin tear to R arm    Extremity Laceration     HPI this an 84-year-old male who presents to the emergency department initially for concerns of a laceration to his left forearm.  Patient states he fell last night.  Patient states he tripped over his feet and fell landing on his bilateral knees and left forearm.  Patient states he did not hit his head, neck.  Patient states the only injury he sustained was a laceration to the left forearm.  Patient states he lives alone consumes a large amount of alcohol daily but he does not believe that this is contributed to the fall.     Overall patient is a poor historian and his physical exam does not correspond with his provide history    Prior to Admission Medications   Prescriptions Last Dose Informant Patient Reported? Taking?   Combigan 0.2-0.5 %   No No   Sig: Administer 1 drop to the right eye every 12 (twelve) hours   Nutritional Supplements (Boost) LIQD  Self No No   Sig: Take 237 mL by mouth daily   amLODIPine (NORVASC) 10 mg tablet   No No   Sig: Take 1 tablet (10 mg total) by mouth daily   aspirin 81 mg chewable tablet Not Taking  Yes No   Sig: Chew 81 mg daily   Patient not taking: Reported on 6/16/2024   calcium carbonate (TUMS) 500 mg chewable tablet  Self Yes No   Sig: Chew 1 tablet as needed for indigestion or heartburn.   cyclobenzaprine (FLEXERIL) 10 mg tablet   No No   Sig: Take 1 tablet by mouth twice daily   finasteride (PROSCAR) 5 mg tablet   No No   Sig: Take 1 tablet (5 mg total) by mouth daily   glucose blood (ONE TOUCH ULTRA TEST) test strip  Self No No   Sig: Test sugar once daily   lisinopril (ZESTRIL) 20 mg tablet   No No   Sig: Take 1 tablet by mouth once daily   polyethylene glycol (MIRALAX) 17 g packet   No No   Sig: Take 17 g by mouth daily   simvastatin (ZOCOR) 10 mg tablet   No No   Sig: Take 1 tablet  "(10 mg total) by mouth every evening   tamsulosin (FLOMAX) 0.4 mg   No No   Sig: Take 1 capsule (0.4 mg total) by mouth daily with dinner      Facility-Administered Medications: None       Past Medical History:   Diagnosis Date    Ambulates with cane     unsure \"why\" falls at times     last fall 09/27/2021    Anxiety state 08/05/2018    Basal cell carcinoma of skin 11/15/2016    Cancer (Formerly [facility])     BASAL CA SKIN    Chronic kidney disease     ACUTE KIDNEY INJURY 3/21    Constipation     COPD (chronic obstructive pulmonary disease) (Formerly [facility])     Coronary artery disease 05/22/2012    Diabetes mellitus (Formerly [facility])     TYPE 2    Forearm laceration, right, initial encounter 05/28/2020    Hydronephrosis     LAST ASSESSED: 11/18/15    Hypertension     Mass of right side of neck     radical neck today 10/1/2021    Osteoporosis 05/22/2012    Pacemaker     SSS (sick sinus syndrome) (Formerly [facility])     s/p generator changeout 3/24/2023    Trifascicular block        Past Surgical History:   Procedure Laterality Date    CARDIAC PACEMAKER PLACEMENT  11/2009    Medtronic dual chamber     CARDIAC PACEMAKER PLACEMENT Left 3/24/2023    Procedure: DUAL LEAD PACEMAKER BATTERY REMOVAL AND REPLACE.;  Surgeon: To Neff MD;  Location: [facility];  Service: Cardiology    CATARACT EXTRACTION W/  INTRAOCULAR LENS IMPLANT      CYSTOSCOPY N/A 05/09/2016    Procedure: CYSTOSCOPY, evacuation of bladder calculi;  Surgeon: Haim Melendez MD;  Location: [facility];  Service:     INGUINAL HERNIA REPAIR      UNILATERAL    KIDNEY STONE SURGERY      MOHS SURGERY Right 08/31/2022    removal of right ear BCCA with free graft nd reconstruction - Dr. Aguilar and Dr. Newman    OTHER SURGICAL HISTORY      HERNIA REPAIR AS PER [facility] (ALREADY IN PERTINENT NEGATIVES)    PAROTIDECTOMY Right 10/01/2021    Procedure: TOTAL PAROTIDECTOMY WITH NIMS; MODIFIED RADICAL NECK DISSECTION;  Surgeon: Karsten Newman DO;  Location: [facility];  Service: ENT    MS TROSMIN " ELECTROSURG RESCJ PROSTATE BLEED COMPLETE N/A 2016    Procedure: TRANSURETHRAL RESECTION OF PROSTATE (TURP);  Surgeon: Haim Melendez MD;  Location: [facility];  Service: Urology    TONSILLECTOMY         Family History   Problem Relation Age of Onset    Stroke Father         SYNDROME    Coronary artery disease Family         less than 60 years of age     I have reviewed and agree with the history as documented.    E-Cigarette/Vaping    E-Cigarette Use Never User      E-Cigarette/Vaping Substances    Nicotine No     THC No     CBD No     Flavoring No     Other No     Unknown No      Social History     Tobacco Use    Smoking status: Former     Current packs/day: 0.00     Average packs/day: 1.5 packs/day for 32.3 years (48.5 ttl pk-yrs)     Types: Cigars, Cigarettes     Start date:      Quit date: 1980     Years since quittin.1    Smokeless tobacco: Former   Vaping Use    Vaping status: Never Used   Substance Use Topics    Alcohol use: Not Currently     Alcohol/week: 2.0 standard drinks of alcohol     Types: 2 Cans of beer per week     Comment: 2  beers daily and shot by hx/No ETOH x 3wks.     Drug use: No       Review of Systems   Constitutional:  Negative for activity change, appetite change, chills, fatigue and fever.   HENT:  Negative for congestion, dental problem, drooling, ear discharge, ear pain, facial swelling, postnasal drip, rhinorrhea and sinus pain.    Eyes:  Negative for photophobia, pain, discharge and itching.   Respiratory:  Negative for apnea, cough, chest tightness and shortness of breath.    Cardiovascular:  Negative for chest pain and leg swelling.   Gastrointestinal:  Negative for abdominal distention, abdominal pain, anal bleeding, constipation, diarrhea and nausea.   Endocrine: Negative for cold intolerance, heat intolerance and polydipsia.   Genitourinary:  Negative for difficulty urinating.   Musculoskeletal:  Negative for arthralgias, gait problem, joint swelling and  myalgias.   Skin:  Positive for wound. Negative for color change and pallor.   Allergic/Immunologic: Negative for immunocompromised state.   Neurological:  Negative for dizziness, seizures, facial asymmetry, weakness, light-headedness, numbness and headaches.   Psychiatric/Behavioral:  Negative for agitation, behavioral problems, confusion, decreased concentration and dysphoric mood.    All other systems reviewed and are negative.      Physical Exam  Physical Exam  Vitals and nursing note reviewed.   Constitutional:       Appearance: He is well-developed. He is ill-appearing.   HENT:      Head: Normocephalic.      Right Ear: Tympanic membrane normal.      Left Ear: Tympanic membrane normal.      Nose: No congestion or rhinorrhea.   Eyes:      Pupils: Pupils are equal, round, and reactive to light.   Cardiovascular:      Rate and Rhythm: Normal rate and regular rhythm.      Comments: Hypertensive  Pulmonary:      Effort: Pulmonary effort is normal.      Breath sounds: Normal breath sounds.   Abdominal:      General: Bowel sounds are normal.      Palpations: Abdomen is soft.   Musculoskeletal:         General: Normal range of motion.      Cervical back: Normal range of motion and neck supple.      Comments: Approximate 2 cm skin tear to the left forearm no active bleeding noted.      He has multiple diffuse abrasions with various stages of healing to the bilateral knees bilateral upper extremities.   Skin:     General: Skin is warm.   Neurological:      Mental Status: He is alert.         Vital Signs  ED Triage Vitals   Temperature Pulse Respirations Blood Pressure SpO2   06/16/24 1241 06/16/24 1239 06/16/24 1239 06/16/24 1239 06/16/24 1239   (!) 97.3 °F (36.3 °C) 96 18 (S) (!) 210/122 95 %      Temp Source Heart Rate Source Patient Position - Orthostatic VS BP Location FiO2 (%)   06/16/24 1241 06/16/24 1239 06/16/24 1239 06/16/24 1239 --   Temporal Monitor Lying Right arm       Pain Score       06/16/24 1240       No  Pain           Vitals:    06/16/24 1515 06/16/24 1530 06/16/24 1600 06/16/24 1647   BP: (!) 202/128 (!) 184/99 (!) 173/106 (!) 195/110   Pulse: 100 85 91 95   Patient Position - Orthostatic VS:             Visual Acuity  Visual Acuity      Flowsheet Row Most Recent Value   L Pupil Size (mm) 3   R Pupil Size (mm) 3            ED Medications  Medications   bacitracin topical ointment 1 small application (1 small application Topical Given 6/16/24 1245)   hydrALAZINE (APRESOLINE) injection 5 mg (5 mg Intravenous Given 6/16/24 1533)       Diagnostic Studies  Results Reviewed       Procedure Component Value Units Date/Time    HS Troponin I 4hr [[id number]]  (Abnormal) Collected: 06/16/24 1659    Lab Status: Final result Specimen: Blood from Arm, Right Updated: 06/16/24 1725     hs TnI 4hr 54 ng/L      Delta 4hr hsTnI 9 ng/L     HS Troponin I 2hr [[id number]]  (Abnormal) Collected: 06/16/24 1507    Lab Status: Final result Specimen: Blood from Arm, Right Updated: 06/16/24 1536     hs TnI 2hr 51 ng/L      Delta 2hr hsTnI 6 ng/L     Protime-INR [[id number]]  (Normal) Collected: 06/16/24 1413    Lab Status: Final result Specimen: Blood from Arm, Right Updated: 06/16/24 1447     Protime 13.7 seconds      INR 1.03    HS Troponin 0hr (reflex protocol) [[id number]]  (Normal) Collected: 06/16/24 1303    Lab Status: Final result Specimen: Blood from Arm, Right Updated: 06/16/24 1339     hs TnI 0hr 45 ng/L     Ethanol [[id number]]  (Normal) Collected: 06/16/24 1303    Lab Status: Final result Specimen: Blood from Arm, Right Updated: 06/16/24 1335     Ethanol Lvl <10 mg/dL     Comprehensive metabolic panel [[id number]]  (Abnormal) Collected: 06/16/24 1303    Lab Status: Final result Specimen: Blood from Arm, Right Updated: 06/16/24 1335     Sodium 140 mmol/L      Potassium 4.2 mmol/L      Chloride 102 mmol/L      CO2 31 mmol/L      ANION GAP 7 mmol/L      BUN 27 mg/dL      Creatinine 1.57 mg/dL      Glucose 112 mg/dL      Calcium 10.0  mg/dL      AST 15 U/L      ALT 10 U/L      Alkaline Phosphatase 46 U/L      Total Protein 6.5 g/dL      Albumin 4.5 g/dL      Total Bilirubin 1.62 mg/dL      eGFR 39 ml/min/1.73sq m     Narrative:      National Kid[employer] Disease Foundation guidelines for Chronic Kidney Disease (CKD):     Stage 1 with normal or high GFR (GFR > 90 mL/min/1.73 square meters)    Stage 2 Mild CKD (GFR = 60-89 mL/min/1.73 square meters)    Stage 3A Moderate CKD (GFR = 45-59 mL/min/1.73 square meters)    Stage 3B Moderate CKD (GFR = 30-44 mL/min/1.73 square meters)    Stage 4 Severe CKD (GFR = 15-29 mL/min/1.73 square meters)    Stage 5 End Stage CKD (GFR <15 mL/min/1.73 square meters)  Note: GFR calculation is accurate only with a steady state creatinine    CBC and differential [[id number]]  (Abnormal) Collected: 06/16/24 1303    Lab Status: Final result Specimen: Blood from Arm, Right Updated: 06/16/24 1322     WBC 6.75 Thousand/uL      RBC 4.29 Million/uL      Hemoglobin 14.4 g/dL      Hematocrit 44.0 %       fL      MCH 33.6 pg      MCHC 32.7 g/dL      RDW 12.5 %      MPV 10.3 fL      Platelets 216 Thousands/uL      nRBC 0 /100 WBCs      Segmented % 64 %      Immature Grans % 0 %      Lymphocytes % 19 %      Monocytes % 11 %      Eosinophils Relative 6 %      Basophils Relative 0 %      Absolute Neutrophils 4.29 Thousands/µL      Absolute Immature Grans 0.01 Thousand/uL      Absolute Lymphocytes 1.30 Thousands/µL      Absolute Monocytes 0.73 Thousand/µL      Eosinophils Absolute 0.40 Thousand/µL      Basophils Absolute 0.02 Thousands/µL                    CT head wo contrast   Final Result by Varghese Gardner MD (06/16 9321)      Isodense epidural hematoma along the right frontal convexity measuring 2.1 x 1.3 x 1.1 cm, favored to be subacute. There is associated mild mass effect with approximately 6 mm leftward midline shift.         I personally discussed this study with DANNIE ZAMBRANO on 6/16/2024 2:01 PM.                      Resident: JEAN-CLAUDE VANEGAS I, the attending radiologist, have reviewed the images and agree with the final report above.      Workstation performed: [id number]         X-ray chest 1 view portable    (Results Pending)              Procedures  ECG 12 Lead Documentation Only    Date/Time: 6/16/2024 1:10 PM    Performed by: Lm Elizabeth MD  Authorized by: Lm Elizabeth MD    Indications / Diagnosis:  Recurrent fall  ECG reviewed by me, the ED Provider: no    Patient location:  ED  Previous ECG:     Previous ECG:  Compared to current    Comparison to cardiac monitor: Yes    Interpretation:     Interpretation: abnormal    Rate:     ECG rate:  83  Rhythm:     Rhythm: sinus rhythm    Ectopy:     Ectopy: PVCs    QRS:     QRS axis:  Left    QRS intervals:  Wide  Conduction:     Conduction: abnormal      Abnormal conduction: incomplete LBBB    ST segments:     ST segments:  Abnormal  CriticalCare Time    Date/Time: 6/16/2024 2:48 PM    Performed by: Lm Elizabeth MD  Authorized by: Lm Elizabeth MD    Critical care provider statement:     Critical care time (minutes):  60    Critical care time was exclusive of:  Teaching time and separately billable procedures and treating other patients    Critical care was necessary to treat or prevent imminent or life-threatening deterioration of the following conditions:  CNS failure or compromise and trauma    Critical care was time spent personally by me on the following activities:  Blood draw for specimens, obtaining history from patient or surrogate, development of treatment plan with patient or surrogate, discussions with consultants, examination of patient, ordering and performing treatments and interventions, ordering and review of laboratory studies, ordering and review of radiographic studies, re-evaluation of patient's condition and review of old charts  Comments:      Epidural hematoma, altered mental status           ED Course  ED Course as of 06/16/24 1828   Danyell Carrillo  "16, 2024   1253 Patient question regarding history of hypertension.  Patient states he does not have hypertension but his blood pressure is elevated because of his \"nerves \".   1253 Given elevated blood pressure, recurrent falls and the fact the patient is not reliable historian plan to pursue ACS workup with CT imaging of the head to ensure no acute intracranial pathology.  However I suspect that his recurrent falls likely secondary to his alcohol use disorder   1254 Additionally given patient's speech contacts ethanol level to ensure that he is able to safely drive himself home.  Although he states his last alcohol consumption was last night he had nothing to drink this morning   1254 Wound was cleaned and dressed by nursing staff, I evaluated all wounds prior to dressings no active infections at this time   1307 EKG reviewed previous EKG reviewed as well patient with no active chest pain denies any active chest pain for the last several weeks   1313 Patient endorsed to nursing staff that he has visiting home nurses.   1337 ETHANOL: <10   1337 Creatinine(!): 1.57   1344 hs TnI 0hr: 45   1406 Spoke with radiology CT showing left frontal lobe subacute epidural hematoma with a 6 mm shift.  Discussed findings with patient.  He is not sure what he would like to do.  Nonetheless  place to speak with neurosurgery for recommendations   1407 IMPRESSION:     Isodense epidural hematoma along the right frontal convexity measuring 2.1 x 1.3 x 1.1 cm, favored to be subacute. There is associated mild mass effect with approximately 6 mm leftward midline shift.     1410 Once again confirmed to patient that he is not taking blood thinners or aspirin   1421 Pacs connected me to trauma.  Discussed case with trauma who accepts pt as a level B trauma alert. Requesting I speak with neurosurg   1443 Discussed findings with patient and need for urgent transfer to [facility] for neurosurgical evaluation.  Patient has poor " comprehension of current pathology and is unable to explain to me why he is in the hospital or what is currently going on with him in particular his brain bleed.  At this point time I do not believe he has the Past need to make his own decisions.  Conversation was witnessed by nurse Christie.  Additionally I had a separate attending physician Dr. Jennings evaluate the patient and he is in agreement that patient does not decision-making capacity at this time.  Please see his separate note.   1530 Patient with persistent hypertension message sent to accepting trauma surgery regarding recommendations.  Unable to get in touch with neurosurgery team   1532 Dr Freitas recommends keep bp systolic <160     1537 hs TnI 2hr(!): 51   1642 Spoke with nurse as well as Dr. Roland.  Patient with worsening mental status changes persistent hypertension.  Will upgrade patient to priority 1 and start Cardene drip for blood pressure control in the setting of epidural hemorrhage   1642 I did speak with PACs to make them aware.   1650 Patient reexamined resting comfortably in no acute distress he continues with some confusion and tangential storytelling.  Airway patent and maintained   1701 Patient reexamined continues to be resting comfortably.  Currently working on titrating Cardene.  Patient is threatening to leave AMA.  Verbally redirected patient, patient now calm and updated on the plan of care             HEART Risk Score      Flowsheet Row Most Recent Value   Heart Score Risk Calculator    History 0 Filed at: 06/16/2024 1346   ECG 1 Filed at: 06/16/2024 1346   Age 2 Filed at: 06/16/2024 1346   Risk Factors 2 Filed at: 06/16/2024 1346   Troponin 2 Filed at: 06/16/2024 1346   HEART Score 7 Filed at: 06/16/2024 1346                          SBIRT 20yo+      Flowsheet Row Most Recent Value   Initial Alcohol Screen: US AUDIT-C     1. How often do you have a drink containing alcohol? 6 Filed at: 06/16/2024 1239   2. How many drinks  containing alcohol do you have on a typical day you are drinking?  1 Filed at: 06/16/2024 1239   3a. Male UNDER 65: How often do you have five or more drinks on one occasion? 0 Filed at: 06/16/2024 1239   3b. FEMALE Any Age, or MALE 65+: How often do you have 4 or more drinks on one occassion? 0 Filed at: 06/16/2024 1239   Audit-C Score 7 Filed at: 06/16/2024 1239   Full Alcohol Screen: US AUDIT    4. How often during the last year have you found that you were not able to stop drinking once you had started? 0 Filed at: 06/16/2024 1239   5. How often during past year have you failed to do what was normally expected of you because of drinking?  0 Filed at: 06/16/2024 1239   6. How often in past year have you needed a first drink in the morning to get yourself going after a heavy drinking session?  0 Filed at: 06/16/2024 1239   7. How often in past year have you had feeling of guilt or remorse after drinking?  0 Filed at: 06/16/2024 1239   8. How often in past year have you been unable to remember what happened night before because you had been drinking?  0 Filed at: 06/16/2024 1239   9. Have you or someone else been injured as a result of your drinking?  0 Filed at: 06/16/2024 1239   10. Has a relative, friend, doctor or other health worker been concerned about your drinking and suggested you cut down?  0 Filed at: 06/16/2024 1239   AUDIT Total Score 7 Filed at: 06/16/2024 1239   CHRISTIN: How many times in the past year have you...    Used an illegal drug or used a prescription medication for non-medical reasons? Never Filed at: 06/16/2024 1239                      Medical Decision Making  Problems Addressed:  Alcohol use disorder: acute illness or injury  Altered mental status, unspecified altered mental status type: acute illness or injury that poses a threat to life or bodily functions  Epidural hematoma (HCC): acute illness or injury that poses a threat to life or bodily functions  Forearm laceration, left, initial  encounter: acute illness or injury  Recurrent falls: acute illness or injury    Amount and/or Complexity of Data Reviewed  Labs: ordered. Decision-making details documented in ED Course.  Radiology: ordered and independent interpretation performed. Decision-making details documented in ED Course.  ECG/medicine tests: ordered and independent interpretation performed. Decision-making details documented in ED Course.    Risk  OTC drugs.  Prescription drug management.  Decision regarding hospitalization.             Disposition  Final diagnoses:   Forearm laceration, left, initial encounter   Recurrent falls   Altered mental status, unspecified altered mental status type   Epidural hematoma (HCC)   Alcohol use disorder   Hypertensive crisis     Time reflects when diagnosis was documented in both MDM as applicable and the Disposition within this note       Time User Action Codes Description Comment    6/16/2024  2:46 PM Lm Elizabeth [S51.812A] Forearm laceration, left, initial encounter     6/16/2024  2:47 PM Lm Elizabeth Add [R29.6] Recurrent falls     6/16/2024  2:47 PM Lm Elizabeth [R41.82] Altered mental status, unspecified altered mental status type     6/16/2024  2:47 PM Lm Elizabeth Add [S06.4XAA] Epidural hematoma (HCC)     6/16/2024  2:47 PM Lm Elizabeth Modify [S51.812A] Forearm laceration, left, initial encounter     6/16/2024  2:47 PM Lm Elizabeth Modify [S06.4XAA] Epidural hematoma (HCC)     6/16/2024  2:53 PM Lm Elizabeth Add [F10.90] Alcohol use disorder     6/16/2024  4:48 PM Lm Elizabeth Add [I16.9] Hypertensive crisis           ED Disposition       ED Disposition   Transfer to Another Facility-In Network    Condition   --    Date/Time   Sun Jun 16, 2024 8518    Comment   To [place] should be transferred out to               MD Documentation      Flowsheet Row Most Recent Value   Patient Condition The patient has been stabilized such that within reasonable medical  probability, no material deterioration of the patient condition or the condition of the unborn child(sy) is likely to result from the transfer   Reason for Transfer Level of Care needed not available at this facility   Benefits of Transfer Specialized equipment and/or services available at the receiving facility (Include comment)________________________   Risks of Transfer Potential for delay in receiving treatment   Accepting Physician [place]   Accepting Facility Name, [facility]   Sending MD Elizabeth          RN Documentation      Flowsheet Row Most Recent Value   Accepting Facility Name, [facility]   Bed Assignment Er   Report Given to Vince          Follow-up Information    None         Discharge Medication List as of 6/16/2024  5:13 PM        CONTINUE these medications which have NOT CHANGED    Details   amLODIPine (NORVASC) 10 mg tablet Take 1 tablet (10 mg total) by mouth daily, Starting Fri 5/5/2023, Normal      aspirin 81 mg chewable tablet Chew 81 mg daily, Historical Med      calcium carbonate (TUMS) 500 mg chewable tablet Chew 1 tablet as needed for indigestion or heartburn., Historical Med      Combigan 0.2-0.5 % Administer 1 drop to the right eye every 12 (twelve) hours, Starting Wed 10/18/2023, Until Mon 4/15/2024, Normal      cyclobenzaprine (FLEXERIL) 10 mg tablet Take 1 tablet by mouth twice daily, Starting Wed 1/24/2024, Normal      finasteride (PROSCAR) 5 mg tablet Take 1 tablet (5 mg total) by mouth daily, Starting Wed 10/18/2023, Normal      glucose blood (ONE TOUCH ULTRA TEST) test strip Test sugar once daily, Normal      lisinopril (ZESTRIL) 20 mg tablet Take 1 tablet by mouth once daily, Starting Thu 7/27/2023, Normal      Nutritional Supplements (Boost) LIQD Take 237 mL by mouth daily, Starting Tue 10/12/2021, Until Wed 3/22/2023, Normal      polyethylene glycol (MIRALAX) 17 g packet Take 17 g by mouth daily, Starting Wed 6/14/2023, Until Mon  12/11/2023, Normal      simvastatin (ZOCOR) 10 mg tablet Take 1 tablet (10 mg total) by mouth every evening, Starting Mon 5/8/2023, Normal      tamsulosin (FLOMAX) 0.4 mg Take 1 capsule (0.4 mg total) by mouth daily with dinner, Starting Tue 10/18/2022, Normal             No discharge procedures on file.    PDMP Review         Value Time User    PDMP Reviewed  Yes 10/18/2023  2:33 PM [place] MOON&#39;DO Willian            ED Provider  Electronically Signed by             Lm Elizabeth MD  06/16/24 9344       Lm Elizabeth MD  06/16/24 7984       Lm Elizabeth MD  06/16/24 7865

## 2024-06-17 ENCOUNTER — APPOINTMENT (OUTPATIENT)
Dept: RADIOLOGY | Facility: HOSPITAL | Age: 84
DRG: 054 | End: 2024-06-17
Payer: COMMERCIAL

## 2024-06-17 ENCOUNTER — TELEPHONE (OUTPATIENT)
Age: 84
End: 2024-06-17

## 2024-06-17 PROBLEM — D32.9 MENINGIOMA (HCC): Status: ACTIVE | Noted: 2024-06-17

## 2024-06-17 LAB
25(OH)D3 SERPL-MCNC: 35.7 NG/ML (ref 30–100)
ANION GAP SERPL CALCULATED.3IONS-SCNC: 12 MMOL/L (ref 4–13)
BASOPHILS # BLD AUTO: 0.02 THOUSANDS/ÂΜL (ref 0–0.1)
BASOPHILS NFR BLD AUTO: 0 % (ref 0–1)
BUN SERPL-MCNC: 22 MG/DL (ref 5–25)
CALCIUM SERPL-MCNC: 9.6 MG/DL (ref 8.4–10.2)
CHLORIDE SERPL-SCNC: 100 MMOL/L (ref 96–108)
CO2 SERPL-SCNC: 28 MMOL/L (ref 21–32)
CREAT SERPL-MCNC: 1.19 MG/DL (ref 0.6–1.3)
EOSINOPHIL # BLD AUTO: 0.2 THOUSAND/ÂΜL (ref 0–0.61)
EOSINOPHIL NFR BLD AUTO: 3 % (ref 0–6)
ERYTHROCYTE [DISTWIDTH] IN BLOOD BY AUTOMATED COUNT: 12.6 % (ref 11.6–15.1)
EST. AVERAGE GLUCOSE BLD GHB EST-MCNC: 126 MG/DL
FOLATE SERPL-MCNC: >22.3 NG/ML
GFR SERPL CREATININE-BSD FRML MDRD: 54 ML/MIN/1.73SQ M
GLUCOSE SERPL-MCNC: 105 MG/DL (ref 65–140)
GLUCOSE SERPL-MCNC: 109 MG/DL (ref 65–140)
GLUCOSE SERPL-MCNC: 119 MG/DL (ref 65–140)
GLUCOSE SERPL-MCNC: 62 MG/DL (ref 65–140)
GLUCOSE SERPL-MCNC: 99 MG/DL (ref 65–140)
HBA1C MFR BLD: 6 %
HCT VFR BLD AUTO: 43.4 % (ref 36.5–49.3)
HGB BLD-MCNC: 14.5 G/DL (ref 12–17)
IMM GRANULOCYTES # BLD AUTO: 0.04 THOUSAND/UL (ref 0–0.2)
IMM GRANULOCYTES NFR BLD AUTO: 1 % (ref 0–2)
LYMPHOCYTES # BLD AUTO: 0.94 THOUSANDS/ÂΜL (ref 0.6–4.47)
LYMPHOCYTES NFR BLD AUTO: 12 % (ref 14–44)
MAGNESIUM SERPL-MCNC: 2.1 MG/DL (ref 1.9–2.7)
MCH RBC QN AUTO: 33.7 PG (ref 26.8–34.3)
MCHC RBC AUTO-ENTMCNC: 33.4 G/DL (ref 31.4–37.4)
MCV RBC AUTO: 101 FL (ref 82–98)
MONOCYTES # BLD AUTO: 0.77 THOUSAND/ÂΜL (ref 0.17–1.22)
MONOCYTES NFR BLD AUTO: 10 % (ref 4–12)
NEUTROPHILS # BLD AUTO: 5.66 THOUSANDS/ÂΜL (ref 1.85–7.62)
NEUTS SEG NFR BLD AUTO: 74 % (ref 43–75)
NRBC BLD AUTO-RTO: 0 /100 WBCS
PHOSPHATE SERPL-MCNC: 3.3 MG/DL (ref 2.3–4.1)
PLATELET # BLD AUTO: 196 THOUSANDS/UL (ref 149–390)
PMV BLD AUTO: 10.2 FL (ref 8.9–12.7)
POTASSIUM SERPL-SCNC: 3.6 MMOL/L (ref 3.5–5.3)
RBC # BLD AUTO: 4.3 MILLION/UL (ref 3.88–5.62)
SODIUM SERPL-SCNC: 140 MMOL/L (ref 135–147)
TSH SERPL DL<=0.05 MIU/L-ACNC: 2.72 UIU/ML (ref 0.45–4.5)
VIT B12 SERPL-MCNC: 463 PG/ML (ref 180–914)
WBC # BLD AUTO: 7.63 THOUSAND/UL (ref 4.31–10.16)

## 2024-06-17 PROCEDURE — 97167 OT EVAL HIGH COMPLEX 60 MIN: CPT

## 2024-06-17 PROCEDURE — 83735 ASSAY OF MAGNESIUM: CPT | Performed by: REGISTERED NURSE

## 2024-06-17 PROCEDURE — NC001 PR NO CHARGE: Performed by: PSYCHIATRY & NEUROLOGY

## 2024-06-17 PROCEDURE — 82607 VITAMIN B-12: CPT

## 2024-06-17 PROCEDURE — 99222 1ST HOSP IP/OBS MODERATE 55: CPT | Performed by: PHYSICIAN ASSISTANT

## 2024-06-17 PROCEDURE — 82948 REAGENT STRIP/BLOOD GLUCOSE: CPT

## 2024-06-17 PROCEDURE — 84425 ASSAY OF VITAMIN B-1: CPT

## 2024-06-17 PROCEDURE — 99232 SBSQ HOSP IP/OBS MODERATE 35: CPT | Performed by: SURGERY

## 2024-06-17 PROCEDURE — 85025 COMPLETE CBC W/AUTO DIFF WBC: CPT | Performed by: REGISTERED NURSE

## 2024-06-17 PROCEDURE — 80048 BASIC METABOLIC PNL TOTAL CA: CPT | Performed by: REGISTERED NURSE

## 2024-06-17 PROCEDURE — 82746 ASSAY OF FOLIC ACID SERUM: CPT

## 2024-06-17 PROCEDURE — 99233 SBSQ HOSP IP/OBS HIGH 50: CPT | Performed by: PSYCHIATRY & NEUROLOGY

## 2024-06-17 PROCEDURE — 70553 MRI BRAIN STEM W/O & W/DYE: CPT

## 2024-06-17 PROCEDURE — 97163 PT EVAL HIGH COMPLEX 45 MIN: CPT

## 2024-06-17 PROCEDURE — 82306 VITAMIN D 25 HYDROXY: CPT

## 2024-06-17 PROCEDURE — 84100 ASSAY OF PHOSPHORUS: CPT | Performed by: REGISTERED NURSE

## 2024-06-17 PROCEDURE — 84443 ASSAY THYROID STIM HORMONE: CPT

## 2024-06-17 PROCEDURE — A9585 GADOBUTROL INJECTION: HCPCS | Performed by: PSYCHIATRY & NEUROLOGY

## 2024-06-17 RX ORDER — LABETALOL HYDROCHLORIDE 5 MG/ML
10 INJECTION, SOLUTION INTRAVENOUS EVERY 6 HOURS PRN
Status: DISCONTINUED | OUTPATIENT
Start: 2024-06-17 | End: 2024-06-19 | Stop reason: HOSPADM

## 2024-06-17 RX ORDER — LISINOPRIL 20 MG/1
20 TABLET ORAL DAILY
Status: DISCONTINUED | OUTPATIENT
Start: 2024-06-17 | End: 2024-06-19

## 2024-06-17 RX ORDER — GADOBUTROL 604.72 MG/ML
7 INJECTION INTRAVENOUS
Status: COMPLETED | OUTPATIENT
Start: 2024-06-17 | End: 2024-06-17

## 2024-06-17 RX ADMIN — CHLORHEXIDINE GLUCONATE 0.12% ORAL RINSE 15 ML: 1.2 LIQUID ORAL at 08:53

## 2024-06-17 RX ADMIN — Medication 1 TABLET: at 08:53

## 2024-06-17 RX ADMIN — THIAMINE HCL TAB 100 MG 100 MG: 100 TAB at 08:53

## 2024-06-17 RX ADMIN — LISINOPRIL 20 MG: 20 TABLET ORAL at 12:08

## 2024-06-17 RX ADMIN — GADOBUTROL 7 ML: 604.72 INJECTION INTRAVENOUS at 11:28

## 2024-06-17 RX ADMIN — BACITRACIN 1 SMALL APPLICATION: 500 OINTMENT TOPICAL at 08:53

## 2024-06-17 RX ADMIN — BACITRACIN 1 SMALL APPLICATION: 500 OINTMENT TOPICAL at 15:21

## 2024-06-17 RX ADMIN — FOLIC ACID 1 MG: 1 TABLET ORAL at 08:53

## 2024-06-17 RX ADMIN — CHLORHEXIDINE GLUCONATE 0.12% ORAL RINSE 15 ML: 1.2 LIQUID ORAL at 20:36

## 2024-06-17 NOTE — PROGRESS NOTES
Critical Care Interval Transfer Note:    Brief Hospital Summary:     84 y.o. male with PMHx of DM2, HTN, CKD4 (baseline Cr ~1.6), glaucoma R eye, chronic lower back pain on norco, presented to [facility] for evaluation of L forearm laceration 2/2 to mechanical fall last night. Pt admits to driniking a large amount of alcohol daily. CTH read as an epidural hematoma along the right frontal convexity measuring 2.1 x 1.3 x 1.1 cm, approximately 6 mm leftward midline shift. Admitted to trauma. Repeat CT head completed and reviewed with neuroradiologist indicating meningioma. Transferred to neurocritical care. MRI completed showed Right frontal extra-axial mass measuring up to 4 cm most consistent with meningioma. Mild localized mass effect without edema. Neurosurgery was consulted. Patient blood pressure was elevated and lisinopril 20 mg daily was started. Patient had no other traumatic injuries and was stable for transfer to the floor for further management.       Barriers to discharge:   Neurosurgery evaluation     Consults: IP CONSULT TO CASE MANAGEMENT  IP CONSULT TO NEUROSURGERY    Recommended to review admission imaging for incidental findings and document in discharge navigator: Chart reviewed, no known incidental findings noted at this time.                  Patient seen and evaluated by Critical Care today and deemed to be appropriate for transfer to Med Surg. Spoke to Dr. Mondragon from [facility] to accept transfer. Critical care can be contacted via [facility] with any questions or concerns.    Emily Good M.D.  [facility] Medicine PGY1  6/17/2024, 1:49 PM

## 2024-06-17 NOTE — UTILIZATION REVIEW
Initial Clinical Review    Admission: Date/Time/Statement:   Admission Orders (From admission, onward)       Ordered        06/16/24 1750  Inpatient Admission  Once                          Orders Placed This Encounter   Procedures    Inpatient Admission     Standing Status:   Standing     Number of Occurrences:   1     Order Specific Question:   Level of Care     Answer:   Critical Care [15]     Order Specific Question:   Estimated length of stay     Answer:   More than 2 Midnights     Order Specific Question:   Certification     Answer:   I certify that inpatient services are medically necessary for this patient for a duration of greater than two midnights. See H&P and MD Progress Notes for additional information about the patient's course of treatment.     ED Arrival Information       Expected   6/16/2024     Arrival   6/16/2024 17:38    Acuity   Immediate              Means of arrival   Helicopter    Escorted by   [facility]    Service   Critical Care/ICU    Admission type   Emergency              Arrival complaint   -             Chief Complaint   Patient presents with    Trauma     Trauma tx, midline shift.        Initial Presentation: 84 y.o. male, Transfer from [facility] ED initially presented there on today Fall. Pt states that he sustained a fall yesterday after tripping over his feet. He sustained a laceration to his left forearm but initially stated that he did not hit his head or neck. At that time he admitted to drinking a large amount alcohol daily. At [place] [facility], CT imaging to have an isodense epidural hematoma with 6 mm leftward midline shift. He was also noted to be confused. Transfer for Trauma services. PMH for COPD. HLD. HTN. T2DM.   Admit to Inpatient Dx; Fall with Epidural hematoma, Alcohol abuse. Drinks heavily. ICU admit. Transported on Cardene drip. Continue Cardene drip with goal SBP less than 160. Neuro checks q1h. Currently on O2 2L NC. Repeat CT Head for now. Neurosurgery consult. Start  CIWA assess. Albuterol prn.   CT Head; Isodense epidural hematoma along the right frontal convexity measuring 2.1 x 1.3 x 1.1 cm, favored to be subacute. There is associated mild mass effect with approximately 6 mm leftward midline shift.      Date: 6/17   Day 2:   Per Critical Care; 24hr events: Patient had a fall at home and was evaluated in hospital.  Transferred to \[facility]\"" for concern of epidural hematoma.  Imaging here indicates possible meningioma, will further characterize with MRI.  MRI Head, has pacemaker. O2 2L NC, wean O2 today. Wound on anterior left arm.  On exam; Rightward drift in right eye. Dried blood on head.   CT head here showing subacute or chronic subdural hematoma   CT head w/ and w/o contrast  at neuro-rad request; Showed 2.4 cm enhancing extra-axial mass over right frontal convexity most consistent with meningioma    Neurosurgery cons; Meningioma. Right frontal meningioma, present on imaging since 2016. GCS 14-15 with fluctuating mental status exam.   Continue neuro exam. No neurosurgical intervention. Pain and medical management.  MRI brain w wo contrast (6/17): Right frontal extra-axial mass measuring up to 4 cm most consistent with meningioma. Mild localized mass effect without edema.   CT head w wo contrast (6/16): 2.4 cm enhancing extra-axial mass over the right frontal convexity, statistically most consistent with a meningioma.     Date: 6/18  Day 3: Has surpassed a 2nd midnight with active treatments and services.  Frequent neuro checks. S/p Cardene drip. Unsure of home antihypertensive regimen.  Continue lisinopril. Continue labetalol prn for SBP greater than 160. Hold Norvasc.   Continue CIWA assess. Folate/thiamine.  Reports drinking daily, beer and mónica. Pt confused.    ED Triage Vitals   Temperature Pulse Respirations Blood Pressure SpO2 Pain Score   06/16/24 1744 06/16/24 1740 06/16/24 1740 06/16/24 1740 06/16/24 1740 06/16/24 1900   100.5 °F (38.1 °C) (!) 116 18 167/92 92 % No  Pain     Weight (last 2 days)       Date/Time Weight    06/16/24 1900 69.4 (153)    06/16/24 17:44:20 67.2 (148.15)            Vital Signs (last 3 days)       Date/Time Temp Pulse Resp BP MAP (mmHg) SpO2 Calculated FIO2 (%) - Nasal Cannula Nasal Cannula O2 Flow Rate (L/min) O2 Device Patient Position - Orthostatic VS [place] Coma Scale Score CIWA-Ar Total Pain    06/18/24 07:22:13 98 °F (36.7 °C) 97 16 114/85 95 95 % -- -- -- -- -- -- --    06/18/24 0700 -- -- -- -- -- -- -- -- -- -- -- 1 --    06/18/24 0300 -- -- -- -- -- -- -- -- -- -- -- 1 --    06/17/24 2300 -- -- -- -- -- -- -- -- -- -- -- 1 --    06/17/24 22:18:13 98.4 °F (36.9 °C) 101 16 110/85 93 94 % -- -- -- -- -- -- --    06/17/24 1945 -- -- -- -- -- -- -- -- None (Room air) -- 14 -- No Pain    06/17/24 1900 -- -- -- -- -- -- -- -- -- -- -- 1 --    06/17/24 16:14:21 98.4 °F (36.9 °C) 94 12 155/104 121 93 % -- -- -- -- -- -- --    06/17/24 1609 -- -- -- -- -- -- -- -- -- -- 14 -- --    06/17/24 1546 -- 96 -- 156/97 -- -- -- -- -- -- -- 2 --    06/17/24 1527 98.4 °F (36.9 °C) 96 18 156/97 113 94 % -- -- None (Room air) -- -- -- --    06/17/24 1414 -- 98 23 177/99 134 96 % -- -- -- Sitting - Orthostatic VS -- -- --    06/17/24 1412 -- 102 25 164/108 127 95 % -- -- -- Standing - Orthostatic VS -- -- --    06/17/24 1408 -- 96 26 177/106 133 97 % -- -- -- Sitting - Orthostatic VS -- -- --    06/17/24 1400 -- 96 32 167/102 128 96 % -- -- -- -- -- -- --    06/17/24 1353 -- 96 29 183/105 141 95 % -- -- -- -- -- -- --    06/17/24 1321 98.4 °F (36.9 °C) 96 24 174/104 120 94 % -- -- None (Room air) -- -- -- --    06/17/24 1207 -- -- -- -- -- -- -- -- -- -- -- -- No Pain    06/17/24 1206 -- -- -- -- -- -- -- -- -- -- -- -- No Pain    06/17/24 1200 -- 92 12 170/107 125 95 % -- -- -- -- 14 -- --    06/17/24 1000 98.6 °F (37 °C) 88 26 168/103 126 97 % -- -- -- -- -- -- --    06/17/24 0900 -- 88 36 177/104 121 97 % -- -- -- -- -- -- --    06/17/24 0842 -- 84 20 157/93 113  96 % -- -- -- -- -- 0 --    06/17/24 0800 -- -- -- -- -- -- -- -- -- -- 15 -- No Pain    06/17/24 0600 -- 78 16 163/93 119 97 % -- -- -- -- -- -- --    06/17/24 0500 -- 76 19 158/88 116 94 % -- -- -- -- -- -- --    06/17/24 0400 97.9 °F (36.6 °C) 78 20 154/87 112 96 % 28 2 L/min Nasal cannula Lying 15 0 No Pain    06/17/24 0300 -- 82 22 163/90 122 95 % -- -- -- -- 15 -- --    06/17/24 0200 -- 80 22 149/84 112 94 % -- -- -- -- 15 -- --    06/17/24 0100 -- 80 20 148/83 106 93 % -- -- -- -- 15 -- --    06/17/24 0000 98.1 °F (36.7 °C) 78 21 132/75 97 93 % 28 2 L/min Nasal cannula Lying 15 0 No Pain    06/16/24 2300 -- 82 17 128/75 91 93 % -- -- -- -- 15 -- --    06/16/24 2230 -- -- -- 115/62 87 -- -- -- -- -- -- -- --    06/16/24 2200 -- 80 22 119/65 90 93 % -- -- -- -- 15 -- --    06/16/24 2100 -- 82 18 140/72 104 94 % -- -- -- -- 15 -- --    06/16/24 2000 97.7 °F (36.5 °C) 86 21 140/70 97 96 % 28 2 L/min Nasal cannula Lying 15 -- No Pain    06/16/24 1900 97.7 °F (36.5 °C) 80 22 127/66 84 93 % 28 2 L/min Nasal cannula Lying 15 0 No Pain    06/16/24 1854 97.7 °F (36.5 °C) 82 25 112/80 -- 94 % -- -- -- -- -- -- --    06/16/24 1815 97.7 °F (36.5 °C) 112 20 193/100 -- 96 % -- -- Nasal cannula -- 14 -- --    06/16/24 1800 -- 112 18 203/108 -- 96 % -- -- Nasal cannula -- 14 -- --    06/16/24 17:44:20 100.5 °F (38.1 °C) 115 18 171/86 -- 95 % -- -- Nasal cannula -- 15 -- --    06/16/24 17:40:10 -- 116 18 167/92 -- 92 % -- -- None (Room air) -- 15 -- --           CIWA-Ar Score       Row Name 06/18/24 0700 06/18/24 0300 06/17/24 2300       CIWA-Ar    Nausea and Vomiting 0 0 0    Tactile Disturbances 0 0 0    Tremor 0 0 0    Auditory Disturbances 0 0 0    Paroxysmal Sweats 0 0 0    Visual Disturbances 0 0 0    Anxiety 1 1 1    Headache, Fullness in Head 0 0 0    Agitation 0 0 0    Orientation and Clouding of Sensorium 0 0 0    CIWA-Ar Total 1 1 1      Row Name 06/17/24 1900 06/17/24 1546 06/17/24 0842       CIWA-Ar    BP --  156/97 157/93    Pulse -- 96 84    Nausea and Vomiting 0 0 0    Tactile Disturbances 0 0 0    Tremor 0 0 0    Auditory Disturbances 0 0 0    Paroxysmal Sweats 0 0 0    Visual Disturbances 0 0 0    Anxiety 1 0 0    Headache, Fullness in Head 0 0 0    Agitation 0 0 0    Orientation and Clouding of Sensorium 0 2 0    CIWA-Ar Total 1 2 0      Row Name 06/17/24 0400 06/17/24 0000          CIWA-Ar    Nausea and Vomiting 0 0     Tactile Disturbances 0 0     Tremor 0 0     Auditory Disturbances 0 0     Paroxysmal Sweats 0 0     Visual Disturbances 0 0     Anxiety 0 0     Headache, Fullness in Head 0 0     Agitation 0 0     Orientation and Clouding of Sensorium 0 0     CIWA-Ar Total 0 0                     Pertinent Labs/Diagnostic Test Results:   Radiology:  MRI brain w wo contrast   Final Interpretation by E. Alec Schoenberger, MD (06/17 1221)      Right frontal extra-axial mass measuring up to 4 cm most consistent with meningioma. Mild localized mass effect without edema.   No acute intracranial pathology.   Chronic microangiopathy.      Workstation performed: [id number]         CT head w wo contrast   Final Interpretation by Sebastien Negron MD (06/16 2131)   2.4 cm enhancing extra-axial mass over the right frontal convexity, statistically most consistent with a meningioma.            Workstation performed: [id number]         TRAUMA - CT spine cervical wo contrast   Final Interpretation by Kev Fisher MD (06/16 1840)      No cervical spine fracture or traumatic malalignment.                  Workstation performed: [id number]         CT head wo contrast   Final Interpretation by Kev Fisher MD (06/16 5074)      No significant change from the study performed earlier today. Reidentified subacute or chronic right upper frontal subdural collection. No acute intracranial hemorrhage.      Maximum of 2 to 3 mm of leftward midline shift secondary to the subacute or chronic subdural hematoma  above. Some of the apparent midline shift seen on the prior study is secondary to tilting of the patient's head in the imaging gantry.                     Workstation performed: [id number]         TRAUMA - CT chest abdomen pelvis wo contrast   Final Interpretation by Kev Fisher MD (06/16 1852)      No acute injury in the chest, abdomen or pelvis.      Moderate bilateral hydronephrosis likely secondary to severe bladder distention.      Low-dose CT chest follow-up is recommended in 1 year for follow-up of ascending aortic ectasia measuring 42 mm.         Trauma team was called just before the time of this dictation      Workstation performed: [id number]         MRI brain w wo contrast    (Results Pending)     Cardiology:  No orders to display     GI:  No orders to display           Results from last 7 days   Lab Units 06/17/24 0528 06/16/24 1749 06/16/24 1747 06/16/24  1303   WBC Thousand/uL 7.63 9.11  --  6.75   HEMOGLOBIN g/dL 14.5 15.0  --  14.4   I STAT HEMOGLOBIN g/dl  --   --  14.6  --    HEMATOCRIT % 43.4 44.7  --  44.0   HEMATOCRIT, ISTAT %  --   --  43  --    PLATELETS Thousands/uL 196 216  --  216   TOTAL NEUT ABS Thousands/µL 5.66 6.26  --  4.29         Results from last 7 days   Lab Units 06/17/24 0528 06/16/24 1749 06/16/24 1747 06/16/24  1303   SODIUM mmol/L 140 140  --  140   POTASSIUM mmol/L 3.6 3.6  --  4.2   CHLORIDE mmol/L 100 103  --  102   CO2 mmol/L 28 27  --  31   CO2, I-STAT mmol/L  --   --  31  --    ANION GAP mmol/L 12 10  --  7   BUN mg/dL 22 27*  --  27*   CREATININE mg/dL 1.19 1.35*  --  1.57*   EGFR ml/min/1.73sq m 54 47  --  39   CALCIUM mg/dL 9.6 9.8  --  10.0   CALCIUM, IONIZED, ISTAT mmol/L  --   --  1.16  --    MAGNESIUM mg/dL 2.1  --   --   --    PHOSPHORUS mg/dL 3.3  --   --   --      Results from last 7 days   Lab Units 06/16/24  1749 06/16/24  1303   AST U/L 14 15   ALT U/L 10 10   ALK PHOS U/L 55 46   TOTAL PROTEIN g/dL 7.0 6.5   ALBUMIN g/dL 4.4 4.5  "  TOTAL BILIRUBIN mg/dL 1.55* 1.62*     Results from last 7 days   Lab Units 06/18/24  0720 06/17/24  1622 06/17/24  1213 06/17/24  1201 06/17/24  0731 06/16/24  2122   POC GLUCOSE mg/dl 103 119 109 62* 105 124     Results from last 7 days   Lab Units 06/17/24  0528 06/16/24  1749 06/16/24  1303   GLUCOSE RANDOM mg/dL 99 108 112         Results from last 7 days   Lab Units 06/16/24  2049   HEMOGLOBIN A1C % 6.0*   EAG mg/dl 126       Results from last 7 days   Lab Units 06/16/24  1747   PH, CANDE I-STAT  7.492*   PCO2, CANDE ISTAT mm HG 39.3*   PO2, CANDE ISTAT mm HG 48.0*   HCO3, CANDE ISTAT mmol/L 30.1*   I STAT BASE EXC mmol/L 6*   I STAT O2 SAT % 87*         Results from last 7 days   Lab Units 06/16/24  1659 06/16/24  1507 06/16/24  1303   HS TNI 0HR ng/L  --   --  45   HS TNI 2HR ng/L  --  51*  --    HSTNI D2 ng/L  --  6  --    HS TNI 4HR ng/L 54*  --   --    HSTNI D4 ng/L 9  --   --          Results from last 7 days   Lab Units 06/16/24  1749 06/16/24  1413   PROTIME seconds 14.3 13.7   INR  1.12 1.03   PTT seconds 26  --      Results from last 7 days   Lab Units 06/17/24  1213   TSH 3RD GENERATON uIU/mL 2.723       Results from last 7 days   Lab Units 06/16/24 2049   CLARITY UA  Turbid   COLOR UA  Light Yellow   SPEC GRAV UA  1.008   PH UA  7.5   GLUCOSE UA mg/dl Negative   KETONES UA mg/dl Negative   BLOOD UA  Small*   PROTEIN UA mg/dl 30 (1+)*   NITRITE UA  Negative   BILIRUBIN UA  Negative   UROBILINOGEN UA (BE) mg/dl <2.0   LEUKOCYTES UA  Trace*   WBC UA /hpf 4-10*   RBC UA /hpf 30-50*   BACTERIA UA /hpf None Seen   EPITHELIAL CELLS WET PREP /hpf None Seen                 Results from last 7 days   Lab Units 06/16/24  1303   ETHANOL LVL mg/dL <10         ED Treatment-Medication Administration from 06/16/2024 1440 to 06/16/2024 1845       None            Past Medical History:   Diagnosis Date    Ambulates with cane     unsure \"why\" falls at times     last fall 09/27/2021    Anxiety state 08/05/2018    Basal cell " carcinoma of skin 11/15/2016    Cancer ([facility])     BASAL CA SKIN    Chronic kidney disease     ACUTE KIDNEY INJURY 3/21    Constipation     COPD (chronic obstructive pulmonary disease) ([facility])     Coronary artery disease 05/22/2012    Diabetes mellitus ([facility])     TYPE 2    Forearm laceration, right, initial encounter 05/28/2020    Hydronephrosis     LAST ASSESSED: 11/18/15    Hypertension     Mass of right side of neck     radical neck today 10/1/2021    Osteoporosis 05/22/2012    Pacemaker     SSS (sick sinus syndrome) ([facility])     s/p generator changeout 3/24/2023    Trifascicular block      Present on Admission:   HTN (hypertension)   Type 2 diabetes mellitus with circulatory disorder, without long-term current use of insulin ([facility])   Mixed hyperlipidemia   Chronic obstructive pulmonary disease, unspecified COPD type ([facility])   Coronary artery disease involving native coronary artery of native heart without angina pectoris   BPH (benign prostatic hyperplasia)   Pacemaker   Chronic kidney disease, stage 4 (severe) ([facility])      Admitting Diagnosis: Epidural hematoma ([facility]) [S06.4XAA]  Unspecified multiple injuries, initial encounter [T07.XXXA]  Age/Sex: 84 y.o. male    Admission Orders:  Scheduled Medications:  bacitracin, 1 small application, Topical, BID  chlorhexidine, 15 mL, Mouth/Throat, Q12H RENATO  finasteride, 5 mg, Oral, Daily  folic acid, 1 mg, Oral, Daily  heparin (porcine), 5,000 Units, Subcutaneous, Q8H RENATO  insulin lispro, 1-5 Units, Subcutaneous, TID AC  lisinopril, 20 mg, Oral, Daily  multivitamin-minerals, 1 tablet, Oral, Daily  pravastatin, 20 mg, Oral, Daily With Dinner  tamsulosin, 0.4 mg, Oral, Daily With Dinner  thiamine, 100 mg, Oral, Daily      Continuous IV Infusions:  niCARdipine (CARDENE) 25 mg (STANDARD CONCENTRATION) in sodium chloride 0.9% 250 mL  Rate:  mL/hr Dose: 1-15 mg/hr  Freq: Titrated Route: IV  Last Dose: Stopped (06/16/24 2236)  Start: 06/16/24 1815 End: 06/17/24 0102     PRN  Meds:  labetalol, 10 mg, Intravenous, Q6H PRN  polyethylene glycol, 17 g, Oral, Daily PRN        IP CONSULT TO CASE MANAGEMENT  IP CONSULT TO NEUROSURGERY  IP CONSULT TO GERONTOLOGY    Network Utilization Review Department  ATTENTION: Please call with any questions or concerns to [phone] and carefully listen to the prompts so that you are directed to the right person. All voicemails are confidential.   For Discharge needs, contact Care Management DC Support Team at [phone] opt. 2  Send all requests for admission clinical reviews, approved or denied determinations and any other requests to dedicated fax number below belonging to the campus where the patient is receiving treatment. List of dedicated fax numbers for the Facilities:  FACILITY NAME UR FAX NUMBER   ADMISSION DENIALS (Administrative/Medical Necessity) [phone]   DISCHARGE SUPPORT TEAM (NETWORK) [phone]   PARENT CHILD HEALTH (Maternity/NICU/Pediatrics) [phone]   [facility] [phone]   [facility] [phone]   [facility] [phone]   [facility] [phone]   [facility] [phone]   [facility] [phone]   [facility] [phone]   [facility] [phone]   [facility] [phone]   [facility] [phone]   [facility] [phone]   [facility] [phone]

## 2024-06-17 NOTE — TELEPHONE ENCOUNTER
06/21/2024- PT WAS DISCHARGED HOME    06/19/2024- PT STILL IN HOSPITAL    06/17/2024- PT IS IN [facility] HOSPITAL  06/17/2025- 1 YR F/U W/ MRI brain W/ AP [facility]  Please schedule 1 year follow up with MRI brain w/wo, AP solo. Thanks!

## 2024-06-17 NOTE — CONSULTS
[facility]  Consult  Name: To Dennison 84 y.o. male I MRN: [id number]  Unit/Bed#: ICU 08 I Date of Admission: 6/16/2024   Date of Service: 6/17/2024 I Hospital Day: 1    Inpatient consult to Neurosurgery  Consult performed by: Gabriela Berg PA-C  Consult ordered by: Emily Good MD      Imaging personally reviewed 6/17/24 at 2pm  Patient examined at bedside 6/17/24 at 2:30pm    Assessment & Plan   Meningioma (HCC)  Assessment & Plan  Right frontal meningioma, present on imaging since 2016   Presented after a fall with a laceration on the left arm. Denied losing consciousness and head strike.   PMHx of alcohol abuse, CAD and DM2  Not on AC/AP therapy.   Patient had MRI imaging in 2016 which showed meningioma. Had serial CT scans that showed meningioma increased in size in 2018. Has been stable without growth since 2021.   Currently GCS 14-15 with fluctuating mental status exam. Otherwise neuro intact.     Imaging:   MRI brain w wo contrast (6/17): Right frontal extra-axial mass measuring up to 4 cm most consistent with meningioma. Mild localized mass effect without edema.   CT head w wo contrast (6/16): 2.4 cm enhancing extra-axial mass over the right frontal convexity, statistically most consistent with a meningioma.      Plan:   Continue to monitor neurological exam.   STAT CT for GCS greater than 2 points in 1 hour.   Blood pressure goals per primary team  Pain management and medical management per primary team.   PT/OT evaluation  DVT ppx: SCDs , ok for pharm ppx   No neurosurgical intervention needed at this time.      Neurosurgery will sign off at this time. Meningioma is stable and asymptomatic. Plan to follow up with repeat MRI brain w/wo in 1 year per NCCN guidelines (given stability only since 2021). Call with any questions or concerns.        History of Present Illness     HPI: To Dennison is a 84 y.o. year old male with PMH including alcohol  "abuse, CAD, and DM2 who presents with complaint of fall and left extremity laceration 6/15. Patient is a poor historian. He drove himself to the hospital following the event. He states that he fell when walking up the front porch and denies head strike. He states that he fell and sustained his laceration a month ago and that he has been in the hospital for a week. Patient had no current complaints besides wanting to go home. Patient lives alone and consumes large amounts of alcohol daily. He denies that being intoxicated contributed to him falling. He denies HA, blurry vision, tinnitus, dysphagia, abdominal pain, N/V, paresthesias of the extremities, SOB and chest pain. Patient states that he ambulates with a cane.     Review of Systems   HENT:  Negative for tinnitus and trouble swallowing.    Eyes:  Negative for visual disturbance.   Respiratory:  Negative for chest tightness and shortness of breath.    Cardiovascular:  Negative for chest pain.   Gastrointestinal:  Negative for abdominal pain, nausea and vomiting.   Skin:  Positive for wound.   Neurological:  Positive for facial asymmetry. Negative for syncope, speech difficulty and headaches.   Psychiatric/Behavioral:  Positive for agitation and confusion.        Historical Information   Past Medical History:   Diagnosis Date    Ambulates with cane     unsure \"why\" falls at times     last fall 09/27/2021    Anxiety state 08/05/2018    Basal cell carcinoma of skin 11/15/2016    Cancer (HCC)     BASAL CA SKIN    Chronic kidney disease     ACUTE KIDNEY INJURY 3/21    Constipation     COPD (chronic obstructive pulmonary disease) ([facility])     Coronary artery disease 05/22/2012    Diabetes mellitus ([facility])     TYPE 2    Forearm laceration, right, initial encounter 05/28/2020    Hydronephrosis     LAST ASSESSED: 11/18/15    Hypertension     Mass of right side of neck     radical neck today 10/1/2021    Osteoporosis 05/22/2012    Pacemaker     SSS (sick sinus syndrome) ([facility])  "    s/p generator changeout 3/24/2023    Trifascicular block      Past Surgical History:   Procedure Laterality Date    CARDIAC PACEMAKER PLACEMENT  2009    Medtronic dual chamber     CARDIAC PACEMAKER PLACEMENT Left 3/24/2023    Procedure: DUAL LEAD PACEMAKER BATTERY REMOVAL AND REPLACE.;  Surgeon: To Neff MD;  Location: [facility];  Service: Cardiology    CATARACT EXTRACTION W/  INTRAOCULAR LENS IMPLANT      CYSTOSCOPY N/A 2016    Procedure: CYSTOSCOPY, evacuation of bladder calculi;  Surgeon: Haim Melendez MD;  Location: [facility];  Service:     INGUINAL HERNIA REPAIR      UNILATERAL    KIDNEY STONE SURGERY      MOHS SURGERY Right 2022    removal of right ear BCCA with free graft nd reconstruction - Dr. Aguilar and Dr. Newman    OTHER SURGICAL HISTORY      HERNIA REPAIR AS PER [facility] (ALREADY IN PERTINENT NEGATIVES)    PAROTIDECTOMY Right 10/01/2021    Procedure: TOTAL PAROTIDECTOMY WITH NIMS; MODIFIED RADICAL NECK DISSECTION;  Surgeon: Karsten Newman DO;  Location: [facility];  Service: ENT    OR TRURL ELECTROSURG RESCJ PROSTATE BLEED COMPLETE N/A 2016    Procedure: TRANSURETHRAL RESECTION OF PROSTATE (TURP);  Surgeon: Haim Melendez MD;  Location: [facility];  Service: Urology    TONSILLECTOMY       Social History     Substance and Sexual Activity   Alcohol Use Not Currently    Alcohol/week: 2.0 standard drinks of alcohol    Types: 2 Cans of beer per week    Comment: 2  beers daily and shot by hx/No ETOH x 3wks.      Social History     Substance and Sexual Activity   Drug Use No     Social History     Tobacco Use   Smoking Status Former    Current packs/day: 0.00    Average packs/day: 1.5 packs/day for 32.3 years (48.5 ttl pk-yrs)    Types: Cigars, Cigarettes    Start date:     Quit date: 1980    Years since quittin.1   Smokeless Tobacco Former     Family History   Problem Relation Age of Onset    Stroke Father         SYNDROME    Coronary artery  disease Family         less than 60 years of age       Meds/Allergies   all current active meds have been reviewed  No Known Allergies    Objective   I/O         06/15 0701  06/16 0700 06/16 0701  06/17 0700 06/17 0701  06/18 0700    P.O.  960 300    I.V. (mL/kg)  137.9 (2)     Total Intake(mL/kg)  1097.9 (15.8) 300 (4.3)    Urine (mL/kg/hr)  2150 1050 (2)    Total Output  2150 1050    Net  -1052.1 -750           Unmeasured Urine Occurrence  1 x             Physical Exam  Constitutional:       General: He is awake.   HENT:      Head: Normocephalic.   Eyes:      Extraocular Movements: Extraocular movements intact.      Pupils: Pupils are equal, round, and reactive to light.   Cardiovascular:      Rate and Rhythm: Normal rate.   Pulmonary:      Effort: No respiratory distress.      Breath sounds: No wheezing, rhonchi or rales.   Musculoskeletal:      Right upper arm: Normal.      Left upper arm: Laceration and tenderness present.   Neurological:      Motor: Motor strength is normal.     Coordination: Finger-Nose-Finger Test normal.      Deep Tendon Reflexes:      Reflex Scores:       Patellar reflexes are 2+ on the right side and 2+ on the left side.  Psychiatric:         Mood and Affect: Mood is anxious.         Speech: Speech normal.         Behavior: Behavior is cooperative.         Cognition and Memory: Cognition is impaired. Memory is impaired. He exhibits impaired remote memory.      Comments: Patient kept saying he wanted to go home. Stated he fell a month ago and was in the hospital for a week. Did not know the president; responded with [place].         Neurologic Exam     Mental Status   Oriented to person.   Oriented to place.   Follows 1 step commands.   Attention: decreased. Concentration: decreased.   Speech: speech is normal   Level of consciousness: alert  Knowledge: poor.   Able to name object. Able to repeat. Normal comprehension.     Cranial Nerves     CN III, IV, VI   Pupils are equal, round, and  "reactive to light.  L facial droop of unknown acuity     Motor Exam   Muscle bulk: normal  Overall muscle tone: normal  Right arm pronator drift: absent  Left arm pronator drift: absent    Strength   Strength 5/5 throughout.     Sensory Exam   Light touch normal.     Gait, Coordination, and Reflexes     Coordination   Finger to nose coordination: normal    Tremor   Resting tremor: present    Reflexes   Right patellar: 2+  Left patellar: 2+      Vitals:Blood pressure 156/97, pulse 96, temperature 98.4 °F (36.9 °C), temperature source Oral, resp. rate 18, height 5' 7\" (1.702 m), weight 69.4 kg (153 lb), SpO2 94%.,Body mass index is 23.96 kg/m².     Lab Results:   Results from last 7 days   Lab Units 06/17/24 0528 06/16/24 1749 06/16/24 1747 06/16/24  1303   WBC Thousand/uL 7.63 9.11  --  6.75   HEMOGLOBIN g/dL 14.5 15.0  --  14.4   I STAT HEMOGLOBIN g/dl  --   --  14.6  --    HEMATOCRIT % 43.4 44.7  --  44.0   HEMATOCRIT, ISTAT %  --   --  43  --    PLATELETS Thousands/uL 196 216  --  216   SEGS PCT % 74 69  --  64   MONO PCT % 10 8  --  11   EOS PCT % 3 5  --  6     Results from last 7 days   Lab Units 06/17/24 0528 06/16/24 1749 06/16/24 1747 06/16/24  1303   POTASSIUM mmol/L 3.6 3.6  --  4.2   CHLORIDE mmol/L 100 103  --  102   CO2 mmol/L 28 27  --  31   CO2, I-STAT mmol/L  --   --  31  --    BUN mg/dL 22 27*  --  27*   CREATININE mg/dL 1.19 1.35*  --  1.57*   CALCIUM mg/dL 9.6 9.8  --  10.0   ALK PHOS U/L  --  55  --  46   ALT U/L  --  10  --  10   AST U/L  --  14  --  15   GLUCOSE, ISTAT mg/dl  --   --  113  --      Results from last 7 days   Lab Units 06/17/24 0528   MAGNESIUM mg/dL 2.1     Results from last 7 days   Lab Units 06/17/24  0528   PHOSPHORUS mg/dL 3.3     Results from last 7 days   Lab Units 06/16/24  1749 06/16/24  1413   INR  1.12 1.03   PTT seconds 26  --      No results found for: \"TROPONINT\"  ABG:No results found for: \"PHART\", \"VKH6IJS\", \"PO2ART\", \"NPA5NWD\", \"D2BMJXBG\", \"BEART\", " "\"SOURCE\"    Imaging Studies: I have personally reviewed pertinent reports.    MRI brain w wo contrast    Result Date: 6/17/2024  Narrative: MRI BRAIN WITH AND WITHOUT CONTRAST INDICATION: meningioma, vs hematoma. COMPARISON: CT from yesterday. CT dated 7/30/2016. TECHNIQUE: Multiplanar, multisequence imaging of the brain was performed before and after gadolinium administration. IV Contrast:  7 mL of Gadobutrol injection (SINGLE-DOSE) IMAGE QUALITY:   Diagnostic. FINDINGS: BRAIN PARENCHYMA: Redemonstration of right frontal enhancing extra-axial mass most consistent with meningioma that measures 4.0 x 1.7 cm (AP, craniocaudal, image 18 series 9) x by 3.9 cm (oblique transverse measurement on image 22 series 11.) Lesion is stable compared with most recent CT when measured in a similar fashion.. Lesion has several small cystic components. In retrospect, lesion has gradually increased from 2 x 1.0 cm in 2016. Medial aspect of the lesion comes in close proximity to but does not involve the superior sagittal sinus. Mild localized mass effect on the subjacent frontal lobe without edema. No significant shift or herniation. No restricted diffusion to indicate an acute infarct. No acute hemorrhage. No other abnormal enhancement.  There are T2/FLAIR hyperintensities in the periventricular and subcortical white matter as well as the jessi due to moderate chronic microangiopathy. Chronic lacunar infarcts in the basal ganglia and thalamus. VENTRICLES: Volume loss. No hydrocephalus. SELLA AND PITUITARY GLAND:  Normal. ORBITS: Bilateral lens replacement. PARANASAL SINUSES:  Normal. VASCULATURE:  Evaluation of the major intracranial vasculature demonstrates appropriate flow voids. CALVARIUM AND SKULL BASE: Small right mastoid effusion. Otherwise unremarkable. EXTRACRANIAL SOFT TISSUES:  Normal.     Impression: Right frontal extra-axial mass measuring up to 4 cm most consistent with meningioma. Mild localized mass effect without edema. " No acute intracranial pathology. Chronic microangiopathy. Workstation performed: [id number]     CT head w wo contrast    Result Date: 6/16/2024  Narrative: CT BRAIN - WITH AND WITHOUT CONTRAST INDICATION:   Clarify subdural hematoma  vs epidural vs meningioma. COMPARISON: Multiple prior CT head examinations, most recent 6/16/2024 at 5:50 p.m. TECHNIQUE:  CT examination of the brain was performed both prior to and after the administration of intravenous contrast.  Multiplanar 2D reformatted images were created from the source data. Radiation dose length product (DLP) for this visit:  2308 mGy-cm .  This examination, like all CT scans performed in the [facility], was performed utilizing techniques to minimize radiation dose exposure, including the use of iterative reconstruction and automated exposure control. IV Contrast:  80 mL of iohexol (OMNIPAQUE) IMAGE QUALITY:  Diagnostic. FINDINGS: PARENCHYMA: No acute intracranial hemorrhage or midline shift. Moderate chronic ischemic changes of the white matter. Intracranial vascular calcifications. Redemonstrated enhancing 2.4 x 1.0 cm extra-axial mass over the right frontal convexity with mild underlying mass effect. VENTRICLES AND EXTRA-AXIAL SPACES: As above. No hydrocephalus. VISUALIZED ORBITS: No acute abnormality. PARANASAL SINUSES: Essentially clear. CALVARIUM: No acute abnormality.     Impression: 2.4 cm enhancing extra-axial mass over the right frontal convexity, statistically most consistent with a meningioma. Workstation performed: [id number]     X-ray chest 1 view portable    Result Date: 6/16/2024  Narrative: XR CHEST PORTABLE INDICATION: chest pain. COMPARISON: Chest CT 6/16/2024, CXR 12/08/2021. FINDINGS: Clear lungs. No pneumothorax or pleural effusion. Mild cardiomegaly left subclavian pacemaker leads in the right atrium and right ventricle. Bones are unremarkable for age. Mild benign eventration of the right hemidiaphragm. Right  paratracheal opacity due to benign ectatic vessels.     Impression: No acute cardiopulmonary disease. Workstation performed: [id number]     TRAUMA - CT chest abdomen pelvis wo contrast    Result Date: 6/16/2024  Narrative: CT CHEST, ABDOMEN AND PELVIS WITHOUT IV CONTRAST INDICATION: trauma. COMPARISON: CT chest abdomen pelvis 11/21/2022. TECHNIQUE: CT examination of the chest, abdomen and pelvis was performed without intravenous contrast. Multiplanar 2D reformatted images were created from the source data. This examination, like all CT scans performed in the [facility], was performed utilizing techniques to minimize radiation dose exposure, including the use of iterative reconstruction and automated exposure control. Radiation dose length product (DLP) for this visit: 928.2 mGy-cm Enteric Contrast: Not administered. FINDINGS: CHEST LUNGS: Lungs are clear. No tracheal or endobronchial lesion. PLEURA: Unremarkable. HEART/GREAT VESSELS: Dual-lead cardiac device. Cardiomegaly. Dense coronary artery calcifications. No pericardial effusion.. Fusiform ectasia of the ascending thoracic aorta measuring up to 42 mm. Recommendation is for follow-up low radiation dose chest CT in one year. MEDIASTINUM AND TARA: Small hiatal hernia. CHEST WALL AND LOWER NECK: Left chest wall cardiac device. ABDOMEN LIVER/BILIARY TREE: Simple hepatic cyst(s). No suspicious mass. Normal hepatic contours. No biliary dilation. GALLBLADDER: Cholelithiasis without findings of acute cholecystitis. SPLEEN: Unremarkable. PANCREAS: Unremarkable. ADRENAL GLANDS: Unremarkable. KIDNEYS/URETERS: Moderate bilateral hydroureteronephrosis without obstructive ureteral calculi likely on the basis of severe bladder distention. See below Simple appearing bilateral renal cysts. STOMACH AND BOWEL: Colonic diverticulosis without findings of acute diverticulitis. Moderate colonic stool burden without obstruction. APPENDIX: No findings to suggest  appendicitis. ABDOMINOPELVIC CAVITY: No ascites. No pneumoperitoneum. No lymphadenopathy. VESSELS: Unremarkable for patient's age. PELVIS REPRODUCTIVE ORGANS: Status post TURP. URINARY BLADDER: Severely distended without wall thickening ABDOMINAL WALL/INGUINAL REGIONS: Unremarkable. BONES: No acute fracture or suspicious osseous lesion.     Impression: No acute injury in the chest, abdomen or pelvis. Moderate bilateral hydronephrosis likely secondary to severe bladder distention. Low-dose CT chest follow-up is recommended in 1 year for follow-up of ascending aortic ectasia measuring 42 mm. Trauma team was called just before the time of this dictation Workstation performed: [id number]     TRAUMA - CT spine cervical wo contrast    Result Date: 6/16/2024  Narrative: CT CERVICAL SPINE - WITHOUT CONTRAST INDICATION:   TRAUMA. COMPARISON: CT soft tissue neck 6/26/2021. TECHNIQUE:  CT examination of the cervical spine was performed without intravenous contrast.  Contiguous axial images were obtained. Multiplanar 2D reformatted images were created from the source data. Radiation dose length product (DLP) for this visit:  408.73 mGy-cm .  This examination, like all CT scans performed in the [facility], was performed utilizing techniques to minimize radiation dose exposure, including the use of iterative  reconstruction and automated exposure control. IMAGE QUALITY:  Diagnostic. FINDINGS: ALIGNMENT:  Normal alignment of the cervical spine. No subluxation. VERTEBRAE:  No fracture. DEGENERATIVE CHANGES: Moderate multilevel cervical degenerative changes are noted. No critical central canal stenosis. PREVERTEBRAL AND PARASPINAL SOFT TISSUES: Dense bilateral carotid bifurcation atherosclerotic calcifications. THORACIC INLET:  Normal.     Impression: No cervical spine fracture or traumatic malalignment. Workstation performed: [id number]     CT head wo contrast    Result Date: 6/16/2024  Narrative: CT BRAIN -  WITHOUT CONTRAST INDICATION:   midline shift. COMPARISON: CT head from earlier today. TECHNIQUE:  CT examination of the brain was performed.  Multiplanar 2D reformatted images were created from the source data. Radiation dose length product (DLP) for this visit:  1006 mGy-cm .  This examination, like all CT scans performed in the [facility], was performed utilizing techniques to minimize radiation dose exposure, including the use of iterative reconstruction and automated exposure control. IMAGE QUALITY:  Diagnostic. FINDINGS: PARENCHYMA: Decreased attenuation is noted in periventricular and subcortical white matter demonstrating an appearance that is statistically most likely to represent moderate microangiopathic change; this appearance is similar when compared to most recent prior examination. No CT signs of acute infarction.  No intracranial mass. No acute intraparenchymal hemorrhage. Maximum of 2 to 3 mm of leftward midline shift secondary to the subacute or chronic subdural hematoma described below. Some of the apparent midline shift seen on the prior study is to tilting of the patient's head in the imaging gantry. VENTRICLES AND EXTRA-AXIAL SPACES: Unchanged isodense right upper frontal subdural collection measuring 10 mm in thickness and 2.6 cm in length #400/42 is new since the 2021 study but subacute/chronic appearance given the overall low-density appearance. No acute components VISUALIZED ORBITS: Normal visualized orbits. PARANASAL SINUSES: Normal visualized paranasal sinuses. CALVARIUM AND EXTRACRANIAL SOFT TISSUES:  Normal.     Impression: No significant change from the study performed earlier today. Reidentified subacute or chronic right upper frontal subdural collection. No acute intracranial hemorrhage. Maximum of 2 to 3 mm of leftward midline shift secondary to the subacute or chronic subdural hematoma above. Some of the apparent midline shift seen on the prior study is secondary to  tilting of the patient's head in the imaging gantry. Workstation performed: [id number]     CT head wo contrast    Result Date: 6/16/2024  Narrative: CT BRAIN - WITHOUT CONTRAST INDICATION:   recurrent falls. COMPARISON: CT head 3/17/2021 TECHNIQUE:  CT examination of the brain was performed.  Multiplanar 2D reformatted images were created from the source data. Radiation dose length product (DLP) for this visit:  862 mGy-cm .  This examination, like all CT scans performed in the [facility], was performed utilizing techniques to minimize radiation dose exposure, including the use of iterative reconstruction and automated exposure control. IMAGE QUALITY:  Diagnostic. FINDINGS: PARENCHYMA/EXTRA-AXIAL SPACES: Isodense biconvex extra-axial fluid collection along the right frontal convexity measuring 2.1 x 1.3 x 1.1 cm (series 400 image 40, series 2 image 36), new from 3/17/2021. There is associated mild mass effect with approximately 6 mm leftward midline shift. Decreased attenuation is noted in periventricular and subcortical white matter demonstrating an appearance that is statistically most likely to represent moderate microangiopathic change. No CT signs of acute territorial infarction. VENTRICLES:  Normal ventricles for the patient's age. VISUALIZED ORBITS: Bilateral lens replacements. PARANASAL SINUSES: Normal visualized paranasal sinuses. CALVARIUM AND EXTRACRANIAL SOFT TISSUES: No acute fracture.     Impression: Isodense epidural hematoma along the right frontal convexity measuring 2.1 x 1.3 x 1.1 cm, favored to be subacute. There is associated mild mass effect with approximately 6 mm leftward midline shift. I personally discussed this study with DANNIE ZAMBRANO on 6/16/2024 2:01 PM. Resident: JEAN-CLAUDE VANEGAS I, the attending radiologist, have reviewed the images and agree with the final report above. Workstation performed: [id number]      EKG, Pathology, and Other Studies: I have personally reviewed  pertinent reports.      VTE Prophylaxis: Sequential compression device (Venodyne)     Code Status: Level 1 - Full Code  Advance Directive and Living Will:      Power of :    POLST:      Counseling / Coordination of Care  I spent 20 minutes with the patient.

## 2024-06-17 NOTE — PLAN OF CARE
Problem: PHYSICAL THERAPY ADULT  Goal: Performs mobility at highest level of function for planned discharge setting.  See evaluation for individualized goals.  Description: Treatment/Interventions: Functional transfer training, LE strengthening/ROM, Elevations, Therapeutic exercise, Endurance training, Cognitive reorientation, Equipment eval/education, Gait training, Bed mobility, Spoke to nursing, OT  Equipment Recommended: Other (Comment) (Continue use of SPC; trial RW)       See flowsheet documentation for full assessment, interventions and recommendations.  Note: Prognosis: Good  Problem List: Decreased strength, Decreased endurance, Impaired balance, Decreased mobility, Decreased cognition, Impaired judgement, Decreased safety awareness  Assessment: Pt is an 84 year old male admitted to the [facility] on 6/16/2024. Pt reports that he had a fall in the middle of the night and tripped over a step and landed on his knees. Pt presents with a skin tear to the RUE. Pt was transferred to [facility] on 6/16/2024 for further medical work-up. Pt had a CT scan that revealed epidural hematoma with a L midline shift. Pt's comorbidities affecting POC include: Anxiety, Chronic kidney disease, COPD (chronic obstructive pulmonary disease), Coronary artery disease, Diabetes mellitus, Hypertension, Osteoporosis, Pacemaker, SSS (sick sinus syndrome), alcohol abuse, and Trifascicular block.  and personal factors of: ALIDA, decreased caregiver support at home, and advanced age. Pt's clinical presentation is currently unstable/unpredictable which is evident in ongoing telem monitoring, pending medical work-up, and abn lab values. Pt presents w/ overall weakness, incl min decreased LE strength, decreased functional endurance and activity tolerance, impaired balance, and gait deviations. Pt presents with impaired cognition requiring frequent cuing to redirect to tasks given and impulsivity during  mobilization tasks requiring close supervision during all mobilization activities.Will continue to follow pt in PT for progressive mobilization to address above functional deficits and to maximize his level of independence, endurance, and safety. D/C recommendations are outlined below. Will continue to follow until medically cleared for D/C.  Barriers to Discharge: Decreased caregiver support     Rehab Resource Intensity Level, PT: II (Moderate Resource Intensity)    See flowsheet documentation for full assessment.

## 2024-06-17 NOTE — CASE MANAGEMENT
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     Chronic kidney disease, stage 4 (severe) ([facility]) 2021     Type 2 diabetes mellitus with diabetic peripheral angiopathy without gangrene (HCC) 2021     Type 2 diabetes mellitus with chronic kidney disease (HCC) 2021     Platelets decreased ([facility]) 2021     BRENDA (acute kidney injury) ([facility]) 2021     Troponin level elevated 2021     Dizziness 2021     Mixed hyperlipidemia 2020     Pacemaker 2019     Constipation 2018     Skin lesion 10/10/2018     Anxiety state 2018     Bradycardia 2018     Basal cell carcinoma of skin 11/15/2016     Abnormal nuclear stress test 2016     BPH (benign prostatic hyperplasia) 2015     Sleep related leg cramps 2013     Coronary artery disease involving native coronary artery of native heart without angina pectoris 2012     Type 2 diabetes mellitus with circulatory disorder, without long-term current use of insulin ([facility]) 2012     HTN (hypertension) 2012     Osteoporosis 2012     Peripheral arterial disease (HCC) 2012       LOS (days): 1  Geometric Mean LOS (GMLOS) (days): 3  Days to GMLOS:2.1     OBJECTIVE:    Risk of Unplanned Readmission Score: 14.73         Current admission status:  Inpatient  Referral Reason: Other    Preferred Pharmacy:   [facility] [employer] [place] [place] - [address]  [address] [id number]  Phone: [phone] Fax: [phone]    Primary Care Provider: Cachorro Garcia DO    Primary Insurance: [facility]  Secondary Insurance:     ASSESSMENT:  Active Health Care Proxies    There are no active Health Care Proxies on file.        Readmission Root Cause  30 Day Readmission: No    Patient Information  Admitted from:: Home  Mental Status: Alert (Reported to be alert, pt was asleep when CM attempted to speak with pt.)  During Assessment patient was accompanied by: Not accompanied during assessment  County of Residence: [place]  What city do you live in?: [place]       DISCHARGE DETAILS:    Discharge planning discussed with:: Call placed to pt's sonRylan, no VM could be left        CM contacted family/caregiver?: Yes (VM to son- Rylan)      Contacts  Patient Contacts: Rylan Dennison  Relationship to Patient:: Family  Contact Method: Phone  Phone Number: [phone]  Reason/Outcome: Emergency Contact     Additional Comments: CM attempted to complete Open with pt, pt was asleep when CM attempted to speak with him.  CM called pt's sonRylan, no answer, unable to leave a VM.  Formal Open assessment will need to be completed.

## 2024-06-17 NOTE — PLAN OF CARE
Problem: Prexisting or High Potential for Compromised Skin Integrity  Goal: Skin integrity is maintained or improved  Description: INTERVENTIONS:  - Identify patients at risk for skin breakdown  - Assess and monitor skin integrity  - Assess and monitor nutrition and hydration status  - Monitor labs   - Assess for incontinence   - Turn and reposition patient  - Assist with mobility/ambulation  - Relieve pressure over bony prominences  - Avoid friction and shearing  - Provide appropriate hygiene as needed including keeping skin clean and dry  - Evaluate need for skin moisturizer/barrier cream  - Collaborate with interdisciplinary team   - Patient/family teaching  - Consider wound care consult   Outcome: Progressing     Problem: PAIN - ADULT  Goal: Verbalizes/displays adequate comfort level or baseline comfort level  Description: Interventions:  - Encourage patient to monitor pain and request assistance  - Assess pain using appropriate pain scale  - Administer analgesics based on type and severity of pain and evaluate response  - Implement non-pharmacological measures as appropriate and evaluate response  - Consider cultural and social influences on pain and pain management  - Notify physician/advanced practitioner if interventions unsuccessful or patient reports new pain  Outcome: Progressing     Problem: INFECTION - ADULT  Goal: Absence or prevention of progression during hospitalization  Description: INTERVENTIONS:  - Assess and monitor for signs and symptoms of infection  - Monitor lab/diagnostic results  - Monitor all insertion sites, i.e. indwelling lines, tubes, and drains  - Monitor endotracheal if appropriate and nasal secretions for changes in amount and color  - [place]ieber appropriate cooling/warming therapies per order  - Administer medications as ordered  - Instruct and encourage patient and family to use good hand hygiene technique  - Identify and instruct in appropriate isolation precautions for  identified infection/condition  Outcome: Progressing  Goal: Absence of fever/infection during neutropenic period  Description: INTERVENTIONS:  - Monitor WBC    Outcome: Progressing     Problem: SAFETY ADULT  Goal: Patient will remain free of falls  Description: INTERVENTIONS:  - Educate patient/family on patient safety including physical limitations  - Instruct patient to call for assistance with activity   - Consult OT/PT to assist with strengthening/mobility   - Keep Call bell within reach  - Keep bed low and locked with side rails adjusted as appropriate  - Keep care items and personal belongings within reach  - Initiate and maintain comfort rounds  - Make Fall Risk Sign visible to staff  - Offer Toileting every 2 Hours, in advance of need  - Initiate/Maintain bed alarm  - Obtain necessary fall risk management equipment  - Apply yellow socks and bracelet for high fall risk patients  - Consider moving patient to room near nurses station  Outcome: Progressing  Goal: Maintain or return to baseline ADL function  Description: INTERVENTIONS:  -  Assess patient's ability to carry out ADLs; assess patient's baseline for ADL function and identify physical deficits which impact ability to perform ADLs (bathing, care of mouth/teeth, toileting, grooming, dressing, etc.)  - Assess/evaluate cause of self-care deficits   - Assess range of motion  - Assess patient's mobility; develop plan if impaired  - Assess patient's need for assistive devices and provide as appropriate  - Encourage maximum independence but intervene and supervise when necessary  - Involve family in performance of ADLs  - Assess for home care needs following discharge   - Consider OT consult to assist with ADL evaluation and planning for discharge  - Provide patient education as appropriate  Outcome: Progressing  Goal: Maintains/Returns to pre admission functional level  Description: INTERVENTIONS:  - Perform AM-PAC 6 Click Basic Mobility/ Daily Activity  assessment daily.  - Set and communicate daily mobility goal to care team and patient/family/caregiver.   - Collaborate with rehabilitation services on mobility goals if consulted  - Out of bed for toileting  - Record patient progress and toleration of activity level   Outcome: Progressing     Problem: DISCHARGE PLANNING  Goal: Discharge to home or other facility with appropriate resources  Description: INTERVENTIONS:  - Identify barriers to discharge w/patient and caregiver  - Arrange for needed discharge resources and transportation as appropriate  - Identify discharge learning needs (meds, wound care, etc.)  - Arrange for interpretive services to assist at discharge as needed  - Refer to Case Management Department for coordinating discharge planning if the patient needs post-hospital services based on physician/advanced practitioner order or complex needs related to functional status, cognitive ability, or social support system  Outcome: Progressing     Problem: Knowledge Deficit  Goal: Patient/family/caregiver demonstrates understanding of disease process, treatment plan, medications, and discharge instructions  Description: Complete learning assessment and assess knowledge base.  Interventions:  - Provide teaching at level of understanding  - Provide teaching via preferred learning methods  Outcome: Progressing     Problem: Nutrition/Hydration-ADULT  Goal: Nutrient/Hydration intake appropriate for improving, restoring or maintaining nutritional needs  Description: Monitor and assess patient's nutrition/hydration status for malnutrition. Collaborate with interdisciplinary team and initiate plan and interventions as ordered.  Monitor patient's weight and dietary intake as ordered or per policy. Utilize nutrition screening tool and intervene as necessary. Determine patient's food preferences and provide high-protein, high-caloric foods as appropriate.     INTERVENTIONS:  - Monitor oral intake, urinary output,  labs, and treatment plans  - Assess nutrition and hydration status and recommend course of action  - Evaluate amount of meals eaten  - Assist patient with eating if necessary   - Allow adequate time for meals  - Recommend/ encourage appropriate diets, oral nutritional supplements, and vitamin/mineral supplements  - Order, calculate, and assess calorie counts as needed  - Recommend, monitor, and adjust tube feedings and TPN/PPN based on assessed needs  - Assess need for intravenous fluids  - Provide specific nutrition/hydration education as appropriate  - Include patient/family/caregiver in decisions related to nutrition  Outcome: Progressing

## 2024-06-17 NOTE — PROGRESS NOTES
Progress Note - Trauma   To Dennison 84 y.o. male [id number]   Unit/Bed#: ICU 08 Encounter: [id number]     ASSESSMENT:   Transfer from [facility]  Fall  Laceration left forearm  Meningioma  Suspect underlying ambulatory dysfunction given medical history list      Patient Active Problem List   Diagnosis    Abnormal nuclear stress test    Basal cell carcinoma of skin    BPH (benign prostatic hyperplasia)    Coronary artery disease involving native coronary artery of native heart without angina pectoris    Type 2 diabetes mellitus with circulatory disorder, without long-term current use of insulin (HCC)    HTN (hypertension)    Osteoporosis    Peripheral arterial disease (HCC)    Sleep related leg cramps    Anxiety state    Bradycardia    Skin lesion    Constipation    Pacemaker    Mixed hyperlipidemia    BRENDA (acute kidney injury) (HCC)    Troponin level elevated    Dizziness    Platelets decreased (HCC)    Type 2 diabetes mellitus with diabetic peripheral angiopathy without gangrene (HCC)    Type 2 diabetes mellitus with chronic kidney disease (HCC)    Chronic kidney disease, stage 4 (severe) (HCC)    Primary malignant neoplasm of parotid gland (HCC)    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(HCC)    Sick sinus syndrome (HCC)    Pacer at end of battery life    Chronic obstructive pulmonary disease, unspecified COPD type (HCC)    Epidural hematoma (HCC)    Alcohol abuse    Fall      PLAN:  -neurosurg evaluation of meningioma  -no further intervention from the trauma standpoint  -local wound care  -encourage alcohol abstinence  -PT/OT        Subjective   Chief Complaint: fall, laceration arm    Subjective: no new complaints     Objective   Vitals:   Temp:  [97.7 °F (36.5 °C)-100.5 °F (38.1 °C)] 98.4 °F (36.9 °C)  HR:  [] 98  Resp:  [12-36] 23  BP: (112-203)/() 177/99    I/O         06/15 0701  06/16 0700 06/16 0701  06/17 0700 06/17 0701  06/18 0700    P.O.  960 300    I.V. (mL/kg)  137.9 (2)      Total Intake(mL/kg)  1097.9 (15.8) 300 (4.3)    Urine (mL/kg/hr)  2150 1050 (1.9)    Total Output  2150 1050    Net  -1052.1 -750           Unmeasured Urine Occurrence  1 x            Physical Exam:   GENERAL APPEARANCE: awake. Pleasant.  NEURO: nonfocal  HEENT: oral mucosa moist  CV: tachy, but regular  LUNGS: ctab  GI: bs present. Soft. nontender  SKIN: warm, dry    Invasive Devices       Peripheral Intravenous Line  Duration             Peripheral IV 06/16/24 Right Arm 1 day    Peripheral IV 06/16/24 Distal;Right;Upper;Ventral (anterior) Arm <1 day                      Lab Results: Results: I have personally reviewed all pertinent laboratory/tests results  Imaging/EKG Studies: I have personally reviewed pertinent reports.

## 2024-06-17 NOTE — ASSESSMENT & PLAN NOTE
Right frontal meningioma, present on imaging since 2016   Presented after a fall with a laceration on the left arm. Denied losing consciousness and head strike.   PMHx of alcohol abuse, CAD and DM2  Not on AC/AP therapy.   Patient had MRI imaging in 2016 which showed meningioma. Had serial CT scans that showed meningioma increased in size in 2018. Has been stable without growth since 2021.   Currently GCS 14-15 with fluctuating mental status exam. Otherwise neuro intact.     Imaging:   MRI brain w wo contrast (6/17): Right frontal extra-axial mass measuring up to 4 cm most consistent with meningioma. Mild localized mass effect without edema.   CT head w wo contrast (6/16): 2.4 cm enhancing extra-axial mass over the right frontal convexity, statistically most consistent with a meningioma.      Plan:   Continue to monitor neurological exam.   STAT CT for GCS greater than 2 points in 1 hour.   Blood pressure goals per primary team  Pain management and medical management per primary team.   PT/OT evaluation  DVT ppx: SCDs , ok for pharm ppx   No neurosurgical intervention needed at this time.      Neurosurgery will sign off at this time. Meningioma is stable and asymptomatic. Plan to follow up with repeat MRI brain w/wo in 1 year per NCCN guidelines (given stability only since 2021). Call with any questions or concerns.

## 2024-06-17 NOTE — PLAN OF CARE
Problem: OCCUPATIONAL THERAPY ADULT  Goal: Performs self-care activities at highest level of function for planned discharge setting.  See evaluation for individualized goals.  Description: Treatment Interventions: ADL retraining, Functional transfer training, Endurance training, Cognitive reorientation, Patient/family training, Equipment evaluation/education, Compensatory technique education, Activityengagement          See flowsheet documentation for full assessment, interventions and recommendations.   Note: Limitation: Decreased ADL status, Decreased Safe judgement during ADL, Decreased cognition, Decreased endurance, Decreased self-care trans, Decreased high-level ADLs  Prognosis: Fair  Assessment: Pt is a 84 y.o. male who was admitted to [facility] on 6/16/2024 with Fall . Patient  has a past medical history of Ambulates with cane, Anxiety state, Basal cell carcinoma of skin, Cancer (HCC), Chronic kidney disease, Constipation, COPD (chronic obstructive pulmonary disease) ([facility]), Coronary artery disease, Diabetes mellitus ([facility]), Forearm laceration, right, initial encounter, Hydronephrosis, Hypertension, Mass of right side of neck, Osteoporosis, Pacemaker, SSS (sick sinus syndrome) ([facility]), and Trifascicular block.   At baseline pt was completing adls and mobility independently with spc - I iadls with assist from family PRN. Pt lives alone in 2 story home with [facility] PRN. Currently pt requires min assist for overall ADLS and min assist for functional mobility/transfers. Pt currently presents with impairments in the following categories -steps to enter environment, limited home support, difficulty performing ADLS, difficulty performing IADLS , limited insight into deficits, health management , and environment activity tolerance, endurance, standing balance/tolerance, sitting balance/tolerance, memory, insight, safety , judgement , and attention . These impairments, as well as pt's fatigue, decreased  caregiver support, risk for falls, and home environment  limit pt's ability to safely engage in all baseline areas of occupation, includingbathing, dressing, toileting, functional mobility/transfers, community mobility, laundry , driving, house maintenance, medication management, meal prep, cleaning, social participation , and leisure activities  From OT standpoint, recommend Level II resources upon D/C. OT will continue to follow to address the below stated goals.     Rehab Resource Intensity Level, OT: II (Moderate Resource Intensity)

## 2024-06-17 NOTE — PHYSICAL THERAPY NOTE
"   Physical Therapy Evaluation     Patient's Name: To Dennison    Admitting Diagnosis  Epidural hematoma (HCC) [S06.4XAA]  Unspecified multiple injuries, initial encounter [T07.XXXA]    Problem List  Patient Active Problem List   Diagnosis    Abnormal nuclear stress test    Basal cell carcinoma of skin    BPH (benign prostatic hyperplasia)    Coronary artery disease involving native coronary artery of native heart without angina pectoris    Type 2 diabetes mellitus with circulatory disorder, without long-term current use of insulin (HCC)    HTN (hypertension)    Osteoporosis    Peripheral arterial disease (HCC)    Sleep related leg cramps    Anxiety state    Bradycardia    Skin lesion    Constipation    Pacemaker    Mixed hyperlipidemia    BRENDA (acute kidney injury) (HCC)    Troponin level elevated    Dizziness    Platelets decreased (HCC)    Type 2 diabetes mellitus with diabetic peripheral angiopathy without gangrene (HCC)    Type 2 diabetes mellitus with chronic kidney disease (HCC)    Chronic kidney disease, stage 4 (severe) (HCC)    Primary malignant neoplasm of parotid gland (HCC)    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(HCC)    Sick sinus syndrome (HCC)    Pacer at end of battery life    Chronic obstructive pulmonary disease, unspecified COPD type (HCC)    Epidural hematoma (HCC)    Alcohol abuse    Fall    Meningioma (HCC)       Past Medical History  Past Medical History:   Diagnosis Date    Ambulates with cane     unsure \"why\" falls at times     last fall 09/27/2021    Anxiety state 08/05/2018    Basal cell carcinoma of skin 11/15/2016    Cancer (HCC)     BASAL CA SKIN    Chronic kidney disease     ACUTE KIDNEY INJURY 3/21    Constipation     COPD (chronic obstructive pulmonary disease) (HCC)     Coronary artery disease 05/22/2012    Diabetes mellitus (HCC)     TYPE 2    Forearm laceration, right, initial encounter 05/28/2020    Hydronephrosis     LAST ASSESSED: 11/18/15    Hypertension     Mass " of right side of neck     radical neck today 10/1/2021    Osteoporosis 05/22/2012    Pacemaker     SSS (sick sinus syndrome) ([facility])     s/p generator changeout 3/24/2023    Trifascicular block        Past Surgical History  Past Surgical History:   Procedure Laterality Date    CARDIAC PACEMAKER PLACEMENT  11/2009    Medtronic dual chamber     CARDIAC PACEMAKER PLACEMENT Left 3/24/2023    Procedure: DUAL LEAD PACEMAKER BATTERY REMOVAL AND REPLACE.;  Surgeon: To Neff MD;  [facility]: [facility];  Service: Cardiology    CATARACT EXTRACTION W/  INTRAOCULAR LENS IMPLANT      CYSTOSCOPY N/A 05/09/2016    Procedure: CYSTOSCOPY, evacuation of bladder calculi;  Surgeon: Haim Melendez MD;  [facility];  Service:     INGUINAL HERNIA REPAIR      UNILATERAL    KIDNEY STONE SURGERY      MOHS SURGERY Right 08/31/2022    removal of right ear BCCA with free graft nd reconstruction - Dr. Aguilar and Dr. Newman    OTHER SURGICAL HISTORY      HERNIA REPAIR AS PER ALLSCRIPTS (ALREADY IN PERTINENT NEGATIVES)    PAROTIDECTOMY Right 10/01/2021    Procedure: TOTAL PAROTIDECTOMY WITH NIMS; MODIFIED RADICAL NECK DISSECTION;  Surgeon: Karsten Newman DO;  [facility];  Service: ENT    MO TRURL ELECTROSURG RESCJ PROSTATE BLEED COMPLETE N/A 05/09/2016    Procedure: TRANSURETHRAL RESECTION OF PROSTATE (TURP);  Surgeon: Haim Melendez MD;  [facility];  Service: Urology    TONSILLECTOMY            06/17/24 1207   PT Last Visit   PT Visit Date 06/17/24   Note Type   Note type Evaluation   Pain Assessment   Pain Assessment Tool 0-10   Pain Score No Pain   Restrictions/Precautions   Weight Bearing Precautions Per Order No   Other Precautions Cognitive;Chair Alarm;Bed Alarm;Multiple lines;Telemetry;Fall Risk   Home Living   Type of Home House   Home Layout Two level;Able to live on main level with bedroom/bathroom;Stairs to enter without rails  (1 ALIDA; FFSU)   Bathroom Shower/Tub Tub/shower unit   Bathroom  Toilet Standard   Bathroom Equipment Grab bars in shower   Home Equipment Cane   Prior Function   Level of [place] Independent with ADLs;Independent with functional mobility  (Pt ambulates with a cane normally.)   Lives With (S)  Alone   Receives Help From Family   Falls in the last 6 months 1 to 4  (Reports 2 falls)   Vocational Retired   General   Additional Pertinent History Cleared for PT eval by [facility]   Family/Caregiver Present No   Cognition   Overall Cognitive Status Impaired   Arousal/Participation Alert   Attention Attends with cues to redirect   Orientation Level Oriented to person;Oriented to place;Oriented to time;Oriented to situation  (Pt reports the month and year)   Memory Decreased short term memory;Decreased recall of recent events;Decreased recall of precautions   Following Commands Follows one step commands with increased time or repetition   Subjective   Subjective Pt alert; lying in bed; agreeable to mobilize with PT and OT to the recliner.   RUE Assessment   RUE Assessment WFL  (AROM)   LUE Assessment   LUE Assessment WFL  (AROM)   RLE Assessment   RLE Assessment WFL  (AROM)   Strength RLE   RLE Overall Strength   (Fair; able to perform LAQ)   LLE Assessment   LLE Assessment WFL  (AROM)   Strength LLE   LLE Overall Strength   (Fair; able to perform LAQ)   Bed Mobility   Supine to Sit 4  Minimal assistance   Additional items Assist x 1;Impulsive   Additional Comments Pt left resting in chair, call bell in reach, chair alarm active   Transfers   Sit to Stand 4  Minimal assistance   Additional items Assist x 1;Impulsive   Stand to Sit 4  Minimal assistance   Additional items Assist x 1;Impulsive   Ambulation/Elevation   Gait pattern Forward Flexion;Wide JANA;Decreased foot clearance;Inconsistent lupe;Short stride;Excessively slow;Shuffling   Gait Assistance 4  Minimal assist   Additional items Assist x 1;Verbal cues   Assistive Device Other (Comment)  (BL HHA)   Distance 4ft   Stair  Management Assistance Not tested   Balance   Static Sitting Fair +   Dynamic Sitting Fair   Static Standing Fair -   Dynamic Standing Poor +   Ambulatory Poor +   Activity Tolerance   Activity Tolerance Patient tolerated treatment well   Medical Staff Made Aware Co-humaira conducted w/ OT d/t medical compelxity   Nurse Made Aware Spoke lisa Watkins RN   Assessment   Prognosis Good   Problem List Decreased strength;Decreased endurance;Impaired balance;Decreased mobility;Decreased cognition;Impaired judgement;Decreased safety awareness   Assessment Pt is an 84 year old male admitted to the [facility] on 6/16/2024. Pt reports that he had a fall in the middle of the night and tripped over a step and landed on his knees. Pt presents with a skin tear to the RUE. Pt was transferred to [facility] on 6/16/2024 for further medical work-up. Pt had a CT scan that revealed epidural hematoma with a L midline shift. Pt's comorbidities affecting POC include: Anxiety, Chronic kidney disease, COPD (chronic obstructive pulmonary disease), Coronary artery disease, Diabetes mellitus, Hypertension, Osteoporosis, Pacemaker, SSS (sick sinus syndrome), alcohol abuse, and Trifascicular block.  and personal factors of: ALIDA, decreased caregiver support at home, and advanced age. Pt's clinical presentation is currently unstable/unpredictable which is evident in ongoing telem monitoring, pending medical work-up, and abn lab values. Pt presents w/ overall weakness, incl min decreased LE strength, decreased functional endurance and activity tolerance, impaired balance, and gait deviations. Pt presents with impaired cognition requiring frequent cuing to redirect to tasks given and impulsivity during mobilization tasks requiring close supervision during all mobilization activities.Will continue to follow pt in PT for progressive mobilization to address above functional deficits and to maximize his level of  independence, endurance, and safety. D/C recommendations are outlined below. Will continue to follow until medically cleared for D/C.   Barriers to Discharge Decreased caregiver support   Goals   Patient Goals To go home   STG Expiration Date 06/27/24   Short Term Goal #1 7-10 days. Pt will amb 50 ft w/ SPC and Supervision, in order to facilitate return to household. Pt will achieve Supervision level w/ bed mob in order to facilitate safety with OOB and back to bed transitions in own living environment. Pt will perform transfers w/ Supervision to be able to perform household activities. Pt will be able to navigate 1 step w/ SPC, no handrails, and Min(A)x1 to be able to safely enter his home and progress to the next continuum of care. Pt will participate in LE therex and balance activities to max progression w/ mobility skills.   PT Treatment Day 0   Plan   Treatment/Interventions Functional transfer training;LE strengthening/ROM;Elevations;Therapeutic exercise;Endurance training;Cognitive reorientation;Equipment eval/education;Gait training;Bed mobility;Spoke to nursing;OT   PT Frequency 3-5x/wk   Discharge Recommendation   Rehab Resource Intensity Level, PT II (Moderate Resource Intensity)   Equipment Recommended Other (Comment)  (Continue use of SPC; trial RW)   AM-PAC Basic Mobility Inpatient   Turning in Flat Bed Without Bedrails 3   Lying on Back to Sitting on Edge of Flat Bed Without Bedrails 3   Moving Bed to Chair 3   Standing Up From Chair Using Arms 3   Walk in Room 2   Climb 3-5 Stairs With Railing 2   Basic Mobility Inpatient Raw Score 16   Basic Mobility Standardized Score 38.32   [facility] Highest Level Of Mobility   -[facility] Goal 5: Stand one or more mins   -HL Achieved 4: Move to chair/commode   Modified Adams Scale   Modified Cristal Scale 4   End of Consult   Patient Position at End of Consult Bedside chair;Bed/Chair alarm activated;All needs within reach         Taina Hidalgo

## 2024-06-17 NOTE — OCCUPATIONAL THERAPY NOTE
"    Occupational Therapy Evaluation     Patient Name: To Dennison  Today's Date: 6/17/2024  Problem List  Principal Problem:    Fall  Active Problems:    Type 2 diabetes mellitus with circulatory disorder, without long-term current use of insulin ([facility])    HTN (hypertension)    Mixed hyperlipidemia    Chronic obstructive pulmonary disease, unspecified COPD type ([facility])    Epidural hematoma ([facility])    Alcohol abuse    Past Medical History  Past Medical History:   Diagnosis Date    Ambulates with cane     unsure \"why\" falls at times     last fall 09/27/2021    Anxiety state 08/05/2018    Basal cell carcinoma of skin 11/15/2016    Cancer ([facility])     BASAL CA SKIN    Chronic kidney disease     ACUTE KIDNEY INJURY 3/21    Constipation     COPD (chronic obstructive pulmonary disease) ([facility])     Coronary artery disease 05/22/2012    Diabetes mellitus ([facility])     TYPE 2    Forearm laceration, right, initial encounter 05/28/2020    Hydronephrosis     LAST ASSESSED: 11/18/15    Hypertension     Mass of right side of neck     radical neck today 10/1/2021    Osteoporosis 05/22/2012    Pacemaker     SSS (sick sinus syndrome) ([facility])     s/p generator changeout 3/24/2023    Trifascicular block      Past Surgical History  Past Surgical History:   Procedure Laterality Date    CARDIAC PACEMAKER PLACEMENT  11/2009    Medtronic dual chamber     CARDIAC PACEMAKER PLACEMENT Left 3/24/2023    Procedure: DUAL LEAD PACEMAKER BATTERY REMOVAL AND REPLACE.;  Surgeon: To Neff MD;  [facility];  Service: Cardiology    CATARACT EXTRACTION W/  INTRAOCULAR LENS IMPLANT      CYSTOSCOPY N/A 05/09/2016    Procedure: CYSTOSCOPY, evacuation of bladder calculi;  Surgeon: Haim Melendez MD;  [facility];  Service:     INGUINAL HERNIA REPAIR      UNILATERAL    KIDNEY STONE SURGERY      MOHS SURGERY Right 08/31/2022    removal of right ear BCCA with free graft nd reconstruction - Dr. Aguilar and Dr. Newman    OTHER SURGICAL " "HISTORY      HERNIA REPAIR AS PER ALLSCRIPTS (ALREADY IN PERTINENT NEGATIVES)    PAROTIDECTOMY Right 10/01/2021    Procedure: TOTAL PAROTIDECTOMY WITH NIMS; MODIFIED RADICAL NECK DISSECTION;  Surgeon: Karsten Newman DO;  [facility];  Service: ENT    NC TRURL ELECTROSURG RESCJ PROSTATE BLEED COMPLETE N/A 05/09/2016    Procedure: TRANSURETHRAL RESECTION OF PROSTATE (TURP);  Surgeon: Haim Melendez MD;  [facility];  Service: Urology    TONSILLECTOMY           06/17/24 1206   OT Last Visit   OT Visit Date 06/17/24   Note Type   Note type Evaluation   Pain Assessment   Pain Assessment Tool 0-10   Pain Score No Pain   Restrictions/Precautions   Weight Bearing Precautions Per Order No   Other Precautions Cognitive;Chair Alarm;Bed Alarm;Multiple lines;Fall Risk   Home Living   Type of Home House   Home Layout Two level;Able to live on main level with bedroom/bathroom;Stairs to enter with rails   Bathroom Shower/Tub Tub/shower unit   Bathroom Toilet Standard   Bathroom Equipment Grab bars in shower   Home Equipment Cane   Prior Function   Level of [place] Independent with ADLs;Independent with functional mobility;Needs assistance with IADLS   Lives With (S)  Alone   Receives Help From Family   IADLs Independent with driving;Independent with medication management;Independent with meal prep  (family assist PRN)   Falls in the last 6 months 1 to 4   Lifestyle   Autonomy I adls and mobility - i iadls - family assists PRN   Reciprocal Relationships supportive family however pt reports he doesn't like to have anyone help and he will only let \"pretty women\" into his home   Service to Others retired   Intrinsic Gratification mostly sedentary   Subjective   Subjective \"I want to go home\"   ADL   Eating Assistance 5  Supervision/Setup   Grooming Assistance 5  Supervision/Setup   UB Bathing Assistance 4  Minimal Assistance   LB Bathing Assistance 4  Minimal Assistance   UB Dressing Assistance 4  Minimal " Assistance   LB Dressing Assistance 4  Minimal Assistance   Toileting Assistance  4  Minimal Assistance   Bed Mobility   Supine to Sit 4  Minimal assistance   Transfers   Sit to Stand 4  Minimal assistance   Stand to Sit 4  Minimal assistance   Functional Mobility   Functional Mobility 4  Minimal assistance   Additional items Hand hold assistance   Balance   Static Sitting Fair +   Dynamic Sitting Fair   Static Standing Fair -   Dynamic Standing Poor +   Ambulatory Poor +   Activity Tolerance   Activity Tolerance Patient limited by fatigue   RUE Assessment   RUE Assessment WFL   LUE Assessment   LUE Assessment WFL   Cognition   Arousal/Participation Alert;Cooperative   Attention Attends with cues to redirect   Orientation Level Oriented to person;Oriented to place;Oriented to time;Oriented to situation  (general place/time)   Memory Decreased short term memory;Decreased recall of recent events;Decreased recall of precautions   Following Commands Follows one step commands without difficulty   Comments impulsive with limited insight and safety awareness   Assessment   Limitation Decreased ADL status;Decreased Safe judgement during ADL;Decreased cognition;Decreased endurance;Decreased self-care trans;Decreased high-level ADLs   Prognosis Fair   Assessment Pt is a 84 y.o. male who was admitted to [facility] on 6/16/2024 with Fall . Patient  has a past medical history of Ambulates with cane, Anxiety state, Basal cell carcinoma of skin, Cancer ([facility]), Chronic kidney disease, Constipation, COPD (chronic obstructive pulmonary disease) ([facility]), Coronary artery disease, Diabetes mellitus ([facility]), Forearm laceration, right, initial encounter, Hydronephrosis, Hypertension, Mass of right side of neck, Osteoporosis, Pacemaker, SSS (sick sinus syndrome) ([facility]), and Trifascicular block.   At baseline pt was completing adls and mobility independently with spc - I iadls with assist from family PRN. Pt lives alone in [address] home  with FFSU PRN. Currently pt requires min assist for overall ADLS and min assist for functional mobility/transfers. Pt currently presents with impairments in the following categories -steps to enter environment, limited home support, difficulty performing ADLS, difficulty performing IADLS , limited insight into deficits, health management , and environment activity tolerance, endurance, standing balance/tolerance, sitting balance/tolerance, memory, insight, safety , judgement , and attention . These impairments, as well as pt's fatigue, decreased caregiver support, risk for falls, and home environment  limit pt's ability to safely engage in all baseline areas of occupation, includingbathing, dressing, toileting, functional mobility/transfers, community mobility, laundry , driving, house maintenance, medication management, meal prep, cleaning, social participation , and leisure activities  From OT standpoint, recommend Level II resources upon D/C. OT will continue to follow to address the below stated goals.   Goals   Patient Goals go home   Long Term Goal #1 1) Mod I UB/LB adls after setup with use of LHAE PRN  2)  Mod I toileting and clothing management  3) Mod I bed mobility  4) Mod I functional mob/transfers to and from all surfaces with fair+ to good balance/safety   5) Increase activity tolerance to 30-35min for participation in adls and enjoyable activities  6) Assess DME needs   7) Demonstrate good carryover with safe use of AD during functional tasks   8) Assess DME needs   9) Ongoing functional cognitive assessment to assist with safe d/c recommendations   Plan   Treatment Interventions ADL retraining;Functional transfer training;Endurance training;Cognitive reorientation;Patient/family training;Equipment evaluation/education;Compensatory technique education;Activityengagement   Goal Expiration Date 07/01/24   OT Frequency 2-3x/wk   Discharge Recommendation   Rehab Resource Intensity Level, OT II (Moderate  Resource Intensity)   AM-PAC Daily Activity Inpatient   Lower Body Dressing 3   Bathing 3   Toileting 3   Upper Body Dressing 3   Grooming 4   Eating 4   Daily Activity Raw Score 20   Daily Activity Standardized Score (Calc for Raw Score >=11) 42.03   AM-PAC Applied Cognition Inpatient   Following a Speech/Presentation 3   Understanding Ordinary Conversation 4   Taking Medications 3   Remembering Where Things Are Placed or Put Away 3   Remembering List of 4-5 Errands 2   Taking Care of Complicated Tasks 2   Applied Cognition Raw Score 17   Applied Cognition Standardized Score 36.52   End of Consult   Education Provided Yes   Patient Position at End of Consult Bedside chair;Bed/Chair alarm activated;All needs within reach   Nurse Communication Nurse aware of consult       Documentation Completed By:    ARTUR Chavarria/L  [employer] Certified - [id number]

## 2024-06-17 NOTE — QUICK NOTE
Talked to son ceci, sees patient once a week and calls him 2-3 times a week. Informed son that patient is doing well and will be moved to [facility] floor today. Patient undergoing neurosurgery evaluation for meningioma. All questions and concerns addressed.    Emily Good M.D.  [facility] PGY1  6/17/2024, 2:50 PM

## 2024-06-17 NOTE — PROGRESS NOTES
[facility]  Progress Note: Critical Care  Name: To Dennison 84 y.o. male I MRN: [id number]  Unit/Bed#: ICU 08 I Date of Admission: 6/16/2024   Date of Service: 6/17/2024 I Hospital Day: 1    Assessment & Plan   Neuro:   Presented to [facility] after fall, CT head showing concern for epidural hematoma with leftward midline shift, transported to [facility]  CT head here showing subacute or chronic subdural hematoma  CT head w/ and w/o contrast completed at neuro-rad request  Showed 2.4 cm enhancing extra-axial mass over right frontal convexity most consistent with meningioma  Plan:  MRI head ordered, has pacemaker  Regular diet  If MRI again demonstrates meningioma could consider discharge today with outpatient follow up    CV:   Has MDT dual PM, not MRI conditional    Pulm:  No acute concerns  On 2 L NC, no history of lung disease an no home oxygen use  Will attempt to wean oxygen today    GI:   Bowel regimen: miralax daily    :   I's and O's    F/E/N:   F: none  E: replete as needed  N: Regular diet    Heme/Onc:   No acute concerns    Endo:   No acute concerns  A1c 6.0    ID:   No acute concerns    MSK/Skin:   Wound on anterior left arm  Frequent repositioning    Disposition: Critical care    ICU Core Measures     A: Assess, Prevent, and Manage Pain Has pain been assessed? Yes  Need for changes to pain regimen? No   B: Both SAT/SAT  N/A   C: Choice of Sedation RASS Goal: 0 Alert and Calm  Need for changes to sedation or analgesia regimen? NA   D: Delirium CAM-ICU: Negative   E: Early Mobility  Plan for early mobility? Yes   F: Family Engagement Plan for family engagement today? Yes         Prophylaxis:  VTE Contraindicated secondary to: concern for subdural vs epidural hematoma   Stress Ulcer  not ordered        Significant 24hr Events     24hr events: Patient had a fall at home and was evaluated in hospital.  Transferred to [facility] for concern of epidural hematoma.  Imaging here  indicates possible meningioma, will further characterize with MRI     Subjective   Patient see and examined at bedside this am. Patient is comfortable appearing and has no concerns other than feeling hungry.          Objective                            Vitals I/O      Most Recent Min/Max in 24hrs   Temp 97.9 °F (36.6 °C) Temp  Min: 97.3 °F (36.3 °C)  Max: 100.5 °F (38.1 °C)   Pulse 78 Pulse  Min: 76  Max: 116   Resp 16 Resp  Min: 16  Max: 25   /93 BP  Min: 112/80  Max: 210/122   O2 Sat 97 % SpO2  Min: 90 %  Max: 97 %      Intake/Output Summary (Last 24 hours) at 6/17/2024 0641  Last data filed at 6/17/2024 0600  Gross per 24 hour   Intake 1097.92 ml   Output 2150 ml   Net -1052.08 ml       Diet Regular; Regular House    Invasive Monitoring           Physical Exam   Physical Exam  Eyes:      Extraocular Movements: Extraocular movements intact.      Pupils: Pupils are equal, round, and reactive to light.      Comments: Rightward drift in right eye   Skin:     General: Skin is warm and dry.      Capillary Refill: Capillary refill takes less than 2 seconds.   HENT:      Head:      Comments: Dried blood on head     Right Ear: Hearing normal.      Left Ear: Hearing normal.      Nose: No congestion or rhinorrhea.      Mouth/Throat:      Mouth: Mucous membranes are moist.   Cardiovascular:      Rate and Rhythm: Normal rate and regular rhythm.   Musculoskeletal:      Right lower leg: No edema.      Left lower leg: No edema.   Abdominal: General: Bowel sounds are normal.      Palpations: Abdomen is soft.   Constitutional:       Appearance: He is well-developed and well-nourished.   Pulmonary:      Effort: Pulmonary effort is normal.      Breath sounds: No wheezing, rhonchi or rales.      Comments: 2 L NC  Neurological:      Mental Status: He is alert and oriented to person, place and time.      Comments: 5/5 gross muscle strength in upper and lower extremities bilaterally            Diagnostic Studies      EKG: Sinus  rhythm with PVCs, atrial synchronized ventricular pacing  Imaging: CT head showing  2.4 cm enhancing extra-axial mass over the right frontal convexity, statistically most consistent with a meningioma.      Medications:  Scheduled PRN   bacitracin, 1 small application, BID  chlorhexidine, 15 mL, Q12H RENATO  folic acid, 1 mg, Daily  insulin lispro, 1-5 Units, TID AC  multivitamin-minerals, 1 tablet, Daily  thiamine, 100 mg, Daily      polyethylene glycol, 17 g, Daily PRN       Continuous          Labs:    CBC    Recent Labs     06/16/24  1749 06/17/24  0528   WBC 9.11 7.63   HGB 15.0 14.5   HCT 44.7 43.4    196     BMP    Recent Labs     06/16/24  1749 06/17/24  0528   SODIUM 140 140   K 3.6 3.6    100   CO2 27 28   AGAP 10 12   BUN 27* 22   CREATININE 1.35* 1.19   CALCIUM 9.8 9.6       Coags    Recent Labs     06/16/24  1413 06/16/24  1749   INR 1.03 1.12   PTT  --  26        Additional Electrolytes  Recent Labs     06/16/24  1747 06/17/24  0528   MG  --  2.1   PHOS  --  3.3   CAIONIZED 1.16  --           Blood Gas    No recent results  No recent results LFTs  Recent Labs     06/16/24  1303 06/16/24  1749   ALT 10 10   AST 15 14   ALKPHOS 46 55   ALB 4.5 4.4   TBILI 1.62* 1.55*       Infectious  No recent results  Glucose  Recent Labs     06/16/24  1303 06/16/24  1749 06/17/24  0528   GLUC 112 108 99               Emily Good MD

## 2024-06-18 PROBLEM — G93.40 ENCEPHALOPATHY: Status: ACTIVE | Noted: 2024-06-18

## 2024-06-18 LAB
GLUCOSE SERPL-MCNC: 103 MG/DL (ref 65–140)
GLUCOSE SERPL-MCNC: 107 MG/DL (ref 65–140)
GLUCOSE SERPL-MCNC: 131 MG/DL (ref 65–140)
GLUCOSE SERPL-MCNC: 194 MG/DL (ref 65–140)

## 2024-06-18 PROCEDURE — 97116 GAIT TRAINING THERAPY: CPT

## 2024-06-18 PROCEDURE — 99232 SBSQ HOSP IP/OBS MODERATE 35: CPT | Performed by: NURSE PRACTITIONER

## 2024-06-18 PROCEDURE — 82948 REAGENT STRIP/BLOOD GLUCOSE: CPT

## 2024-06-18 PROCEDURE — 99232 SBSQ HOSP IP/OBS MODERATE 35: CPT | Performed by: SURGERY

## 2024-06-18 PROCEDURE — 99222 1ST HOSP IP/OBS MODERATE 55: CPT | Performed by: INTERNAL MEDICINE

## 2024-06-18 RX ORDER — FINASTERIDE 5 MG/1
5 TABLET, FILM COATED ORAL DAILY
Status: DISCONTINUED | OUTPATIENT
Start: 2024-06-18 | End: 2024-06-19 | Stop reason: HOSPADM

## 2024-06-18 RX ORDER — TAMSULOSIN HYDROCHLORIDE 0.4 MG/1
0.4 CAPSULE ORAL
Status: DISCONTINUED | OUTPATIENT
Start: 2024-06-18 | End: 2024-06-19

## 2024-06-18 RX ORDER — HEPARIN SODIUM 5000 [USP'U]/ML
5000 INJECTION, SOLUTION INTRAVENOUS; SUBCUTANEOUS EVERY 8 HOURS SCHEDULED
Status: DISCONTINUED | OUTPATIENT
Start: 2024-06-18 | End: 2024-06-19 | Stop reason: HOSPADM

## 2024-06-18 RX ORDER — PRAVASTATIN SODIUM 20 MG
20 TABLET ORAL
Status: DISCONTINUED | OUTPATIENT
Start: 2024-06-18 | End: 2024-06-19 | Stop reason: HOSPADM

## 2024-06-18 RX ORDER — LANOLIN ALCOHOL/MO/W.PET/CERES
100 CREAM (GRAM) TOPICAL DAILY
Status: DISCONTINUED | OUTPATIENT
Start: 2024-06-18 | End: 2024-06-19 | Stop reason: HOSPADM

## 2024-06-18 RX ADMIN — TAMSULOSIN HYDROCHLORIDE 0.4 MG: 0.4 CAPSULE ORAL at 16:59

## 2024-06-18 RX ADMIN — BACITRACIN 1 SMALL APPLICATION: 500 OINTMENT TOPICAL at 17:01

## 2024-06-18 RX ADMIN — CHLORHEXIDINE GLUCONATE 0.12% ORAL RINSE 15 ML: 1.2 LIQUID ORAL at 21:00

## 2024-06-18 RX ADMIN — HEPARIN SODIUM 5000 UNITS: 5000 INJECTION INTRAVENOUS; SUBCUTANEOUS at 21:00

## 2024-06-18 RX ADMIN — HEPARIN SODIUM 5000 UNITS: 5000 INJECTION INTRAVENOUS; SUBCUTANEOUS at 13:14

## 2024-06-18 RX ADMIN — BACITRACIN 1 SMALL APPLICATION: 500 OINTMENT TOPICAL at 07:45

## 2024-06-18 RX ADMIN — PRAVASTATIN SODIUM 20 MG: 20 TABLET ORAL at 16:59

## 2024-06-18 RX ADMIN — Medication 1 TABLET: at 07:45

## 2024-06-18 RX ADMIN — FINASTERIDE 5 MG: 5 TABLET, FILM COATED ORAL at 12:13

## 2024-06-18 RX ADMIN — FOLIC ACID 1 MG: 1 TABLET ORAL at 07:45

## 2024-06-18 RX ADMIN — LISINOPRIL 20 MG: 20 TABLET ORAL at 07:45

## 2024-06-18 RX ADMIN — THIAMINE HCL TAB 100 MG 100 MG: 100 TAB at 12:13

## 2024-06-18 RX ADMIN — CHLORHEXIDINE GLUCONATE 0.12% ORAL RINSE 15 ML: 1.2 LIQUID ORAL at 07:45

## 2024-06-18 NOTE — ASSESSMENT & PLAN NOTE
6/17 MRI head: Right frontal extra-axial mass measuring up to 4 cm most consistent with meningioma. Mild localized mass effect without edema. No acute intracranial pathology. Chronic microangiopathy.  -No acute intervention at this time

## 2024-06-18 NOTE — PROGRESS NOTES
[facility]  Progress Note  Name: To Dennison I  MRN: [id number]  Unit/Bed#: [facility] 602-01 I Date of Admission: 6/16/2024   Date of Service: 6/18/2024  Hospital Day: 2    Assessment & Plan   * Meningioma (HCC)  Assessment & Plan  Patient presented to [facility] for eval ration of left forearm laceration following a fall.  CT head read as epidural hematoma along the right frontal convexity with midline shift therefore was transferred to [place] under neurocritical care.  MRI showed right frontal extra-axial mass measuring up to 4 cm most consistent with meningioma which has been present on imaging since 2016.  Patient was transferred out to medical floor.  Evaluated by neurosurgery, meningioma is stable and asymptomatic.  Plan to follow-up with repeat MRI brain with and without in 1 year given stability since 2021.   PT/OT recommending rehab, Cm following  Additional plan as below     Encephalopathy  Assessment & Plan  Patient is confused on my exam.  Oriented to person, month and year.  Unsure how he arrived at the hospital.  Does admit to drinking alcohol daily, states that he also drives.  Unsure of baseline, unable to contact family  Geriatrics consult      Alcohol abuse  Assessment & Plan  Patient reports drinking daily, beer and mónica.   No clinical signs of withdrawal  Continue CIWA  Folate/thiamine    Chronic obstructive pulmonary disease, unspecified COPD type (HCC)  Assessment & Plan  Without exacerbation.  Former smoker     Fall  Assessment & Plan  PT/OT recommending rehab  CM following    Chronic kidney disease, stage 4 (severe) (HCC)  Assessment & Plan  Lab Results   Component Value Date    EGFR 54 06/17/2024    EGFR 47 06/16/2024    EGFR 39 06/16/2024    CREATININE 1.19 06/17/2024    CREATININE 1.35 (H) 06/16/2024    CREATININE 1.57 (H) 06/16/2024   Baseline appears wide, 1.5-1.9. Currently below baseline.  Monitor PRN    HTN (hypertension)  Assessment &  Plan  BP has been labile  Continue lisinopril  Holding Norvasc  Monitor       Mixed hyperlipidemia  Assessment & Plan  Continue statin    BPH (benign prostatic hyperplasia)  Assessment & Plan  Resume flomax and proscar  Has hx of urinary retention as noted in PCP notes     Type 2 diabetes mellitus with circulatory disorder, without long-term current use of insulin (HCC)  Assessment & Plan  Lab Results   Component Value Date    HGBA1C 6.0 (H) 06/16/2024       Recent Labs     06/17/24  1201 06/17/24  1213 06/17/24  1622 06/18/24  0720   POCGLU 62* 109 119 103       Diet controlled as outpatient. Not on any medications for this chronically.  Can monitor accuchecks, SSI for now.       Coronary artery disease involving native coronary artery of native heart without angina pectoris  Assessment & Plan  Presumed based on Myoview per cardiology note.  Not on medical therapy.     Pacemaker  Assessment & Plan  History of SSS           VTE Pharmacologic Prophylaxis:   Moderate Risk (Score 3-4) - Pharmacological DVT Prophylaxis Ordered: heparin.    Mobility:   Basic Mobility Inpatient Raw Score: 16  JH-HLM Goal: 5: Stand one or more mins  JH-HLM Achieved: 7: Walk 25 feet or more  JH-HLM Goal achieved. Continue to encourage appropriate mobility.    Patient Centered Rounds: I performed bedside rounds with nursing staff today.   Discussions with Specialists or Other Care Team Provider: nursing, case management, geriatrics attending    Education and Discussions with Family / Patient: Attempted to update  (son) via phone. Unable to contact.    Total Time Spent on Date of Encounter in care of patient: 45 mins. This time was spent on one or more of the following: performing physical exam; counseling and coordination of care; obtaining or reviewing history; documenting in the medical record; reviewing/ordering tests, medications or procedures; communicating with other healthcare professionals and discussing with patient's  family/caregivers.    Current Length of Stay: 2 day(s)  Current Patient Status: Inpatient   Certification Statement: The patient will continue to require additional inpatient hospital stay due to geriatrics evaluation, connect with family, discharge planning, monitor ms  Discharge Plan: Anticipate discharge in 48 hrs to rehab facility.    Code Status: Level 1 - Full Code    Subjective:   No complaints. Wants to go home. Is confused. Admits to drinking several times per week, mónica and beer.     Objective:     Vitals:   Temp (24hrs), Av.3 °F (36.8 °C), Min:98 °F (36.7 °C), Max:98.4 °F (36.9 °C)    Temp:  [98 °F (36.7 °C)-98.4 °F (36.9 °C)] 98 °F (36.7 °C)  HR:  [] 97  Resp:  [12-32] 16  BP: (110-183)/() 114/85  SpO2:  [93 %-97 %] 95 %  Body mass index is 23.96 kg/m².     Input and Output Summary (last 24 hours):     Intake/Output Summary (Last 24 hours) at 2024 1011  Last data filed at 2024 1354  Gross per 24 hour   Intake 300 ml   Output 1050 ml   Net -750 ml       Physical Exam:   Physical Exam  Vitals and nursing note reviewed.   Constitutional:       General: He is not in acute distress.  Cardiovascular:      Rate and Rhythm: Normal rate.   Pulmonary:      Breath sounds: Decreased breath sounds present.   Abdominal:      Tenderness: There is no abdominal tenderness.   Musculoskeletal:         General: No swelling.   Skin:     General: Skin is warm.   Neurological:      Mental Status: He is alert.      Comments: Oriented to person, month, year.    Psychiatric:         Cognition and Memory: Cognition is impaired.          Additional Data:     Labs:  Results from last 7 days   Lab Units 24  0528   WBC Thousand/uL 7.63   HEMOGLOBIN g/dL 14.5   HEMATOCRIT % 43.4   PLATELETS Thousands/uL 196   SEGS PCT % 74   LYMPHO PCT % 12*   MONO PCT % 10   EOS PCT % 3     Results from last 7 days   Lab Units 24  0528 24  1749   SODIUM mmol/L 140 140   POTASSIUM mmol/L 3.6 3.6   CHLORIDE  mmol/L 100 103   CO2 mmol/L 28 27   BUN mg/dL 22 27*   CREATININE mg/dL 1.19 1.35*   ANION GAP mmol/L 12 10   CALCIUM mg/dL 9.6 9.8   ALBUMIN g/dL  --  4.4   TOTAL BILIRUBIN mg/dL  --  1.55*   ALK PHOS U/L  --  55   ALT U/L  --  10   AST U/L  --  14   GLUCOSE RANDOM mg/dL 99 108     Results from last 7 days   Lab Units 06/16/24  1749   INR  1.12     Results from last 7 days   Lab Units 06/18/24  0720 06/17/24  1622 06/17/24  1213 06/17/24  1201 06/17/24  0731 06/16/24  2122   POC GLUCOSE mg/dl 103 119 109 62* 105 124     Results from last 7 days   Lab Units 06/16/24  2049   HEMOGLOBIN A1C % 6.0*           Lines/Drains:  Invasive Devices       Peripheral Intravenous Line  Duration             Peripheral IV 06/16/24 Distal;Right;Upper;Ventral (anterior) Arm 1 day    Peripheral IV 06/16/24 Right Arm 1 day                          Imaging: No pertinent imaging reviewed.    Recent Cultures (last 7 days):         Last 24 Hours Medication List:   Current Facility-Administered Medications   Medication Dose Route Frequency Provider Last Rate    bacitracin  1 small application Topical BID Emily Good MD      chlorhexidine  15 mL Mouth/Throat Q12H [facility] Emily Good MD      finasteride  5 mg Oral Daily CARROLL Celeste      folic acid  1 mg Oral Daily Emily Good MD      heparin (porcine)  5,000 Units Subcutaneous Q8H [facility] CARROLL Celeste      insulin lispro  1-5 Units Subcutaneous TID AC Emily Good MD      labetalol  10 mg Intravenous Q6H PRN Emily Good MD      lisinopril  20 mg Oral Daily Emily Good MD      multivitamin-minerals  1 tablet Oral Daily Emily Good MD      polyethylene glycol  17 g Oral Daily PRN Emily Good MD      pravastatin  20 mg Oral Daily With Dinner CARROLL Celeste      tamsulosin  0.4 mg Oral Daily With Dinner CARROLL Celeste      thiamine  100 mg Oral Daily CARROLL Celeste          Today,  Patient Was Seen By: CARROLL Celeste    **Please Note: This note may have been constructed using a voice recognition system.**

## 2024-06-18 NOTE — ASSESSMENT & PLAN NOTE
Lab Results   Component Value Date    HGBA1C 6.0 (H) 06/16/2024       Recent Labs     06/17/24  0731 06/17/24  1201 06/17/24  1213 06/17/24  1622   POCGLU 105 62* 109 119       Blood Sugar Average: Last 72 hrs:  (P) 103.8  -Initiate insulin sliding scale  -Frequent glucose monitoring  -Hypoglycemia protocol

## 2024-06-18 NOTE — CONSULTS
Consultation - Geriatric Medicine   To Dennison 84 y.o. male MRN: [id number]  Unit/Bed#: [facility] 602-01 Encounter: [id number]      Assessment & Plan     Hypertension  - Uncontrolled  - BP range 110-203 SYS this admission and  DYS  - Pharmacy of record reports he has not picked up his Amlodipine since July, his Lisinopril since August, and his Flomax since September  - Pressures appear improved at time of consult but still occasionally elevated; increased pressures may contribute to impaired cognition so incremental adjustment and medication adherence advised.     2. Diabetes  - Last A1C of 6.0   - Not on medication  - No changes or recommendations beyond repeat patient diabetic and nutritional education as covered by insurance if applicable    3.  Encephalopathy  - Questionable baseline and questionable substance use  - AAO x1 at time of consult, gave year as  and did not know location, DID report mechanical trip and fall  - Unreliable historian initially reports he lives with his wife he then says his wife has been  for many years. This fluctuance in history likely behind his earlier report of possible daily heavy drinking.  - Doubt medication side effect, possible PRES given uncontrolled HTN, does not appear to be in glycemic extremis, does not show signs of infection  - Three attempts to contact son were made and all unsuccessful  - Continue to observe while additional efforts are made to contact family    4. Fall  - Reported mechanical trip and fall  - Told ED he drinks daily but denies this contributed  - Denies Head injury or LOC  -Imaging does not show acute findings beyond a s, but meangioma was noted and evaluated and found to be stable.   - Neuro Surgery signed off at this time        Home medication review    Personally confirmed with Pharmacy     History of Present Illness   Physician Requesting Consult: Lm Freedman MD  Reason for Consult / Principal Problem: Altered mentation, s/p  "fall  Hx and PE limited by: Memory/cognition  Additional history obtained from: Chart review and patient evaluation      HPI: To Dennison is a 84 y.o. year old male who presents after he reports his mechanical trip and fall.  Patient reports he lives alone after the death of his wife.  He has 4 sons and 1 daughter some of whom live nearby. Patient states he has an extensive history of tobacco, alcohol, and drug use when he was younger. He states he is now retired, does not drink more than 2-3 drinks per day and no longer smokes or does drugs after going \"cold turkey\" years ago. He reports passing the time doing chores around the house and going to socialize with other widowers in the area.    Inpatient consult to Gerontology  Consult performed by: Kim Cronin DO  Consult ordered by: CARROLL Celeste          Review of Systems   Constitutional:  Negative for chills and fever.   HENT:  Positive for hearing loss. Negative for ear pain and sore throat.    Eyes:  Negative for pain and visual disturbance.   Respiratory:  Negative for cough and shortness of breath.    Cardiovascular:  Negative for chest pain and palpitations.   Gastrointestinal:  Negative for abdominal pain and vomiting.   Genitourinary:  Negative for dysuria and hematuria.   Musculoskeletal:  Negative for arthralgias and back pain.   Skin:  Negative for color change and rash.   Neurological:  Negative for seizures and syncope.   All other systems reviewed and are negative.      Historical Information   Past Medical History:   Diagnosis Date    Ambulates with cane     unsure \"why\" falls at times     last fall 09/27/2021    Anxiety state 08/05/2018    Basal cell carcinoma of skin 11/15/2016    Cancer (HCC)     BASAL CA SKIN    Chronic kidney disease     ACUTE KIDNEY INJURY 3/21    Constipation     COPD (chronic obstructive pulmonary disease) ([facility])     Coronary artery disease 05/22/2012    Diabetes mellitus ([facility])     TYPE 2    Forearm laceration, " right, initial encounter 05/28/2020    Hydronephrosis     LAST ASSESSED: 11/18/15    Hypertension     Mass of right side of neck     radical neck today 10/1/2021    Osteoporosis 05/22/2012    Pacemaker     SSS (sick sinus syndrome) (HCC)     s/p generator changeout 3/24/2023    Trifascicular block      Past Surgical History:   Procedure Laterality Date    CARDIAC PACEMAKER PLACEMENT  11/2009    Medtronic dual chamber     CARDIAC PACEMAKER PLACEMENT Left 3/24/2023    Procedure: DUAL LEAD PACEMAKER BATTERY REMOVAL AND REPLACE.;  Surgeon: To Neff MD;  Location: [facility];  Service: Cardiology    CATARACT EXTRACTION W/  INTRAOCULAR LENS IMPLANT      CYSTOSCOPY N/A 05/09/2016    Procedure: CYSTOSCOPY, evacuation of bladder calculi;  Surgeon: Haim Melendez MD;  Location: [facility];  Service:     INGUINAL HERNIA REPAIR      UNILATERAL    KIDNEY STONE SURGERY      MOHS SURGERY Right 08/31/2022    removal of right ear BCCA with free graft nd reconstruction - Dr. Aguilar and Dr. Newman    OTHER SURGICAL HISTORY      HERNIA REPAIR AS PER [facility] (ALREADY IN PERTINENT NEGATIVES)    PAROTIDECTOMY Right 10/01/2021    Procedure: TOTAL PAROTIDECTOMY WITH NIMS; MODIFIED RADICAL NECK DISSECTION;  Surgeon: Karsten Newman DO;  Location: [facility];  Service: ENT    MA TRURL ELECTROSURG RESCJ PROSTATE BLEED COMPLETE N/A 05/09/2016    Procedure: TRANSURETHRAL RESECTION OF PROSTATE (TURP);  Surgeon: Haim Melendez MD;  Location: [facility];  Service: Urology    TONSILLECTOMY       Social History   Social History     Substance and Sexual Activity   Alcohol Use Not Currently    Alcohol/week: 2.0 standard drinks of alcohol    Types: 2 Cans of beer per week    Comment: 2  beers daily and shot by hx/No ETOH x 3wks.      Social History     Substance and Sexual Activity   Drug Use No     Social History     Tobacco Use   Smoking Status Former    Current packs/day: 0.00    Average packs/day: 1.5 packs/day for 32.3 years  (48.5 ttl pk-yrs)    Types: Cigars, Cigarettes    Start date:     Quit date: 1980    Years since quittin.1   Smokeless Tobacco Former     Family History:   Family History   Problem Relation Age of Onset    Stroke Father         SYNDROME    Coronary artery disease Family         less than 60 years of age       Meds/Allergies   all current active meds have been reviewed    No Known Allergies    Objective     Intake/Output Summary (Last 24 hours) at 2024 1202  Last data filed at 2024 1100  Gross per 24 hour   Intake 360 ml   Output 450 ml   Net -90 ml     Invasive Devices       Peripheral Intravenous Line  Duration             Peripheral IV 24 Distal;Right;Upper;Ventral (anterior) Arm 1 day    Peripheral IV 24 Right Arm 1 day                    Physical Exam  Vitals and nursing note reviewed.   Constitutional:       General: He is not in acute distress.     Appearance: He is well-developed.   HENT:      Head: Normocephalic and atraumatic.   Eyes:      Conjunctiva/sclera: Conjunctivae normal.   Cardiovascular:      Rate and Rhythm: Normal rate and regular rhythm.      Heart sounds: No murmur heard.  Pulmonary:      Effort: Pulmonary effort is normal. No respiratory distress.      Breath sounds: Normal breath sounds.   Abdominal:      Palpations: Abdomen is soft.      Tenderness: There is no abdominal tenderness.   Musculoskeletal:         General: No swelling.      Cervical back: Neck supple.   Skin:     General: Skin is warm and dry.      Capillary Refill: Capillary refill takes less than 2 seconds.   Neurological:      Mental Status: He is alert. He is disoriented.      GCS: GCS eye subscore is 4. GCS verbal subscore is 4. GCS motor subscore is 6.      Cranial Nerves: Cranial nerves 2-12 are intact.   Psychiatric:         Mood and Affect: Mood normal.         Lab Results:   I have personally reviewed pertinent lab results including the following:   CMP,CBC, Trop,Type and Screen,  UA    I have personally reviewed the following imaging study reports in PACS:  MRI brain, CT Head, CT cervical, CT CAP, XR chest,      Therapies:   PT & OT: Moderate Resource Intensity Rehab (decreased strength, decreased endurance, decreased mobility, Decreased cognition, Impaired Judgement, Decreased Safety Awareness Moderate Resource intensity Rehab)    VTE Prophylaxis: Heparin    Code Status: Level 1 - Full Code  Advance Directive and Living Will:      Power of :    POLST:      Family and Social Support:   Living Arrangements: Lives Alone  Support Systems: Children; Family members  Type of Current Residence: 2 story home  Discharge planning discussed with:: pt  Freedom of Choice: Yes      Goals of Care: Get in touch with patient's son for collateral history, preparations for safe discharge.

## 2024-06-18 NOTE — PLAN OF CARE
Problem: PHYSICAL THERAPY ADULT  Goal: Performs mobility at highest level of function for planned discharge setting.  See evaluation for individualized goals.  Description: Treatment/Interventions: Functional transfer training, LE strengthening/ROM, Elevations, Therapeutic exercise, Endurance training, Cognitive reorientation, Equipment eval/education, Gait training, Bed mobility, Spoke to nursing, OT  Equipment Recommended: Other (Comment) (Continue use of SPC; trial RW)       See flowsheet documentation for full assessment, interventions and recommendations.  Outcome: Progressing  Note: Prognosis: Good  Problem List: Decreased strength, Decreased endurance, Impaired balance, Decreased mobility, Decreased cognition, Impaired judgement, Decreased safety awareness  Assessment: pt particiapted in impromptu PT session after he was recieved standing in room with alarm sounding as previously noted.  He was able to follow commands eventually to participate in ambulatory tasks, where he demonstrated improved functional endurance and appropriately used his baseline SPC to negotiate hallways with mod instructions for pathing when he appeared to be distracted by environment.  He safely negotiated obstacles & returned to room without incident, sitting in recliner where LEs were elevated & alarm activated.  PT POC & d/c recommendations rmain appropriate at this time pending positve change in cog status & reduced need for assist & guidance with all activity to ensure safe return to highest level of independece & reduce long term burden of care.  Barriers to Discharge: Decreased caregiver support     Rehab Resource Intensity Level, PT: II (Moderate Resource Intensity)    See flowsheet documentation for full assessment.

## 2024-06-18 NOTE — PROGRESS NOTES
[facility]  Progress Note  Name: To Dennison I  MRN: [id number]  Unit/Bed#: [facility] 602-01 I Date of Admission: 6/16/2024   Date of Service: 6/18/2024 I Hospital Day: 2    Assessment & Plan   * Meningioma (HCC)  Assessment & Plan  6/17 MRI head: Right frontal extra-axial mass measuring up to 4 cm most consistent with meningioma. Mild localized mass effect without edema. No acute intracranial pathology. Chronic microangiopathy.  -No acute intervention at this time  -Neuro surgery signing off    Epidural hematoma (HCC)  Assessment & Plan  Patient reports fall 6/15  On arrival 6/16, GCS 14, Patient denies being on blood thinners   6/16 CT Head: Isodense epidural hematoma along the right frontal convexity measuring 2.1 x 1.3 x 1.1 cm, favored to be subacute. There is associated mild mass effect with approximately 6 mm leftward midline shift.   6/16 repeat CT head: 2.4 cm enhancing extra-axial mass over the right frontal convexity, statistically most consistent with a meningioma.   6/17 MRI head: Right frontal extra-axial mass measuring up to 4 cm most consistent with meningioma. Mild localized mass effect without edema. No acute intracranial pathology.  -Neurosurgery consulted, appreciate recommendations  -Frequent neurochecks  -Findings likely representing meningioma over bleed  -Consider restarting DVT prophylaxis in the absence of epidural hematoma    Fall  Assessment & Plan  - Status post fall with the below noted injuries.  - Fall precautions.  - Geriatric Medicine consultation for evaluation, medication review and recommendations.  - PT and OT evaluation and treatment as indicated.  - Case Management consultation for disposition planning.      Alcohol abuse  Assessment & Plan  -Reports drinking heavily daily  -Initiate CIWA protocol    Chronic obstructive pulmonary disease, unspecified COPD type (HCC)  Assessment & Plan  Saturating in the mid to low 90s on room air  -As  needed albuterol  -Monitor respiratory status    Mixed hyperlipidemia  Assessment & Plan  - Continue home statin    HTN (hypertension)  Assessment & Plan  Unsure of home antihypertensive regimen  -Continue lisinopril  -Continue labetalol as needed for systolic blood pressures greater than 160      Type 2 diabetes mellitus with circulatory disorder, without long-term current use of insulin (HCC)  Assessment & Plan  Lab Results   Component Value Date    HGBA1C 6.0 (H) 06/16/2024       Recent Labs     06/17/24  0731 06/17/24  1201 06/17/24  1213 06/17/24  1622   POCGLU 105 62* 109 119       Blood Sugar Average: Last 72 hrs:  (P) 103.8  -Initiate insulin sliding scale  -Frequent glucose monitoring  -Hypoglycemia protocol             TRAUMA TERTIARY SURVEY NOTE    VTE Prophylaxis: Held    Disposition: Pending clinical improvement, per primary team.    Trauma will sign off at this time, please reach out with any additional questions or concerns    Code status:  Level 1 - Full Code    Consultants: IP CONSULT TO CASE MANAGEMENT  IP CONSULT TO NEUROSURGERY    Subjective     Mechanism of Injury:Fall     Chief Complaint: Fall    HPI/Last 24 hour events: Imaging revealed meningioma, no bleed.  Patient seen and evaluated by the neurosurgery team.  No acute surgical intervention recommended at this time, neurosurgery signed off.  Today, patient expressed no acute concerns.  Patient is oriented to person only not place and time.  Patient denies pain at this time.  Patient denies nausea vomiting fevers chills and shortness of breath on room air.     Objective   Vitals:   Temp:  [97.9 °F (36.6 °C)-98.6 °F (37 °C)] 98.4 °F (36.9 °C)  HR:  [] 101  Resp:  [12-36] 16  BP: (110-183)/() 110/85    I/O         06/16 0701  06/17 0700 06/17 0701  06/18 0700    P.O. 960 600    I.V. (mL/kg) 137.9 (2)     Total Intake(mL/kg) 1097.9 (15.8) 600 (8.6)    Urine (mL/kg/hr) 2150 1050 (0.6)    Total Output 2150 1050    Net -1052.1 -450           Unmeasured Urine Occurrence 1 x              Physical Exam:   GENERAL APPEARANCE: Resting comfortably, no acute distress  NEURO: Oriented to person only  HEENT: Moist mucous membranes.  No scleral icterus, normal conjunctiva.  Right ear lesion, appears chronic.  CV: Well-perfused, regular rate  LUNGS: Normal work of breathing on room air  GI: Soft, nontender, nondistended  MSK: No lower extremity edema bilaterally.  Left upper extremity laceration, dressed without strikethrough  SKIN: Warm and dry    Invasive Devices       Peripheral Intravenous Line  Duration             Peripheral IV 06/16/24 Distal;Right;Upper;Ventral (anterior) Arm 1 day    Peripheral IV 06/16/24 Right Arm 1 day                       1. Before the illness or injury that brought you to the Emergency, did you need someone to help you on a regular basis? 1=Yes   2. Since the illness or injury that brought you to the Emergency, have you needed more help than usual to take care of yourself? 1=Yes   3. Have you been hospitalized for one or more nights during the past 6 months (excluding a stay in the Emergency Department)? 1=Yes   4. In general, do you see well? 0=Yes   5. In general, do you have serious problems with your memory? 1=Yes   6. Do you take more than three different medications everyday? 1=Yes   TOTAL   5     Did you order a geriatric consult if the score was 2 or greater?: yes         Lab Results: Results: I have personally reviewed all pertinent laboratory/tests results, BMP/CMP:   Lab Results   Component Value Date    SODIUM 140 06/17/2024    K 3.6 06/17/2024     06/17/2024    CO2 28 06/17/2024    BUN 22 06/17/2024    CREATININE 1.19 06/17/2024    CALCIUM 9.6 06/17/2024    EGFR 54 06/17/2024   , and CBC:   Lab Results   Component Value Date    WBC 7.63 06/17/2024    HGB 14.5 06/17/2024    HCT 43.4 06/17/2024     (H) 06/17/2024     06/17/2024    RBC 4.30 06/17/2024    MCH 33.7 06/17/2024    MCHC 33.4 06/17/2024     RDW 12.6 06/17/2024    MPV 10.2 06/17/2024    NRBC 0 06/17/2024       Imaging Results: I have personally reviewed pertinent reports.    Chest Xray(s): N/A   FAST exam(s): N/A   CT Scan(s): positive for acute findings: Meningioma   Additional Xray(s): N/A     Other Studies: 6/17 MRI head: Meningioma    Rigo Doan MD  [id number]

## 2024-06-18 NOTE — CASE MANAGEMENT
Case Management Assessment & Discharge Planning Note    Patient name To Dennison  Location [facility] 602/[facility] 602-01 MRN [id number]  : 1940 Date 2024       Current Admission Date: 2024  Current Admission Diagnosis:Meningioma (HCC)   Patient Active Problem List    Diagnosis Date Noted Date Diagnosed    Encephalopathy 2024     Meningioma (HCC) 2024     Alcohol abuse 2024     Fall 2024     Chronic obstructive pulmonary disease, unspecified COPD type ([facility]) 2023     Pacer at end of battery life 2023     Sick sinus syndrome ([facility]) 2023     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     Chronic kidney disease, stage 4 (severe) ([facility]) 2021     Type 2 diabetes mellitus with diabetic peripheral angiopathy without gangrene ([facility]) 2021     Type 2 diabetes mellitus with chronic kidney disease ([facility]) 2021     Platelets decreased ([facility]) 2021     BRENDA (acute kidney injury) ([facility]) 2021     Troponin level elevated 2021     Dizziness 2021     Mixed hyperlipidemia 2020     Pacemaker 2019     Constipation 2018     Skin lesion 10/10/2018     Anxiety state 2018     Bradycardia 2018     Basal cell carcinoma of skin 11/15/2016     Abnormal nuclear stress test 2016     BPH (benign prostatic hyperplasia) 2015     Sleep related leg cramps 2013     Coronary artery disease involving native coronary artery of native heart without angina pectoris 2012     Type 2 diabetes mellitus with circulatory disorder, without long-term current use of insulin ([facility]) 2012     HTN (hypertension) 2012     Osteoporosis 2012     Peripheral arterial disease (HCC) 2012       LOS (days): 2  Geometric Mean LOS (GMLOS) (days): 3  Days to GMLOS:1.3     OBJECTIVE:    Risk of Unplanned Readmission Score: 14.6          Current admission status: Inpatient       Preferred Pharmacy:   [facility] [address] - [address]  [address] [address]  Phone: [phone] Fax: [phone]    Primary Care Provider: Cachorro Garcia DO    Primary Insurance: [facility]  Secondary Insurance:     ASSESSMENT:  Active Health Care Proxies    There are no active Health Care Proxies on file.       Advance Directives  Does patient have a Health Care POA?: No  Does patient have Advance Directives?: No  Primary Contact: joan Fagan              Patient Information  Admitted from:: Facility  Mental Status: Alert  During Assessment patient was accompanied by: Not accompanied during assessment  Assessment information provided by:: Patient  Primary Caregiver: Self  Support Systems: Children, Family members  County of Residence: [facility] do you live in?: [place]  Home entry access options. Select all that apply.: Stairs  Number of steps to enter home.: 1  Do the steps have railings?: No  Type of Current Residence: [address] [place] [address] home  Upon entering residence, is there a bedroom on the main floor (no further steps)?: Yes  Upon entering residence, is there a bathroom on the main floor (no further steps)?: Yes  Living Arrangements: Lives Alone    Activities of Daily Living Prior to Admission  Functional Status: Independent  Completes ADLs independently?: Yes  Ambulates independently?: Yes  Does patient use assisted devices?: Yes  Assisted Devices (DME) used: Straight Cane  Does patient currently own DME?: Yes  What DME does the patient currently own?: Straight Cane  Does patient have a history of Outpatient Therapy (PT/OT)?: No  Does the patient have a history of Short-Term Rehab?: No  Does patient have a history of HHC?: No         Patient Information Continued  Income Source: Pension/skilled nursing  Does patient have prescription coverage?: Yes  Does patient receive dialysis treatments?: No  Does  "patient have a history of substance abuse?: No (denies ETOH abuse, goes to \"[employer]\" daily to \"sign up\" has 1 beer or 1 [employer])  Does patient have a history of Mental Health Diagnosis?: No         Means of Transportation  Means of Transport to Appts:: Drives Self      Social Determinants of Health (SDOH)      Flowsheet Row Most Recent Value   Housing Stability    In the last 12 months, was there a time when you were not able to pay the mortgage or rent on time? N   In the past 12 months, how many times have you moved where you were living? 1   At any time in the past 12 months, were you homeless or living in a shelter (including now)? N   Transportation Needs    In the past 12 months, has lack of transportation kept you from medical appointments or from getting medications? no   In the past 12 months, has lack of transportation kept you from meetings, work, or from getting things needed for daily living? No   Food Insecurity    Within the past 12 months, you worried that your food would run out before you got the money to buy more. Never true   Within the past 12 months, the food you bought just didn't last and you didn't have money to get more. Never true   Utilities    In the past 12 months has the electric, gas, oil, or water company threatened to shut off services in your home? No            DISCHARGE DETAILS:    Discharge planning discussed with:: pt  Freedom of Choice: Yes                   Contacts  Patient Contacts: Rylan Dennison  Relationship to Patient:: Family  Contact Method: Phone  Phone Number: [phone]  Reason/Outcome: Continuity of Care, Emergency Contact, Discharge Planning                   Would you like to participate in our [facility] service program?  : No - Declined                                                 Additional Comments: resides alone, I PTA, retired, uses a cane                    "

## 2024-06-18 NOTE — ASSESSMENT & PLAN NOTE
Lab Results   Component Value Date    EGFR 54 06/17/2024    EGFR 47 06/16/2024    EGFR 39 06/16/2024    CREATININE 1.19 06/17/2024    CREATININE 1.35 (H) 06/16/2024    CREATININE 1.57 (H) 06/16/2024   Baseline appears wide, 1.5-1.9. Currently below baseline.  Monitor PRN

## 2024-06-18 NOTE — PLAN OF CARE
Problem: PAIN - ADULT  Goal: Verbalizes/displays adequate comfort level or baseline comfort level  Description: Interventions:  - Encourage patient to monitor pain and request assistance  - Assess pain using appropriate pain scale  - Administer analgesics based on type and severity of pain and evaluate response  - Implement non-pharmacological measures as appropriate and evaluate response  - Consider cultural and social influences on pain and pain management  - Notify physician/advanced practitioner if interventions unsuccessful or patient reports new pain  Outcome: Progressing     Problem: INFECTION - ADULT  Goal: Absence or prevention of progression during hospitalization  Description: INTERVENTIONS:  - Assess and monitor for signs and symptoms of infection  - Monitor lab/diagnostic results  - Monitor all insertion sites, i.e. indwelling lines, tubes, and drains  - Monitor endotracheal if appropriate and nasal secretions for changes in amount and color  - [place] appropriate cooling/warming therapies per order  - Administer medications as ordered  - Instruct and encourage patient and family to use good hand hygiene technique  - Identify and instruct in appropriate isolation precautions for identified infection/condition  Outcome: Progressing  Goal: Absence of fever/infection during neutropenic period  Description: INTERVENTIONS:  - Monitor WBC    Outcome: Progressing

## 2024-06-18 NOTE — ASSESSMENT & PLAN NOTE
Patient presented to [facility] for eval ration of left forearm laceration following a fall.  CT head read as epidural hematoma along the right frontal convexity with midline shift therefore was transferred to [place] under neurocritical care.  MRI showed right frontal extra-axial mass measuring up to 4 cm most consistent with meningioma which has been present on imaging since 2016.  Patient was transferred out to medical floor.  Evaluated by neurosurgery, meningioma is stable and asymptomatic.  Plan to follow-up with repeat MRI brain with and without in 1 year given stability since 2021.   PT/OT recommending rehab, Cm following  Additional plan as below

## 2024-06-18 NOTE — PHYSICAL THERAPY NOTE
Physical Therapy Progress Note     06/18/24 1330   PT Last Visit   PT Visit Date 06/18/24   Note Type   Note Type Treatment   Pain Assessment   Pain Score No Pain   Restrictions/Precautions   Other Precautions Cognitive;Impulsive;Chair Alarm;Bed Alarm;Fall Risk   Subjective   Subjective Pt encountered standing in room with alarm sounding, disoriented & asking about parking his truck & finding his keys.  Pt initally difficult to direct, but eventually agreeable to take a walk, and easily redirected to recliner upon return to room.   Transfers   Sit to Stand 4  Minimal assistance   Additional items Assist x 1;Armrests;Increased time required   Stand to Sit 4  Minimal assistance   Additional items Assist x 1;Armrests;Increased time required   Ambulation/Elevation   Gait pattern Short stride;Inconsistent lupe;Decreased foot clearance;Improper Weight shift   Gait Assistance 4  Minimal assist   Additional items Assist x 1   Assistive Device SPC   Distance 300'   Balance   Static Sitting Fair +   Static Standing Fair -   Ambulatory Poor +   Activity Tolerance   Activity Tolerance Patient tolerated treatment well   Nurse Made Aware LUIS Espinoza   Assessment   Prognosis Good   Problem List Decreased strength;Decreased endurance;Impaired balance;Decreased mobility;Decreased cognition;Impaired judgement;Decreased safety awareness   Assessment pt particiapted in impromptu PT session after he was recieved standing in room with alarm sounding as previously noted.  He was able to follow commands eventually to participate in ambulatory tasks, where he demonstrated improved functional endurance and appropriately used his baseline SPC to negotiate hallways with mod instructions for pathing when he appeared to be distracted by environment.  He safely negotiated obstacles & returned to room without incident, sitting in recliner where LEs were elevated & alarm activated.  PT POC & d/c recommendations rmain appropriate at this time  pending positve change in cog status & reduced need for assist & guidance with all activity to ensure safe return to highest level of independece & reduce long term burden of care.   Goals   Patient Goals to park the truck in the garage   STG Expiration Date 06/27/24   PT Treatment Day 1   Plan   Treatment/Interventions Functional transfer training;LE strengthening/ROM;Therapeutic exercise;Endurance training;Patient/family training;Equipment eval/education;Bed mobility;Gait training;Cognitive reorientation;Elevations   Progress Progressing toward goals   PT Frequency 3-5x/wk   Discharge Recommendation   Rehab Resource Intensity Level, PT II (Moderate Resource Intensity)   Equipment Recommended   (SPC for now, may benefit from RW trial)   AM-PAC Basic Mobility Inpatient   Turning in Flat Bed Without Bedrails 3   Lying on Back to Sitting on Edge of Flat Bed Without Bedrails 3   Moving Bed to Chair 3   Standing Up From Chair Using Arms 3   Walk in Room 3   Climb 3-5 Stairs With Railing 3   Basic Mobility Inpatient Raw Score 18   Basic Mobility Standardized Score 41.05   [facility] Highest Level Of Mobility   JH-HLM Goal 6: Walk 10 steps or more   JH-HLM Achieved 8: Walk 250 feet ot more       Madhu Blackburn PTA    An [facility] Basic Mobility Raw Score less than 17 suggests pt would benefit from post acute rehab.  Please also refer to the recommendation of the Physical Therapist for safe discharge planning.

## 2024-06-18 NOTE — ASSESSMENT & PLAN NOTE
Patient reports fall 6/15  On arrival 6/16, GCS 14, Patient denies being on blood thinners   6/16 CT Head: Isodense epidural hematoma along the right frontal convexity measuring 2.1 x 1.3 x 1.1 cm, favored to be subacute. There is associated mild mass effect with approximately 6 mm leftward midline shift.   6/16 repeat CT head: 2.4 cm enhancing extra-axial mass over the right frontal convexity, statistically most consistent with a meningioma.   6/17 MRI head: Right frontal extra-axial mass measuring up to 4 cm most consistent with meningioma. Mild localized mass effect without edema. No acute intracranial pathology.  -Neurosurgery consulted, appreciate recommendations  -Frequent neurochecks  -Findings likely representing meningioma over bleed

## 2024-06-18 NOTE — ASSESSMENT & PLAN NOTE
Unsure of home antihypertensive regimen  -Continue lisinopril  -Continue labetalol as needed for systolic blood pressures greater than 160

## 2024-06-18 NOTE — ASSESSMENT & PLAN NOTE
Patient is confused on my exam.  Oriented to person, month and year.  Unsure how he arrived at the hospital.  Does admit to drinking alcohol daily, states that he also drives.  Unsure of baseline, unable to contact family  Geriatrics consult

## 2024-06-18 NOTE — CASE MANAGEMENT
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     Chronic kidney disease, stage 4 (severe) ([facility]) 2021     Type 2 diabetes mellitus with diabetic peripheral angiopathy without gangrene ([facility]) 2021     Type 2 diabetes mellitus with chronic kidney disease ([facility]) 2021     Platelets decreased ([facility]) 2021     BRENDA (acute kidney injury) ([facility]) 2021     Troponin level elevated 2021     Dizziness 2021     Mixed hyperlipidemia 2020     Pacemaker 2019     Constipation 2018     Skin lesion 10/10/2018     Anxiety state 2018     Bradycardia 2018     Basal cell carcinoma of skin 11/15/2016     Abnormal nuclear stress test 2016     BPH (benign prostatic hyperplasia) 2015     Sleep related leg cramps 2013     Coronary artery disease involving native coronary artery of native heart without angina pectoris 2012     Type 2 diabetes mellitus with circulatory disorder, without long-term current use of insulin ([facility]) 2012     HTN (hypertension) 2012     Osteoporosis 2012     Peripheral arterial disease (HCC) 2012       LOS (days): 2  Geometric Mean LOS (GMLOS) (days): 3  Days to GMLOS:1.2     OBJECTIVE:  Risk of Unplanned Readmission Score: 16.16         Current  admission status: Inpatient   Preferred Pharmacy:   [id number] - [address]  [address] [address]  Phone: [phone] Fax: [phone]    Primary Care Provider: Cachorro Garcia DO    [facility]  Secondary Insurance:     DISCHARGE DETAILS:    Other Referral/Resources/Interventions Provided:  Referral Comments: This CM is unable to get in contact with son, soft blanket referral for SNF in patient's area entered in [facility]

## 2024-06-18 NOTE — ASSESSMENT & PLAN NOTE
Patient reports drinking daily, beer and mónica.   No clinical signs of withdrawal  Continue CIWA  Folate/thiamine

## 2024-06-18 NOTE — ASSESSMENT & PLAN NOTE
Lab Results   Component Value Date    HGBA1C 6.0 (H) 06/16/2024       Recent Labs     06/17/24  1201 06/17/24  1213 06/17/24  1622 06/18/24  0720   POCGLU 62* 109 119 103       Diet controlled as outpatient. Not on any medications for this chronically.  Can monitor accuchecks, SSI for now.

## 2024-06-18 NOTE — CASE MANAGEMENT
Case Management Discharge Planning Note    Patient name To Dennison  Location [facility] 602/[facility] 602-01 MRN [id number]  : 1940 Date 2024       Current Admission Date: 2024  Current Admission Diagnosis:Meningioma (HCC)   Patient Active Problem List    Diagnosis Date Noted Date Diagnosed    Encephalopathy 2024     Meningioma (HCC) 2024     Alcohol abuse 2024     Fall 2024     Chronic obstructive pulmonary disease, unspecified COPD type ([facility]) 2023     Pacer at end of battery life 2023     Sick sinus syndrome ([facility]) 2023     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     Chronic kidney disease, stage 4 (severe) ([facility]) 2021     Type 2 diabetes mellitus with diabetic peripheral angiopathy without gangrene ([facility]) 2021     Type 2 diabetes mellitus with chronic kidney disease ([facility]) 2021     Platelets decreased ([facility]) 2021     BRENDA (acute kidney injury) ([facility]) 2021     Troponin level elevated 2021     Dizziness 2021     Mixed hyperlipidemia 2020     Pacemaker 2019     Constipation 2018     Skin lesion 10/10/2018     Anxiety state 2018     Bradycardia 2018     Basal cell carcinoma of skin 11/15/2016     Abnormal nuclear stress test 2016     BPH (benign prostatic hyperplasia) 2015     Sleep related leg cramps 2013     Coronary artery disease involving native coronary artery of native heart without angina pectoris 2012     Type 2 diabetes mellitus with circulatory disorder, without long-term current use of insulin ([facility]) 2012     HTN (hypertension) 2012     Osteoporosis 2012     Peripheral arterial disease (HCC) 2012       LOS (days): 2  Geometric Mean LOS (GMLOS) (days): 3  Days to GMLOS:1.1     OBJECTIVE:  Risk of Unplanned Readmission Score: 16.16         Current  admission status: Inpatient   Preferred Pharmacy:   [id number] [address] - [address]  [address] [address]  Phone: [phone] Fax: [phone]    Primary Care Provider: Cachorro Garcia DO    Primary Insurance: [facility]  Secondary Insurance:     DISCHARGE DETAILS: TC from JORDAN Titus, who states that she prepares all patient's meals, patient is independent otherwise.  Patient still drives, Dasia talks to patient daily, and checks in twice weekly.

## 2024-06-19 ENCOUNTER — TRANSITIONAL CARE MANAGEMENT (OUTPATIENT)
Dept: INTERNAL MEDICINE CLINIC | Facility: CLINIC | Age: 84
End: 2024-06-19

## 2024-06-19 VITALS
TEMPERATURE: 98 F | HEART RATE: 90 BPM | RESPIRATION RATE: 16 BRPM | DIASTOLIC BLOOD PRESSURE: 80 MMHG | SYSTOLIC BLOOD PRESSURE: 119 MMHG | BODY MASS INDEX: 22.87 KG/M2 | WEIGHT: 145.72 LBS | HEIGHT: 67 IN | OXYGEN SATURATION: 95 %

## 2024-06-19 PROBLEM — R94.31 QT PROLONGATION: Status: ACTIVE | Noted: 2024-06-19

## 2024-06-19 LAB
GLUCOSE SERPL-MCNC: 115 MG/DL (ref 65–140)
GLUCOSE SERPL-MCNC: 120 MG/DL (ref 65–140)

## 2024-06-19 PROCEDURE — 99232 SBSQ HOSP IP/OBS MODERATE 35: CPT | Performed by: INTERNAL MEDICINE

## 2024-06-19 PROCEDURE — 99239 HOSP IP/OBS DSCHRG MGMT >30: CPT | Performed by: NURSE PRACTITIONER

## 2024-06-19 PROCEDURE — 82948 REAGENT STRIP/BLOOD GLUCOSE: CPT

## 2024-06-19 RX ORDER — LISINOPRIL 10 MG/1
10 TABLET ORAL DAILY
Status: DISCONTINUED | OUTPATIENT
Start: 2024-06-20 | End: 2024-06-19 | Stop reason: HOSPADM

## 2024-06-19 RX ORDER — LISINOPRIL 10 MG/1
10 TABLET ORAL DAILY
Qty: 30 TABLET | Refills: 0 | Status: SHIPPED | OUTPATIENT
Start: 2024-06-20 | End: 2024-06-20 | Stop reason: SDUPTHER

## 2024-06-19 RX ADMIN — LISINOPRIL 20 MG: 20 TABLET ORAL at 08:46

## 2024-06-19 RX ADMIN — Medication 1 TABLET: at 08:47

## 2024-06-19 RX ADMIN — CHLORHEXIDINE GLUCONATE 0.12% ORAL RINSE 15 ML: 1.2 LIQUID ORAL at 08:46

## 2024-06-19 RX ADMIN — FOLIC ACID 1 MG: 1 TABLET ORAL at 08:47

## 2024-06-19 RX ADMIN — FINASTERIDE 5 MG: 5 TABLET, FILM COATED ORAL at 08:47

## 2024-06-19 RX ADMIN — THIAMINE HCL TAB 100 MG 100 MG: 100 TAB at 08:47

## 2024-06-19 RX ADMIN — HEPARIN SODIUM 5000 UNITS: 5000 INJECTION INTRAVENOUS; SUBCUTANEOUS at 05:50

## 2024-06-19 NOTE — ASSESSMENT & PLAN NOTE
Patient reports drinking daily, beer and mónica.   No clinical signs of withdrawal  Ongoing cessation advised

## 2024-06-19 NOTE — ASSESSMENT & PLAN NOTE
Has not been taking flomax /proscar as prescribed per geriatrics review of meds and discussion with outpatient pharmacy  Given labile pressures, risk of orthostasis and falls, will not resume flomax. Can continue proscar.   Has hx of urinary retention as noted in PCP notes, no urinary issues this admission.   F/u with PCP

## 2024-06-19 NOTE — ASSESSMENT & PLAN NOTE
BP has been labile.   Was not taking antihypertensives (lisinopril/norvasc) as prescribed.   Continue lisinopril, reduce dose.   Outpatient f/u with PCP

## 2024-06-19 NOTE — ASSESSMENT & PLAN NOTE
Lab Results   Component Value Date    EGFR 54 06/17/2024    EGFR 47 06/16/2024    EGFR 39 06/16/2024    CREATININE 1.19 06/17/2024    CREATININE 1.35 (H) 06/16/2024    CREATININE 1.57 (H) 06/16/2024   Baseline appears wide, 1.5-1.9. Currently below baseline.  Monitor outpatient

## 2024-06-19 NOTE — INCIDENTAL FINDINGS
The following findings require follow up:  Radiographic finding   Finding: Low-dose CT chest follow-up is recommended in 1 year for follow-up of ascending aortic ectasia measuring 42 mm.    Follow up required: Repeat CT chest   Follow up should be done within 1 month(s)    Please notify the following clinician to assist with the follow up:   Dr. Cachorro Garcia

## 2024-06-19 NOTE — ASSESSMENT & PLAN NOTE
Patient presented to [facility] for eval ration of left forearm laceration following a fall.  CT head read as epidural hematoma along the right frontal convexity with midline shift therefore was transferred to [place] under neurocritical care.  MRI showed right frontal extra-axial mass measuring up to 4 cm most consistent with meningioma which has been present on imaging since 2016.  Patient was transferred out to medical floor.  Evaluated by neurosurgery, meningioma is stable and asymptomatic.  Plan to follow-up with repeat MRI brain with and without in 1 year given stability since 2021.   PT/OT recommending rehab, however patient/family refused as below.   Additional plan as below

## 2024-06-19 NOTE — PLAN OF CARE
Problem: Prexisting or High Potential for Compromised Skin Integrity  Goal: Skin integrity is maintained or improved  Description: INTERVENTIONS:  - Identify patients at risk for skin breakdown  - Assess and monitor skin integrity  - Assess and monitor nutrition and hydration status  - Monitor labs   - Assess for incontinence   - Turn and reposition patient  - Assist with mobility/ambulation  - Relieve pressure over bony prominences  - Avoid friction and shearing  - Provide appropriate hygiene as needed including keeping skin clean and dry  - Evaluate need for skin moisturizer/barrier cream  - Collaborate with interdisciplinary team   - Patient/family teaching  - Consider wound care consult   Outcome: Progressing     Problem: SAFETY ADULT  Goal: Maintain or return to baseline ADL function  Description: INTERVENTIONS:  -  Assess patient's ability to carry out ADLs; assess patient's baseline for ADL function and identify physical deficits which impact ability to perform ADLs (bathing, care of mouth/teeth, toileting, grooming, dressing, etc.)  - Assess/evaluate cause of self-care deficits   - Assess range of motion  - Assess patient's mobility; develop plan if impaired  - Assess patient's need for assistive devices and provide as appropriate  - Encourage maximum independence but intervene and supervise when necessary  - Involve family in performance of ADLs  - Assess for home care needs following discharge   - Consider OT consult to assist with ADL evaluation and planning for discharge  - Provide patient education as appropriate  Outcome: Progressing     Problem: Nutrition/Hydration-ADULT  Goal: Nutrient/Hydration intake appropriate for improving, restoring or maintaining nutritional needs  Description: Monitor and assess patient's nutrition/hydration status for malnutrition. Collaborate with interdisciplinary team and initiate plan and interventions as ordered.  Monitor patient's weight and dietary intake as ordered  or per policy. Utilize nutrition screening tool and intervene as necessary. Determine patient's food preferences and provide high-protein, high-caloric foods as appropriate.     INTERVENTIONS:  - Monitor oral intake, urinary output, labs, and treatment plans  - Assess nutrition and hydration status and recommend course of action  - Evaluate amount of meals eaten  - Assist patient with eating if necessary   - Allow adequate time for meals  - Recommend/ encourage appropriate diets, oral nutritional supplements, and vitamin/mineral supplements  - Order, calculate, and assess calorie counts as needed  - Recommend, monitor, and adjust tube feedings and TPN/PPN based on assessed needs  - Assess need for intravenous fluids  - Provide specific nutrition/hydration education as appropriate  - Include patient/family/caregiver in decisions related to nutrition  Outcome: Progressing

## 2024-06-19 NOTE — CASE MANAGEMENT
Case Management Discharge Planning Note    Patient name To Dennison  Location [facility] 602/[facility] 602-01 MRN [id number]  : 1940 Date 2024       Current Admission Date: 2024  Current Admission Diagnosis:Meningioma (HCC)   Patient Active Problem List    Diagnosis Date Noted Date Diagnosed    QT prolongation 2024     Encephalopathy 2024     Meningioma (HCC) 2024     Alcohol abuse 2024     Fall 2024     Chronic obstructive pulmonary disease, unspecified COPD type ([facility]) 2023     Pacer at end of battery life 2023     Sick sinus syndrome (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) 2021     Type 2 diabetes mellitus with diabetic peripheral angiopathy without gangrene ([facility]) 2021     Type 2 diabetes mellitus with chronic kidney disease ([facility]) 2021     Platelets decreased ([facility]) 2021     BRENDA (acute kidney injury) ([facility]) 2021     Troponin level elevated 2021     Dizziness 2021     Mixed hyperlipidemia 2020     Pacemaker 2019     Constipation 2018     Skin lesion 10/10/2018     Anxiety state 2018     Bradycardia 2018     Basal cell carcinoma of skin 11/15/2016     Abnormal nuclear stress test 2016     BPH (benign prostatic hyperplasia) 2015     Sleep related leg cramps 2013     Coronary artery disease involving native coronary artery of native heart without angina pectoris 2012     Type 2 diabetes mellitus with circulatory disorder, without long-term current use of insulin ([facility]) 2012     HTN (hypertension) 2012     Osteoporosis 2012     Peripheral arterial disease ([facility]) 2012       LOS (days): 3  Geometric Mean LOS (GMLOS) (days): 3  Days to GMLOS:0.2     OBJECTIVE:  Risk of Unplanned Readmission  Score: 16.22         Current admission status: Inpatient   Preferred Pharmacy:   [id number]  [address]  [address] [address]  Phone: [phone] Fax: [phone]    Primary Care Provider: Cachorro Garcia DO    Primary Insurance: [facility]  Secondary Insurance:     DISCHARGE DETAILS:    Requested Home Health Care         Is the patient interested in HHC at discharge?: Yes  Home Health Discipline requested:: Nursing, Occupational Therapy, Physical Therapy  Home Health Agency Name:: [facility]  Home Health Follow-Up Provider:: PCP  Home Health Services Needed:: Evaluate Functional Status and Safety, Gait/ADL Training, Strengthening/Theraputic Exercises to Improve Function  Homebound Criteria Met:: Uses an Assist Device (i.e. cane, walker, etc), Requires the Assistance of Another Person for Safe Ambulation or to Leave the Home  Supporting Clincal Findings:: Limited Endurance, Fatigues Easliy in Short Distances    Family requesting HHC services, [facility] able to accept, family agreeable to DCP.  SonTitus, stating that they have taken patient's car keys so that he will not be able to drive, and family will be able to stay with patient 24/7 at his house, to which patient is agreeable    IMM reviewed with patient's caregiver, patient's caregiver agrees with discharge determination.

## 2024-06-19 NOTE — ASSESSMENT & PLAN NOTE
Lab Results   Component Value Date    HGBA1C 6.0 (H) 06/16/2024       Recent Labs     06/18/24  1048 06/18/24  1626 06/18/24 2056 06/19/24  0710   POCGLU 107 131 194* 120         Diet controlled as outpatient. Not on any medications for this chronically.  Outpatient f/u

## 2024-06-19 NOTE — PROGRESS NOTES
Patient:    MRN:  [id number]    Aidin Request ID:  [id number]    Level of care reserved:  Home Health Agency    Partner Reserved:  [facility], [place] [address] [phone]    Clinical needs requested:    Geography searched:  [address]    Start of Service:    Request sent:  12:45pm EDT on 6/19/2024 by Carine Small    Partner reserved:  1:03pm EDT on 6/19/2024 by Carine Small    Choice list shared:  1:03pm EDT on 6/19/2024 by Carine Small

## 2024-06-19 NOTE — PROGRESS NOTES
Progress Note - Geriatric Medicine   To Dennison 84 y.o. male MRN: [id number]  Unit/Bed#: [facility] 602-01 Encounter: [id number]      Assessment/Plan:    Acute metabolic encephalopathy  -evidenced by confusion and fluctuations worse than baseline requiring frequent reorientation and redirection   -multifactorial including age, fall, hypertensive urgency, ETOH use disorder and multiple setting changes in elderly individual with some degree of cognitive impairment suspected at baseline   -markedly improved and appears to be near baseline but remains high risk recurrence, cont strict delirium precautions   -reorient frequently as appropriate    Cognitive screening  -mentation somewhat clearer today but remains very forgetful often attempts to cover deficits with humor, family notes some forgetfulness at baseline as well and have been encouraging patient to get help in the home which he has been resistant to however after long discussion with medical team and family at bedside he is now amenable   -discussed memory and driving concerns as well as noted that patients drivers license has  which patient/family were not aware of  -encouraged patient retire from driving given memory/cognitive concerns and informed of mandated reporting to [place] by healthcare professionals, form completed and submitted, if patient were to wish to resume driving would recommend fitness to drive evaluation and clearance of PCP to do so    ETOH use disorder  -patient endorses daily use, cessation counseling provided   -voiced concerns of patients drinking and driving and counseled to stop   -CIWA protocol, continue to monitor for withdrawal   -cont thiamine and folate supplementation     Fall  -reportedly mechanical fall PTA  -admitted with left forearm laceration   -high risk future falls cont fall precautions   -encourage good body mechanics and assist with tx     Meningoma   -know from imaging dating back to 2016  -stable and  asymptomatic   -noted on CTH/MRI brain on admission   -Nsx on consult, recommend continued conservative management with surveillance and repeat imaging as o/p     QTc prolongation  -QTc prolonged to 574 on EKG on admission   -continue to avoid QTc prolonging agents  -monitor electrolytes closely and replete as appropriate     BPH  -previously on flomax/proscar as o/p but has not consistently been picking up medications as o/p  -BPH increases risk urinary retention, recommend retention protocol     Frailty syndrome in geriatric patient   -due to age and co-morbidities   -encourage well balanced nutritional intake  -continue optimization of chronic conditions and address acute derangements as arise   -continue to ensure treatments and interventions align with patients wishes and goals of care, per extensive discussion with patient and family pts goal is to age in place, given increasing care needs, suspected previously undiagnosed underlying cognitive impairment and progression of chronic co-morbidities patient likely to require increasing assistance with ADLs/iADLs, pt adamant of never going to SNF and thus family working on increasing assistance and supports for patient in the home     Care coordination: rounded with Cristian (RN) and Monika (RACHELLE AP), discussed with patients son and daughter in law at bedside     Subjective:     Cr is seen and examined at bedside where he is sitting resting comfortably, mentation is somewhat clearer today but remains very forgetful stating he is not sure why he is in the hospital or where he left his truck. He reports no pain and notes that he slept well most of the evening. He reports no new acute complaints, nursing reports no acute events overnight.     Review of Systems   Constitutional: Negative.    HENT: Negative.     Eyes: Negative.    Respiratory: Negative.  Negative for shortness of breath.    Cardiovascular: Negative.    Gastrointestinal: Negative.    Genitourinary:  "Negative.    Musculoskeletal: Negative.         No MSK pain    Skin: Negative.    Neurological: Negative.  Negative for headaches.   Hematological: Negative.    Psychiatric/Behavioral:  Positive for confusion. Negative for sleep disturbance.    All other systems reviewed and are negative.    Objective:     Vitals: Blood pressure 119/80, pulse 90, temperature 98 °F (36.7 °C), resp. rate 16, height 5' 7\" (1.702 m), weight 66.1 kg (145 lb 11.6 oz), SpO2 95%.,Body mass index is 22.82 kg/m².      Intake/Output Summary (Last 24 hours) at 6/19/2024 0954  Last data filed at 6/19/2024 0707  Gross per 24 hour   Intake 791 ml   Output --   Net 791 ml     Current Medications: Reviewed    Physical Exam:   Physical Exam  Vitals and nursing note reviewed.   Constitutional:       General: He is not in acute distress.     Comments: Elderly male appears stated age    HENT:      Head: Normocephalic.      Nose: Nose normal.      Mouth/Throat:      Mouth: Mucous membranes are moist.   Eyes:      General: No scleral icterus.        Right eye: No discharge.         Left eye: No discharge.      Conjunctiva/sclera: Conjunctivae normal.   Neck:      Comments: Phonation norm   Cardiovascular:      Rate and Rhythm: Normal rate and regular rhythm.      Heart sounds: Murmur heard.   Pulmonary:      Effort: Pulmonary effort is normal. No respiratory distress.      Breath sounds: No wheezing.      Comments: Currently saturating well on room air, no conversational dyspnea, no wheeze   Abdominal:      General: Bowel sounds are normal. There is no distension.      Palpations: Abdomen is soft.      Tenderness: There is no abdominal tenderness.   Musculoskeletal:      Cervical back: Neck supple.      Comments: Reduced overall muscle mass    Skin:     General: Skin is dry.      Comments: Skin cool dry thin friable     Clean dry dressing left forearm    Neurological:      Mental Status: He is alert.      Comments: Awake and alert oriented to person place " general situation but cannot recall details, forgetful    Psychiatric:      Comments: Pleasant and cooperative         Invasive Devices       Peripheral Intravenous Line  Duration             Peripheral IV 06/16/24 Distal;Right;Upper;Ventral (anterior) Arm 2 days    Peripheral IV 06/16/24 Right Arm 2 days                  Lab Results:     I have personally reviewed pertinent lab results including the following:    Results from last 7 days   Lab Units 06/17/24 0528 06/16/24 1749 06/16/24 1747 06/16/24  1303   WBC Thousand/uL 7.63 9.11  --  6.75   HEMOGLOBIN g/dL 14.5 15.0  --  14.4   I STAT HEMOGLOBIN g/dl  --   --  14.6  --    HEMATOCRIT % 43.4 44.7  --  44.0   HEMATOCRIT, ISTAT %  --   --  43  --    PLATELETS Thousands/uL 196 216  --  216   SEGS PCT % 74 69  --  64   MONO PCT % 10 8  --  11   EOS PCT % 3 5  --  6     Results from last 7 days   Lab Units 06/17/24 0528 06/16/24 1749 06/16/24 1747 06/16/24  1303   POTASSIUM mmol/L 3.6 3.6  --  4.2   CHLORIDE mmol/L 100 103  --  102   CO2 mmol/L 28 27  --  31   CO2, I-STAT mmol/L  --   --  31  --    BUN mg/dL 22 27*  --  27*   CREATININE mg/dL 1.19 1.35*  --  1.57*   CALCIUM mg/dL 9.6 9.8  --  10.0   ALK PHOS U/L  --  55  --  46   ALT U/L  --  10  --  10   AST U/L  --  14  --  15   GLUCOSE, ISTAT mg/dl  --   --  113  --      I have personally reviewed the following imaging study reports in PACS:    6/17/24 MRI brain w/wo contrast

## 2024-06-19 NOTE — ASSESSMENT & PLAN NOTE
Patient seems somewhat improved today, currently alert and oriented x 3 but waxes and wanes, unsure how he arrived at the hospital.    Evaluated by geriatrics, upon review of outpatient medications, appears that patient was not taking medications as prescribed.  Physical and Occupational Therapy recommended rehab.  Patient and family refusing at this time.  Discussed that we are recommending 24/7 supervision at this time patient should not be driving.  [employer] form will be filled out by the geriatrics team.

## 2024-06-19 NOTE — DISCHARGE SUMMARY
[facility]  Discharge- To Dennison 1940, 84 y.o. male MRN: [id number]  Unit/Bed#: PPHP 602-01 Encounter: [id number]  Primary Care Provider: Cachorro Garcia DO   Date and time admitted to hospital: 6/16/2024  5:38 PM      [email or web address] spoke with patient's son Alejandro and wife at bedside, informed them of our recommendations for supervision and that patient not drive.  Son states that they have already taken his keys and that he will not be driving moving forward.  They are not interested in rehab, after further discussion with the patient, he is agreeable to allow his other son Rylan and his wife Dasia to stay with him for the time being.    * Meningioma (HCC)  Assessment & Plan  Patient presented to [facility] for eval ration of left forearm laceration following a fall.  CT head read as epidural hematoma along the right frontal convexity with midline shift therefore was transferred to [place] under neurocritical care.  MRI showed right frontal extra-axial mass measuring up to 4 cm most consistent with meningioma which has been present on imaging since 2016.  Patient was transferred out to medical floor.  Evaluated by neurosurgery, meningioma is stable and asymptomatic.  Plan to follow-up with repeat MRI brain with and without in 1 year given stability since 2021.   PT/OT recommending rehab, however patient/family refused as below.   Additional plan as below     Encephalopathy  Assessment & Plan  Patient seems somewhat improved today, currently alert and oriented x 3 but waxes and wanes, unsure how he arrived at the hospital.    Evaluated by geriatrics, upon review of outpatient medications, appears that patient was not taking medications as prescribed.  Physical and Occupational Therapy recommended rehab.  Patient and family refusing at this time.  Discussed that we are recommending 24/7 supervision at this time patient should not be driving.  [employer] form will be  filled out by the geriatrics team.      Alcohol abuse  Assessment & Plan  Patient reports drinking daily, beer and mónica.   No clinical signs of withdrawal  Ongoing cessation advised     Chronic obstructive pulmonary disease, unspecified COPD type ([facility])  Assessment & Plan  Without exacerbation.  Former smoker     Fall  Assessment & Plan  PT/OT recommending rehab, patient and family refused.     Chronic kidney disease, stage 4 (severe) ([facility])  Assessment & Plan  Lab Results   Component Value Date    EGFR 54 06/17/2024    EGFR 47 06/16/2024    EGFR 39 06/16/2024    CREATININE 1.19 06/17/2024    CREATININE 1.35 (H) 06/16/2024    CREATININE 1.57 (H) 06/16/2024   Baseline appears wide, 1.5-1.9. Currently below baseline.  Monitor outpatient    HTN (hypertension)  Assessment & Plan  BP has been labile.   Was not taking antihypertensives (lisinopril/norvasc) as prescribed.   Continue lisinopril, reduce dose.   Outpatient f/u with PCP      QT prolongation  Assessment & Plan  Noted on EKG, 574.   Avoid QTC prolonging agents.     Mixed hyperlipidemia  Assessment & Plan  Continue statin    BPH (benign prostatic hyperplasia)  Assessment & Plan  Has not been taking flomax /proscar as prescribed per geriatrics review of meds and discussion with outpatient pharmacy  Given labile pressures, risk of orthostasis and falls, will not resume flomax. Can continue proscar.   Has hx of urinary retention as noted in PCP notes, no urinary issues this admission.   F/u with PCP    Type 2 diabetes mellitus with circulatory disorder, without long-term current use of insulin ([facility])  Assessment & Plan  Lab Results   Component Value Date    HGBA1C 6.0 (H) 06/16/2024       Recent Labs     06/18/24  1048 06/18/24  1626 06/18/24  2056 06/19/24  0710   POCGLU 107 131 194* 120         Diet controlled as outpatient. Not on any medications for this chronically.  Outpatient f/u       Coronary artery disease involving native coronary artery of native heart  without angina pectoris  Assessment & Plan  Presumed based on Myoview per cardiology note.  Not on medical therapy.     Pacemaker  Assessment & Plan  History of SSS      Medical Problems       Resolved Problems  Date Reviewed: 6/19/2024   None       Discharging Physician / Practitioner: CARROLL Celeste  PCP: Cachorro Garcia DO  Admission Date:   Admission Orders (From admission, onward)       Ordered        06/16/24 1750  Inpatient Admission  Once                          Discharge Date: 06/19/24    Consultations During Hospital Stay:  Geriatric  NSX  PT/OT  Case management     Procedures Performed:   None     Significant Findings / Test Results:   CTH Isodense epidural hematoma along the right frontal convexity measuring 2.1 x 1.3 x 1.1 cm, favored to be subacute. There is associated mild mass effect with approximately 6 mm leftward midline shift.   CXR negative  CTH No significant change from the study performed earlier today. Reidentified subacute or chronic right upper frontal subdural collection. No acute intracranial hemorrhage. Maximum of 2 to 3 mm of leftward midline shift secondary to the subacute or chronic subdural hematoma above. Some of the apparent midline shift seen on the prior study is secondary to tilting of the patient's head in the imaging gantry.   CT C/A/P No acute injury in the chest, abdomen or pelvis. Moderate bilateral hydronephrosis likely secondary to severe bladder distention.   Low-dose CT chest follow-up is recommended in 1 year for follow-up of ascending aortic ectasia measuring 42 mm.   CT C spine No cervical spine fracture or traumatic malalignment.   CTH with and without 2.4 cm enhancing extra-axial mass over the right frontal convexity, statistically most consistent with a meningioma.   MRI brain with and without Right frontal extra-axial mass measuring up to 4 cm most consistent with meningioma. Mild localized mass effect without edema. No acute intracranial pathology.  Chronic microangiopathy.     Incidental Findings:   Low-dose CT chest follow-up is recommended in 1 year for follow-up of ascending aortic ectasia measuring 42 mm.    I reviewed the above mentioned incidental findings with the patient and/or family and they expressed understanding.    Test Results Pending at Discharge (will require follow up):   none     Outpatient Tests Requested:  Outpatient f/u with PCP  Outpatient f/u with nsx in one year with repeat MRI brain     Complications:  none     Reason for Admission: fall, arm laceration, evaluation of meningioma    Hospital Course:   To Dennison is a 84 y.o. male patient who originally presented to the hospital on 6/16/2024 due to fall with an arm laceration.  Upon further traumatic imaging patient was noted to have a brain mass with possible midline shift and was transferred to [place] for neurosurgery evaluation.  MRI showed that this is most consistent with a meningioma which has been present on imaging since 2016.  Neurosurgery recommended follow-up with repeat MRI brain in 1 year given stability since 2021.  Patient is stable and asymptomatic from this standpoint.    Patient was noted to be confused, although this is improving, this remains a concern.  Was evaluated by the geriatric team.  He was recommended for rehab by physical and Occupational Therapy however patient and family are refusing.  At this point, we are recommending 24/7 supervision and that he not drive.  [employer] form was filled out and faxed. Family is aware of our recommendations, spoke with julio c luevano.     Please see above list of diagnoses and related plan for additional information.     Condition at Discharge: stable    Discharge Day Visit / Exam:   Subjective:  Patient has no complaints. Wants to go home.   Vitals: Blood Pressure: 119/80 (06/19/24 0707)  Pulse: 90 (06/19/24 0707)  Temperature: 98 °F (36.7 °C) (06/19/24 0707)  Temp Source: Axillary (06/19/24 0230)  Respirations:  "16 (06/19/24 0707)  Height: 5' 7\" (170.2 cm) (06/16/24 1900)  Weight - Scale: 66.1 kg (145 lb 11.6 oz) (06/19/24 0550)  SpO2: 95 % (06/19/24 0707)  Exam:   Physical Exam  Vitals and nursing note reviewed.   Constitutional:       General: He is not in acute distress.  Cardiovascular:      Rate and Rhythm: Normal rate.   Pulmonary:      Breath sounds: Normal breath sounds.   Abdominal:      Tenderness: There is no abdominal tenderness.   Musculoskeletal:         General: No swelling.   Skin:     General: Skin is warm.      Coloration: Skin is pale.      Comments: Left arm wrapped    Neurological:      Mental Status: He is alert.      Comments: Oriented x 3 however forgetful with periods of confusion   Psychiatric:         Mood and Affect: Mood normal.          Discussion with Family:  Attempted to call patient's son Rylan, unable to reach.  Was able to reach patient's daughter-in-law Dasia who is Rylan's wife..     Discharge instructions/Information to patient and family:   See after visit summary for information provided to patient and family.      Provisions for Follow-Up Care:  See after visit summary for information related to follow-up care and any pertinent home health orders.      Mobility at time of Discharge:   Basic Mobility Inpatient Raw Score: 18  JH-HLM Goal: 6: Walk 10 steps or more  JH-HLM Achieved: 5: Stand (1 or more minutes)  HLM Goal NOT achieved. Continue to encourage mobility in post discharge setting.     Disposition:   Home    Planned Readmission: no     Discharge Statement:  I spent 45 minutes discharging the patient. This time was spent on the day of discharge. I had direct contact with the patient on the day of discharge. Greater than 50% of the total time was spent examining patient, answering all patient questions, arranging and discussing plan of care with patient as well as directly providing post-discharge instructions.  Additional time then spent on discharge activities.    Discharge " Medications:  See after visit summary for reconciled discharge medications provided to patient and/or family.      **Please Note: This note may have been constructed using a voice recognition system**

## 2024-06-20 ENCOUNTER — HOME CARE VISIT (OUTPATIENT)
Dept: HOME HEALTH SERVICES | Facility: HOME HEALTHCARE | Age: 84
End: 2024-06-20

## 2024-06-20 ENCOUNTER — TELEPHONE (OUTPATIENT)
Dept: INTERNAL MEDICINE CLINIC | Facility: CLINIC | Age: 84
End: 2024-06-20

## 2024-06-20 ENCOUNTER — OFFICE VISIT (OUTPATIENT)
Dept: INTERNAL MEDICINE CLINIC | Facility: CLINIC | Age: 84
End: 2024-06-20
Payer: COMMERCIAL

## 2024-06-20 VITALS
SYSTOLIC BLOOD PRESSURE: 118 MMHG | HEART RATE: 59 BPM | WEIGHT: 143 LBS | OXYGEN SATURATION: 96 % | DIASTOLIC BLOOD PRESSURE: 68 MMHG | TEMPERATURE: 97.2 F | HEIGHT: 67 IN | BODY MASS INDEX: 22.44 KG/M2

## 2024-06-20 DIAGNOSIS — E11.51 TYPE 2 DIABETES MELLITUS WITH DIABETIC PERIPHERAL ANGIOPATHY WITHOUT GANGRENE, WITHOUT LONG-TERM CURRENT USE OF INSULIN (HCC): ICD-10-CM

## 2024-06-20 DIAGNOSIS — I49.5 SICK SINUS SYNDROME (HCC): ICD-10-CM
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
## 2024-06-20 DIAGNOSIS — N18.4 CHRONIC KIDNEY DISEASE, STAGE 4 (SEVERE) (HCC): ICD-10-CM

## 2024-06-20 DIAGNOSIS — G93.40 ENCEPHALOPATHY: Chronic | ICD-10-CM

## 2024-06-20 DIAGNOSIS — S51.812D LACERATION OF LEFT FOREARM, SUBSEQUENT ENCOUNTER: ICD-10-CM

## 2024-06-20 DIAGNOSIS — D32.9 MENINGIOMA (HCC): ICD-10-CM

## 2024-06-20 DIAGNOSIS — J44.9 CHRONIC OBSTRUCTIVE PULMONARY DISEASE, UNSPECIFIED COPD TYPE (HCC): ICD-10-CM

## 2024-06-20 DIAGNOSIS — I10 PRIMARY HYPERTENSION: ICD-10-CM

## 2024-06-20 DIAGNOSIS — C79.89 SECONDARY MALIGNANT NEOPLASM OF PAROTID GLAND (HCC): ICD-10-CM

## 2024-06-20 DIAGNOSIS — R33.9 URINARY RETENTION: ICD-10-CM

## 2024-06-20 DIAGNOSIS — R94.31 QT PROLONGATION: ICD-10-CM

## 2024-06-20 DIAGNOSIS — D68.9 COAGULOPATHY (HCC): ICD-10-CM

## 2024-06-20 DIAGNOSIS — R46.89 SEXUALLY OFFENSIVE BEHAVIOR: ICD-10-CM

## 2024-06-20 DIAGNOSIS — E78.00 HYPERCHOLESTEREMIA: ICD-10-CM

## 2024-06-20 DIAGNOSIS — N40.0 BENIGN PROSTATIC HYPERPLASIA, UNSPECIFIED WHETHER LOWER URINARY TRACT SYMPTOMS PRESENT: ICD-10-CM

## 2024-06-20 DIAGNOSIS — Z95.0 PACEMAKER: Chronic | ICD-10-CM

## 2024-06-20 DIAGNOSIS — Z71.89 COMPLEX CARE COORDINATION: Primary | ICD-10-CM

## 2024-06-20 DIAGNOSIS — Z09 HOSPITAL DISCHARGE FOLLOW-UP: Primary | ICD-10-CM

## 2024-06-20 DIAGNOSIS — H40.9 GLAUCOMA OF RIGHT EYE, UNSPECIFIED GLAUCOMA TYPE: ICD-10-CM

## 2024-06-20 DIAGNOSIS — F10.10 ALCOHOL ABUSE: ICD-10-CM

## 2024-06-20 DIAGNOSIS — E78.2 MIXED HYPERLIPIDEMIA: ICD-10-CM
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
## 2024-06-20 PROBLEM — E11.22 TYPE 2 DIABETES MELLITUS WITH CHRONIC KIDNEY DISEASE (HCC): Chronic | Status: ACTIVE | Noted: 2021-05-04

## 2024-06-20 PROBLEM — S51.812A LACERATION OF LEFT FOREARM: Chronic | Status: ACTIVE | Noted: 2024-06-20

## 2024-06-20 PROBLEM — S51.812A LACERATION OF LEFT FOREARM: Status: ACTIVE | Noted: 2024-06-20

## 2024-06-20 PROCEDURE — 99496 TRANSJ CARE MGMT HIGH F2F 7D: CPT | Performed by: NURSE PRACTITIONER

## 2024-06-20 RX ORDER — FINASTERIDE 5 MG/1
5 TABLET, FILM COATED ORAL DAILY
Qty: 90 TABLET | Refills: 1 | Status: SHIPPED | OUTPATIENT
Start: 2024-06-20

## 2024-06-20 RX ORDER — BRIMONIDINE TARTRATE, TIMOLOL MALEATE 2; 5 MG/ML; MG/ML
1 SOLUTION/ DROPS OPHTHALMIC EVERY 12 HOURS SCHEDULED
Qty: 45 ML | Refills: 1 | Status: SHIPPED | OUTPATIENT
Start: 2024-06-20 | End: 2024-12-17

## 2024-06-20 RX ORDER — SIMVASTATIN 10 MG
10 TABLET ORAL EVERY EVENING
Qty: 90 TABLET | Refills: 3 | Status: SHIPPED | OUTPATIENT
Start: 2024-06-20

## 2024-06-20 RX ORDER — LISINOPRIL 10 MG/1
10 TABLET ORAL DAILY
Qty: 30 TABLET | Refills: 3 | Status: SHIPPED | OUTPATIENT
Start: 2024-06-20

## 2024-06-20 NOTE — ASSESSMENT & PLAN NOTE
"6/20/2024  From the moment that this provider entered the room with the patient and his daughter, the patient spoke mostly about sexual innuendos  Patient is extremely preoccupied with sexual acts  The patient attempted to engage the provider in a sexual act conversation for which he was immediately turned down and told that it was not appropriate  Patient then laughed and stated that I (the provider) \"must be a prude\", and that \"one night with him would make me change my ways \".  The patient's daughter was apologetic the entire time he was speaking  "

## 2024-06-20 NOTE — ASSESSMENT & PLAN NOTE
6/16/2024 in the ER  presents to the emergency department initially for concerns of a laceration to his left forearm. Patient states he fell last night. Patient states he tripped over his feet and fell landing on his bilateral knees and left forearm.   Comments: Approximate 2 cm skin tear to the left forearm no active bleeding noted.

## 2024-06-20 NOTE — ASSESSMENT & PLAN NOTE
Patient presented to [facility] for eval ration of left forearm laceration following a fall.  CT head read as epidural hematoma along the right frontal convexity with midline shift therefore was transferred to [place] under neurocritical care.  MRI showed right frontal extra-axial mass measuring up to 4 cm most consistent with meningioma which has been present on imaging since 2016.  Patient was transferred out to medical floor.  Evaluated by neurosurgery, meningioma is stable and asymptomatic.  Plan to follow-up with repeat MRI brain with and without in 1 year given stability since 2021.   PT/OT recommending rehab, however patient/family refused as below.   Additional plan as below

## 2024-06-20 NOTE — PROGRESS NOTES
Transition of Care Visit  Name: To Dennison      : 1940      MRN: [id number]  Encounter Provider: CARROLL Greer  Encounter Date: 2024   Encounter department: [facility]    Assessment & Plan   1. Hospital discharge follow-up  Assessment & Plan:  The patient was hospitalized from 2024 to 2024.  Hospital Course:   To Dennison is a 84 y.o. male patient who originally presented to the hospital on 2024 due to fall with an arm laceration.  Upon further traumatic imaging patient was noted to have a brain mass with possible midline shift and was transferred to [place] for neurosurgery evaluation.  MRI showed that this is most consistent with a meningioma which has been present on imaging since .  Neurosurgery recommended follow-up with repeat MRI brain in 1 year given stability since .  Patient is stable and asymptomatic from this standpoint.   Patient was noted to be confused, although this is improving, this remains a concern.  Was evaluated by the geriatric team.  He was recommended for rehab by physical and Occupational Therapy however patient and family are refusing.  At this point, we are recommending 24/7 supervision and that he not drive.  [employer] form was filled [place] and faxed. Family is aware of our recommendations, spoke with julio c luevano.    Significant Findings / Test Results:   CTH Isodense epidural hematoma along the right frontal convexity measuring 2.1 x 1.3 x 1.1 cm, favored to be subacute. There is associated mild mass effect with approximately 6 mm leftward midline shift.   CXR negative  CTH No significant change from the study performed earlier today. Reidentified subacute or chronic right upper frontal subdural collection. No acute intracranial hemorrhage. Maximum of 2 to 3 mm of leftward midline shift secondary to the subacute or chronic subdural hematoma above. Some of the apparent midline shift seen on the prior study  is secondary to tilting of the patient's head in the imaging gantry.   CT C/A/P No acute injury in the chest, abdomen or pelvis. Moderate bilateral hydronephrosis likely secondary to severe bladder distention.   Low-dose CT chest follow-up is recommended in 1 year for follow-up of ascending aortic ectasia measuring 42 mm.   CT C spine No cervical spine fracture or traumatic malalignment.   CTH with and without 2.4 cm enhancing extra-axial mass over the right frontal convexity, statistically most consistent with a meningioma.   MRI brain with and without Right frontal extra-axial mass measuring up to 4 cm most consistent with meningioma. Mild localized mass effect without edema. No acute intracranial pathology. Chronic microangiopathy.     Incidental Findings:   Low-dose CT chest follow-up is recommended in 1 year for follow-up of ascending aortic ectasia measuring 42 mm.    I reviewed the above mentioned incidental findings with the patient and/or family and they expressed understanding.  2. Meningioma (HCC)  Assessment & Plan:  Patient presented to [facility] for eval ration of left forearm laceration following a fall.  CT head read as epidural hematoma along the right frontal convexity with midline shift therefore was transferred to [place] under neurocritical care.  MRI showed right frontal extra-axial mass measuring up to 4 cm most consistent with meningioma which has been present on imaging since 2016.  Patient was transferred out to medical floor.  Evaluated by neurosurgery, meningioma is stable and asymptomatic.  Plan to follow-up with repeat MRI brain with and without in 1 year given stability since 2021.   PT/OT recommending rehab, however patient/family refused as below.   Additional plan as below   Orders:  -     EXTERNAL Referral to Home Health; Future  3. Encephalopathy  Assessment & Plan:  Patient seems somewhat improved today, currently alert and oriented x 3 but waxes and wanes, unsure how he  "arrived at the hospital.    Evaluated by geriatrics, upon review of outpatient medications, appears that patient was not taking medications as prescribed.  Physical and Occupational Therapy recommended rehab.  Patient and family refusing at this time.  Discussed that we are recommending 24/7 supervision at this time patient should not be driving.  [employer] form will be filled out by the geriatrics team.    4. Alcohol abuse  Assessment & Plan:  Patient reports drinking daily, beer and mónica.   No clinical signs of withdrawal  Ongoing cessation advised     6/20/2024  Patient states that he enjoys drinking [employer]  He states that he usually drinks from the time he wakes up until he goes to bed  He states \"I am not an alcoholic\"  When discussing alcohol cessation, he states \" I do not have a drinking problem\"  5. Chronic obstructive pulmonary disease, unspecified COPD type (HCC)  Assessment & Plan:  Without exacerbation.  Former smoker   Orders:  -     EXTERNAL Referral to Home Health; Future  6. Fall, subsequent encounter  Assessment & Plan:  PT/OT recommending rehab, patient and family refused.   Orders:  -     EXTERNAL Referral to Home Health; Future  7. Chronic kidney disease, stage 4 (severe) (HCC)  Assessment & Plan:  Lab Results   Component Value Date    EGFR 54 06/17/2024    EGFR 47 06/16/2024    EGFR 39 06/16/2024    CREATININE 1.19 06/17/2024    CREATININE 1.35 (H) 06/16/2024    CREATININE 1.57 (H) 06/16/2024     Lab Results   Component Value Date    EGFR 54 06/17/2024    EGFR 47 06/16/2024    EGFR 39 06/16/2024    CREATININE 1.19 06/17/2024    CREATININE 1.35 (H) 06/16/2024    CREATININE 1.57 (H) 06/16/2024   Baseline appears wide, 1.5-1.9. Currently below baseline.  Monitor outpatient  Orders:  -     EXTERNAL Referral to Home Health; Future  8. Primary hypertension  Assessment & Plan:  BP has been labile.   Was not taking antihypertensives (lisinopril/norvasc) as prescribed.   Continue lisinopril, " reduce dose.   Outpatient f/u with PCP    Orders:  -     lisinopril (ZESTRIL) 10 mg tablet; Take 1 tablet (10 mg total) by mouth daily  -     EXTERNAL Referral to Home Health; Future  9. QT prolongation  Assessment & Plan:  Noted on EKG, 574.   Avoid QTC prolonging agents.   10. Mixed hyperlipidemia  Assessment & Plan:  Continue statin  11. Benign prostatic hyperplasia, unspecified whether lower urinary tract symptoms present  Assessment & Plan:  Has not been taking flomax /proscar as prescribed per geriatrics review of meds and discussion with outpatient pharmacy  Given labile pressures, risk of orthostasis and falls, will not resume flomax. Can continue proscar.   Has hx of urinary retention as noted in PCP notes, no urinary issues this admission.   F/u with PCP  Orders:  -     EXTERNAL Referral to Home Health; Future  12. Type 2 diabetes mellitus with diabetic peripheral angiopathy without gangrene, without long-term current use of insulin (HCC)  Assessment & Plan:    Lab Results   Component Value Date    HGBA1C 6.0 (H) 06/16/2024     Lab Results   Component Value Date    HGBA1C 6.0 (H) 06/16/2024       Recent Labs     06/18/24  1626 06/18/24  2056 06/19/24  0710 06/19/24  1102   POCGLU 131 194* 120 115       Diet controlled as outpatient. Not on any medications for this chronically.  Outpatient f/u     Orders:  -     EXTERNAL Referral to Home Health; Future  13. Pacemaker  Assessment & Plan:  History of SSS  Orders:  -     EXTERNAL Referral to Home Health; Future  14. Laceration of left forearm, subsequent encounter  Assessment & Plan:  6/16/2024 in the ER  presents to the emergency department initially for concerns of a laceration to his left forearm. Patient states he fell last night. Patient states he tripped over his feet and fell landing on his bilateral knees and left forearm.   Comments: Approximate 2 cm skin tear to the left forearm no active bleeding noted.   15. Hypercholesteremia  -     simvastatin  "(ZOCOR) 10 mg tablet; Take 1 tablet (10 mg total) by mouth every evening  16. Glaucoma of right eye, unspecified glaucoma type  -     Combigan 0.2-0.5 %; Administer 1 drop to the right eye every 12 (twelve) hours  17. 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 Sick sinus syndrome (HCC)  20. Coagulopathy (HCC)  21. Sexually offensive behavior  Assessment & Plan:  6/20/2024  From the moment that this provider entered the room with the patient and his daughter, the patient spoke mostly about sexual innuendos  Patient is extremely preoccupied with sexual acts  The patient attempted to engage the provider in a sexual act conversation for which he was immediately turned down and told that it was not appropriate  Patient then laughed and stated that I (the provider) \"must be a prude\", and that \"one night with him would make me change my ways \".  The patient's daughter was apologetic the entire time he was speaking      Falls Plan of Care: Assessed feet and footwear. Home safety education provided.         History of Present Illness     Transitional Care Management Review:   To Dennison is a 84 y.o. male here for TCM follow up.     During the TCM phone call patient stated:  TCM Call       Date and time call was made  6/19/2024  4:14 PM    Hospital care reviewed  Records reviewed    Patient was hospitialized at  [facility]    Date of Admission  06/16/24    Date of discharge  06/19/24    Diagnosis  meningioma    Disposition  Home    Were the patients medications reviewed and updated  Yes    Current Symptoms  None          TCM Call       Post hospital issues  None    Should patient be enrolled in anticoag monitoring?  No    Scheduled for follow up?  Yes    Did you obtain your prescribed medications  Yes    Do you need help managing your prescriptions or medications  No    Is transportation to " "your appointment needed  No    I have advised the patient to call PCP with any new or worsening symptoms  Patti Henley MA          The patient is here today to discuss his Recent hospitalization.  Patient was preoccupied with engaging this provider with a conversation about sexual encounters.  He was also extremely anxious to have \"his stitches removed\".  Patient did not have any sutures in his left forearm, he was only wrapped with gauze secondary to a skin tear.  Please continue to the PORCH section of the note for details of today's visit.        Review of Systems   Constitutional:  Negative for activity change, chills, fatigue and fever.   HENT:  Negative for rhinorrhea and sore throat.    Eyes:  Negative for pain.   Respiratory:  Negative for cough and shortness of breath.    Cardiovascular:  Negative for chest pain, palpitations and leg swelling.   Gastrointestinal:  Negative for abdominal pain, constipation, diarrhea, nausea and vomiting.   Genitourinary:  Negative for difficulty urinating, flank pain, frequency and urgency.   Musculoskeletal:  Negative for gait problem, joint swelling and myalgias.   Skin:  Positive for wound. Negative for color change.   Neurological:  Negative for dizziness, weakness, light-headedness and headaches.   Psychiatric/Behavioral:  Negative for sleep disturbance. The patient is not nervous/anxious.    All other systems reviewed and are negative.    Objective     /68 (BP Location: Right arm, Patient Position: Sitting)   Pulse 59   Temp (!) 97.2 °F (36.2 °C) (Tympanic)   Ht 5' 7\" (1.702 m)   Wt 64.9 kg (143 lb)   SpO2 96%   BMI 22.40 kg/m²     Physical Exam  Vitals and nursing note reviewed.   Constitutional:       General: He is awake.      Appearance: Normal appearance. He is well-developed.   HENT:      Head: Normocephalic and atraumatic.      Nose: Nose normal.      Mouth/Throat:      Mouth: Mucous membranes are moist.   Eyes:      Conjunctiva/sclera: " Conjunctivae normal.   Cardiovascular:      Rate and Rhythm: Normal rate and regular rhythm.      Pulses: Normal pulses.      Heart sounds: Normal heart sounds. No murmur heard.  Pulmonary:      Effort: Pulmonary effort is normal. No respiratory distress.      Breath sounds: Normal breath sounds.   Abdominal:      General: Bowel sounds are normal.      Palpations: Abdomen is soft.      Tenderness: There is no abdominal tenderness.   Musculoskeletal:      Cervical back: Neck supple.      Right lower leg: No edema.      Left lower leg: No edema.   Skin:     General: Skin is warm and dry.          Neurological:      Mental Status: He is alert and oriented to person, place, and time.      Motor: Motor function is intact.      Coordination: Coordination is intact.      Gait: Gait is intact.   Psychiatric:         Attention and Perception: He is inattentive.         Mood and Affect: Mood normal.         Speech: Speech normal.         Behavior: Behavior is aggressive. Behavior is cooperative.         Thought Content: Thought content is delusional.         Cognition and Memory: Cognition is impaired. Memory is impaired.         Judgment: Judgment is inappropriate.      Comments: Patient was sexually preoccupied during the entire visit.       Medications have been reviewed by provider in current encounter    Administrative Statements   I have spent a total time of 40 minutes on 06/20/24 In caring for this patient including Diagnostic results, Prognosis, Risks and benefits of tx options, Instructions for management, Patient and family education, Importance of tx compliance, Risk factor reductions, Impressions, Counseling / Coordination of care, Documenting in the medical record, Reviewing / ordering tests, medicine, procedures  , and Obtaining or reviewing history  .

## 2024-06-20 NOTE — UTILIZATION REVIEW
NOTIFICATION OF ADMISSION DISCHARGE   This is a Notification of Discharge from [facility]. Please be advised that this patient has been discharge from our facility. Below you will find the admission and discharge date and time including the patient’s disposition.   UTILIZATION REVIEW CONTACT:  Tony Escalera  Utilization   Network Utilization Review Department  Phone: [phone] x carefully listen to the prompts. All voicemails are confidential.  Email: [email or web address]     ADMISSION INFORMATION  PRESENTATION DATE: 6/16/2024  5:38 PM  OBERVATION ADMISSION DATE:   INPATIENT ADMISSION DATE: 6/16/24  5:50 PM   DISCHARGE DATE: 6/19/2024  1:39 PM   DISPOSITION:Home/Self Care    Network Utilization Review Department  ATTENTION: Please call with any questions or concerns to [phone] and carefully listen to the prompts so that you are directed to the right person. All voicemails are confidential.   For Discharge needs, contact Care Management DC Support Team at [phone] opt. 2  Send all requests for admission clinical reviews, approved or denied determinations and any other requests to dedicated fax number below belonging to the campus where the patient is receiving treatment. List of dedicated fax numbers for the Facilities:  FACILITY NAME UR FAX NUMBER   ADMISSION DENIALS (Administrative/Medical Necessity) [phone]   DISCHARGE SUPPORT TEAM ([facility]) [phone]   PARENT CHILD HEALTH (Maternity/NICU/Pediatrics) [phone]   [facility] [phone]   [facility] [phone]   [facility] [phone]   [facility] [phone]   [facility] [phone]   [facility] [phone]   [facility] [phone]   [facility] [phone]   [facility]  [facility] [phone]   [facility] [phone]   [facility] [phone]   [facility] [phone]

## 2024-06-20 NOTE — PATIENT INSTRUCTIONS
Problem List Items Addressed This Visit          Cardiovascular and Mediastinum    Type 2 diabetes mellitus with circulatory disorder, without long-term current use of insulin (HCC) (Chronic)       Lab Results   Component Value Date    HGBA1C 6.0 (H) 06/16/2024     Lab Results   Component Value Date    HGBA1C 6.0 (H) 06/16/2024       Recent Labs     06/18/24  1626 06/18/24  2056 06/19/24  0710 06/19/24  1102   POCGLU 131 194* 120 115       Diet controlled as outpatient. Not on any medications for this chronically.  Outpatient f/u            Relevant Orders    EXTERNAL Referral to Home Health    HTN (hypertension) (Chronic)     BP has been labile.   Was not taking antihypertensives (lisinopril/norvasc) as prescribed.   Continue lisinopril, reduce dose.   Outpatient f/u with PCP           Relevant Medications    lisinopril (ZESTRIL) 10 mg tablet    Other Relevant Orders    EXTERNAL Referral to Home Health    Sick sinus syndrome (HCC)       Respiratory    Chronic obstructive pulmonary disease, unspecified COPD type (HCC) (Chronic)     Without exacerbation.  Former smoker          Relevant Orders    EXTERNAL Referral to Home Health       Digestive    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(HCC)       Nervous and Auditory    Meningioma (HCC) (Chronic)     Patient presented to [facility] for eval ration of left forearm laceration following a fall.  CT head read as epidural hematoma along the right frontal convexity with midline shift therefore was transferred to [place] under neurocritical care.  MRI showed right frontal extra-axial mass measuring up to 4 cm most consistent with meningioma which has been present on imaging since 2016.  Patient was transferred out to medical floor.  Evaluated by neurosurgery, meningioma is stable and asymptomatic.  Plan to follow-up with repeat MRI brain with and without in 1 year given stability since 2021.   PT/OT recommending rehab, however patient/family refused as below.    Additional plan as below          Relevant Orders    EXTERNAL Referral to Home Health    Encephalopathy (Chronic)     Patient seems somewhat improved today, currently alert and oriented x 3 but waxes and wanes, unsure how he arrived at the hospital.    Evaluated by geriatrics, upon review of outpatient medications, appears that patient was not taking medications as prescribed.  Physical and Occupational Therapy recommended rehab.  Patient and family refusing at this time.  Discussed that we are recommending 24/7 supervision at this time patient should not be driving.  [employer] form will be filled out by the geriatrics team.              Genitourinary    BPH (benign prostatic hyperplasia)     Has not been taking flomax /proscar as prescribed per geriatrics review of meds and discussion with outpatient pharmacy  Given labile pressures, risk of orthostasis and falls, will not resume flomax. Can continue proscar.   Has hx of urinary retention as noted in PCP notes, no urinary issues this admission.   F/u with PCP         Relevant Orders    EXTERNAL Referral to Home Health    Chronic kidney disease, stage 4 (severe) ([facility])     Lab Results   Component Value Date    EGFR 54 06/17/2024    EGFR 47 06/16/2024    EGFR 39 06/16/2024    CREATININE 1.19 06/17/2024    CREATININE 1.35 (H) 06/16/2024    CREATININE 1.57 (H) 06/16/2024     Lab Results   Component Value Date    EGFR 54 06/17/2024    EGFR 47 06/16/2024    EGFR 39 06/16/2024    CREATININE 1.19 06/17/2024    CREATININE 1.35 (H) 06/16/2024    CREATININE 1.57 (H) 06/16/2024   Baseline appears wide, 1.5-1.9. Currently below baseline.  Monitor outpatient         Relevant Orders    EXTERNAL Referral to Home Health       Behavioral Health    Alcohol abuse (Chronic)     Patient reports drinking daily, beer and mónica.   No clinical signs of withdrawal  Ongoing cessation advised             Surgery/Wound/Pain    Laceration of left forearm (Chronic)     6/16/2024 in the  ER  presents to the emergency department initially for concerns of a laceration to his left forearm. Patient states he fell last night. Patient states he tripped over his feet and fell landing on his bilateral knees and left forearm.   Comments: Approximate 2 cm skin tear to the left forearm no active bleeding noted.             Other    Mixed hyperlipidemia (Chronic)     Continue statin         Relevant Medications    simvastatin (ZOCOR) 10 mg tablet    Pacemaker (Chronic)     History of SSS         Relevant Orders    EXTERNAL Referral to Home Health    Fall (Chronic)     PT/OT recommending rehab, patient and family refused.          Relevant Orders    EXTERNAL Referral to Home Health    QT prolongation (Chronic)     Noted on EKG, 574.   Avoid QTC prolonging agents.          Hospital discharge follow-up - Primary     The patient was hospitalized from 6/16/2024 to 6/19/2024.  Hospital Course:   To Dennison is a 84 y.o. male patient who originally presented to the hospital on 6/16/2024 due to fall with an arm laceration.  Upon further traumatic imaging patient was noted to have a brain mass with possible midline shift and was transferred to [place] for neurosurgery evaluation.  MRI showed that this is most consistent with a meningioma which has been present on imaging since 2016.  Neurosurgery recommended follow-up with repeat MRI brain in 1 year given stability since 2021.  Patient is stable and asymptomatic from this standpoint.   Patient was noted to be confused, although this is improving, this remains a concern.  Was evaluated by the geriatric team.  He was recommended for rehab by physical and Occupational Therapy however patient and family are refusing.  At this point, we are recommending 24/7 supervision and that he not drive.  [employer] form was filled out and faxed. Family is aware of our recommendations, spoke with julio c luevano.    Significant Findings / Test Results:   CTH Isodense epidural  hematoma along the right frontal convexity measuring 2.1 x 1.3 x 1.1 cm, favored to be subacute. There is associated mild mass effect with approximately 6 mm leftward midline shift.   CXR negative  CTH No significant change from the study performed earlier today. Reidentified subacute or chronic right upper frontal subdural collection. No acute intracranial hemorrhage. Maximum of 2 to 3 mm of leftward midline shift secondary to the subacute or chronic subdural hematoma above. Some of the apparent midline shift seen on the prior study is secondary to tilting of the patient's head in the imaging gantry.   CT C/A/P No acute injury in the chest, abdomen or pelvis. Moderate bilateral hydronephrosis likely secondary to severe bladder distention.   Low-dose CT chest follow-up is recommended in 1 year for follow-up of ascending aortic ectasia measuring 42 mm.   CT C spine No cervical spine fracture or traumatic malalignment.   CTH with and without 2.4 cm enhancing extra-axial mass over the right frontal convexity, statistically most consistent with a meningioma.   MRI brain with and without Right frontal extra-axial mass measuring up to 4 cm most consistent with meningioma. Mild localized mass effect without edema. No acute intracranial pathology. Chronic microangiopathy.     Incidental Findings:   Low-dose CT chest follow-up is recommended in 1 year for follow-up of ascending aortic ectasia measuring 42 mm.    I reviewed the above mentioned incidental findings with the patient and/or family and they expressed understanding.          Other Visit Diagnoses       Hypercholesteremia        Relevant Medications    simvastatin (ZOCOR) 10 mg tablet    Glaucoma of right eye, unspecified glaucoma type        Relevant Medications    Combigan 0.2-0.5 %    Urinary retention        Relevant Medications    finasteride (PROSCAR) 5 mg tablet    Coagulopathy (HCC)

## 2024-06-20 NOTE — ASSESSMENT & PLAN NOTE
"Patient reports drinking daily, beer and mónica.   No clinical signs of withdrawal  Ongoing cessation advised     6/20/2024  Patient states that he enjoys drinking [employer]  He states that he usually drinks from the time he wakes up until he goes to bed  He states \"I am not an alcoholic\"  When discussing alcohol cessation, he states \" I do not have a drinking problem\"  "

## 2024-06-20 NOTE — ASSESSMENT & PLAN NOTE
Lab Results   Component Value Date    EGFR 54 06/17/2024    EGFR 47 06/16/2024    EGFR 39 06/16/2024    CREATININE 1.19 06/17/2024    CREATININE 1.35 (H) 06/16/2024    CREATININE 1.57 (H) 06/16/2024     Lab Results   Component Value Date    EGFR 54 06/17/2024    EGFR 47 06/16/2024    EGFR 39 06/16/2024    CREATININE 1.19 06/17/2024    CREATININE 1.35 (H) 06/16/2024    CREATININE 1.57 (H) 06/16/2024   Baseline appears wide, 1.5-1.9. Currently below baseline.  Monitor outpatient

## 2024-06-20 NOTE — ASSESSMENT & PLAN NOTE
The patient was hospitalized from 6/16/2024 to 6/19/2024.  Hospital Course:   To Dennison is a 84 y.o. male patient who originally presented to the hospital on 6/16/2024 due to fall with an arm laceration.  Upon further traumatic imaging patient was noted to have a brain mass with possible midline shift and was transferred to [place] for neurosurgery evaluation.  MRI showed that this is most consistent with a meningioma which has been present on imaging since 2016.  Neurosurgery recommended follow-up with repeat MRI brain in 1 year given stability since 2021.  Patient is stable and asymptomatic from this standpoint.   Patient was noted to be confused, although this is improving, this remains a concern.  Was evaluated by the geriatric team.  He was recommended for rehab by physical and Occupational Therapy however patient and family are refusing.  At this point, we are recommending 24/7 supervision and that he not drive.  [employer] form was filled out and faxed. Family is aware of our recommendations, spoke with julio c luevano.    Significant Findings / Test Results:   CTH Isodense epidural hematoma along the right frontal convexity measuring 2.1 x 1.3 x 1.1 cm, favored to be subacute. There is associated mild mass effect with approximately 6 mm leftward midline shift.   CXR negative  CTH No significant change from the study performed earlier today. Reidentified subacute or chronic right upper frontal subdural collection. No acute intracranial hemorrhage. Maximum of 2 to 3 mm of leftward midline shift secondary to the subacute or chronic subdural hematoma above. Some of the apparent midline shift seen on the prior study is secondary to tilting of the patient's head in the imaging gantry.   CT C/A/P No acute injury in the chest, abdomen or pelvis. Moderate bilateral hydronephrosis likely secondary to severe bladder distention.   Low-dose CT chest follow-up is recommended in 1 year for follow-up of ascending  aortic ectasia measuring 42 mm.   CT C spine No cervical spine fracture or traumatic malalignment.   CTH with and without 2.4 cm enhancing extra-axial mass over the right frontal convexity, statistically most consistent with a meningioma.   MRI brain with and without Right frontal extra-axial mass measuring up to 4 cm most consistent with meningioma. Mild localized mass effect without edema. No acute intracranial pathology. Chronic microangiopathy.     Incidental Findings:   Low-dose CT chest follow-up is recommended in 1 year for follow-up of ascending aortic ectasia measuring 42 mm.    I reviewed the above mentioned incidental findings with the patient and/or family and they expressed understanding.

## 2024-06-20 NOTE — ASSESSMENT & PLAN NOTE
Lab Results   Component Value Date    HGBA1C 6.0 (H) 06/16/2024     Lab Results   Component Value Date    HGBA1C 6.0 (H) 06/16/2024       Recent Labs     06/18/24  1626 06/18/24 2056 06/19/24  0710 06/19/24  1102   POCGLU 131 194* 120 115       Diet controlled as outpatient. Not on any medications for this chronically.  Outpatient f/u

## 2024-06-21 ENCOUNTER — PATIENT OUTREACH (OUTPATIENT)
Dept: INTERNAL MEDICINE CLINIC | Facility: CLINIC | Age: 84
End: 2024-06-21

## 2024-06-21 LAB — VIT B1 BLD-SCNC: 212.6 NMOL/L (ref 66.5–200)

## 2024-06-21 NOTE — TELEPHONE ENCOUNTER
06/21/2024- PT WAS DISCHARGED HOME. CALLED AND SPOKE TO PT DAUGHTER GUS CONFIRMING APT. I LET HER KNOW THAT I WILL F/U 2 MONTHS PRIOR TO THE APT TO MAKE SURE MRI BRAIN GETS SCHEDULED.   **WAITING FOR MRI BRAIN TO BE SCHEDULED**  06/17/2025- 1 YR F/U W/ MRI brain W/ AP solo     06/19/2024- PT STILL IN HOSPITAL    06/17/2024- PT IS IN [facility]  06/17/2025- 1 YR F/U W/ MRI brain W/ AP solo     JIGNA [facility]  Please schedule 1 year follow up with MRI brain w/wo, AP solo. Thanks!

## 2024-06-21 NOTE — PROGRESS NOTES
Outpatient Care Management Note    HRR referral. Chart reviewed. Patient hospitalized 6/16/24-6/19/24 at [facility] for Meningioma.  Originally presented due to fall with arm laceration.    OP RN CM placed outreach call to preferred number listed in chart.  No answer- message states wireless customer is not available at this time, try call again later.  No message left.    RN CM will schedule second outreach attempt next week.

## 2024-06-22 ENCOUNTER — HOME CARE VISIT (OUTPATIENT)
Dept: HOME HEALTH SERVICES | Facility: HOME HEALTHCARE | Age: 84
End: 2024-06-22

## 2024-06-22 NOTE — CASE COMMUNICATION
"SN spoke with JORDAN Parson and patient for a SOC visit for 6/22. SN arrived at patient's home and pt was alone. SN reviewed MDs order and patient stated  \" what are you guys going to do for me, upset me? He then stated that he doesnt want to respond to any more questions. He also stated that he is not homebound as he is driving around and was driving to the mall yesterday. SN then educated pt with MDs recommendations of not driving anymore  for patients safety and others. Pt was very upset and inform SN that he is 84 y/o and he will do what he wants to do.     Pt lives alone and inform SN that he is being seen  by his family daily and that he feels great and was sleeping better. He stated that his DIL calls him and manage his meds.  He also admits that he still drinks.     SN clarified to patient if he is refusing ALL home health services. He then stated that he is only lo oking for \"good looking woman and that is it\".  Patient then inform this SN to leave.     SN called DIL while at pts parking lot to inform that pt refuses ALL home health services and reported above. Per JORDAN, they dont come daily but she calls pts 2x a day and that she prepares a week worth of food. SN reviewed MDs recommendation for a 24/7 supervision and pts not to drive anymore.  also made DIL aware about importance of F/U with MD  PRN for any medical concerns.  She verbalizes understanding.     SN will not be admitted to home health services.     "

## 2024-06-24 ENCOUNTER — IN-CLINIC DEVICE VISIT (OUTPATIENT)
Dept: CARDIOLOGY CLINIC | Facility: CLINIC | Age: 84
End: 2024-06-24
Payer: COMMERCIAL

## 2024-06-24 ENCOUNTER — PATIENT OUTREACH (OUTPATIENT)
Dept: INTERNAL MEDICINE CLINIC | Facility: CLINIC | Age: 84
End: 2024-06-24

## 2024-06-24 ENCOUNTER — OFFICE VISIT (OUTPATIENT)
Dept: CARDIOLOGY CLINIC | Facility: CLINIC | Age: 84
End: 2024-06-24
Payer: COMMERCIAL

## 2024-06-24 VITALS
HEIGHT: 64 IN | HEART RATE: 80 BPM | BODY MASS INDEX: 23.9 KG/M2 | WEIGHT: 140 LBS | RESPIRATION RATE: 18 BRPM | OXYGEN SATURATION: 97 % | DIASTOLIC BLOOD PRESSURE: 70 MMHG | SYSTOLIC BLOOD PRESSURE: 130 MMHG

## 2024-06-24 DIAGNOSIS — Z45.010 ENCOUNTER FOR CHECKING AND TESTING OF CARDIAC PACEMAKER PULSE GENERATOR (BATTERY): ICD-10-CM

## 2024-06-24 DIAGNOSIS — I49.5 SICK SINUS SYNDROME (HCC): Primary | ICD-10-CM

## 2024-06-24 DIAGNOSIS — R00.1 BRADYCARDIA: ICD-10-CM

## 2024-06-24 DIAGNOSIS — Z95.0 PACEMAKER: Chronic | ICD-10-CM

## 2024-06-24 DIAGNOSIS — I49.5 SICK SINUS SYNDROME (HCC): ICD-10-CM

## 2024-06-24 DIAGNOSIS — I25.10 CORONARY ARTERY DISEASE INVOLVING NATIVE CORONARY ARTERY OF NATIVE HEART WITHOUT ANGINA PECTORIS: Primary | ICD-10-CM

## 2024-06-24 PROCEDURE — 99214 OFFICE O/P EST MOD 30 MIN: CPT | Performed by: INTERNAL MEDICINE

## 2024-06-24 PROCEDURE — 93279 PRGRMG DEV EVAL PM/LDLS PM: CPT | Performed by: INTERNAL MEDICINE

## 2024-06-24 NOTE — PROGRESS NOTES
Patient ID: To Dennison is a 84 y.o. male.        Plan:      Coronary artery disease involving native coronary artery of native heart without angina pectoris  Presumed present based on myoview but no current symptoms.    Pacemaker  Continue device surveillance.    Bradycardia  Pacer surveillance continues.    Sick sinus syndrome (HCC)  Pacer surveillance continues.       Follow up Plan/Other summary comments:  Return in about 1 year (around 6/24/2025).    HPI:   Patient seen in f/u regarding the above issues.  No CP. No dyspnea.  Lives alone.  No syncope or near syncope.      Patient had DDD MDT placed 11/6/2009.  Medtronic change out 3/24/2023:          Most recent or relevant cardiac/vascular testing:    [employer] DDD: 11/6/2009.  Echo 4/25/2019: Normal LV function. Mild hypokinesia of the  apical inferior wall.  Myoview: 7/10/2018: Normal LV function. Small zone of inferoapical ischemia.      Past Surgical History:   Procedure Laterality Date    CARDIAC PACEMAKER PLACEMENT  11/2009    Medtronic dual chamber     CARDIAC PACEMAKER PLACEMENT Left 3/24/2023    Procedure: DUAL LEAD PACEMAKER BATTERY REMOVAL AND REPLACE.;  Surgeon: To Neff MD;  Location: [facility];  Service: Cardiology    CATARACT EXTRACTION W/  INTRAOCULAR LENS IMPLANT      CYSTOSCOPY N/A 05/09/2016    Procedure: CYSTOSCOPY, evacuation of bladder calculi;  Surgeon: Haim Melendez MD;  Location: [facility];  Service:     INGUINAL HERNIA REPAIR      UNILATERAL    KIDNEY STONE SURGERY      MOHS SURGERY Right 08/31/2022    removal of right ear BCCA with free graft nd reconstruction - Dr. Aguilar and Dr. Newman    OTHER SURGICAL HISTORY      HERNIA REPAIR AS PER [facility] (ALREADY IN PERTINENT NEGATIVES)    PAROTIDECTOMY Right 10/01/2021    Procedure: TOTAL PAROTIDECTOMY WITH NIMS; MODIFIED RADICAL NECK DISSECTION;  Surgeon: Karsten Newman DO;  Location: [facility];  Service: ENT    WA TRURL ELECTROSURG RESCJ PROSTATE BLEED COMPLETE  "N/A 05/09/2016    Procedure: TRANSURETHRAL RESECTION OF PROSTATE (TURP);  Surgeon: Haim Melendez MD;  Location: [facility];  Service: Urology    TONSILLECTOMY         Lipid Profile: Reviewed      Review of Systems   10  point ROS  was otherwise non pertinent or negative except as per HPI or as below.   Gait: Uses a cane.        Objective:     /70 (BP Location: Left arm, Patient Position: Sitting, Cuff Size: Standard)   Pulse 80   Resp 18   Ht 5' 4\" (1.626 m)   Wt 63.5 kg (140 lb)   SpO2 97%   BMI 24.03 kg/m²     PHYSICAL EXAM:    General:  Normal appearance in no distress.  Eyes:  Anicteric.  Oral mucosa:  Moist.  Neck:  No JVD. Carotid upstrokes are brisk without bruits.  No masses.  Chest:  Clear to auscultation.  Pacer left subclavian well healed.  Cardiac:  No palpable PMI.  Normal S1 and S2.  No murmur gallop or rub.  Abdomen:  Soft and nontender. No palpable organomegaly or aortic enlargement.  Extremities:  No peripheral edema.  Musculoskeletal:  Symmetric.   Vascular:  Femoral pulses are brisk without bruits.  Popliteal pulses are intact bilaterally.   Pedal pulses are intact.  Neuro:  Grossly symmetric.  Psych:  Alert and oriented x3.      Meds reviewed.    Past Medical History:   Diagnosis Date    Ambulates with cane     unsure \"why\" falls at times     last fall 09/27/2021    Anxiety state 08/05/2018    Basal cell carcinoma of skin 11/15/2016    Cancer ([facility])     BASAL CA SKIN    Chronic kidney disease     ACUTE KIDNEY INJURY 3/21    Constipation     COPD (chronic obstructive pulmonary disease) ([facility])     Coronary artery disease 05/22/2012    Diabetes mellitus ([facility])     TYPE 2    Forearm laceration, right, initial encounter 05/28/2020    Hydronephrosis     LAST ASSESSED: 11/18/15    Hypertension     Mass of right side of neck     radical neck today 10/1/2021    Osteoporosis 05/22/2012    Pacemaker     SSS (sick sinus syndrome) ([facility])     s/p generator changeout 3/24/2023    Trifascicular block "            Social History     Tobacco Use   Smoking Status Former    Current packs/day: 0.00    Average packs/day: 1.5 packs/day for 32.3 years (48.5 ttl pk-yrs)    Types: Cigars, Cigarettes    Start date:     Quit date: 1980    Years since quittin.1    Passive exposure: Never   Smokeless Tobacco Former

## 2024-06-24 NOTE — PROGRESS NOTES
Outpatient Care Management Note    IB reminder: HRR referral- second outreach attempt needed. Chart reviewed.    Patient hospitalized 6/16/24-6/19/24 at [facility] for meningioma.  Originally presented due to fall with arm laceration.    OP RN CM made second outreach attempt to preferred number listed in chart. No answer- message states wireless customer is not available. No message left.    Unable to reach letter sent via [employer].    RN CM will monitor for patient response.  Will remove self from care team and close referral if no response by 7/8/24.

## 2024-06-24 NOTE — LETTER
Date: 06/24/24    Dear To Dennison,   My name is Holly Cruz; I am a registered nurse care manager working with [address] [address].     I have not been able to reach you and would like to set a time that I can talk with you over the phone.  My work is to help patients that have complex medical conditions get the care they need. This includes patients who may have been in the hospital or emergency room.    Please call me with any questions you may have. I look forward to speaking with you.  Sincerely,  Holly Cruz  [phone]  Outpatient Care Manager

## 2024-06-27 NOTE — PROGRESS NOTES
Device check in person pacer.  Normal device function.      Current Outpatient Medications:   •  calcium carbonate (TUMS) 500 mg chewable tablet, Chew 1 tablet as needed for indigestion or heartburn., Disp: , Rfl:   •  Combigan 0.2-0.5 %, Administer 1 drop to the right eye every 12 (twelve) hours, Disp: 45 mL, Rfl: 1  •  finasteride (PROSCAR) 5 mg tablet, Take 1 tablet (5 mg total) by mouth daily, Disp: 90 tablet, Rfl: 1  •  glucose blood (ONE TOUCH ULTRA TEST) test strip, Test sugar once daily, Disp: 50 each, Rfl: 5  •  lisinopril (ZESTRIL) 10 mg tablet, Take 1 tablet (10 mg total) by mouth daily, Disp: 30 tablet, Rfl: 3  •  Multiple Vitamin (MULTIVITAMIN ADULT PO), Take 1 tablet by mouth in the morning, Disp: , Rfl:   •  Nutritional Supplements (Boost) LIQD, Take 237 mL by mouth daily, Disp: 7110 mL, Rfl: 1  •  polyethylene glycol (MIRALAX) 17 g packet, Take 17 g by mouth daily, Disp: 510 g, Rfl: 5  •  simvastatin (ZOCOR) 10 mg tablet, Take 1 tablet (10 mg total) by mouth every evening, Disp: 90 tablet, Rfl: 3

## 2024-07-08 ENCOUNTER — PATIENT OUTREACH (OUTPATIENT)
Dept: INTERNAL MEDICINE CLINIC | Facility: CLINIC | Age: 84
End: 2024-07-08

## 2024-07-08 NOTE — PROGRESS NOTES
Outpatient Care Management Note    IB reminder: UTR letter follow up-no patient response.    RN DARIUSZ made two previous outreach call attempts and have been unable to reach patient. Message played states wireless customer is unavailable and to try call again later.    Unable to reach letter sent via [employer] on 6/24/24 with no patient response received.    RN CM will remove self from care team and close referral at this time. No further outreach scheduled.

## 2024-07-10 ENCOUNTER — TELEPHONE (OUTPATIENT)
Age: 84
End: 2024-07-10

## 2024-07-10 NOTE — TELEPHONE ENCOUNTER
Daughter in law of pt called in to schedule appt for pt.     Scheduled for: 07/12 @ 14PM w/ Dr. Garcia    She stated that pt needs competency form to be filled out by PCP.     She then stated that his son Alejandro will bring pt to appt on 07/12 and will bring in FMLA paperwork that would also need to be filled out by Dr. Garcia, so that Alejandro is able to take care of pt without being penalized at work.

## 2024-07-12 ENCOUNTER — OFFICE VISIT (OUTPATIENT)
Dept: INTERNAL MEDICINE CLINIC | Facility: CLINIC | Age: 84
End: 2024-07-12
Payer: COMMERCIAL

## 2024-07-12 VITALS
TEMPERATURE: 98.1 F | BODY MASS INDEX: 23.97 KG/M2 | HEIGHT: 64 IN | DIASTOLIC BLOOD PRESSURE: 90 MMHG | OXYGEN SATURATION: 96 % | HEART RATE: 76 BPM | WEIGHT: 140.38 LBS | SYSTOLIC BLOOD PRESSURE: 170 MMHG

## 2024-07-12 DIAGNOSIS — E11.59 TYPE 2 DIABETES MELLITUS WITH OTHER CIRCULATORY COMPLICATION, WITHOUT LONG-TERM CURRENT USE OF INSULIN (HCC): Chronic | ICD-10-CM

## 2024-07-12 DIAGNOSIS — I10 PRIMARY HYPERTENSION: Chronic | ICD-10-CM

## 2024-07-12 DIAGNOSIS — Z23 ENCOUNTER FOR IMMUNIZATION: Primary | ICD-10-CM

## 2024-07-12 DIAGNOSIS — G93.40 ENCEPHALOPATHY: ICD-10-CM

## 2024-07-12 PROCEDURE — G2211 COMPLEX E/M VISIT ADD ON: HCPCS | Performed by: INTERNAL MEDICINE

## 2024-07-12 PROCEDURE — 99214 OFFICE O/P EST MOD 30 MIN: CPT | Performed by: INTERNAL MEDICINE

## 2024-07-12 NOTE — PROGRESS NOTES
"Ambulatory Visit  Name: To Dennison      : 1940      MRN: [id number]  Encounter Provider: Cachorro Garcia DO  Encounter Date: 2024   Encounter department: [facility]    Assessment & Plan   1. Encounter for immunization  2. Encephalopathy  Comments:  Resolved  Issues with serial & and world backward and we will follow up for MoCA  3. Primary hypertension  Comments:  Elevated BP and we will follow up on this next visit no change a tthis time  4. Type 2 diabetes mellitus with other circulatory complication, without long-term current use of insulin (HCC)  Comments:  A1c stable and no change at this time       History of Present Illness     The patient BP is noted to be elevated.  License was removed by physician at [place].  The patient had fallen at home and driven himself to the ER.  Meningioma was noted on the CT and the patient was sent to [place] secondary to concern this might be a Subdural Hematoma.  He was subsequently released 3 days later.  He is now back to his baseline.          Review of Systems   Constitutional:  Negative for chills and fever.   HENT:  Negative for ear pain and sore throat.    Eyes:  Negative for pain and visual disturbance.   Respiratory:  Negative for cough and shortness of breath.    Cardiovascular:  Negative for chest pain and palpitations.   Gastrointestinal:  Negative for abdominal pain and vomiting.   Genitourinary:  Negative for dysuria and hematuria.   Musculoskeletal:  Negative for arthralgias and back pain.   Skin:  Negative for color change and rash.   Neurological:  Negative for seizures and syncope.   All other systems reviewed and are negative.      Objective     /90 (BP Location: Left arm, Patient Position: Sitting)   Pulse 76   Temp 98.1 °F (36.7 °C) (Tympanic)   Ht 5' 4\" (1.626 m)   Wt 63.7 kg (140 lb 6 oz)   SpO2 96%   BMI 24.10 kg/m²     Physical Exam  Vitals and nursing note reviewed.   Constitutional:       General: " He is not in acute distress.     Appearance: He is well-developed.   HENT:      Head: Normocephalic and atraumatic.   Eyes:      Conjunctiva/sclera: Conjunctivae normal.   Cardiovascular:      Rate and Rhythm: Normal rate and regular rhythm.      Heart sounds: No murmur heard.  Pulmonary:      Effort: Pulmonary effort is normal. No respiratory distress.      Breath sounds: Normal breath sounds.   Abdominal:      Palpations: Abdomen is soft.      Tenderness: There is no abdominal tenderness.   Musculoskeletal:         General: No swelling.      Cervical back: Neck supple.   Skin:     General: Skin is warm and dry.      Capillary Refill: Capillary refill takes less than 2 seconds.   Neurological:      Mental Status: He is alert.   Psychiatric:         Mood and Affect: Mood normal.       Administrative Statements

## 2024-08-03 DIAGNOSIS — E78.00 HYPERCHOLESTEREMIA: ICD-10-CM

## 2024-08-05 RX ORDER — SIMVASTATIN 10 MG
10 TABLET ORAL EVERY EVENING
Qty: 90 TABLET | Refills: 1 | Status: SHIPPED | OUTPATIENT
Start: 2024-08-05

## 2024-08-09 ENCOUNTER — OFFICE VISIT (OUTPATIENT)
Dept: INTERNAL MEDICINE CLINIC | Facility: CLINIC | Age: 84
End: 2024-08-09
Payer: COMMERCIAL

## 2024-08-09 VITALS
WEIGHT: 139 LBS | BODY MASS INDEX: 23.73 KG/M2 | SYSTOLIC BLOOD PRESSURE: 152 MMHG | OXYGEN SATURATION: 94 % | TEMPERATURE: 97.7 F | DIASTOLIC BLOOD PRESSURE: 90 MMHG | HEART RATE: 73 BPM | HEIGHT: 64 IN

## 2024-08-09 DIAGNOSIS — Z23 ENCOUNTER FOR IMMUNIZATION: Primary | ICD-10-CM

## 2024-08-09 DIAGNOSIS — D32.9 MENINGIOMA (HCC): Chronic | ICD-10-CM

## 2024-08-09 DIAGNOSIS — H40.1114 PRIMARY OPEN ANGLE GLAUCOMA (POAG) OF RIGHT EYE, INDETERMINATE STAGE: ICD-10-CM

## 2024-08-09 DIAGNOSIS — E11.51 TYPE 2 DIABETES MELLITUS WITH DIABETIC PERIPHERAL ANGIOPATHY WITHOUT GANGRENE, UNSPECIFIED WHETHER LONG TERM INSULIN USE (HCC): ICD-10-CM

## 2024-08-09 DIAGNOSIS — H40.1110 PRIMARY OPEN ANGLE GLAUCOMA OF RIGHT EYE, UNSPECIFIED GLAUCOMA STAGE: ICD-10-CM

## 2024-08-09 DIAGNOSIS — H69.91 DYSFUNCTION OF RIGHT EUSTACHIAN TUBE: ICD-10-CM

## 2024-08-09 PROBLEM — D69.6 PLATELETS DECREASED (HCC): Status: RESOLVED | Noted: 2021-03-31 | Resolved: 2024-08-09

## 2024-08-09 PROCEDURE — G2211 COMPLEX E/M VISIT ADD ON: HCPCS | Performed by: INTERNAL MEDICINE

## 2024-08-09 PROCEDURE — 99214 OFFICE O/P EST MOD 30 MIN: CPT | Performed by: INTERNAL MEDICINE

## 2024-08-09 RX ORDER — BRIMONIDINE TARTRATE AND TIMOLOL MALEATE 2; 5 MG/ML; MG/ML
1 SOLUTION OPHTHALMIC EVERY 12 HOURS SCHEDULED
Qty: 10 ML | Refills: 5 | Status: SHIPPED | OUTPATIENT
Start: 2024-08-09 | End: 2025-02-05

## 2024-08-09 NOTE — PROGRESS NOTES
"Ambulatory Visit  Name: To Dennison      : 1940      MRN: [id number]  Encounter Provider: Cachorro Garcia DO  Encounter Date: 2024   Encounter department: [facility]    Assessment & Plan   1. Encounter for immunization  -     Pneumococcal Conjugate Vaccine 20-valent (Pcv20)  2. Type 2 diabetes mellitus with diabetic peripheral angiopathy without gangrene, unspecified whether long term insulin use (HCC)  Assessment & Plan:    Lab Results   Component Value Date    HGBA1C 6.0 (H) 2024   Controlled with lifestyle  3. Primary open angle glaucoma of right eye, unspecified glaucoma stage  -     brimonidine-timolol (COMBIGAN) 0.2-0.5 %; Administer 1 drop to the right eye every 12 (twelve) hours  4. Dysfunction of right eustachian tube  Comments:  Discussed anihistamine  Sx of right ear pain noted on rooming secondar to ET dysfunction  5. Meningioma (HCC)  Assessment & Plan:  Follow up with MRI of the brain (scheduled)  Increase 2 x 1 cm to 4 x 1.7 cm over 8 years  6. Primary open angle glaucoma (POAG) of right eye, indeterminate stage  Assessment & Plan:  The patient was restarted on Combigen         History of Present Illness     The patient notes that his right ear is blocked in the right ear.        Review of Systems   Constitutional:  Negative for chills, fatigue and fever.   HENT: Negative.     Respiratory:  Negative for cough, chest tightness and shortness of breath.    Cardiovascular:  Negative for chest pain and palpitations.   Gastrointestinal:  Negative for abdominal pain, constipation, diarrhea, nausea and vomiting.   Genitourinary: Negative.    Musculoskeletal:  Negative for back pain and myalgias.   Skin: Negative.    Neurological: Negative.    Psychiatric/Behavioral:  Negative for dysphoric mood. The patient is not nervous/anxious.        Objective     /90   Pulse 73   Temp 97.7 °F (36.5 °C)   Ht 5' 4\" (1.626 m)   Wt 63 kg (139 lb)   SpO2 94%   BMI 23.86 " kg/m²     Physical Exam  Vitals and nursing note reviewed.   Constitutional:       General: He is not in acute distress.     Appearance: He is well-developed.   HENT:      Head: Normocephalic and atraumatic.   Eyes:      Conjunctiva/sclera: Conjunctivae normal.   Cardiovascular:      Rate and Rhythm: Normal rate and regular rhythm.      Heart sounds: No murmur heard.  Pulmonary:      Effort: Pulmonary effort is normal. No respiratory distress.      Breath sounds: Normal breath sounds.   Abdominal:      Palpations: Abdomen is soft.      Tenderness: There is no abdominal tenderness.   Musculoskeletal:         General: No swelling.      Cervical back: Neck supple.   Skin:     General: Skin is warm and dry.      Capillary Refill: Capillary refill takes less than 2 seconds.   Neurological:      Mental Status: He is alert.   Psychiatric:         Mood and Affect: Mood normal.       Administrative Statements

## 2024-08-20 NOTE — TELEPHONE ENCOUNTER
Pt needs finasteride (PROSCAR) 5 mg tablet, cyclobenzaprine (FLEXERIL) 10 mg tablet,   amLODIPine (NORVASC) 10 mg tablet refilled, send to [facility] in [facility] pass  Rx removed    Message sent to pt    Yes - the patient is able to be screened

## 2024-09-06 ENCOUNTER — HOSPITAL ENCOUNTER (EMERGENCY)
Facility: HOSPITAL | Age: 84
Discharge: HOME | End: 2024-09-06
Attending: EMERGENCY MEDICINE
Payer: COMMERCIAL

## 2024-09-06 VITALS
RESPIRATION RATE: 16 BRPM | DIASTOLIC BLOOD PRESSURE: 91 MMHG | SYSTOLIC BLOOD PRESSURE: 139 MMHG | OXYGEN SATURATION: 96 % | HEART RATE: 83 BPM | TEMPERATURE: 97 F

## 2024-09-06 DIAGNOSIS — S01.309A: ICD-10-CM

## 2024-09-06 DIAGNOSIS — C44.90 SKIN CANCER: Primary | ICD-10-CM

## 2024-09-06 PROCEDURE — 99283 EMERGENCY DEPT VISIT LOW MDM: CPT | Performed by: EMERGENCY MEDICINE

## 2024-09-06 PROCEDURE — 99283 EMERGENCY DEPT VISIT LOW MDM: CPT

## 2024-09-06 NOTE — ED PROVIDER NOTES
"History  Chief Complaint   Patient presents with    Ear Problem     83 yo male presenting to the ed for reports of R external ear wound and bleeding that he's noticed for a around 1 month that he states isnt healing. States it seems to be bleeding less than before but that it still will bleed at times. Denies any pain, change in hearing or any other symptoms than this wound which intermittently bleeds. Denies any obvious trauma to the area that he can remember.        Prior to Admission Medications   Prescriptions Last Dose Informant Patient Reported? Taking?   Combigan 0.2-0.5 %   No No   Sig: Administer 1 drop to the right eye every 12 (twelve) hours   Multiple Vitamin (MULTIVITAMIN ADULT PO)   Yes No   Sig: Take 1 tablet by mouth in the morning   Nutritional Supplements (Boost) LIQD  Self No No   Sig: Take 237 mL by mouth daily   brimonidine-timolol (COMBIGAN) 0.2-0.5 %   No No   Sig: Administer 1 drop to the right eye every 12 (twelve) hours   calcium carbonate (TUMS) 500 mg chewable tablet  Self Yes No   Sig: Chew 1 tablet as needed for indigestion or heartburn.   finasteride (PROSCAR) 5 mg tablet   No No   Sig: Take 1 tablet (5 mg total) by mouth daily   glucose blood (ONE TOUCH ULTRA TEST) test strip  Self No No   Sig: Test sugar once daily   lisinopril (ZESTRIL) 10 mg tablet   No No   Sig: Take 1 tablet (10 mg total) by mouth daily   polyethylene glycol (MIRALAX) 17 g packet   No No   Sig: Take 17 g by mouth daily   simvastatin (ZOCOR) 10 mg tablet   No No   Sig: TAKE 1 TABLET BY MOUTH ONCE DAILY IN THE EVENING      Facility-Administered Medications: None       Past Medical History:   Diagnosis Date    Ambulates with cane     unsure \"why\" falls at times     last fall 09/27/2021    Anxiety state 08/05/2018    Basal cell carcinoma of skin 11/15/2016    Cancer (HCC)     BASAL CA SKIN    Chronic kidney disease     ACUTE KIDNEY INJURY 3/21    Constipation     COPD (chronic obstructive pulmonary disease) ([facility])     " Coronary artery disease 05/22/2012    Diabetes mellitus (HCC)     TYPE 2    Forearm laceration, right, initial encounter 05/28/2020    Hydronephrosis     LAST ASSESSED: 11/18/15    Hypertension     Mass of right side of neck     radical neck today 10/1/2021    Osteoporosis 05/22/2012    Pacemaker     SSS (sick sinus syndrome) (HCC)     s/p generator changeout 3/24/2023    Trifascicular block        Past Surgical History:   Procedure Laterality Date    CARDIAC PACEMAKER PLACEMENT  11/2009    Medtronic dual chamber     CARDIAC PACEMAKER PLACEMENT Left 3/24/2023    Procedure: DUAL LEAD PACEMAKER BATTERY REMOVAL AND REPLACE.;  Surgeon: To Neff MD;  Location: [facility];  Service: Cardiology    CATARACT EXTRACTION W/  INTRAOCULAR LENS IMPLANT      CYSTOSCOPY N/A 05/09/2016    Procedure: CYSTOSCOPY, evacuation of bladder calculi;  Surgeon: Haim Melendez MD;  Location: [facility];  Service:     INGUINAL HERNIA REPAIR      UNILATERAL    KIDNEY STONE SURGERY      MOHS SURGERY Right 08/31/2022    removal of right ear BCCA with free graft nd reconstruction - Dr. Aguilar and Dr. Newman    OTHER SURGICAL HISTORY      HERNIA REPAIR AS PER [facility] (ALREADY IN PERTINENT NEGATIVES)    PAROTIDECTOMY Right 10/01/2021    Procedure: TOTAL PAROTIDECTOMY WITH NIMS; MODIFIED RADICAL NECK DISSECTION;  Surgeon: Karsten Newman DO;  Location: [facility];  Service: ENT    NY TRURL ELECTROSURG RESCJ PROSTATE BLEED COMPLETE N/A 05/09/2016    Procedure: TRANSURETHRAL RESECTION OF PROSTATE (TURP);  Surgeon: Haim Melendez MD;  Location: [facility];  Service: Urology    TONSILLECTOMY         Family History   Problem Relation Age of Onset    Stroke Father         SYNDROME    Coronary artery disease Family         less than 60 years of age     I have reviewed and agree with the history as documented.    E-Cigarette/Vaping    E-Cigarette Use Never User      E-Cigarette/Vaping Substances    Nicotine No     THC No     CBD No      Flavoring No     Other No     Unknown No      Social History     Tobacco Use    Smoking status: Former     Current packs/day: 0.00     Average packs/day: 1.5 packs/day for 32.3 years (48.5 ttl pk-yrs)     Types: Cigars, Cigarettes     Start date:      Quit date: 1980     Years since quittin.3     Passive exposure: Never    Smokeless tobacco: Former   Vaping Use    Vaping status: Never Used   Substance Use Topics    Alcohol use: Not Currently     Alcohol/week: 2.0 standard drinks of alcohol     Types: 2 Cans of beer per week     Comment: 2  beers daily and shot by hx/No ETOH x 3wks.     Drug use: No       Review of Systems   Skin:  Positive for wound (R ear).   All other systems reviewed and are negative.      Physical Exam  Physical Exam  HENT:      Right Ear: Hearing, tympanic membrane and ear canal normal. No decreased hearing noted. No laceration, drainage, swelling or tenderness. No middle ear effusion. There is no impacted cerumen. No foreign body. No mastoid tenderness. No hemotympanum. Tympanic membrane is not injected, scarred, perforated, erythematous, retracted or bulging.      Left Ear: Hearing, ear canal and external ear normal. No decreased hearing noted. No laceration, drainage, swelling or tenderness. No mastoid tenderness.      Ears:        Comments: Open appearing wound       General: VS reviewed  Appears in NAD  awake, alert.   Well-nourished, well-developed. Appears stated age.   Speaking normally in full sentences.   Head: Normocephalic, atraumatic  Eyes: EOM-I. No diplopia.   No hyphema.   No subconjunctival hemorrhages.  Symmetrical lids.   ENT: Atraumatic external nose and ears.    MMM  No malocclusion. No stridor. Normal phonation. No drooling. Normal swallowing.   Neck: No JVD.  CV: No pallor noted  Lungs:   No tachypnea  No respiratory distress  MSK:   FROM spontaneously  Neuro: Awake, alert, GCS15, CN II-XII grossly intact.   Motor grossly intact.  Psychiatric/Behavioral:  Appropriate mood and affect   Exam: deferred          Vital Signs  ED Triage Vitals [09/06/24 0734]   Temperature Pulse Respirations Blood Pressure SpO2   (!) 97 °F (36.1 °C) 83 16 139/91 96 %      Temp Source Heart Rate Source Patient Position - Orthostatic VS BP Location FiO2 (%)   Oral Monitor Sitting Left arm --      Pain Score       --           Vitals:    09/06/24 0734   BP: 139/91   Pulse: 83   Patient Position - Orthostatic VS: Sitting         Visual Acuity      ED Medications  Medications - No data to display    Diagnostic Studies  Results Reviewed       None                   No orders to display              Procedures  Procedures         ED Course                                               Medical Decision Making  84-year-old male presenting for approximate 1 month of nonhealing and intermittently bleeding wound to the right ear.  No trauma noted.  Wound hemostatic on examination.  Eardrum easily visible on the right side and no pain with external auricular motion.  No TM tenderness or erythema or purulent discharge.  Concern for likely skin cancer.  Wound cleaned and covered.  Patient not willing to allow us to wrap with gauze and protective nonstick dressing demanding we only use a Band-Aid.  Band-Aid placed.  Strict return precautions discussed and provided in stated to patient that he needs to follow-up with ENT as he needs further evaluation for this.                  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                   Follow-up Information       Follow up With Specialties Details Why Contact Info Additional Information    Cachorro Garcia, DO Internal Medicine   [address] [facility] [address]  [phone]       [facility] Call   [address] [facility] [facility]  [address]  [facility] [address] [phone] [facility], [address] [place] [address] [address], [place] [address]            Discharge Medication List as of 9/6/2024  8:11 AM        CONTINUE these medications which have NOT CHANGED    Details   !! brimonidine-timolol (COMBIGAN) 0.2-0.5 % Administer 1 drop to the right eye every 12 (twelve) hours, Starting Fri 8/9/2024, Until Wed 2/5/2025, Normal      calcium carbonate (TUMS) 500 mg chewable tablet Chew 1 tablet as needed for indigestion or heartburn., Historical Med      !! Combigan 0.2-0.5 % Administer 1 drop to the right eye every 12 (twelve) hours, Starting Thu 6/20/2024, Until Tue 12/17/2024, Normal      finasteride (PROSCAR) 5 mg tablet Take 1 tablet (5 mg total) by mouth daily, Starting Thu 6/20/2024, Normal      glucose blood (ONE TOUCH ULTRA TEST) test strip Test sugar once daily, Normal      lisinopril (ZESTRIL) 10 mg tablet Take 1 tablet (10 mg total) by mouth daily, Starting Thu 6/20/2024, Normal      Multiple Vitamin (MULTIVITAMIN ADULT PO) Take 1 tablet by mouth in the morning, Historical Med      Nutritional Supplements (Boost) LIQD Take 237 mL by mouth daily, Starting Tue 10/12/2021, Until Fri 8/9/2024, Normal      polyethylene glycol (MIRALAX) 17 g packet Take 17 g by mouth daily, Starting Wed 6/14/2023, Until Fri 8/9/2024, Normal      simvastatin (ZOCOR) 10 mg tablet TAKE 1 TABLET BY MOUTH ONCE DAILY IN THE EVENING, Starting Mon 8/5/2024, Normal       !! - Potential duplicate medications found. Please discuss with provider.               PDMP Review         Value Time User    PDMP Reviewed  Yes 10/18/2023  2:33 PM Cachorro MOON&#39;DO Willian            ED Provider  Electronically Signed by             Lm Sanchez DO  09/06/24 1423

## 2024-09-09 PROCEDURE — 88305 TISSUE EXAM BY PATHOLOGIST: CPT | Performed by: PATHOLOGY

## 2024-09-13 PROCEDURE — 88305 TISSUE EXAM BY PATHOLOGIST: CPT | Performed by: PATHOLOGY

## 2024-11-08 DIAGNOSIS — R33.9 URINARY RETENTION: ICD-10-CM

## 2024-11-08 DIAGNOSIS — I10 PRIMARY HYPERTENSION: ICD-10-CM

## 2024-11-08 DIAGNOSIS — H40.1110 PRIMARY OPEN ANGLE GLAUCOMA OF RIGHT EYE, UNSPECIFIED GLAUCOMA STAGE: ICD-10-CM

## 2024-11-08 RX ORDER — FINASTERIDE 5 MG/1
5 TABLET, FILM COATED ORAL DAILY
Qty: 90 TABLET | Refills: 1 | Status: SHIPPED | OUTPATIENT
Start: 2024-11-08

## 2024-11-08 RX ORDER — LISINOPRIL 10 MG/1
10 TABLET ORAL DAILY
Qty: 30 TABLET | Refills: 5 | Status: SHIPPED | OUTPATIENT
Start: 2024-11-08

## 2024-11-08 RX ORDER — BRIMONIDINE TARTRATE AND TIMOLOL MALEATE 2; 5 MG/ML; MG/ML
1 SOLUTION OPHTHALMIC EVERY 12 HOURS SCHEDULED
Qty: 10 ML | Refills: 5 | Status: SHIPPED | OUTPATIENT
Start: 2024-11-08 | End: 2025-05-07

## 2024-11-08 NOTE — TELEPHONE ENCOUNTER
Medication: brimonidine-timolol (COMBIGAN) 0.2-0.5 %    Dose/Frequency:   Administer 1 drop to the right eye every 12 (twelve) hours        Quantity: 10 mL     Pharmacy: [place] [address] [address]     Office:   [x] PCP/Provider -  Cachorro Garcia, DO   [] Speciality/Provider -     Does the patient have enough for 3 days?   [] Yes   [x] No - Send as HP to POD      Medication: finasteride (PROSCAR) 5 mg tablet    Dose/Frequency:     Take 1 tablet (5 mg total) by mouth daily       Quantity:  90 tablet     Pharmacy: [place] [address] [address]     Office:   [] PCP/Provider -  CARROLL Greer   [] Speciality/Provider -     Does the patient have enough for 3 days?   [x] Yes   [] No - Send as HP to POD        Medication:  lisinopril (ZESTRIL) 10 mg tablet    Dose/Frequency: Take 1 tablet (10 mg total) by mouth daily     Quantity: : 30 tablet     Pharmacy: [place] [address] [address]     Office:   [] PCP/Provider - CARROLL Greer   [] Speciality/Provider -     Does the patient have enough for 3 days?   [x] Yes   [] No - Send as HP to POD

## 2024-12-05 ENCOUNTER — RA CDI HCC (OUTPATIENT)
Dept: OTHER | Facility: HOSPITAL | Age: 84
End: 2024-12-05

## 2024-12-13 ENCOUNTER — OFFICE VISIT (OUTPATIENT)
Dept: INTERNAL MEDICINE CLINIC | Facility: CLINIC | Age: 84
End: 2024-12-13
Payer: COMMERCIAL

## 2024-12-13 VITALS
HEIGHT: 64 IN | SYSTOLIC BLOOD PRESSURE: 138 MMHG | BODY MASS INDEX: 24.01 KG/M2 | OXYGEN SATURATION: 96 % | WEIGHT: 140.6 LBS | DIASTOLIC BLOOD PRESSURE: 86 MMHG | TEMPERATURE: 98.9 F | HEART RATE: 76 BPM

## 2024-12-13 DIAGNOSIS — E11.51 TYPE 2 DIABETES MELLITUS WITH DIABETIC PERIPHERAL ANGIOPATHY WITHOUT GANGRENE, UNSPECIFIED WHETHER LONG TERM INSULIN USE (HCC): ICD-10-CM

## 2024-12-13 DIAGNOSIS — R33.9 URINARY RETENTION: ICD-10-CM

## 2024-12-13 DIAGNOSIS — Z00.00 MEDICARE ANNUAL WELLNESS VISIT, SUBSEQUENT: ICD-10-CM

## 2024-12-13 DIAGNOSIS — I10 PRIMARY HYPERTENSION: ICD-10-CM

## 2024-12-13 DIAGNOSIS — Z23 ENCOUNTER FOR IMMUNIZATION: Primary | ICD-10-CM

## 2024-12-13 LAB
LEFT EYE DIABETIC RETINOPATHY: ABNORMAL
LEFT EYE IMAGE QUALITY: ABNORMAL
LEFT EYE MACULAR EDEMA: ABNORMAL
LEFT EYE OTHER RETINOPATHY: ABNORMAL
RIGHT EYE DIABETIC RETINOPATHY: ABNORMAL
RIGHT EYE IMAGE QUALITY: ABNORMAL
RIGHT EYE MACULAR EDEMA: ABNORMAL
RIGHT EYE OTHER RETINOPATHY: ABNORMAL
SEVERITY (EYE EXAM): ABNORMAL
SL AMB POCT HEMOGLOBIN AIC: 5.8 (ref ?–6.5)

## 2024-12-13 PROCEDURE — 90662 IIV NO PRSV INCREASED AG IM: CPT

## 2024-12-13 PROCEDURE — G0439 PPPS, SUBSEQ VISIT: HCPCS | Performed by: INTERNAL MEDICINE

## 2024-12-13 PROCEDURE — 90471 IMMUNIZATION ADMIN: CPT

## 2024-12-13 PROCEDURE — 83036 HEMOGLOBIN GLYCOSYLATED A1C: CPT | Performed by: INTERNAL MEDICINE

## 2024-12-13 PROCEDURE — 99213 OFFICE O/P EST LOW 20 MIN: CPT | Performed by: INTERNAL MEDICINE

## 2024-12-13 RX ORDER — FINASTERIDE 5 MG/1
5 TABLET, FILM COATED ORAL DAILY
Qty: 90 TABLET | Refills: 1 | Status: SHIPPED | OUTPATIENT
Start: 2024-12-13

## 2024-12-13 RX ORDER — LISINOPRIL 10 MG/1
10 TABLET ORAL DAILY
Qty: 30 TABLET | Refills: 5 | Status: SHIPPED | OUTPATIENT
Start: 2024-12-13

## 2024-12-13 NOTE — ASSESSMENT & PLAN NOTE
BP good and we will refill the Zestril  No hypotension and doing good at this time  Orders:  •  lisinopril (ZESTRIL) 10 mg tablet; Take 1 tablet (10 mg total) by mouth daily

## 2024-12-13 NOTE — ASSESSMENT & PLAN NOTE
A1c 5.8% and noted no other issues at this time and treated with lifestyle changes  Lab Results   Component Value Date    HGBA1C 5.8 12/13/2024     Orders:  •  Diabetic foot exam; Future  •  IRIS Diabetic eye exam  •  POCT hemoglobin A1c  •  Basic metabolic panel; Future

## 2024-12-13 NOTE — PROGRESS NOTES
Diabetic Foot Exam    Patient's shoes and socks removed.    Right Foot/Ankle   Right Foot Inspection  Skin Exam: skin normal and skin intact. No dry skin, no warmth, no callus, no erythema, no maceration, no abnormal color, no pre-ulcer, no ulcer and no callus.     Toe Exam:  no right toe deformity    Sensory   Monofilament testing: diminished    Vascular  Capillary refills: < 3 seconds  The right DP pulse is 2+. The right PT pulse is 2+.     Left Foot/Ankle  Left Foot Inspection  Skin Exam: skin normal and skin intact. No dry skin, no warmth, no erythema, no maceration, normal color, no pre-ulcer, no ulcer and no callus.     Toe Exam: No left toe deformity.     Sensory   Monofilament testing: diminished    Vascular  Capillary refills: < 3 seconds  The left DP pulse is 2+. The left PT pulse is 2+.     Assign Risk Category  No deformity present  Loss of protective sensation  No weak pulses  Risk: 1      Name: To Dennison      : 1940      MRN: [id number]  Encounter Provider: Cachorro Garcia DO  Encounter Date: 2024   Encounter department: [facility]    Assessment & Plan  Medicare annual wellness visit, subsequent         Primary hypertension  BP good and we will refill the Zestril  No hypotension and doing good at this time  Orders:  •  lisinopril (ZESTRIL) 10 mg tablet; Take 1 tablet (10 mg total) by mouth daily    Type 2 diabetes mellitus with diabetic peripheral angiopathy without gangrene, unspecified whether long term insulin use (HCC)  A1c 5.8% and noted no other issues at this time and treated with lifestyle changes  Lab Results   Component Value Date    HGBA1C 5.8 2024     Orders:  •  Diabetic foot exam; Future  •  IRIS Diabetic eye exam  •  POCT hemoglobin A1c  •  Basic metabolic panel; Future    Urinary retention  Continue Proscar and refilled  Orders:  •  finasteride (PROSCAR) 5 mg tablet; Take 1 tablet (5 mg total) by mouth daily    Encounter for  immunization  Agreeable to the flu shot  Orders:  •  influenza vaccine, high-dose, PF 0.5 mL (Fluzone High Dose)       Preventive health issues were discussed with patient, and age appropriate screening tests were ordered as noted in patient's After Visit Summary. Personalized health advice and appropriate referrals for health education or preventive services given if needed, as noted in patient's After Visit Summary.    History of Present Illness     HPI   Patient Care Team:  Cachorro Garcia DO as PCP - General (Internal Medicine)  MD Marva Wyatt CRNP Jarrod Evan Rosenthal, MD Ronald Stein, MD David O'Neill, DO Erica Doyle, RD (Nutrition)    Review of Systems   Constitutional:  Negative for chills and fever.   HENT:  Negative for ear pain and sore throat.    Eyes:  Negative for pain and visual disturbance.   Respiratory:  Negative for cough and shortness of breath.    Cardiovascular:  Negative for chest pain and palpitations.   Gastrointestinal:  Negative for abdominal pain and vomiting.   Genitourinary:  Negative for dysuria and hematuria.   Musculoskeletal:  Negative for arthralgias and back pain.   Skin:  Negative for color change and rash.   Neurological:  Negative for seizures and syncope.   All other systems reviewed and are negative.    Medical History Reviewed by provider this encounter:       Annual Wellness Visit Questionnaire   To is here for his Subsequent Wellness visit. Last Medicare Wellness visit information reviewed, patient interviewed and updates made to the record today.      Health Risk Assessment:   Patient rates overall health as very good. Patient feels that their physical health rating is same. Patient is dissatisfied with their life. Eyesight was rated as slightly worse. Hearing was rated as slightly worse. Patient feels that their emotional and mental health rating is same. Patients states they are sometimes angry. Patient states they are sometimes  unusually tired/fatigued. Pain experienced in the last 7 days has been none. Patient states that he has experienced weight loss or gain in last 6 months.     Depression Screening:   PHQ-2 Score: 0      Fall Risk Screening:   In the past year, patient has experienced: no history of falling in past year      Home Safety:  Patient has trouble with stairs inside or outside of their home. Patient has no working smoke alarms and has no working carbon monoxide detector. Home safety hazards include: none.     Nutrition:   Current diet is Regular.     Medications:   Patient is currently taking over-the-counter supplements. OTC medications include: see medication list. Patient is able to manage medications.     Activities of Daily Living (ADLs)/Instrumental Activities of Daily Living (IADLs):   Walk and transfer into and out of bed and chair?: Yes  Dress and groom yourself?: Yes    Bathe or shower yourself?: Yes    Feed yourself? Yes  Do your laundry/housekeeping?: Yes  Manage your money, pay your bills and track your expenses?: Yes  Make your own meals?: Yes    Do your own shopping?: Yes    Previous Hospitalizations:   Any hospitalizations or ED visits within the last 12 months?: No      Advance Care Planning:   Living will: No    Durable POA for healthcare: No    Advanced directive: No    Advanced directive counseling given: No    ACP document given: Yes    Patient declined ACP directive: No    End of Life Decisions reviewed with patient: No    Provider agrees with end of life decisions: Yes      Cognitive Screening:   Provider or family/friend/caregiver concerned regarding cognition?: No    PREVENTIVE SCREENINGS      Cardiovascular Screening:    General: Screening Not Indicated and History Lipid Disorder      Diabetes Screening:     General: Screening Not Indicated and History Diabetes      Colorectal Cancer Screening:     General: Screening Not Indicated      Prostate Cancer Screening:    General: Screening Not  "Indicated      Osteoporosis Screening:    General: Screening Not Indicated and History Osteoporosis      Abdominal Aortic Aneurysm (AAA) Screening:    Risk factors include: tobacco use        Lung Cancer Screening:     General: Screening Not Indicated      Hepatitis C Screening:    General: Screening Not Indicated    Screening, Brief Intervention, and Referral to Treatment (SBIRT)    Screening  Typical number of drinks in a day: 2  Typical number of drinks in a week: 14  Interpretation: Low risk drinking behavior.    Single Item Drug Screening:  How often have you used an illegal drug (including marijuana) or a prescription medication for non-medical reasons in the past year? never    Single Item Drug Screen Score: 0  Interpretation: Negative screen for possible drug use disorder    Brief Intervention  Alcohol & drug use screenings were reviewed. No concerns regarding substance use disorder identified.     Social Drivers of Health     Financial Resource Strain: Low Risk  (6/14/2023)    Overall Financial Resource Strain (CARDIA)    • Difficulty of Paying Living Expenses: Not very hard   Food Insecurity: No Food Insecurity (12/13/2024)    Hunger Vital Sign    • Worried About Running Out of Food in the Last Year: Never true    • Ran Out of Food in the Last Year: Never true   Transportation Needs: No Transportation Needs (12/13/2024)    PRAPARE - Transportation    • Lack of Transportation (Medical): No    • Lack of Transportation (Non-Medical): No   Housing Stability: Low Risk  (12/13/2024)    Housing Stability Vital Sign    • Unable to Pay for Housing in the Last Year: No    • Number of Times Moved in the Last Year: 0    • Homeless in the Last Year: No   Utilities: Not At Risk (12/13/2024)    [facility] Utilities    • Threatened with loss of utilities: No     No results found.    Objective   /86   Pulse 76   Temp 98.9 °F (37.2 °C)   Ht 5' 4\" (1.626 m)   Wt 63.8 kg (140 lb 9.6 oz)   SpO2 96%   BMI 24.13 kg/m² "     Physical Exam  Vitals and nursing note reviewed.   Constitutional:       General: He is not in acute distress.     Appearance: He is well-developed.   HENT:      Head: Normocephalic and atraumatic.   Eyes:      Conjunctiva/sclera: Conjunctivae normal.   Cardiovascular:      Rate and Rhythm: Normal rate and regular rhythm.      Pulses: no weak pulses.           Dorsalis pedis pulses are 2+ on the right side and 2+ on the left side.        Posterior tibial pulses are 2+ on the right side and 2+ on the left side.      Heart sounds: No murmur heard.  Pulmonary:      Effort: Pulmonary effort is normal. No respiratory distress.      Breath sounds: Normal breath sounds.   Abdominal:      Palpations: Abdomen is soft.      Tenderness: There is no abdominal tenderness.   Musculoskeletal:         General: No swelling.      Cervical back: Neck supple.        Feet:    Feet:      Right foot:      Skin integrity: No ulcer, skin breakdown, erythema, warmth, callus or dry skin.      Left foot:      Skin integrity: No ulcer, skin breakdown, erythema, warmth, callus or dry skin.   Skin:     General: Skin is warm and dry.      Capillary Refill: Capillary refill takes less than 2 seconds.   Neurological:      Mental Status: He is alert.   Psychiatric:         Mood and Affect: Mood normal.

## 2024-12-13 NOTE — PATIENT INSTRUCTIONS
Medicare Preventive Visit Patient Instructions  Thank you for completing your Welcome to Medicare Visit or Medicare Annual Wellness Visit today. Your next wellness visit will be due in one year (12/14/2025).  The screening/preventive services that you may require over the next 5-10 years are detailed below. Some tests may not apply to you based off risk factors and/or age. Screening tests ordered at today's visit but not completed yet may show as past due. Also, please note that scanned in results may not display below.  Preventive Screenings:  Service Recommendations Previous Testing/Comments   Colorectal Cancer Screening  Colonoscopy    Fecal Occult Blood Test (FOBT)/Fecal Immunochemical Test (FIT)  Fecal DNA/Cologuard Test  Flexible Sigmoidoscopy Age: 45-75 years old   Colonoscopy: every 10 years (May be performed more frequently if at higher risk)  OR  FOBT/FIT: every 1 year  OR  Cologuard: every 3 years  OR  Sigmoidoscopy: every 5 years  Screening may be recommended earlier than age 45 if at higher risk for colorectal cancer. Also, an individualized decision between you and your healthcare provider will decide whether screening between the ages of 76-85 would be appropriate. Colonoscopy: Not on file  FOBT/FIT: Not on file  Cologuard: Not on file  Sigmoidoscopy: Not on file    Screening Not Indicated     Prostate Cancer Screening Individualized decision between patient and health care provider in men between ages of 55-69   Medicare will cover every 12 months beginning on the day after your 50th birthday PSA: 0.4 ng/mL     Screening Not Indicated     Hepatitis C Screening Once for adults born between 1945 and 1965  More frequently in patients at high risk for Hepatitis C Hep C Antibody: Not on file        Diabetes Screening 1-2 times per year if you're at risk for diabetes or have pre-diabetes Fasting glucose: 125 mg/dL (4/3/2023)  A1C: 6.0 % (6/16/2024)  Screening Not Indicated  History Diabetes   Cholesterol  Screening Once every 5 years if you don't have a lipid disorder. May order more often based on risk factors. Lipid panel: 02/13/2023  Screening Not Indicated  History Lipid Disorder      Other Preventive Screenings Covered by Medicare:  Abdominal Aortic Aneurysm (AAA) Screening: covered once if your at risk. You're considered to be at risk if you have a family history of AAA or a male between the age of 65-75 who smoking at least 100 cigarettes in your lifetime.  Lung Cancer Screening: covers low dose CT scan once per year if you meet all of the following conditions: (1) Age 55-77; (2) No signs or symptoms of lung cancer; (3) Current smoker or have quit smoking within the last 15 years; (4) You have a tobacco smoking history of at least 20 pack years (packs per day x number of years you smoked); (5) You get a written order from a healthcare provider.  Glaucoma Screening: covered annually if you're considered high risk: (1) You have diabetes OR (2) Family history of glaucoma OR (3)  aged 50 and older OR (4)  American aged 65 and older  Osteoporosis Screening: covered every 2 years if you meet one of the following conditions: (1) Have a vertebral abnormality; (2) On glucocorticoid therapy for more than 3 months; (3) Have primary hyperparathyroidism; (4) On osteoporosis medications and need to assess response to drug therapy.  HIV Screening: covered annually if you're between the age of 15-65. Also covered annually if you are younger than 15 and older than 65 with risk factors for HIV infection. For pregnant patients, it is covered up to 3 times per pregnancy.    Immunizations:  Immunization Recommendations   Influenza Vaccine Annual influenza vaccination during flu season is recommended for all persons aged >= 6 months who do not have contraindications   Pneumococcal Vaccine   * Pneumococcal conjugate vaccine = PCV13 (Prevnar 13), PCV15 (Vaxneuvance), PCV20 (Prevnar 20)  * Pneumococcal  polysaccharide vaccine = PPSV23 (Pneumovax) Adults 19-63 yo with certain risk factors or if 65+ yo  If never received any pneumonia vaccine: recommend Prevnar 20 (PCV20)  Give PCV20 if previously received 1 dose of PCV13 or PPSV23   Hepatitis B Vaccine 3 dose series if at intermediate or high risk (ex: diabetes, end stage renal disease, liver disease)   Respiratory syncytial virus (RSV) Vaccine - COVERED BY MEDICARE PART D  * RSVPreF3 (Arexvy) CDC recommends that adults 60 years of age and older may receive a single dose of RSV vaccine using shared clinical decision-making (SCDM)   Tetanus (Td) Vaccine - COST NOT COVERED BY MEDICARE PART B Following completion of primary series, a booster dose should be given every 10 years to maintain immunity against tetanus. Td may also be given as tetanus wound prophylaxis.   Tdap Vaccine - COST NOT COVERED BY MEDICARE PART B Recommended at least once for all adults. For pregnant patients, recommended with each pregnancy.   Shingles Vaccine (Shingrix) - COST NOT COVERED BY MEDICARE PART B  2 shot series recommended in those 19 years and older who have or will have weakened immune systems or those 50 years and older     Health Maintenance Due:  There are no preventive care reminders to display for this patient.  Immunizations Due:      Topic Date Due   • Hepatitis A Vaccine (1 of 2 - Risk 2-dose series) Never done   • Pneumococcal Vaccine: 65+ Years (2 of 2 - PCV) 07/27/2010   • Influenza Vaccine (1) 09/01/2024   • COVID-19 Vaccine (4 - 2024-25 season) 09/01/2024     Advance Directives   What are advance directives?  Advance directives are legal documents that state your wishes and plans for medical care. These plans are made ahead of time in case you lose your ability to make decisions for yourself. Advance directives can apply to any medical decision, such as the treatments you want, and if you want to donate organs.   What are the types of advance directives?  There are many  types of advance directives, and each state has rules about how to use them. You may choose a combination of any of the following:  Living will:  This is a written record of the treatment you want. You can also choose which treatments you do not want, which to limit, and which to stop at a certain time. This includes surgery, medicine, IV fluid, and tube feedings.   Durable power of  for healthcare (DPAHC):  This is a written record that states who you want to make healthcare choices for you when you are unable to make them for yourself. This person, called a proxy, is usually a family member or a friend. You may choose more than 1 proxy.  Do not resuscitate (DNR) order:  A DNR order is used in case your heart stops beating or you stop breathing. It is a request not to have certain forms of treatment, such as CPR. A DNR order may be included in other types of advance directives.  Medical directive:  This covers the care that you want if you are in a coma, near death, or unable to make decisions for yourself. You can list the treatments you want for each condition. Treatment may include pain medicine, surgery, blood transfusions, dialysis, IV or tube feedings, and a ventilator (breathing machine).  Values history:  This document has questions about your views, beliefs, and how you feel and think about life. This information can help others choose the care that you would choose.  Why are advance directives important?  An advance directive helps you control your care. Although spoken wishes may be used, it is better to have your wishes written down. Spoken wishes can be misunderstood, or not followed. Treatments may be given even if you do not want them. An advance directive may make it easier for your family to make difficult choices about your care.       © Copyright [employer] 2018 Information is for End User's use only and may not be sold, redistributed or otherwise used for commercial purposes. All  illustrations and images included in CareNotes® are the copyrighted property of [employer] or [employer]

## 2024-12-26 ENCOUNTER — REMOTE DEVICE CLINIC VISIT (OUTPATIENT)
Dept: CARDIOLOGY CLINIC | Facility: CLINIC | Age: 84
End: 2024-12-26
Payer: COMMERCIAL

## 2024-12-26 DIAGNOSIS — Z95.0 CARDIAC PACEMAKER IN SITU: Primary | ICD-10-CM

## 2024-12-26 PROCEDURE — 93296 REM INTERROG EVL PM/IDS: CPT | Performed by: INTERNAL MEDICINE

## 2024-12-26 PROCEDURE — 93294 REM INTERROG EVL PM/LDLS PM: CPT | Performed by: INTERNAL MEDICINE

## 2024-12-27 ENCOUNTER — RESULTS FOLLOW-UP (OUTPATIENT)
Dept: CARDIOLOGY CLINIC | Facility: CLINIC | Age: 84
End: 2024-12-27

## 2024-12-27 NOTE — PROGRESS NOTES
Results for orders placed or performed in visit on 12/26/24   Cardiac EP device report    Narrative    MDT DUAL PM/ NOT MRI CONDITIONAL  CARELINK TRANSMISSION: BATTERY VOLTAGE ADEQUATE (5.8 YRS). AP-38%, >99% (>40% DDDR@60PPM/MVP OFF). ALL AVAILABLE LEAD PARAMETERS WITHIN NORMAL LIMITS. NO SIGNIFICANT HIGH RATE EPISODES. NORMAL DEVICE FUNCTION. GV

## 2025-03-25 ENCOUNTER — REMOTE DEVICE CLINIC VISIT (OUTPATIENT)
Dept: CARDIOLOGY CLINIC | Facility: CLINIC | Age: 85
End: 2025-03-25

## 2025-03-25 DIAGNOSIS — I44.2 CHB (COMPLETE HEART BLOCK) (HCC): Primary | ICD-10-CM

## 2025-03-25 PROCEDURE — 93296 REM INTERROG EVL PM/IDS: CPT | Performed by: INTERNAL MEDICINE

## 2025-03-25 PROCEDURE — 93294 REM INTERROG EVL PM/LDLS PM: CPT | Performed by: INTERNAL MEDICINE

## 2025-03-25 NOTE — PROGRESS NOTES
Results for orders placed or performed in visit on 03/25/25   Cardiac EP device report    Narrative    MDT DUAL PM/ NOT MRI CONDITIONAL  CARELINK TRANSMISSION: BATTERY VOLTAGE ADEQUATE (6.1 YRS). AP: 34.6%. : 99.8% (>40%~MVP-OFF~CHB). ALL AVAILABLE LEAD PARAMETERS WITHIN NORMAL LIMITS. NO SIGNIFICANT HIGH RATE EPISODES. NORMAL DEVICE FUNCTION. CH

## 2025-03-26 ENCOUNTER — RESULTS FOLLOW-UP (OUTPATIENT)
Dept: CARDIOLOGY CLINIC | Facility: CLINIC | Age: 85
End: 2025-03-26

## 2025-06-16 ENCOUNTER — TELEPHONE (OUTPATIENT)
Dept: NEUROSURGERY | Facility: CLINIC | Age: 85
End: 2025-06-16

## 2025-06-16 NOTE — TELEPHONE ENCOUNTER
6/16/25 I made several attempt to confirm tiffanie for 6/17 the voicemail is not set up LVM, I called the daughter her phone is disconnected.

## 2025-06-24 ENCOUNTER — OFFICE VISIT (OUTPATIENT)
Dept: CARDIOLOGY CLINIC | Facility: CLINIC | Age: 85
End: 2025-06-24
Payer: MEDICARE

## 2025-06-24 VITALS
BODY MASS INDEX: 22.71 KG/M2 | HEIGHT: 64 IN | WEIGHT: 133 LBS | SYSTOLIC BLOOD PRESSURE: 140 MMHG | HEART RATE: 85 BPM | DIASTOLIC BLOOD PRESSURE: 70 MMHG

## 2025-06-24 DIAGNOSIS — I25.10 CORONARY ARTERY DISEASE INVOLVING NATIVE CORONARY ARTERY OF NATIVE HEART WITHOUT ANGINA PECTORIS: ICD-10-CM

## 2025-06-24 DIAGNOSIS — I44.2 CHB (COMPLETE HEART BLOCK) (HCC): Primary | ICD-10-CM

## 2025-06-24 DIAGNOSIS — R63.4 WEIGHT LOSS: ICD-10-CM

## 2025-06-24 DIAGNOSIS — E78.2 MIXED HYPERLIPIDEMIA: Chronic | ICD-10-CM

## 2025-06-24 DIAGNOSIS — I49.5 SICK SINUS SYNDROME (HCC): ICD-10-CM

## 2025-06-24 PROCEDURE — 93000 ELECTROCARDIOGRAM COMPLETE: CPT | Performed by: INTERNAL MEDICINE

## 2025-06-24 PROCEDURE — 99214 OFFICE O/P EST MOD 30 MIN: CPT | Performed by: INTERNAL MEDICINE

## 2025-06-24 RX ORDER — ASPIRIN 81 MG/1
81 TABLET, CHEWABLE ORAL DAILY
COMMUNITY

## 2025-06-24 NOTE — ASSESSMENT & PLAN NOTE
Eating fair. Perhaps because he is living alone now.  He will follow up with PCP if this continues.

## 2025-06-24 NOTE — PROGRESS NOTES
" Patient ID: To Dennison is a 85 y.o. male.        Plan:      Assessment & Plan  CHB (complete heart block) (HCC)    Coronary artery disease involving native coronary artery of native heart without angina pectoris  Presumed present based on myoview but no current symptoms.  Sick sinus syndrome (HCC)  Pacer surveillance continues.  Mixed hyperlipidemia  Patient averse to meds and isn't taking statin at present.  Not keen on restarting.  Weight loss  Eating fair. Perhaps because he is living alone now.  He will follow up with PCP if this continues.      Follow up Plan/Other summary comments:  Return in about 1 year (around 6/24/2026).    HPI: Patient seen in follow-up today regarding the above.  He continues to reside at home alone now that his wife of over 60 years of marriage has passed away.  He mows his grass and goes to [employer] to have [employer] most days.  He states it is never more than 2 shots however.    No chest pain.  No syncope or near syncope.    Patient had DDD MDT placed 11/6/2009.  Medtronic change out 3/24/2023:  Results for orders placed or performed in visit on 06/24/25   POCT ECG    Impression    NSR. Atrial sense with V pacing.         Most recent or relevant cardiac/vascular testing:    Echo 4/25/2019: Normal LV function. Mild hypokinesia of the  apical inferior wall.  Myoview: 7/10/2018: Normal LV function. Small zone of inferoapical ischemia.      Past Surgical History[1]    Lipid Profile: Reviewed      Review of Systems   10  point ROS  was otherwise non pertinent or negative except as per HPI or as below.   Gait: Uses a cane.  .        Objective:     /70   Pulse 85   Ht 5' 4\" (1.626 m)   Wt 60.3 kg (133 lb)   BMI 22.83 kg/m²     PHYSICAL EXAM:    General:  Normal appearance in no distress.  Eyes:  Anicteric.  Oral mucosa:  Moist.  Neck:  No JVD. Carotid upstrokes are brisk without bruits.  No masses.  Chest:  Clear to auscultation.  Pacemaker left " "subclavian region well-healed.  Cardiac:  No palpable PMI.  Normal S1 and S2.  No murmur gallop or rub.  Abdomen:  Soft and nontender. No palpable organomegaly or aortic enlargement.  Extremities:  No peripheral edema.  Musculoskeletal:  Symmetric.   Vascular:  Femoral pulses are brisk without bruits.  Popliteal pulses are intact bilaterally.   Pedal pulses are intact.  Neuro:  Grossly symmetric.  Psych:  Alert and oriented x3.      Meds reviewed.    Past Medical History[2]        Tobacco Use History[3]               [1]   Past Surgical History:  Procedure Laterality Date    CARDIAC PACEMAKER PLACEMENT  11/2009    Medtronic dual chamber     CARDIAC PACEMAKER PLACEMENT Left 3/24/2023    Procedure: DUAL LEAD PACEMAKER BATTERY REMOVAL AND REPLACE.;  Surgeon: To Neff MD;  Location: [facility];  Service: Cardiology    CATARACT EXTRACTION W/  INTRAOCULAR LENS IMPLANT      CYSTOSCOPY N/A 05/09/2016    Procedure: CYSTOSCOPY, evacuation of bladder calculi;  Surgeon: Haim Melendez MD;  Location: [facility];  Service:     INGUINAL HERNIA REPAIR      UNILATERAL    KIDNEY STONE SURGERY      MOHS SURGERY Right 08/31/2022    removal of right ear BCCA with free graft nd reconstruction - Dr. Aguilar and Dr. Newman    OTHER SURGICAL HISTORY      HERNIA REPAIR AS PER [facility] (ALREADY IN PERTINENT NEGATIVES)    PAROTIDECTOMY Right 10/01/2021    Procedure: TOTAL PAROTIDECTOMY WITH NIMS; MODIFIED RADICAL NECK DISSECTION;  Surgeon: Karsten Newman DO;  Location: [facility];  Service: ENT    ND TRURL ELECTROSURG RESCJ PROSTATE BLEED COMPLETE N/A 05/09/2016    Procedure: TRANSURETHRAL RESECTION OF PROSTATE (TURP);  Surgeon: Haim Melendez MD;  Location: [facility];  Service: Urology    TONSILLECTOMY     [2]   Past Medical History:  Diagnosis Date    Ambulates with cane     unsure \"why\" falls at times     last fall 09/27/2021    Anxiety state 08/05/2018    Basal cell carcinoma of skin 11/15/2016    Cancer (HCC)     " BASAL CA SKIN    Chronic kidney disease     ACUTE KIDNEY INJURY 3/21    Constipation     COPD (chronic obstructive pulmonary disease) ([facility])     Coronary artery disease 2012    Diabetes mellitus ([facility])     TYPE 2    Forearm laceration, right, initial encounter 2020    Hydronephrosis     LAST ASSESSED: 11/18/15    Hypertension     Mass of right side of neck     radical neck today 10/1/2021    Osteoporosis 2012    Pacemaker     SSS (sick sinus syndrome) ([facility])     s/p generator changeout 3/24/2023    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

## 2025-06-25 ENCOUNTER — REMOTE DEVICE CLINIC VISIT (OUTPATIENT)
Dept: CARDIOLOGY CLINIC | Facility: CLINIC | Age: 85
End: 2025-06-25
Payer: MEDICARE

## 2025-06-25 DIAGNOSIS — Z95.0 CARDIAC PACEMAKER IN SITU: Primary | ICD-10-CM

## 2025-06-25 PROCEDURE — 93294 REM INTERROG EVL PM/LDLS PM: CPT | Performed by: INTERNAL MEDICINE

## 2025-06-25 PROCEDURE — 93296 REM INTERROG EVL PM/IDS: CPT | Performed by: INTERNAL MEDICINE

## 2025-06-25 NOTE — PROGRESS NOTES
Results for orders placed or performed in visit on 06/25/25   Cardiac EP device report    Narrative    MDT DUAL PM/ ACTIVE SYSTEM IS MRI CONDITIONAL  CARELINK TRANSMISSION: BATTERY VOLTAGE ADEQUATE (5.8 YRS). AP-30%, >99% (>40% DDDR@60PPM/MVP OFF). ALL AVAILABLE LEAD PARAMETERS WITHIN NORMAL LIMITS. NO SIGNIFICANT HIGH RATE EPISODES. NORMAL DEVICE FUNCTION. GV

## 2025-07-07 ENCOUNTER — RA CDI HCC (OUTPATIENT)
Dept: OTHER | Facility: HOSPITAL | Age: 85
End: 2025-07-07

## 2025-07-11 ENCOUNTER — OFFICE VISIT (OUTPATIENT)
Dept: INTERNAL MEDICINE CLINIC | Facility: CLINIC | Age: 85
End: 2025-07-11
Payer: MEDICARE

## 2025-07-11 VITALS
SYSTOLIC BLOOD PRESSURE: 132 MMHG | DIASTOLIC BLOOD PRESSURE: 84 MMHG | HEIGHT: 64 IN | BODY MASS INDEX: 23.22 KG/M2 | WEIGHT: 136 LBS | HEART RATE: 65 BPM | OXYGEN SATURATION: 96 % | TEMPERATURE: 97.3 F

## 2025-07-11 DIAGNOSIS — C79.89 SECONDARY MALIGNANT NEOPLASM OF PAROTID GLAND (HCC): ICD-10-CM

## 2025-07-11 DIAGNOSIS — R63.4 WEIGHT LOSS: ICD-10-CM

## 2025-07-11 DIAGNOSIS — I10 PRIMARY HYPERTENSION: ICD-10-CM

## 2025-07-11 DIAGNOSIS — E11.51 TYPE 2 DIABETES MELLITUS WITH DIABETIC PERIPHERAL ANGIOPATHY WITHOUT GANGRENE, UNSPECIFIED WHETHER LONG TERM INSULIN USE (HCC): ICD-10-CM

## 2025-07-11 DIAGNOSIS — C44.41 BASAL CELL CARCINOMA (BCC) OF SCALP: ICD-10-CM

## 2025-07-11 DIAGNOSIS — N18.4 CHRONIC KIDNEY DISEASE, STAGE 4 (SEVERE) (HCC): ICD-10-CM

## 2025-07-11 DIAGNOSIS — J44.9 CHRONIC OBSTRUCTIVE PULMONARY DISEASE, UNSPECIFIED COPD TYPE (HCC): ICD-10-CM

## 2025-07-11 DIAGNOSIS — H40.1110 PRIMARY OPEN ANGLE GLAUCOMA OF RIGHT EYE, UNSPECIFIED GLAUCOMA STAGE: ICD-10-CM

## 2025-07-11 DIAGNOSIS — Z23 ENCOUNTER FOR IMMUNIZATION: Primary | ICD-10-CM

## 2025-07-11 LAB — SL AMB POCT HEMOGLOBIN AIC: 5.8 (ref ?–6.5)

## 2025-07-11 PROCEDURE — G2211 COMPLEX E/M VISIT ADD ON: HCPCS | Performed by: INTERNAL MEDICINE

## 2025-07-11 PROCEDURE — 99214 OFFICE O/P EST MOD 30 MIN: CPT | Performed by: INTERNAL MEDICINE

## 2025-07-11 PROCEDURE — 83036 HEMOGLOBIN GLYCOSYLATED A1C: CPT | Performed by: INTERNAL MEDICINE

## 2025-07-11 RX ORDER — BRIMONIDINE TARTRATE AND TIMOLOL MALEATE 2; 5 MG/ML; MG/ML
1 SOLUTION OPHTHALMIC EVERY 12 HOURS SCHEDULED
Qty: 10 ML | Refills: 5 | Status: SHIPPED | OUTPATIENT
Start: 2025-07-11 | End: 2026-01-07

## 2025-07-11 NOTE — ASSESSMENT & PLAN NOTE
Lab Results   Component Value Date    EGFR 54 06/17/2024    EGFR 47 06/16/2024    EGFR 39 06/16/2024    CREATININE 1.19 06/17/2024    CREATININE 1.35 (H) 06/16/2024    CREATININE 1.57 (H) 06/16/2024     GFR stable and we will follow up on CMP

## 2025-07-11 NOTE — PROGRESS NOTES
Name: To Dennison      : 1940      MRN: [id number]  Encounter Provider: Cachorro Garcia DO  Encounter Date: 2025   Encounter department: [facility]  :  Assessment & Plan  Encounter for immunization  deferred       Type 2 diabetes mellitus with diabetic peripheral angiopathy without gangrene, unspecified whether long term insulin use (HCC)  Controlled with diet  The patient is noted to have a normal A1c  Lab Results   Component Value Date    HGBA1C 5.8 2025   Deferred Iris and Ophtho follow up    Orders:  •  POCT hemoglobin A1c  •  IRIS Diabetic eye exam    Primary open angle glaucoma of right eye, unspecified glaucoma stage  Glaucoma noted   The patient asking for refill of Combigan  No interest in ophthalmology evaluation at this time  Orders:  •  brimonidine-timolol (COMBIGAN) 0.2-0.5 %; Administer 1 drop to the right eye every 12 (twelve) hours    Weight loss  Offered PET scan to follow up on the BCC of the right ear  Orders:  •  Prealbumin    Primary hypertension  BP controlled without medications at this time  Orders:  •  Comprehensive metabolic panel  •  CBC and differential    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(HCC)  No concerns at this time       Chronic kidney disease, stage 4 (severe) (HCC)  Lab Results   Component Value Date    EGFR 54 2024    EGFR 47 2024    EGFR 39 2024    CREATININE 1.19 2024    CREATININE 1.35 (H) 2024    CREATININE 1.57 (H) 2024     GFR stable and we will follow up on CMP        Chronic obstructive pulmonary disease, unspecified COPD type (HCC)  Stable no MDI and the patient is not interested in any additional medications       Basal cell carcinoma (BCC) of scalp  No interested in treatment at this time.         Assessment & Plan           History of Present Illness   The patient was seen and examined.  The patient notes is interested in a refill of his glaucoma medication.  The patient does  "not want to go back to Dr Dean.  The patient has a history of BCC of the right ear.  He is not interested in any further evaluation.  He has a history of a 40 lb weight loss over 5 years.  This has been continuous and gradual.  BMI is now normal and weight up 3lbs since prior evaluation.  No other concerns at this time.      Review of Systems   Constitutional:  Negative for chills, fatigue and fever.   HENT: Negative.     Respiratory:  Negative for cough, chest tightness and shortness of breath.    Cardiovascular:  Negative for chest pain and palpitations.   Gastrointestinal:  Negative for abdominal pain, constipation, diarrhea, nausea and vomiting.   Genitourinary: Negative.    Musculoskeletal:  Negative for back pain and myalgias.   Skin:  Positive for color change.        Lesion of the upper right pinnae.  Induration and ulceration of the ear.    Neurological: Negative.    Psychiatric/Behavioral:  Negative for dysphoric mood. The patient is not nervous/anxious.        Objective   /84   Pulse 65   Temp (!) 97.3 °F (36.3 °C)   Ht 5' 4\" (1.626 m)   Wt 61.7 kg (136 lb)   SpO2 96%   BMI 23.34 kg/m²      Physical Exam  Vitals and nursing note reviewed.   Constitutional:       General: He is not in acute distress.     Appearance: He is well-developed.   HENT:      Head: Normocephalic and atraumatic.     Eyes:      Conjunctiva/sclera: Conjunctivae normal.       Cardiovascular:      Rate and Rhythm: Normal rate and regular rhythm.      Heart sounds: No murmur heard.  Pulmonary:      Effort: Pulmonary effort is normal. No respiratory distress.      Breath sounds: Normal breath sounds.   Abdominal:      Palpations: Abdomen is soft.      Tenderness: There is no abdominal tenderness.     Musculoskeletal:         General: No swelling.      Cervical back: Neck supple.     Skin:     General: Skin is warm and dry.      Capillary Refill: Capillary refill takes less than 2 seconds.      Findings: Erythema present.     "      Neurological:      Mental Status: He is alert.     Psychiatric:         Mood and Affect: Mood normal.

## 2025-07-11 NOTE — ASSESSMENT & PLAN NOTE
Offered PET scan to follow up on the BCC of the right ear  Orders:  •  Prealbumin   Patient's wife, Sally, called back with regards to having orders faxed to [facility] for imaging Dr Lewis had wished for the patient to have done. Sally isn't sure what location the orders were sent to or even if there is a number she can contact for scheduling. Sally is concerned with having this taken care of soon as the patient's condition has been worsening. Sally would appreciate a call back at [phone].

## 2025-07-11 NOTE — ASSESSMENT & PLAN NOTE
Controlled with diet  The patient is noted to have a normal A1c  Lab Results   Component Value Date    HGBA1C 5.8 07/11/2025   Deferred Iris and Ophtho follow up    Orders:  •  POCT hemoglobin A1c  •  IRIS Diabetic eye exam

## 2025-07-11 NOTE — ASSESSMENT & PLAN NOTE
BP controlled without medications at this time  Orders:  •  Comprehensive metabolic panel  •  CBC and differential
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
## 2025-08-20 ENCOUNTER — OFFICE VISIT (OUTPATIENT)
Dept: URGENT CARE | Facility: CLINIC | Age: 85
End: 2025-08-20
Payer: MEDICARE

## 2025-08-20 VITALS
DIASTOLIC BLOOD PRESSURE: 86 MMHG | OXYGEN SATURATION: 94 % | TEMPERATURE: 98.2 F | HEART RATE: 86 BPM | SYSTOLIC BLOOD PRESSURE: 130 MMHG | RESPIRATION RATE: 16 BRPM

## 2025-08-20 DIAGNOSIS — H10.32 ACUTE CONJUNCTIVITIS OF LEFT EYE, UNSPECIFIED ACUTE CONJUNCTIVITIS TYPE: Primary | ICD-10-CM

## 2025-08-20 PROCEDURE — G0463 HOSPITAL OUTPT CLINIC VISIT: HCPCS

## 2025-08-20 PROCEDURE — 99203 OFFICE O/P NEW LOW 30 MIN: CPT

## 2025-08-20 RX ORDER — TOBRAMYCIN 3 MG/ML
1 SOLUTION/ DROPS OPHTHALMIC
Qty: 1.8 ML | Refills: 0 | Status: SHIPPED | COMMUNITY
Start: 2025-08-20 | End: 2025-08-27

## 2025-08-20 RX ORDER — POLYMYXIN B SULFATE AND TRIMETHOPRIM 1; 10000 MG/ML; [USP'U]/ML
1 SOLUTION OPHTHALMIC EVERY 4 HOURS
Qty: 10 ML | Refills: 0 | Status: SHIPPED | OUTPATIENT
Start: 2025-08-20 | End: 2025-08-20

## (undated) DEVICE — BETHLEHEM UNIVERSAL MINOR GEN: Brand: CARDINAL HEALTH

## (undated) DEVICE — GLOVE SRG BIOGEL ECLIPSE 7

## (undated) DEVICE — PROBE 8225101 5PK STD PRASS FL TIP ROHS

## (undated) DEVICE — SCD SEQUENTIAL COMPRESSION COMFORT SLEEVE MEDIUM KNEE LENGTH: Brand: KENDALL SCD

## (undated) DEVICE — GAUZE SPONGES,16 PLY: Brand: CURITY

## (undated) DEVICE — SPONGE 4 X 4 XRAY 16 PLY STRL LF RFD

## (undated) DEVICE — SUT SILK 2-0 30 IN A305H

## (undated) DEVICE — INTENDED FOR TISSUE SEPARATION, AND OTHER PROCEDURES THAT REQUIRE A SHARP SURGICAL BLADE TO PUNCTURE OR CUT.: Brand: BARD-PARKER ® CARBON RIB-BACK BLADES

## (undated) DEVICE — ENT FACIAL BAND UNIVERSAL

## (undated) DEVICE — SPONGE STICK WITH PVP-I: Brand: KENDALL

## (undated) DEVICE — DRAPE EQUIPMENT RF WAND

## (undated) DEVICE — GLOVE PI ULTRA TOUCH SZ.7.5

## (undated) DEVICE — PLUMEPEN PRO 10FT

## (undated) DEVICE — GLOVE PI ULTRA TOUCH SZ.6.5

## (undated) DEVICE — 3M™ TEGADERM™ TRANSPARENT FILM DRESSING FRAME STYLE, 1624W, 2-3/8 IN X 2-3/4 IN (6 CM X 7 CM), 100/CT 4CT/CASE: Brand: 3M™ TEGADERM™

## (undated) DEVICE — 10FR FRAZIER SUCTION HANDLE: Brand: CARDINAL HEALTH

## (undated) DEVICE — OCCLUSIVE GAUZE STRIP,3% BISMUTH TRIBROMOPHENATE IN PETROLATUM BLEND: Brand: XEROFORM

## (undated) DEVICE — GLOVE SRG BIOGEL ORTHOPEDIC 7

## (undated) DEVICE — GLOVE INDICATOR PI UNDERGLOVE SZ 7.5 BLUE

## (undated) DEVICE — ELECTRODE 8227411 PAIRED 4 CH SET ROHS

## (undated) DEVICE — NEEDLE 18 G X 1 1/2

## (undated) DEVICE — VIOLET BRAIDED (POLYGLACTIN 910), SYNTHETIC ABSORBABLE SUTURE: Brand: COATED VICRYL

## (undated) DEVICE — PROXIMATE SKIN STAPLERS (35 WIDE) CONTAINS 35 STAINLESS STEEL STAPLES (FIXED HEAD): Brand: PROXIMATE

## (undated) DEVICE — SUT VICRYL 4-0 SH-1 27 IN J218H

## (undated) DEVICE — SYRINGE 10ML LL

## (undated) DEVICE — TIBURON SPLIT SHEET: Brand: CONVERTORS

## (undated) DEVICE — INTENDED FOR TISSUE SEPARATION, AND OTHER PROCEDURES THAT REQUIRE A SHARP SURGICAL BLADE TO PUNCTURE OR CUT.: Brand: BARD-PARKER SAFETY BLADES SIZE 15, STERILE

## (undated) DEVICE — JACKSON-PRATT 100CC BULB RESERVOIR: Brand: CARDINAL HEALTH

## (undated) DEVICE — GLOVE SRG BIOGEL ECLIPSE 7.5

## (undated) DEVICE — 2000CC GUARDIAN II: Brand: GUARDIAN

## (undated) DEVICE — SUT SILK 2-0 SH 30 IN K833H

## (undated) DEVICE — PENCIL ELECTROSURG E-Z CLEAN -0035H

## (undated) DEVICE — SURGICEL NU-KNIT 3 X 4

## (undated) DEVICE — NEEDLE 25G X 1 1/2

## (undated) DEVICE — ADHESIVE SKIN HIGH VISCOSITY EXOFIN 1ML

## (undated) DEVICE — TUBING SUCTION 5MM X 12 FT

## (undated) DEVICE — CHLORAPREP HI-LITE 26ML ORANGE

## (undated) DEVICE — SPONGE CHERRY 1/2IN

## (undated) DEVICE — SUT SILK 0 CT-1 30 IN 424H

## (undated) DEVICE — SUT VICRYL 4-0 SH 27 IN J415H

## (undated) DEVICE — HARMONIC FOCUS SHEARS 9CM LENGTH + ADAPTIVE TISSUE TECHNOLOGY FOR USE WITH BLUE HAND PIECE ONLY: Brand: HARMONIC FOCUS

## (undated) DEVICE — JP CHAN DRN SIL RND 10FR FULL W/TROCAR: Brand: JACKSON-PRATT

## (undated) DEVICE — SUT ETHILON 2-0 FS 18 IN 664H

## (undated) DEVICE — DECANTER: Brand: UNBRANDED

## (undated) DEVICE — GLOVE SRG BIOGEL 7.5

## (undated) DEVICE — ELECTRODE BLADE MOD E-Z CLEAN  2.75IN 7CM -0012AM

## (undated) DEVICE — 3M™ STERI-DRAPE™ INCISE DRAPE 1040 (35CM X 35CM): Brand: STERI-DRAPE™

## (undated) DEVICE — 3M™ DEFIBRULATOR PADS 2346N: Brand: 3M™

## (undated) DEVICE — COTTON BALLS: Brand: DEROYAL

## (undated) DEVICE — ABDOMINAL PAD: Brand: DERMACEA